# Patient Record
Sex: FEMALE | Race: WHITE | NOT HISPANIC OR LATINO | Employment: FULL TIME | URBAN - METROPOLITAN AREA
[De-identification: names, ages, dates, MRNs, and addresses within clinical notes are randomized per-mention and may not be internally consistent; named-entity substitution may affect disease eponyms.]

---

## 2017-03-07 ENCOUNTER — TRANSFERRED RECORDS (OUTPATIENT)
Dept: HEALTH INFORMATION MANAGEMENT | Facility: CLINIC | Age: 32
End: 2017-03-07

## 2017-03-07 LAB
HPV ABSTRACT: NORMAL
PAP SMEAR - HIM PATIENT REPORTED: NEGATIVE

## 2018-02-23 ENCOUNTER — TRANSFERRED RECORDS (OUTPATIENT)
Dept: HEALTH INFORMATION MANAGEMENT | Facility: CLINIC | Age: 33
End: 2018-02-23

## 2018-03-29 ENCOUNTER — TRANSFERRED RECORDS (OUTPATIENT)
Dept: HEALTH INFORMATION MANAGEMENT | Facility: CLINIC | Age: 33
End: 2018-03-29

## 2018-08-07 ENCOUNTER — MEDICAL CORRESPONDENCE (OUTPATIENT)
Dept: HEALTH INFORMATION MANAGEMENT | Facility: CLINIC | Age: 33
End: 2018-08-07

## 2018-08-07 ENCOUNTER — TRANSFERRED RECORDS (OUTPATIENT)
Dept: HEALTH INFORMATION MANAGEMENT | Facility: CLINIC | Age: 33
End: 2018-08-07

## 2018-08-17 ENCOUNTER — OFFICE VISIT (OUTPATIENT)
Dept: PHARMACY | Facility: CLINIC | Age: 33
End: 2018-08-17
Payer: COMMERCIAL

## 2018-08-17 DIAGNOSIS — J30.2 CHRONIC SEASONAL ALLERGIC RHINITIS DUE TO OTHER ALLERGEN: ICD-10-CM

## 2018-08-17 DIAGNOSIS — J45.30 MILD PERSISTENT ASTHMA WITHOUT COMPLICATION: Primary | ICD-10-CM

## 2018-08-17 DIAGNOSIS — F43.21 ADJUSTMENT DISORDER WITH DEPRESSED MOOD: ICD-10-CM

## 2018-08-17 DIAGNOSIS — G47.00 INSOMNIA, UNSPECIFIED TYPE: ICD-10-CM

## 2018-08-17 DIAGNOSIS — F43.22 ADJUSTMENT DISORDER WITH ANXIOUS MOOD: ICD-10-CM

## 2018-08-17 DIAGNOSIS — M79.7 FIBROMYALGIA: ICD-10-CM

## 2018-08-17 DIAGNOSIS — G43.719 INTRACTABLE CHRONIC MIGRAINE WITHOUT AURA AND WITHOUT STATUS MIGRAINOSUS: ICD-10-CM

## 2018-08-17 PROCEDURE — 99605 MTMS BY PHARM NP 15 MIN: CPT | Performed by: PHARMACIST

## 2018-08-17 PROCEDURE — 99607 MTMS BY PHARM ADDL 15 MIN: CPT | Performed by: PHARMACIST

## 2018-08-17 RX ORDER — TRAZODONE HYDROCHLORIDE 50 MG/1
50 TABLET, FILM COATED ORAL
COMMUNITY
Start: 2017-11-24 | End: 2018-09-20

## 2018-08-17 RX ORDER — HYDROXYZINE HYDROCHLORIDE 25 MG/1
25-50 TABLET, FILM COATED ORAL 2 TIMES DAILY PRN
COMMUNITY
Start: 2017-11-24 | End: 2018-11-27

## 2018-08-17 RX ORDER — MONTELUKAST SODIUM 10 MG/1
10 TABLET ORAL AT BEDTIME
COMMUNITY
Start: 2018-08-03

## 2018-08-17 RX ORDER — LEVALBUTEROL TARTRATE 45 UG/1
1-2 AEROSOL, METERED ORAL EVERY 4 HOURS PRN
COMMUNITY
Start: 2017-05-19 | End: 2022-01-07

## 2018-08-17 RX ORDER — LORAZEPAM 0.5 MG/1
0.5 TABLET ORAL DAILY PRN
COMMUNITY
End: 2019-10-16

## 2018-08-17 RX ORDER — ESCITALOPRAM OXALATE 20 MG/1
40 TABLET ORAL DAILY
COMMUNITY
Start: 2018-02-26 | End: 2018-09-20

## 2018-08-17 RX ORDER — BIOTIN 1 MG
5000 TABLET ORAL DAILY
COMMUNITY
End: 2019-02-19

## 2018-08-17 RX ORDER — BENZONATATE 100 MG/1
100-200 CAPSULE ORAL 3 TIMES DAILY PRN
COMMUNITY
Start: 2018-02-23 | End: 2018-10-02

## 2018-08-17 RX ORDER — FEXOFENADINE HCL 180 MG/1
180 TABLET ORAL DAILY
COMMUNITY
Start: 2018-01-19 | End: 2022-01-07

## 2018-08-17 RX ORDER — GABAPENTIN 300 MG/1
300 CAPSULE ORAL EVERY MORNING
COMMUNITY
Start: 2018-05-09 | End: 2018-11-27

## 2018-08-17 RX ORDER — NAPROXEN 500 MG/1
500 TABLET ORAL 2 TIMES DAILY PRN
COMMUNITY
End: 2018-10-25

## 2018-08-17 RX ORDER — ETONOGESTREL AND ETHINYL ESTRADIOL VAGINAL RING .015; .12 MG/D; MG/D
1 RING VAGINAL WEEKLY
COMMUNITY
Start: 2017-11-24 | End: 2019-06-05

## 2018-08-17 RX ORDER — FLUTICASONE PROPIONATE 110 UG/1
2 AEROSOL, METERED RESPIRATORY (INHALATION) 2 TIMES DAILY
COMMUNITY
Start: 2017-09-06 | End: 2020-02-21

## 2018-08-17 RX ORDER — EPINEPHRINE 0.3 MG/.3ML
0.3 INJECTION SUBCUTANEOUS ONCE
COMMUNITY
Start: 2018-06-12 | End: 2023-03-23

## 2018-08-17 NOTE — MR AVS SNAPSHOT
After Visit Summary   8/17/2018    Paris Nguyen    MRN: [id number]           Patient Information     Date Of Birth          1985        Visit Information        Provider Department      8/17/2018 11:00 AM Areli Schuler [facility] Medication Therapy Management        Today's Diagnoses     Mild persistent asthma without complication    -  1    Fibromyalgia        Chronic seasonal allergic rhinitis due to other allergen        Insomnia, unspecified type        Adjustment disorder with depressed mood        Adjustment disorder with anxious mood        Intractable chronic migraine without aura and without status migrainosus          Care Instructions    Recommendations from today's MTM visit:                                                    Today we reviewed what your medicines are for, how to know if they are working, that your medicines are safe and how to make your medicine regimen as easy as possible.     1. I have placed referrals for psychiatry, acupuncture and pain medicine with health psychology. Those places should be calling you to schedule appointments.  When those phone calls come, discuss coverage and payment with the person on the phone.  I was told this is the best person to help with questions about billing.     2.  You will need to find a new primary care provider in the [facility].  Here are a few names:    Floresita Pacheco      They will all take great care of you.  You can find more information on our website about these providers or just Google them.    Next MTM visit: I'll check in next month and see where you are!    To schedule another MTM appointment, please call the clinic directly or you may call the MTM scheduling line at [phone] or toll-free at [phone].     My Clinical Pharmacist's contact information:                                                      It was a pleasure seeing you today!   Please feel free to contact me with any questions or concerns you have.      Areli Schuler, Pharm.D., [facility]  Medication Therapy Management Pharmacist  Page/VM:  [phone]    You may receive a survey about the MTM services you received.  I would appreciate your feedback to help me serve you better in the future. Please fill it out and return it when you can. Your comments will be anonymous.                  Follow-ups after your visit        Additional Services     ACUPUNCTURE REFERRAL       [facility] provider [facility] [phone]    Please be aware that coverage of these services is subject to the terms and limitations of your health insurance plan.  Call member services at your health plan with any benefit or coverage questions.      Please bring the following with you to your appointment:    (1) Any X-Rays, CTs or MRIs which have been performed.  Contact the facility where they were done to arrange for  prior to your scheduled appointment.  (2) List of current medications   (3) This referral request   (4) Any documents/labs given to you for this referral  Services Requested: INTERVENTIONAL: Evaluations for Blocks/Injections    Please answer the following questions:    1. How long have you been treating this patient for this pain issue?      0 month(s)-  Transferring care from an outside health system    2. Have there been any of the following problematic behaviors?      Medication Management Issues: NO      Chemical Dependency: NO      Psychiatric History or Current Psych/Social Issues: YES depression, anxiety    3. Has the patient been to any other pain clinics and/or programs?      NO - she has seen acupuncture in the past prior to fibromyalgia ddx            BEHAVIORAL / SPIRITUAL HEALTH ([facility] ONLY)       The [facility] Behavioral / Spiritual Health Team is available to support any patient who receives care at the [facility].  This  team provides and/or connects patients and families to appropriate psychological, spiritual, and social work services.      The Behavioral / Spiritual Health Team  also provides consultation to medical providers and other care team members regarding patient behavioral/mental health issues, psychosocial concerns, or spiritual needs.  Services are provided by behavioral health clinicians, licensed psychologists, licensed social workers, and a .     The Behavioral / Spiritual Health Team will coordinate with the patient's medical care team to determine the appropriate response(s) to the current need.        Please provide the following information to assist us in addressing the current concern:    1. Reason for Referral? Depression, anxiety, fibromyalgia - she is specifically requesting psychiatry     2. Has clinical staff discussed this referral to the Behavioral / Spiritual Health Team with the patient and/or caregiver? Yes    3. Who should we contact on the patient's care team to discuss this issue further or to coordinate care? Patient is just establishing care at the [facility] but future contact is recommended            PAIN MANAGEMENT REFERRAL       Your provider has referred you to: [facility]: [facility] [place] [phone] [email or web address]      Please call [phone] to make an appointment. Clinic is located: [facility] [address] [place] [address] [id number] [place] [address]      Please complete the following questions:    Procedure/Referral: Referral Only -  Comprehensive Evaluation and Management  Refer to Dr. Ceci Mcgee for Pain Psychology, Needs approval to be seen, contact [phone] - patient is specifically requesting pain management physician and health psychology.     What is your diagnosis for the patient's pain?  Patient reports fibromyalgia    What are your specific  questions for the pain specialist? Patient requested this referral    Are there any red flags that may impact the assessment or management of the patient? None    Please note the Pre-Op Pain Consult must be scheduled 2-3 weeks prior to the patient's surgery.  Patient's trying to schedule within 2 weeks of surgery may not be accommodated.     Pre-Op Pain Consults are only good for 30 days.    **ANY DIAGNOSTIC TESTS THAT ARE NOT IN EPIC SHOULD BE SENT TO THE PAIN CENTER**    REGARDING OPIOID MEDICATIONS:  The discussion of opioids management, appropriateness of therapy, and dosing will be discussed in patients being seen for evaluation.  The pain management clinics are not long-term prescribing clinics, with transition of prescribing of medications ultimately going back to the referring provider/PCP.  If prescribing is taken over at the pain clinic, it is in actively involved patients whom are appropriate for opioids, urine drug screening is completed, and long-term prescribing plan has been determined.  Therefore, we will not be automatically taking over prescribing at the patient's first visit.  Is this agreeable to you? Agrees - patient will establish care with PCP at the [facility] soon.     Please be aware that coverage of these services is subject to the terms and limitations of your health insurance plan.  Call member services at your health plan with any benefit or coverage questions.      Please bring the following with you to your appointment:    (1) Any X-Rays, CTs or MRIs which have been performed.  Contact the facility where they were done to arrange for  prior to your scheduled appointment.    (2) List of current medications   (3) This referral request   (4) Any documents/labs given to you for this referral                  Who to contact     If you have questions or need follow up information about today's clinic visit or your schedule please contact [facility] MEDICATION THERAPY MANAGEMENT directly at  [phone].  Normal or non-critical lab and imaging results will be communicated to you by MyChart, letter or phone within 4 business days after the clinic has received the results. If you do not hear from us within 7 days, please contact the clinic through [employer] or phone. If you have a critical or abnormal lab result, we will notify you by phone as soon as possible.  Submit refill requests through [employer] or call your pharmacy and they will forward the refill request to us. Please allow 3 business days for your refill to be completed.          Additional Information About Your Visit        [employer] Information     [employer] gives you secure access to your electronic health record. If you see a primary care provider, you can also send messages to your care team and make appointments. If you have questions, please call your primary care clinic.  If you do not have a primary care provider, please call [phone] and they will assist you.        Care EveryWhere ID     This is your Care EveryWhere ID. This could be used by other organizations to access your [place] medical records  [id number]         Blood Pressure from Last 3 Encounters:   10/13/16 115/65    Weight from Last 3 Encounters:   10/12/16 219 lb 1.9 oz (99.4 kg)              We Performed the Following     ACUPUNCTURE REFERRAL     BEHAVIORAL / SPIRITUAL HEALTH ([facility] ONLY)     PAIN MANAGEMENT REFERRAL        Primary Care Provider    None Specified       No primary provider on file.        Equal Access to Services     FERMIN DELEON : Hadii julio Prasad, wanellieda kishore, qaybta kaalana laura barajas, chanel akhtar . So [facility] [place] [phone].    ATENCIÓN: Si habla español, tiene a simons disposición servicios gratuitos de asistencia lingüística. Llame al [phone].    We comply with applicable federal civil rights laws and [place] laws. We do not discriminate on the basis of race, color, national origin, age, disability, sex,  sexual orientation, or gender identity.            Thank you!     Thank you for choosing [facility] MEDICATION THERAPY MANAGEMENT  for your care. Our goal is always to provide you with excellent care. Hearing back from our patients is one way we can continue to improve our services. Please take a few minutes to complete the written survey that you may receive in the mail after your visit with us. Thank you!             Your Updated Medication List - Protect others around you: Learn how to safely use, store and throw away your medicines at www.disposemymeds.org.          This list is accurate as of 8/17/18 11:59 PM.  Always use your most recent med list.                   Brand Name Dispense Instructions for use Diagnosis    benzonatate 100 MG capsule    TESSALON     Take 100-200 mg by mouth 3 times daily as needed Use during asthma exacerbation        biotin 1000 MCG Tabs tablet      Take 5,000 mcg by mouth daily        EPINEPHrine 0.3 MG/0.3ML injection 2-pack    EPIPEN/ADRENACLICK/or ANY BX GENERIC EQUIV     Inject 0.3 mg into the muscle once        escitalopram 20 MG tablet    LEXAPRO     Take 40 mg by mouth daily        etonogestrel-ethinyl estradiol 0.12-0.015 MG/24HR vaginal ring    NUVARING     Place 1 each vaginally once a week        fexofenadine 180 MG tablet    ALLEGRA     Take 180 mg by mouth daily        fluticasone 110 MCG/ACT Inhaler    FLOVENT HFA     Inhale 2 puffs into the lungs 2 times daily        gabapentin 300 MG capsule    NEURONTIN     Take 300 mg by mouth 3 times daily as needed        hydrOXYzine 25 MG tablet    ATARAX     Take 25-50 mg by mouth nightly as needed        levalbuterol 45 MCG/ACT Inhaler    XOPENEX HFA     Inhale 1-2 puffs into the lungs every 4 hours as needed        LORazepam 0.5 MG tablet    ATIVAN     Take 0.5 mg by mouth daily as needed        montelukast 10 MG tablet    SINGULAIR     Take 10 mg by mouth At Bedtime        naproxen 500 MG tablet    NAPROSYN     Take 500 mg  by mouth 2 times daily as needed        traZODone 50 MG tablet    DESYREL     Take 50 mg by mouth nightly as needed

## 2018-08-17 NOTE — PROGRESS NOTES
SUBJECTIVE/OBJECTIVE:                           Paris Nguyen is a 33 year old female coming in for an initial visit for Medication Therapy Management.  She was referred to me from self. She is transferring her care to the [facility] as much as she is able.     Chief Complaint: Initial CMR    Allergies/ADRs: Reviewed in Epic  Tobacco: No tobacco use  Alcohol: 1-3 beverages / week  Caffeine: 2-3 cups/day of coffee  Activity: Very active with gym, pool therapy, PT, Betzy Barnes, gardening  PMH: Reviewed in Epic    Medication Adherence/Access:  no issues reported    Asthma:  Current asthma medications: Xoponex 45 mcg/act 1-2 puffs every 6 hours as needed and prior to exercise and Fluticasone (Flovent) 110 mcg 2 puffs twice daily. Pt rinses their mouth after using steroid inhaler. Asthma triggers include: dust mites, pollens and exercise or sports. She does not have concerns about her inhalers today and feels her asthma is well controlled.   Pt reports the following symptoms: none.  AAP on file: NO  ACT Total Scores 8/17/2018   ACT TOTAL SCORE (Goal Greater than or Equal to 20) 21   In the past 12 months, how many times did you visit the emergency room for your asthma without being admitted to the hospital? 0   In the past 12 months, how many times were you hospitalized overnight because of your asthma? 0     Fibromyalgia: Current medications include gabapentin 300 mg three times daily.  Sedentary activities make her fibro pain worse; light exercise makes it much better. She does pool therapy at [place] twice monthly.  She also has an occupational therapist she really likes and plans to continue to see. She is very active but notes significant soreness after exercise.  She is interested in trying acupuncture again. She also requests a pain management referral and for access to health psychology. Has been on amitriptyline in the past but experienced side effects including deteriorating nail health.  She takes biotin 1000 mcg  "daily for this and reports that it is better now.     Allergies: Current medications include montelukast 10 mg daily, fexofenadine 180 mg daily, hydroxyzine as needed, benzonatate 100 mg 1-2 capsules as needed. She reports allergies to trees, cats, grass, brewers yeast, dust mites, and a variety of foods: melons, bananas, pea protein (oral allergy syndrome).  She is undergoing allergy shots and desensitization with a provider at an outside health system currently. She hopes this will reduce her medication needs.      Insomnia: Current medications include: trazodone 50 daily mg and hydroxyzine 25-50 mg at bedtime as needed (she likes hydroxyzine better because it helps with muscle spasms and anxiety as well as sleep). Pt reports trouble staying asleep and trouble falling asleep. She hopes to get a DreamPad to help with sleep. She hopes this will eliminate her need for medication altogether.     Depression/Anxiety:  Current medications include: Escitalopram 40 mg once daily and trazodone 50 mg at bedtime. Pt reports that depression symptoms are better but were recently worse with some tearful episodes. She finds that her anxiety has been really poor - \"fight or flight state\". She loses her appetite when she is really anxious.  She feels a little \"frayed\".  She only takes her lorazepam when she thinks it will help I.e. when she is preparing for social conflict (difficult work situations, challenging public transportation rides).  She has tried paroxetine and citalopram in the past.  She is concerned that her escitalopram is not working. She does not have confidence in her current provider for psychiatry.  He doesn't spend much time with her during office visits. She would like to establish care at the [facility].      No flowsheet data found.    Migraines: She uses naproxen 500 mg twice daily as needed.  She finds that this is very helpful for her. She has tried Maxalt which made her feel dissociated from her body and Imitrex " wasn't effective. She uses naproxen 2-3 times a week. She denies side effects at this time and is satisfied with her treatment. She is not interested in prophylactic therapy.     Last Comprehensive Metabolic Panel:  No results found for: NA, POTASSIUM, CHLORIDE, CO2, ANIONGAP, GLC, BUN, CR, GFRESTIMATED, MASSIEL    Today's Vitals: There were no vitals taken for this visit. - patient declined      ASSESSMENT:                             Current medications were reviewed today.     Medication Adherence: excellent, no issues identified    Asthma: Stable. Up to date and at goal of ACT > or equal to 20.    Fibromyalgia: Stable. Patient would benefit from an acupuncture referral to help manage muscle pain with fibromyalgia. Will also refer to pain management.     Allergies: Stable. Plan in place with allergist.     Insomnia: Stable.     Depression/Anxiety: Needs Improvement. Based on worsening anxiety symptoms, patient may benefit from additional therapy or a change to an SNRI for anxiety and depression. Defer PHQ9 and GAD7 today. Patient is referred to psychiatry, per her request.     Migraines: Stable.     PLAN:                            1) Referrals for acupuncture, pain medicine, psychiatry and health psychology are all made today.   2) List of primary care providers will be given as well. Patient knows she must establish care with a PCP here to take advantage of pharmacy services.     I spent 45 minutes with this patient today.      Will follow up via [employer] when I have more information about billing info.    The patient was sent via [employer] a summary of these recommendations as an after visit summary.     Areli Schuler, Pharm.D., [facility]  Medication Therapy Management Pharmacist  Page/VM:  [phone]

## 2018-08-18 ASSESSMENT — ASTHMA QUESTIONNAIRES: ACT_TOTALSCORE: 21

## 2018-08-20 NOTE — PATIENT INSTRUCTIONS
Recommendations from today's MTM visit:                                                    Today we reviewed what your medicines are for, how to know if they are working, that your medicines are safe and how to make your medicine regimen as easy as possible.     1. I have placed referrals for psychiatry, acupuncture and pain medicine with health psychology. Those places should be calling you to schedule appointments.  When those phone calls come, discuss coverage and payment with the person on the phone.  I was told this is the best person to help with questions about billing.     2.  You will need to find a new primary care provider in the [facility].  Here are a few names:    Floresita Pacheco      They will all take great care of you.  You can find more information on our website about these providers or just Google them.    Next MTM visit: I'll check in next month and see where you are!    To schedule another MTM appointment, please call the clinic directly or you may call the MTM scheduling line at [phone] or toll-free at [phone].     My Clinical Pharmacist's contact information:                                                      It was a pleasure seeing you today!  Please feel free to contact me with any questions or concerns you have.      Areli Schuler, Pharm.D., [facility]  Medication Therapy Management Pharmacist  Page/VM:  [phone]    You may receive a survey about the MTM services you received.  I would appreciate your feedback to help me serve you better in the future. Please fill it out and return it when you can. Your comments will be anonymous.

## 2018-08-21 ENCOUNTER — HEALTH MAINTENANCE LETTER (OUTPATIENT)
Age: 33
End: 2018-08-21

## 2018-08-24 ENCOUNTER — TRANSFERRED RECORDS (OUTPATIENT)
Dept: HEALTH INFORMATION MANAGEMENT | Facility: CLINIC | Age: 33
End: 2018-08-24

## 2018-09-18 NOTE — TELEPHONE ENCOUNTER
FUTURE VISIT INFORMATION      FUTURE VISIT INFORMATION:    Date: 9/24/18    Time:     Location[place] [facility]  REFERRAL INFORMATION:    Referring provider:      Referring providers clinic:  Primary Care at     Reason for visit/diagnosis  Fibromyalgia    RECORDS REQUESTED FROM:       Clinic name Comments Records Status Imaging Status   [facility]  Care Everywhere n/a                                   RECORDS STATUS

## 2018-09-20 ENCOUNTER — OFFICE VISIT (OUTPATIENT)
Dept: BEHAVIORAL HEALTH | Facility: CLINIC | Age: 33
End: 2018-09-20
Payer: COMMERCIAL

## 2018-09-20 VITALS — SYSTOLIC BLOOD PRESSURE: 126 MMHG | HEART RATE: 72 BPM | DIASTOLIC BLOOD PRESSURE: 82 MMHG

## 2018-09-20 DIAGNOSIS — F41.1 GENERALIZED ANXIETY DISORDER: ICD-10-CM

## 2018-09-20 DIAGNOSIS — F90.9 ATTENTION DEFICIT HYPERACTIVITY DISORDER (ADHD), UNSPECIFIED ADHD TYPE: ICD-10-CM

## 2018-09-20 DIAGNOSIS — F33.1 MODERATE EPISODE OF RECURRENT MAJOR DEPRESSIVE DISORDER (H): Primary | ICD-10-CM

## 2018-09-20 DIAGNOSIS — G47.00 INSOMNIA, UNSPECIFIED TYPE: ICD-10-CM

## 2018-09-20 PROBLEM — J45.40 MODERATE PERSISTENT ASTHMA WITHOUT COMPLICATION: Status: ACTIVE | Noted: 2017-09-20

## 2018-09-20 PROBLEM — M79.7 FIBROMYALGIA: Status: ACTIVE | Noted: 2018-02-08

## 2018-09-20 PROBLEM — F84.0 AUTISM SPECTRUM DISORDER: Status: ACTIVE | Noted: 2018-08-31

## 2018-09-20 PROBLEM — F41.9 ANXIETY: Status: ACTIVE | Noted: 2018-08-31

## 2018-09-20 PROBLEM — F42.9 OCD (OBSESSIVE COMPULSIVE DISORDER): Status: ACTIVE | Noted: 2018-08-31

## 2018-09-20 RX ORDER — GABAPENTIN 300 MG/1
600 CAPSULE ORAL AT BEDTIME
Qty: 60 CAPSULE | Refills: 1 | Status: SHIPPED | OUTPATIENT
Start: 2018-09-20 | End: 2018-11-27

## 2018-09-20 RX ORDER — GUANFACINE 1 MG/1
1 TABLET, EXTENDED RELEASE ORAL AT BEDTIME
Qty: 30 TABLET | Refills: 1 | Status: SHIPPED | OUTPATIENT
Start: 2018-09-20 | End: 2018-10-16 | Stop reason: SINTOL

## 2018-09-20 RX ORDER — TRAZODONE HYDROCHLORIDE 50 MG/1
25-50 TABLET, FILM COATED ORAL
Qty: 30 TABLET | Refills: 1 | Status: SHIPPED | OUTPATIENT
Start: 2018-09-20 | End: 2019-04-16

## 2018-09-20 RX ORDER — ESCITALOPRAM OXALATE 20 MG/1
20 TABLET ORAL DAILY
Qty: 30 TABLET | Refills: 1 | Status: SHIPPED | OUTPATIENT
Start: 2018-09-20 | End: 2018-10-16

## 2018-09-20 ASSESSMENT — ANXIETY QUESTIONNAIRES
1. FEELING NERVOUS, ANXIOUS, OR ON EDGE: MORE THAN HALF THE DAYS
GAD7 TOTAL SCORE: 11
6. BECOMING EASILY ANNOYED OR IRRITABLE: MORE THAN HALF THE DAYS
5. BEING SO RESTLESS THAT IT IS HARD TO SIT STILL: SEVERAL DAYS
GAD7 TOTAL SCORE: 11
7. FEELING AFRAID AS IF SOMETHING AWFUL MIGHT HAPPEN: SEVERAL DAYS
2. NOT BEING ABLE TO STOP OR CONTROL WORRYING: SEVERAL DAYS
GAD7 TOTAL SCORE: 11
4. TROUBLE RELAXING: NEARLY EVERY DAY
7. FEELING AFRAID AS IF SOMETHING AWFUL MIGHT HAPPEN: SEVERAL DAYS
3. WORRYING TOO MUCH ABOUT DIFFERENT THINGS: SEVERAL DAYS

## 2018-09-20 ASSESSMENT — PATIENT HEALTH QUESTIONNAIRE - PHQ9
SUM OF ALL RESPONSES TO PHQ QUESTIONS 1-9: 21
SUM OF ALL RESPONSES TO PHQ QUESTIONS 1-9: 21
10. IF YOU CHECKED OFF ANY PROBLEMS, HOW DIFFICULT HAVE THESE PROBLEMS MADE IT FOR YOU TO DO YOUR WORK, TAKE CARE OF THINGS AT HOME, OR GET ALONG WITH OTHER PEOPLE: EXTREMELY DIFFICULT
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
## 2018-09-20 NOTE — PATIENT INSTRUCTIONS
Decrease Lexapro to 20mg daily.   Add a bedtime dose of 600mg of gabapentin to address sleep, and possibly RLS. Ask your new pain and primary doctors about RLS workup, and potentially Complete Blood Count and maybe iron studies. Iron supplementation may help if RLS is an issue as well.   Decrease trazodone as you are able. Start with 1/2 tablet, and see how this goes, gauge for improvement in morning sedation. If this goes okay, may discontinue.   Start guanfacine ER (Intuniv) 1mg at bedtime.       Scheduling:  If you have any concerns about today's visit or wish to schedule another appointment please call our office during normal business hours [phone] (8-5:00 M-F)    Contact Us:  Please call [phone] during business hours (8-5:00 M-F).  If after clinic hours, or on the weekend, please call  [phone].    Financial Assistance [phone]  [facility] Billing [phone]  [facility] Billing [phone]  Medical Records [phone]      MENTAL HEALTH CRISIS NUMBERS:  [facility]:   [facility] - [phone]   Crisis Residence [facility]idence - [phone]   Walk-In Counseling [facility] - [phone]   COPE 24/7 [facility] Mobile Team for Adults - [[phone]]; Child - [[phone]]     Crisis Connection - [phone]     [facility] - [phone]   Walk-in counseling [facility] - [phone]   Walk-in counseling [facility] - [phone]   Crisis Residence [facility] - [phone]   Urgent Care Adult Mental Health:   --Drop-in, 24/7 crisis line, and [facility] Mobile Team [[phone]]    CRISIS TEXT LINE: Text [phone] from anywhere, anytime, any crisis 24/7;    OR SEE www.crisistextline.org     Poison Control Center - [phone]    CHILD: [facility] needs assessment team - [phone]     [facility] - [phone]; or [facility] - [phone]    If  you have a medical emergency please call 911 or go to the nearest ER.

## 2018-09-20 NOTE — NURSING NOTE
Chief Complaint   Patient presents with     Eval/Assessment     depression and anxiety     Would like out of today's visit:  Treatment for increased in depression does not think antidepressant is working. Interested in [facility]. Had some recent testing done and wants to know how it relates to her medication management.

## 2018-09-20 NOTE — MR AVS SNAPSHOT
After Visit Summary   9/20/2018    Paris Nguyen    MRN: [id number]           Patient Information     Date Of Birth          1985        Visit Information        Provider Department      9/20/2018 3:00 PM Chin Cerda MD [facility]        Today's Diagnoses     Moderate episode of recurrent major depressive disorder (H)    -  1    Generalized anxiety disorder        Attention deficit hyperactivity disorder (ADHD), unspecified ADHD type        Insomnia, unspecified type          Care Instructions    Decrease Lexapro to 20mg daily.   Add a bedtime dose of 600mg of gabapentin to address sleep, and possibly RLS. Ask your new pain and primary doctors about RLS workup, and potentially Complete Blood Count and maybe iron studies. Iron supplementation may help if RLS is an issue as well.   Decrease trazodone as you are able. Start with 1/2 tablet, and see how this goes, gauge for improvement in morning sedation. If this goes okay, may discontinue.   Start guanfacine ER (Intuniv) 1mg at bedtime.       Scheduling:  If you have any concerns about today's visit or wish to schedule another appointment please call our office during normal business hours [phone] (8-5:00 M-F)    Contact Us:  Please call [phone] during business hours (8-5:00 M-F).  If after clinic hours, or on the weekend, please call  [phone].    Financial Assistance [phone]  [employer] Billing [phone]  [place] Billing [phone]  Medical Records [phone]      MENTAL HEALTH CRISIS NUMBERS:  [facility]:   [facility] - [phone]   Crisis Residence [facility] Residence - [phone]   Walk-In Counseling [facility] - [phone]   COPE 24/7 [place] Mobile Team for Adults - [[phone]]; Child - [[phone]]     Crisis Connection - [phone]     [facility]:   [facility] - [phone]   Walk-in counseling [facility]  [phone]   Walk-in counseling [facility] - [phone]   Crisis Residence [facility] - [phone]   Urgent Care Adult Mental Health:   --Drop-in, 24/7 crisis line, [facility] Mobile Team [[phone]]    CRISIS TEXT LINE: Text [phone] from anywhere, anytime, any crisis 24/7;    OR SEE www.crisistextline.org     Poison Control Center - [phone]    CHILD: [facility] needs assessment team - [phone]     [facility] - 1-573.941.3386; or [employer] [facility] - 1-316.344.1933    If you have a medical emergency please call 911 or go to the nearest ER.             Follow-ups after your visit        Follow-up notes from your care team     Return in about 4 weeks (around 10/18/2018).      Your next 10 appointments already scheduled     Sep 24, 2018  8:20 AM CDT   (Arrive by 8:05 AM)   New Patient Visit with TORSTEN Salcedo [facility] ([facility] [place])    [address] [address] [phone]            Oct 16, 2018 11:00 AM CDT   (Arrive by 10:45 AM)   Return Visit with Chin Cerda MD   [facility] ([facility])    [address] [address] [phone]              Who to contact     Please call your clinic at [phone] to:    Ask questions about your health    Make or cancel appointments    Discuss your medicines    Learn about your test results    Speak to your doctor            Additional Information About Your Visit        MyChart Information     [employer] gives you secure access to your electronic health record. If you see a primary care provider, you can also send messages to your care team and make appointments. If you have questions, please call your primary care clinic.  If you do not have a primary care provider, please call [phone] and they will assist  you.      [employer] is an electronic gateway that provides easy, online access to your medical records. With [employer], you can request a clinic appointment, read your test results, renew a prescription or communicate with your care team.     To access your existing account, please contact your [facility] or call [phone] for assistance.        Care EveryWhere ID     This is your Care EveryWhere ID. This could be used by other organizations to access your [facility] medical records  [id number]        Your Vitals Were     Pulse                   72            Blood Pressure from Last 3 Encounters:   09/20/18 126/82   10/13/16 115/65    Weight from Last 3 Encounters:   10/12/16 99.4 kg (219 lb 1.9 oz)              Today, you had the following     No orders found for display         Today's Medication Changes          These changes are accurate as of 9/20/18  4:38 PM.  If you have any questions, ask your nurse or doctor.               Start taking these medicines.        Dose/Directions    guanFACINE 1 MG Tb24 24 hr tablet   Commonly known as:  INTUNIV   Used for:  Generalized anxiety disorder, Attention deficit hyperactivity disorder (ADHD), unspecified ADHD type   Started by:  Chin Cerda MD        Dose:  1 mg   Take 1 tablet (1 mg) by mouth At Bedtime   Quantity:  30 tablet   Refills:  1         These medicines have changed or have updated prescriptions.        Dose/Directions    escitalopram 20 MG tablet   Commonly known as:  LEXAPRO   This may have changed:  how much to take   Used for:  Moderate episode of recurrent major depressive disorder (H), Generalized anxiety disorder   Changed by:  Chin Cerda MD        Dose:  20 mg   Take 1 tablet (20 mg) by mouth daily   Quantity:  30 tablet   Refills:  1       * gabapentin 300 MG capsule   Commonly known as:  NEURONTIN   This may have changed:  Another medication with the same name was added. Make sure you understand how and  when to take each.   Changed by:  Chin Cerda MD        Dose:  300 mg   Take 300 mg by mouth 3 times daily as needed   Refills:  0       * gabapentin 300 MG capsule   Commonly known as:  NEURONTIN   This may have changed:  You were already taking a medication with the same name, and this prescription was added. Make sure you understand how and when to take each.   Used for:  Insomnia, unspecified type, Generalized anxiety disorder   Changed by:  Chin Cerda MD        Dose:  600 mg   Take 2 capsules (600 mg) by mouth At Bedtime   Quantity:  60 capsule   Refills:  1       traZODone 50 MG tablet   Commonly known as:  DESYREL   This may have changed:    - how much to take  - reasons to take this   Used for:  Insomnia, unspecified type   Changed by:  Chin Cerda MD        Dose:  25-50 mg   Take 0.5-1 tablets (25-50 mg) by mouth nightly as needed for sleep   Quantity:  30 tablet   Refills:  1       * Notice:  This list has 2 medication(s) that are the same as other medications prescribed for you. Read the directions carefully, and ask your doctor or other care provider to review them with you.         Where to get your medicines      These medications were sent to [facility] [address] [place], [place] - [address]  [address], [place] [address]     Phone:  [phone]     escitalopram 20 MG tablet    gabapentin 300 MG capsule    guanFACINE 1 MG Tb24 24 hr tablet    traZODone 50 MG tablet                Primary Care Provider    None Specified       No primary provider on file.        Equal Access to Services     [facility]: Hadii julio Prasad, wanellieda ludonnelladaha, qaybta kaalmada landryda, chanel lazo. So [facility] [place] [phone].    ATENCIÓN: Si habla español, tiene a simons disposición servicios gratuitos de asistencia lingüística. Llame al [phone].    We comply with applicable federal civil rights laws and [place] laws. We do not discriminate on  the basis of race, color, national origin, age, disability, sex, sexual orientation, or gender identity.            Thank you!     Thank you for choosing [facility]  for your care. Our goal is always to provide you with excellent care. Hearing back from our patients is one way we can continue to improve our services. Please take a few minutes to complete the written survey that you may receive in the mail after your visit with us. Thank you!             Your Updated Medication List - Protect others around you: Learn how to safely use, store and throw away your medicines at www.disposemymeds.org.          This list is accurate as of 9/20/18  4:38 PM.  Always use your most recent med list.                   Brand Name Dispense Instructions for use Diagnosis    benzonatate 100 MG capsule    TESSALON     Take 100-200 mg by mouth 3 times daily as needed Use during asthma exacerbation        biotin 1000 MCG Tabs tablet      Take 5,000 mcg by mouth daily        EPINEPHrine 0.3 MG/0.3ML injection 2-pack    EPIPEN/ADRENACLICK/or ANY BX GENERIC EQUIV     Inject 0.3 mg into the muscle once        escitalopram 20 MG tablet    LEXAPRO    30 tablet    Take 1 tablet (20 mg) by mouth daily    Moderate episode of recurrent major depressive disorder (H), Generalized anxiety disorder       etonogestrel-ethinyl estradiol 0.12-0.015 MG/24HR vaginal ring    NUVARING     Place 1 each vaginally once a week        fexofenadine 180 MG tablet    ALLEGRA     Take 180 mg by mouth daily        fluticasone 110 MCG/ACT Inhaler    FLOVENT HFA     Inhale 2 puffs into the lungs 2 times daily        * gabapentin 300 MG capsule    NEURONTIN     Take 300 mg by mouth 3 times daily as needed        * gabapentin 300 MG capsule    NEURONTIN    60 capsule    Take 2 capsules (600 mg) by mouth At Bedtime    Insomnia, unspecified type, Generalized anxiety disorder       guanFACINE 1 MG Tb24 24 hr tablet    INTUNIV    30 tablet    Take 1 tablet  (1 mg) by mouth At Bedtime    Generalized anxiety disorder, Attention deficit hyperactivity disorder (ADHD), unspecified ADHD type       hydrOXYzine 25 MG tablet    ATARAX     Take 25-50 mg by mouth 2 times daily as needed for itching or anxiety        levalbuterol 45 MCG/ACT Inhaler    XOPENEX HFA     Inhale 1-2 puffs into the lungs every 4 hours as needed        LORazepam 0.5 MG tablet    ATIVAN     Take 0.5 mg by mouth daily as needed        montelukast 10 MG tablet    SINGULAIR     Take 10 mg by mouth At Bedtime        naproxen 500 MG tablet    NAPROSYN     Take 500 mg by mouth 2 times daily as needed        traZODone 50 MG tablet    DESYREL    30 tablet    Take 0.5-1 tablets (25-50 mg) by mouth nightly as needed for sleep    Insomnia, unspecified type       * Notice:  This list has 2 medication(s) that are the same as other medications prescribed for you. Read the directions carefully, and ask your doctor or other care provider to review them with you.

## 2018-09-21 ASSESSMENT — PATIENT HEALTH QUESTIONNAIRE - PHQ9: SUM OF ALL RESPONSES TO PHQ QUESTIONS 1-9: 21

## 2018-09-21 ASSESSMENT — ANXIETY QUESTIONNAIRES: GAD7 TOTAL SCORE: 11

## 2018-09-24 ENCOUNTER — OFFICE VISIT (OUTPATIENT)
Dept: ANESTHESIOLOGY | Facility: CLINIC | Age: 33
End: 2018-09-24
Payer: COMMERCIAL

## 2018-09-24 ENCOUNTER — PRE VISIT (OUTPATIENT)
Dept: ANESTHESIOLOGY | Facility: CLINIC | Age: 33
End: 2018-09-24

## 2018-09-24 VITALS
DIASTOLIC BLOOD PRESSURE: 67 MMHG | WEIGHT: 226 LBS | HEART RATE: 77 BPM | BODY MASS INDEX: 37.65 KG/M2 | RESPIRATION RATE: 16 BRPM | HEIGHT: 65 IN | SYSTOLIC BLOOD PRESSURE: 103 MMHG

## 2018-09-24 DIAGNOSIS — M79.7 FIBROMYALGIA: Primary | ICD-10-CM

## 2018-09-24 DIAGNOSIS — M62.838 MUSCLE SPASM: ICD-10-CM

## 2018-09-24 DIAGNOSIS — M79.18 MYOFASCIAL PAIN: ICD-10-CM

## 2018-09-24 RX ORDER — TIZANIDINE 2 MG/1
2 TABLET ORAL 3 TIMES DAILY PRN
Qty: 90 TABLET | Refills: 0 | Status: SHIPPED | OUTPATIENT
Start: 2018-09-24 | End: 2019-06-05

## 2018-09-24 ASSESSMENT — PATIENT HEALTH QUESTIONNAIRE - PHQ9
SUM OF ALL RESPONSES TO PHQ QUESTIONS 1-9: 17
SUM OF ALL RESPONSES TO PHQ QUESTIONS 1-9: 17
10. IF YOU CHECKED OFF ANY PROBLEMS, HOW DIFFICULT HAVE THESE PROBLEMS MADE IT FOR YOU TO DO YOUR WORK, TAKE CARE OF THINGS AT HOME, OR GET ALONG WITH OTHER PEOPLE: VERY DIFFICULT

## 2018-09-24 ASSESSMENT — ENCOUNTER SYMPTOMS
CHILLS: 0
DIARRHEA: 0
NUMBNESS: 1
EYE REDNESS: 1
DOUBLE VISION: 1
WEIGHT GAIN: 0
COUGH DISTURBING SLEEP: 0
HALLUCINATIONS: 0
HEMOPTYSIS: 0
DIZZINESS: 1
BOWEL INCONTINENCE: 0
BLOOD IN STOOL: 0
DYSPNEA ON EXERTION: 1
SHORTNESS OF BREATH: 1
TINGLING: 1
ABDOMINAL PAIN: 0
VOMITING: 0
HEADACHES: 1
NIGHT SWEATS: 1
NAUSEA: 1
POOR WOUND HEALING: 0
FATIGUE: 1
DEPRESSION: 1
DECREASED APPETITE: 1
SEIZURES: 0
EYE WATERING: 1
PANIC: 0
WEAKNESS: 0
LOSS OF CONSCIOUSNESS: 0
FEVER: 0
INSOMNIA: 0
ARTHRALGIAS: 1
INCREASED ENERGY: 1
SPEECH CHANGE: 1
NERVOUS/ANXIOUS: 1
BLOATING: 1
WEIGHT LOSS: 0
SPUTUM PRODUCTION: 1
JAUNDICE: 0
RECTAL PAIN: 1
SNORES LOUDLY: 0
DISTURBANCES IN COORDINATION: 1
BACK PAIN: 1
POLYDIPSIA: 1
ALTERED TEMPERATURE REGULATION: 1
NECK PAIN: 1
EYE IRRITATION: 1
PARALYSIS: 0
DECREASED CONCENTRATION: 1
MEMORY LOSS: 0
JOINT SWELLING: 0
HEARTBURN: 1
NAIL CHANGES: 0
COUGH: 0
POLYPHAGIA: 0
MUSCLE WEAKNESS: 1
EYE PAIN: 1
CONSTIPATION: 1
STIFFNESS: 1
MYALGIAS: 1
POSTURAL DYSPNEA: 0
MUSCLE CRAMPS: 1
TREMORS: 0
SKIN CHANGES: 0

## 2018-09-24 ASSESSMENT — PAIN SCALES - GENERAL: PAINLEVEL: MILD PAIN (2)

## 2018-09-24 NOTE — LETTER
"9/24/2018       RE: Paris Nguyen  [address] [place] [address]     Dear Colleague,    Thank you for referring your patient, Paris Nguyen, to the [facility] at [facility]. Please see a copy of my visit note below.    I had the pleasure of meeting Ms. Paris Nguyen on 9/24/2018 in the outpatient pain clinic in consult for Dr. Alvarado with regards to her fibromyalgia.   HPI:  Patient presents with past medical history of OCD, ADHD, depression, asthma, possible autism spectrum disorder, endometriosis, IBS; she is here today for evaluation of fibromyalgia. Patient states she was diagnosed with fibromyalgia by \"process of elimination\" by her neurologist and primary care provider approximately two years ago. She reports chronic fatigue and widespread pain; symptoms are aggravated by stress, depression, sleeplessness. Patient closely follows with her psychiatrist, but admits she is \"in between therapists\" but is interested in finding someone who has an understanding of chronic pain.   Her medication regimen for fibromyalgia management currently consists of gabapentin 300 mg which she takes morning and afternoon with a recent increase to 600 mg at HS at bedtime. She is also taking Lexapro through psychiatry management, but mentions she is tapering down with plans to alter to Cymbalta. She takes naproxen as needed which is greatly efficacious.   Further attempted treatments include current once weekly aquatic physical therapy, occupation therapy for sensory integration, regular massage therapy. She has used a friend's TENS unit and oral muscle relaxants in the past with good benefit. Patient also states that she focuses heavily on sleep hygiene with blackout curtains, noise machine, and a weighted blanket.    ?  Past Medical History:   Diagnosis Date     Anxiety      Depression      GERD (gastroesophageal reflux disease)      IBS " (irritable bowel syndrome)      Migraines      Pelvic and perineal pain      Uncomplicated asthma     past medical history reviewed with patient.   Past Surgical History:   Procedure Laterality Date     CYSTOSCOPY N/A 10/12/2016    Procedure: CYSTOSCOPY;  Surgeon: Eliazar Patel MD;  Location:  OR     DAVINCI ASSISTED ABLATION / EXCISION OF ENDOMETRIOSIS N/A 10/12/2016    Procedure: DAVINCI ASSISTED ABLATION / EXCISION OF ENDOMETRIOSIS;  Surgeon: Eliazar Patel MD;  Location:  OR     DAVINCI LYSIS OF ADHESIONS N/A 10/12/2016    Procedure: DAVINCI LYSIS OF ADHESIONS;  Surgeon: Eliazar Patel MD;  Location:  OR     DAVINCI PELVIC PROCEDURE N/A 10/12/2016    Procedure: DAVINCI PELVIC PROCEDURE;  Surgeon: Eliazar Patel MD;  Location:  OR     DILATION AND CURETTAGE, HYSTEROSCOPY DIAGNOSTIC, COMBINED N/A 10/12/2016    Procedure: COMBINED DILATION AND CURETTAGE, HYSTEROSCOPY DIAGNOSTIC;  Surgeon: Eliazar Patel MD;  Location:  OR     EYE SURGERY      Eye Surgery x 2 as an infant     HC TOOTH EXTRACTION W/FORCEP      past surgical history reviewed with patient.   Medications:  Current Outpatient Prescriptions   Medication     benzonatate (TESSALON) 100 MG capsule     biotin 1000 MCG TABS tablet     EPINEPHrine (EPIPEN/ADRENACLICK/OR ANY BX GENERIC EQUIV) 0.3 MG/0.3ML injection 2-pack     escitalopram (LEXAPRO) 20 MG tablet     etonogestrel-ethinyl estradiol (NUVARING) 0.12-0.015 MG/24HR vaginal ring     fexofenadine (ALLEGRA) 180 MG tablet     gabapentin (NEURONTIN) 300 MG capsule     gabapentin (NEURONTIN) 300 MG capsule     guanFACINE (INTUNIV) 1 MG TB24 24 hr tablet     hydrOXYzine (ATARAX) 25 MG tablet     levalbuterol (XOPENEX HFA) 45 MCG/ACT Inhaler     LORazepam (ATIVAN) 0.5 MG tablet     montelukast (SINGULAIR) 10 MG tablet     naproxen (NAPROSYN) 500 MG tablet     traZODone (DESYREL) 50 MG tablet     fluticasone (FLOVENT HFA) 110 MCG/ACT Inhaler     No current  "facility-administered medications for this visit.      A multi-state Prescription Monitoring Program reviewed and no aberrancies noted.     Allergies:     Allergies   Allergen Reactions     Latex Rash     Rash and blisters     Liquid Adhesive Rash     Rash and blisters       Family History:  family history includes Alcoholism in her father; Attention Deficit Disorder in her sister; Depression in her father; Hypothyroidism in her mother; KIDNEY DISEASE in her mother; Myocardial Infarction (age of onset: 53) in her paternal grandfather; Pancreatitis in her father.  Social history: she lives in [place]. She is in the process of looking for a new job.   Smoking: never smoker. Alcohol: yes, 3x/month. Street drugs: denies.     ROS:  A 14 point review of systems was completed; results are listed at end of note.     Objective:   /67  Pulse 77  Resp 16  Ht 1.638 m (5' 4.5\")  Wt 102.5 kg (226 lb)  BMI 38.19 kg/m2  Body mass index is 38.19 kg/(m^2).  General: In no apparent distress  Mental status: Normal mood and affect, pleasant  Neuro: Alert, oriented. No sensory deficits.   Head: Atraumatic, normocephalic  Eyes: Extra-ocular movements grossly intact, no scleral icterus  Neck: Supple, Range of motion full  Respiratory: Non-labored breathing; No respiratory distress  Skin: No rashes or lesions noted on exposed areas of skin  Msk:  Full range of motion of all 4 extremities, no swelling or erythema of joints   TTP in 18/18 common points for fibromyalgia: occiput, trapezius, supraspinatus, lateral glutes, low cervical neck, 2nd rib, lateral epicondyles, greater trochanters, bilateral knees.     Assessment:  Ms. Paris Nguyen is a 34 yo female seen today for fibromyalgia management.     Plan:   1. Patient education: I went over the above diagnoses and treatment plan with her and answered all of her questions.  2. Interventions:  -We discussed integrative medicine modalities, such as acupuncture, myofascial release, " TENS, trigger point injections/dry needling. Patient interested in trying a TENS unit; DME provided. Acupuncture may be considered in the future as patient has had good benefit with TMJ in the past.   3. Medications  1. Trial of tizanidine 2 mg tid prn for muscle spasm; I did discuss the weak evidence for skeletal muscle relaxants in fibromyalgia, however, as she reports benefit in the past, I think it is a reasonable option. Discussed side effect profile with patient in depth.   -Patient is likely tapering from her Lexapro with alteration to duloxetine through her psychiatrist.   -May consider further titration of gabapentin in future.   -Continue with naproxen; consider alternating with acetaminophen.   4. Exercise program:   -Current in physical therapy, OT, and massage; continue with HEP.   5. Behavioral Psychology:   -Referral to Dr. Ceci Mcgee in pain psychology; consider CBT. Patient has had benefit with CBT in the past.   6. Follow up: Return in 8-12 weeks.     Total time spent was 30 minutes, and more than 50% of face to face time was spent in counseling and/or coordination of care regarding the above plan.    Thank you for the consult.   TORSTEN Salcedo, [facility]          Answers for HPI/ROS submitted by the patient on 9/24/2018   General Symptoms: Yes  Skin Symptoms: Yes  HENT Symptoms: No  EYE SYMPTOMS: Yes  HEART SYMPTOMS: No  LUNG SYMPTOMS: Yes  INTESTINAL SYMPTOMS: Yes  URINARY SYMPTOMS: No  GYNECOLOGIC SYMPTOMS: No  BREAST SYMPTOMS: No  SKELETAL SYMPTOMS: Yes  BLOOD SYMPTOMS: No  NERVOUS SYSTEM SYMPTOMS: Yes  MENTAL HEALTH SYMPTOMS: Yes  Fever: No  Loss of appetite: Yes  Weight loss: No  Weight gain: No  Fatigue: Yes  Night sweats: Yes  Chills: No  Increased stress: Yes  Excessive hunger: No  Excessive thirst: Yes  Feeling hot or cold when others believe the temperature is normal: Yes  Loss of height: No  Post-operative complications: No  Surgical  site pain: No  Hallucinations: No  Change in or Loss of Energy: Yes  Hyperactivity: No  Confusion: No  Changes in hair: No  Changes in moles/birth marks: No  Itching: Yes  Rashes: No  Changes in nails: No  Acne: Yes  Hair in places you don't want it: No  Change in facial hair: No  Warts: No  Non-healing sores: No  Scarring: No  Flaking of skin: No  Color changes of hands/feet in cold : No  Sun sensitivity: Yes  Skin thickening: No  Eye pain: Yes  Vision loss: No  Dry eyes: Yes  Watery eyes: Yes  Eye bulging: No  Double vision: Yes  Flashing of lights: No  Spots: No  Floaters: Yes  Redness: Yes  Crossed eyes: Yes  Tunnel Vision: No  Yellowing of eyes: No  Eye irritation: Yes  Cough: No  Sputum or phlegm: Yes  Coughing up blood: No  Difficulty breating or shortness of breath: Yes  Snoring: No  Difficulty breathing on exertion: Yes  Nighttime Cough: No  Difficulty breathing when lying flat: No  Heart burn or indigestion: Yes  Nausea: Yes  Vomiting: No  Abdominal pain: No  Bloating: Yes  Constipation: Yes  Diarrhea: No  Blood in stool: No  Black stools: No  Rectal or Anal pain: Yes  Fecal incontinence: No  Yellowing of skin or eyes: No  Vomit with blood: No  Change in stools: No  Back pain: Yes  Muscle aches: Yes  Neck pain: Yes  Swollen joints: No  Joint pain: Yes  Bone pain: No  Muscle cramps: Yes  Muscle weakness: Yes  Joint stiffness: Yes  Bone fracture: No  Trouble with coordination: Yes  Dizziness or trouble with balance: Yes  Fainting or black-out spells: No  Memory loss: No  Headache: Yes  Seizures: No  Speech problems: Yes  Tingling: Yes  Tremor: No  Weakness: No  Difficulty walking: No  Paralysis: No  Numbness: Yes  Nervous or Anxious: Yes  Depression: Yes  Trouble sleeping: No  Trouble thinking or concentrating: Yes  Mood changes: No  Panic attacks: No  PHQ-2 Score: 4  If you checked off any problems, how difficult have these problems made it for you to do your work, take care of things at home, or get along  with other people?: Very difficult  PHQ9 TOTAL SCORE: 17      Again, thank you for allowing me to participate in the care of your patient.      Sincerely,    TORSTEN Salcedo CNP

## 2018-09-24 NOTE — NURSING NOTE
LPN reviewed AVS with Pt.  Pt verbalized an understanding of information, and was asked to contact clinic with questions.    Annabel Lucia LPN

## 2018-09-24 NOTE — LETTER
Date:September 25, 2018      Patient was self referred, no letter generated. Do not send.        [facility] Information

## 2018-09-24 NOTE — MR AVS SNAPSHOT
After Visit Summary   9/24/2018    Paris Nguyen    MRN: [id number]           Patient Information     Date Of Birth          1985        Visit Information        Provider Department      9/24/2018 8:20 AM Terri Otero APRN [facility]        Today's Diagnoses     Fibromyalgia    -  1    Muscle spasm        Myofascial pain          Care Instructions    1. Trial Tizanidine 2 mg Three times a day, as needed.    2.  Referral to Dr. Mcgee in Pain/Health Psychology.   [phone]    3. TENS unit ordered- Contact your insurance regarding coverage.      Follow up: 8 weeks in clinic.       To speak with a nurse, schedule/reschedule/cancel a clinic appointment, or request a medication refill call: [phone]     You can also reach us by [employer]: [email or web address]    For refills, please call on Monday, 1 week before your medication runs out. The doctors are not always in clinic, so this gives us time to get your prescriptions ready.  Please let us know the name of the medication you are requesting a refill of.                                     Follow-ups after your visit        Additional Services     PSYCHOLOGY REFERRAL       To make an appointment for health psychology, call any of these providers to make an appointmen:    Dr. Ceci Mcgee [phone]                  Follow-up notes from your care team     Return in about 8 weeks (around 11/19/2018).      Your next 10 appointments already scheduled     Oct 02, 2018  7:05 AM CDT   (Arrive by 6:50 AM)   New Patient Visit with DO FABI Staton [facility] ([facility])    [address] [place] [id number] [phone]            Oct 16, 2018 11:00 AM CDT   (Arrive by 10:45 AM)   Return Visit with MD FABI Iqbal [facility] ([facility])    [address] [place] [address]  "Se  3rd [facility] [id number] [phone]              Who to contact     Please call your clinic at [phone] to:    Ask questions about your health    Make or cancel appointments    Discuss your medicines    Learn about your test results    Speak to your doctor            Additional Information About Your Visit        MyChart Information     [employer] gives you secure access to your electronic health record. If you see a primary care provider, you can also send messages to your care team and make appointments. If you have questions, please call your primary care clinic.  If you do not have a primary care provider, please call [phone] and they will assist you.      [employer] is an electronic gateway that provides easy, online access to your medical records. With [employer], you can request a clinic appointment, read your test results, renew a prescription or communicate with your care team.     To access your existing account, please contact your [facility] or call [phone] for assistance.        Care EveryWhere ID     This is your Care EveryWhere ID. This could be used by other organizations to access your [place] medical records  [id number]        Your Vitals Were     Pulse Respirations Height BMI (Body Mass Index)          77 16 1.638 m (5' 4.5\") 38.19 kg/m2         Blood Pressure from Last 3 Encounters:   09/24/18 103/67   09/20/18 126/82   10/13/16 115/65    Weight from Last 3 Encounters:   09/24/18 102.5 kg (226 lb)   10/12/16 99.4 kg (219 lb 1.9 oz)              We Performed the Following     PSYCHOLOGY REFERRAL          Today's Medication Changes          These changes are accurate as of 9/24/18  9:18 AM.  If you have any questions, ask your nurse or doctor.               Start taking these medicines.        Dose/Directions    order for DME   Used for:  Fibromyalgia, Muscle spasm, Myofascial pain   Started by:  Terri Otero APRN CNP        Equipment " being ordered: TENS All necessary equipment: leads/pads Duration of use: 36 months Apply to painful areas   Quantity:  1 Device   Refills:  0       tiZANidine 2 MG tablet   Commonly known as:  ZANAFLEX   Used for:  Fibromyalgia, Muscle spasm   Started by:  Terri Otero APRN CNP        Dose:  2 mg   Take 1 tablet (2 mg) by mouth 3 times daily as needed for muscle spasms   Quantity:  90 tablet   Refills:  0            Where to get your medicines      These medications were sent to [place] [address] [place] [address] [address] [facility]  [address], [place] [address]     Phone:  [phone]     tiZANidine 2 MG tablet         Some of these will need a paper prescription and others can be bought over the counter.  Ask your nurse if you have questions.     Bring a paper prescription for each of these medications     order for DME                Primary Care Provider    None Specified       No primary provider on file.        Equal Access to Services     [facility] : Keesha Prasad, rigo novak, dannie barajas, chanel akhtar . So [facility] [phone].    ATENCIÓN: Si habla español, tiene a simons disposición servicios gratuitos de asistencia lingüística. Silvia al [phone].    We comply with applicable federal civil rights laws and [place] laws. We do not discriminate on the basis of race, color, national origin, age, disability, sex, sexual orientation, or gender identity.            Thank you!     Thank you for choosing [facility]  for your care. Our goal is always to provide you with excellent care. Hearing back from our patients is one way we can continue to improve our services. Please take a few minutes to complete the written survey that you may receive in the mail after your visit with us. Thank you!             Your Updated Medication List - Protect others around you: Learn how to safely use, store  and throw away your medicines at www.disposemymeds.org.          This list is accurate as of 9/24/18  9:18 AM.  Always use your most recent med list.                   Brand Name Dispense Instructions for use Diagnosis    benzonatate 100 MG capsule    TESSALON     Take 100-200 mg by mouth 3 times daily as needed Use during asthma exacerbation        biotin 1000 MCG Tabs tablet      Take 5,000 mcg by mouth daily        EPINEPHrine 0.3 MG/0.3ML injection 2-pack    EPIPEN/ADRENACLICK/or ANY BX GENERIC EQUIV     Inject 0.3 mg into the muscle once        escitalopram 20 MG tablet    LEXAPRO    30 tablet    Take 1 tablet (20 mg) by mouth daily    Moderate episode of recurrent major depressive disorder (H), Generalized anxiety disorder       etonogestrel-ethinyl estradiol 0.12-0.015 MG/24HR vaginal ring    NUVARING     Place 1 each vaginally once a week        fexofenadine 180 MG tablet    ALLEGRA     Take 180 mg by mouth daily        fluticasone 110 MCG/ACT Inhaler    FLOVENT HFA     Inhale 2 puffs into the lungs 2 times daily        * gabapentin 300 MG capsule    NEURONTIN     Take 300 mg by mouth 3 times daily as needed        * gabapentin 300 MG capsule    NEURONTIN    60 capsule    Take 2 capsules (600 mg) by mouth At Bedtime    Insomnia, unspecified type, Generalized anxiety disorder       guanFACINE 1 MG Tb24 24 hr tablet    INTUNIV    30 tablet    Take 1 tablet (1 mg) by mouth At Bedtime    Generalized anxiety disorder, Attention deficit hyperactivity disorder (ADHD), unspecified ADHD type       hydrOXYzine 25 MG tablet    ATARAX     Take 25-50 mg by mouth 2 times daily as needed for itching or anxiety        levalbuterol 45 MCG/ACT Inhaler    XOPENEX HFA     Inhale 1-2 puffs into the lungs every 4 hours as needed        LORazepam 0.5 MG tablet    ATIVAN     Take 0.5 mg by mouth daily as needed        montelukast 10 MG tablet    SINGULAIR     Take 10 mg by mouth At Bedtime        naproxen 500 MG tablet    NAPROSYN      Take 500 mg by mouth 2 times daily as needed        order for DME     1 Device    Equipment being ordered: TENS All necessary equipment: leads/pads Duration of use: 36 months Apply to painful areas    Fibromyalgia, Muscle spasm, Myofascial pain       tiZANidine 2 MG tablet    ZANAFLEX    90 tablet    Take 1 tablet (2 mg) by mouth 3 times daily as needed for muscle spasms    Fibromyalgia, Muscle spasm       traZODone 50 MG tablet    DESYREL    30 tablet    Take 0.5-1 tablets (25-50 mg) by mouth nightly as needed for sleep    Insomnia, unspecified type       * Notice:  This list has 2 medication(s) that are the same as other medications prescribed for you. Read the directions carefully, and ask your doctor or other care provider to review them with you.

## 2018-09-24 NOTE — PROGRESS NOTES
"I had the pleasure of meeting Ms. Paris Nguyen on 9/24/2018 in the outpatient pain clinic in consult for Dr. Alvarado with regards to her fibromyalgia.   HPI:  Patient presents with past medical history of OCD, ADHD, depression, asthma, possible autism spectrum disorder, endometriosis, IBS; she is here today for evaluation of fibromyalgia. Patient states she was diagnosed with fibromyalgia by \"process of elimination\" by her neurologist and primary care provider approximately two years ago. She reports chronic fatigue and widespread pain; symptoms are aggravated by stress, depression, sleeplessness. Patient closely follows with her psychiatrist, but admits she is \"in between therapists\" but is interested in finding someone who has an understanding of chronic pain.   Her medication regimen for fibromyalgia management currently consists of gabapentin 300 mg which she takes morning and afternoon with a recent increase to 600 mg at HS at bedtime. She is also taking Lexapro through psychiatry management, but mentions she is tapering down with plans to alter to Cymbalta. She takes naproxen as needed which is greatly efficacious.   Further attempted treatments include current once weekly aquatic physical therapy, occupation therapy for sensory integration, regular massage therapy. She has used a friend's TENS unit and oral muscle relaxants in the past with good benefit. Patient also states that she focuses heavily on sleep hygiene with blackout curtains, noise machine, and a weighted blanket.    ?  Past Medical History:   Diagnosis Date     Anxiety      Depression      GERD (gastroesophageal reflux disease)      IBS (irritable bowel syndrome)      Migraines      Pelvic and perineal pain      Uncomplicated asthma     past medical history reviewed with patient.   Past Surgical History:   Procedure Laterality Date     CYSTOSCOPY N/A 10/12/2016    Procedure: CYSTOSCOPY;  Surgeon: Eliazar Patel MD;  Location:  OR     " DAVINCI ASSISTED ABLATION / EXCISION OF ENDOMETRIOSIS N/A 10/12/2016    Procedure: DAVINCI ASSISTED ABLATION / EXCISION OF ENDOMETRIOSIS;  Surgeon: Eliazar Patel MD;  Location:  OR     DAVINCI LYSIS OF ADHESIONS N/A 10/12/2016    Procedure: DAVINCI LYSIS OF ADHESIONS;  Surgeon: Eliazar Patel MD;  Location:  OR     DAVINCI PELVIC PROCEDURE N/A 10/12/2016    Procedure: DAVINCI PELVIC PROCEDURE;  Surgeon: Eliazar Patel MD;  Location:  OR     DILATION AND CURETTAGE, HYSTEROSCOPY DIAGNOSTIC, COMBINED N/A 10/12/2016    Procedure: COMBINED DILATION AND CURETTAGE, HYSTEROSCOPY DIAGNOSTIC;  Surgeon: Eliazar Patel MD;  Location:  OR     EYE SURGERY      Eye Surgery x 2 as an infant     HC TOOTH EXTRACTION W/FORCEP      past surgical history reviewed with patient.   Medications:  Current Outpatient Prescriptions   Medication     benzonatate (TESSALON) 100 MG capsule     biotin 1000 MCG TABS tablet     EPINEPHrine (EPIPEN/ADRENACLICK/OR ANY BX GENERIC EQUIV) 0.3 MG/0.3ML injection 2-pack     escitalopram (LEXAPRO) 20 MG tablet     etonogestrel-ethinyl estradiol (NUVARING) 0.12-0.015 MG/24HR vaginal ring     fexofenadine (ALLEGRA) 180 MG tablet     gabapentin (NEURONTIN) 300 MG capsule     gabapentin (NEURONTIN) 300 MG capsule     guanFACINE (INTUNIV) 1 MG TB24 24 hr tablet     hydrOXYzine (ATARAX) 25 MG tablet     levalbuterol (XOPENEX HFA) 45 MCG/ACT Inhaler     LORazepam (ATIVAN) 0.5 MG tablet     montelukast (SINGULAIR) 10 MG tablet     naproxen (NAPROSYN) 500 MG tablet     traZODone (DESYREL) 50 MG tablet     fluticasone (FLOVENT HFA) 110 MCG/ACT Inhaler     No current facility-administered medications for this visit.      A multi-state Prescription Monitoring Program reviewed and no aberrancies noted.     Allergies:     Allergies   Allergen Reactions     Latex Rash     Rash and blisters     Liquid Adhesive Rash     Rash and blisters       Family History:  family history  "includes Alcoholism in her father; Attention Deficit Disorder in her sister; Depression in her father; Hypothyroidism in her mother; KIDNEY DISEASE in her mother; Myocardial Infarction (age of onset: 53) in her paternal grandfather; Pancreatitis in her father.  Social history: she lives in [place]. She is in the process of looking for a new job.   Smoking: never smoker. Alcohol: yes, 3x/month. Street drugs: denies.     ROS:  A 14 point review of systems was completed; results are listed at end of note.     Objective:   /67  Pulse 77  Resp 16  Ht 1.638 m (5' 4.5\")  Wt 102.5 kg (226 lb)  BMI 38.19 kg/m2  Body mass index is 38.19 kg/(m^2).  General: In no apparent distress  Mental status: Normal mood and affect, pleasant  Neuro: Alert, oriented. No sensory deficits.   Head: Atraumatic, normocephalic  Eyes: Extra-ocular movements grossly intact, no scleral icterus  Neck: Supple, Range of motion full  Respiratory: Non-labored breathing; No respiratory distress  Skin: No rashes or lesions noted on exposed areas of skin  Msk:  Full range of motion of all 4 extremities, no swelling or erythema of joints   TTP in 18/18 common points for fibromyalgia: occiput, trapezius, supraspinatus, lateral glutes, low cervical neck, 2nd rib, lateral epicondyles, greater trochanters, bilateral knees.     Assessment:  Ms. Paris Nguyen is a 32 yo female seen today for fibromyalgia management.     Plan:   1. Patient education: I went over the above diagnoses and treatment plan with her and answered all of her questions.  2. Interventions:  -We discussed integrative medicine modalities, such as acupuncture, myofascial release, TENS, trigger point injections/dry needling. Patient interested in trying a TENS unit; DME provided. Acupuncture may be considered in the future as patient has had good benefit with TMJ in the past.   3. Medications  1. Trial of tizanidine 2 mg tid prn for muscle spasm; I did discuss the weak evidence for " skeletal muscle relaxants in fibromyalgia, however, as she reports benefit in the past, I think it is a reasonable option. Discussed side effect profile with patient in depth.   -Patient is likely tapering from her Lexapro with alteration to duloxetine through her psychiatrist.   -May consider further titration of gabapentin in future.   -Continue with naproxen; consider alternating with acetaminophen.   4. Exercise program:   -Current in physical therapy, OT, and massage; continue with HEP.   5. Behavioral Psychology:   -Referral to Dr. Ceci Mcgee in pain psychology; consider CBT. Patient has had benefit with CBT in the past.   6. Follow up: Return in 8-12 weeks.     Total time spent was 30 minutes, and more than 50% of face to face time was spent in counseling and/or coordination of care regarding the above plan.    Thank you for the consult.   TORSTNE Salcedo, [facility]          Answers for HPI/ROS submitted by the patient on 9/24/2018   General Symptoms: Yes  Skin Symptoms: Yes  HENT Symptoms: No  EYE SYMPTOMS: Yes  HEART SYMPTOMS: No  LUNG SYMPTOMS: Yes  INTESTINAL SYMPTOMS: Yes  URINARY SYMPTOMS: No  GYNECOLOGIC SYMPTOMS: No  BREAST SYMPTOMS: No  SKELETAL SYMPTOMS: Yes  BLOOD SYMPTOMS: No  NERVOUS SYSTEM SYMPTOMS: Yes  MENTAL HEALTH SYMPTOMS: Yes  Fever: No  Loss of appetite: Yes  Weight loss: No  Weight gain: No  Fatigue: Yes  Night sweats: Yes  Chills: No  Increased stress: Yes  Excessive hunger: No  Excessive thirst: Yes  Feeling hot or cold when others believe the temperature is normal: Yes  Loss of height: No  Post-operative complications: No  Surgical site pain: No  Hallucinations: No  Change in or Loss of Energy: Yes  Hyperactivity: No  Confusion: No  Changes in hair: No  Changes in moles/birth marks: No  Itching: Yes  Rashes: No  Changes in nails: No  Acne: Yes  Hair in places you don't want it: No  Change in facial hair: No  Warts: No  Non-healing  sores: No  Scarring: No  Flaking of skin: No  Color changes of hands/feet in cold : No  Sun sensitivity: Yes  Skin thickening: No  Eye pain: Yes  Vision loss: No  Dry eyes: Yes  Watery eyes: Yes  Eye bulging: No  Double vision: Yes  Flashing of lights: No  Spots: No  Floaters: Yes  Redness: Yes  Crossed eyes: Yes  Tunnel Vision: No  Yellowing of eyes: No  Eye irritation: Yes  Cough: No  Sputum or phlegm: Yes  Coughing up blood: No  Difficulty breating or shortness of breath: Yes  Snoring: No  Difficulty breathing on exertion: Yes  Nighttime Cough: No  Difficulty breathing when lying flat: No  Heart burn or indigestion: Yes  Nausea: Yes  Vomiting: No  Abdominal pain: No  Bloating: Yes  Constipation: Yes  Diarrhea: No  Blood in stool: No  Black stools: No  Rectal or Anal pain: Yes  Fecal incontinence: No  Yellowing of skin or eyes: No  Vomit with blood: No  Change in stools: No  Back pain: Yes  Muscle aches: Yes  Neck pain: Yes  Swollen joints: No  Joint pain: Yes  Bone pain: No  Muscle cramps: Yes  Muscle weakness: Yes  Joint stiffness: Yes  Bone fracture: No  Trouble with coordination: Yes  Dizziness or trouble with balance: Yes  Fainting or black-out spells: No  Memory loss: No  Headache: Yes  Seizures: No  Speech problems: Yes  Tingling: Yes  Tremor: No  Weakness: No  Difficulty walking: No  Paralysis: No  Numbness: Yes  Nervous or Anxious: Yes  Depression: Yes  Trouble sleeping: No  Trouble thinking or concentrating: Yes  Mood changes: No  Panic attacks: No  PHQ-2 Score: 4  If you checked off any problems, how difficult have these problems made it for you to do your work, take care of things at home, or get along with other people?: Very difficult  PHQ9 TOTAL SCORE: 17

## 2018-09-24 NOTE — PATIENT INSTRUCTIONS
1. Trial Tizanidine 2 mg Three times a day, as needed.    2.  Referral to Dr. Mcgee in Pain/Health Psychology.   [phone]    3. TENS unit ordered- Contact your insurance regarding coverage.      Follow up: 8 weeks in clinic.       To speak with a nurse, schedule/reschedule/cancel a clinic appointment, or request a medication refill call: [phone]     You can also reach us by [employer]: [email or web address]    For refills, please call on Monday, 1 week before your medication runs out. The doctors are not always in clinic, so this gives us time to get your prescriptions ready.  Please let us know the name of the medication you are requesting a refill of.

## 2018-09-25 ASSESSMENT — PATIENT HEALTH QUESTIONNAIRE - PHQ9: SUM OF ALL RESPONSES TO PHQ QUESTIONS 1-9: 17

## 2018-09-26 ASSESSMENT — ENCOUNTER SYMPTOMS
HEARTBURN: 1
EXERCISE INTOLERANCE: 1
NAUSEA: 0
DOUBLE VISION: 1
DECREASED APPETITE: 1
LIGHT-HEADEDNESS: 1
NECK PAIN: 1
EYE WATERING: 1
RECTAL PAIN: 0
WEIGHT LOSS: 0
SYNCOPE: 0
LOSS OF CONSCIOUSNESS: 0
WEAKNESS: 1
PANIC: 0
POLYPHAGIA: 0
EYE PAIN: 0
HALLUCINATIONS: 0
NERVOUS/ANXIOUS: 1
HYPOTENSION: 1
INCREASED ENERGY: 1
MUSCLE WEAKNESS: 1
HOT FLASHES: 0
CHILLS: 0
BACK PAIN: 1
POLYDIPSIA: 1
NUMBNESS: 0
MUSCLE CRAMPS: 1
HEMOPTYSIS: 0
SORE THROAT: 1
SINUS CONGESTION: 0
DECREASED CONCENTRATION: 1
WEIGHT GAIN: 0
SLEEP DISTURBANCES DUE TO BREATHING: 0
TROUBLE SWALLOWING: 0
WHEEZING: 0
SNORES LOUDLY: 0
ALTERED TEMPERATURE REGULATION: 1
DISTURBANCES IN COORDINATION: 1
BLOOD IN STOOL: 0
TASTE DISTURBANCE: 0
EYE IRRITATION: 1
POSTURAL DYSPNEA: 0
FATIGUE: 1
COUGH DISTURBING SLEEP: 0
SPUTUM PRODUCTION: 1
MEMORY LOSS: 1
PARALYSIS: 0
INSOMNIA: 0
EYE REDNESS: 0
DIARRHEA: 0
TREMORS: 1
JOINT SWELLING: 0
NECK MASS: 0
STIFFNESS: 1
SEIZURES: 0
SINUS PAIN: 0
DEPRESSION: 1
POOR WOUND HEALING: 0
DIFFICULTY URINATING: 0
LEG PAIN: 0
NAIL CHANGES: 0
DYSPNEA ON EXERTION: 1
COUGH: 0
ORTHOPNEA: 0
SHORTNESS OF BREATH: 1
HYPERTENSION: 0
SMELL DISTURBANCE: 0
TINGLING: 1
SKIN CHANGES: 0
ARTHRALGIAS: 1
DIZZINESS: 1
HEMATURIA: 0
CONSTIPATION: 1
BOWEL INCONTINENCE: 0
SPEECH CHANGE: 1
ABDOMINAL PAIN: 0
VOMITING: 0
BLOATING: 1
HOARSE VOICE: 1
FLANK PAIN: 0
HEADACHES: 1
FEVER: 0
PALPITATIONS: 0
NIGHT SWEATS: 1
DYSURIA: 0
JAUNDICE: 0
DECREASED LIBIDO: 1
MYALGIAS: 1

## 2018-10-02 ENCOUNTER — OFFICE VISIT (OUTPATIENT)
Dept: INTERNAL MEDICINE | Facility: CLINIC | Age: 33
End: 2018-10-02
Payer: COMMERCIAL

## 2018-10-02 VITALS
SYSTOLIC BLOOD PRESSURE: 122 MMHG | TEMPERATURE: 98.5 F | OXYGEN SATURATION: 97 % | DIASTOLIC BLOOD PRESSURE: 79 MMHG | HEART RATE: 84 BPM | BODY MASS INDEX: 38.4 KG/M2 | WEIGHT: 227.2 LBS | RESPIRATION RATE: 12 BRPM

## 2018-10-02 DIAGNOSIS — E55.9 VITAMIN D DEFICIENCY: ICD-10-CM

## 2018-10-02 DIAGNOSIS — D50.8 IRON DEFICIENCY ANEMIA SECONDARY TO INADEQUATE DIETARY IRON INTAKE: ICD-10-CM

## 2018-10-02 DIAGNOSIS — M79.7 FIBROMYALGIA: ICD-10-CM

## 2018-10-02 DIAGNOSIS — R53.83 OTHER FATIGUE: ICD-10-CM

## 2018-10-02 DIAGNOSIS — D50.8 OTHER IRON DEFICIENCY ANEMIA: Primary | ICD-10-CM

## 2018-10-02 LAB
FERRITIN SERPL-MCNC: 41 NG/ML (ref 12–150)
HETEROPH AB SER QL: NEGATIVE
IRON SATN MFR SERPL: 17 % (ref 15–46)
IRON SERPL-MCNC: 77 UG/DL (ref 35–180)
TIBC SERPL-MCNC: 447 UG/DL (ref 240–430)
TRANSFERRIN SERPL-MCNC: 343 MG/DL (ref 210–360)

## 2018-10-02 ASSESSMENT — PAIN SCALES - GENERAL: PAINLEVEL: NO PAIN (0)

## 2018-10-02 NOTE — NURSING NOTE
"Chief Complaint   Patient presents with     Establish Care     patient states she is here to establish care.  Patient complains of \"flu\" like symptoms     ADAM WHEELER at 6:56 AM on 10/2/2018.      "

## 2018-10-02 NOTE — PROGRESS NOTES
PRIMARY CARE CENTER         HPI:       Paris Nguyen is a 33 year old female with PMH notable for fibromyalgia, IBS, TMJ, moderate persistent asthma, endometriosis, INDRA, and depression who presents for flu-like symptoms and to establish care. [facility] at which time she had reported 2 week history of fatigue and malaise. Labs at that time were notable for low vitamin D, normal TSH, hgb 12.1 (unchanged since 2016), ferritin 39, MCV 84, and hgbA1C 5.2%.     Today, she reports a 5 day history of increased fatigue, diffuse weakness, poor appetite, sinus pressure, both thin and thick marte colored sputum, and difficulty adjusting eyes into focus. Denies any diplopia, weight changes, fevers, muscle aches, changes in bowel habits, or abdominal pain. She states she has been sleeping 14 hours per day. Her partner who is a  had similar symptoms 4 weeks ago. She also reports starting guanfacine in the past week, but states that this made her too fatigued and has discontinued this several days ago. No recent travel. She states that she may soon apply for FMLA as she has used most of her PTO days due to frequent appointments with PT, OT, psych and allergy.       Problem, Medication and Allergy Lists were   reviewed and are current.     Patient Active Problem List    Diagnosis Date Noted     ADHD 08/31/2018     Priority: Medium     Autism spectrum disorder 08/31/2018     Priority: Medium     Generalized anxiety disorder 08/31/2018     Priority: Medium     OCD (obsessive compulsive disorder) 08/31/2018     Priority: Medium     Fibromyalgia 02/08/2018     Priority: Medium     Moderate persistent asthma without complication 09/20/2017     Priority: Medium     TMJ arthralgia 04/06/2015     Priority: Medium     IBS (irritable bowel syndrome) 03/19/2015     Priority: Medium         Current Outpatient Prescriptions   Medication Sig Dispense Refill     biotin 1000 MCG TABS tablet  Take 5,000 mcg by mouth daily       EPINEPHrine (EPIPEN/ADRENACLICK/OR ANY BX GENERIC EQUIV) 0.3 MG/0.3ML injection 2-pack Inject 0.3 mg into the muscle once       escitalopram (LEXAPRO) 20 MG tablet Take 1 tablet (20 mg) by mouth daily 30 tablet 1     etonogestrel-ethinyl estradiol (NUVARING) 0.12-0.015 MG/24HR vaginal ring Place 1 each vaginally once a week       fexofenadine (ALLEGRA) 180 MG tablet Take 180 mg by mouth daily       gabapentin (NEURONTIN) 300 MG capsule Take 2 capsules (600 mg) by mouth At Bedtime 60 capsule 1     gabapentin (NEURONTIN) 300 MG capsule Take 300 mg by mouth every morning        guanFACINE (INTUNIV) 1 MG TB24 24 hr tablet Take 1 tablet (1 mg) by mouth At Bedtime 30 tablet 1     hydrOXYzine (ATARAX) 25 MG tablet Take 25-50 mg by mouth 2 times daily as needed for itching or anxiety        levalbuterol (XOPENEX HFA) 45 MCG/ACT Inhaler Inhale 1-2 puffs into the lungs every 4 hours as needed       LORazepam (ATIVAN) 0.5 MG tablet Take 0.5 mg by mouth daily as needed       montelukast (SINGULAIR) 10 MG tablet Take 10 mg by mouth At Bedtime       naproxen (NAPROSYN) 500 MG tablet Take 500 mg by mouth 2 times daily as needed       order for DME Equipment being ordered: TENS  All necessary equipment: leads/pads  Duration of use: 36 months  Apply to painful areas 1 Device 0     tiZANidine (ZANAFLEX) 2 MG tablet Take 1 tablet (2 mg) by mouth 3 times daily as needed for muscle spasms 90 tablet 0     traZODone (DESYREL) 50 MG tablet Take 0.5-1 tablets (25-50 mg) by mouth nightly as needed for sleep 30 tablet 1     fluticasone (FLOVENT HFA) 110 MCG/ACT Inhaler Inhale 2 puffs into the lungs 2 times daily           Allergies   Allergen Reactions     Latex Rash     Rash and blisters     Liquid Adhesive Rash     Rash and blisters       Patient is a new patient to this clinic and so  I reviewed/updated the Past Medical History, the Family History and the Social History.          Review of Systems:    LIAM  I have personally reviewed and updated the complete ROS on the day of the visit.           Physical Exam:   /79  Pulse 84  Temp 98.5  F (36.9  C)  Resp 12  Wt 103.1 kg (227 lb 3.2 oz)  SpO2 97%  BMI 38.4 kg/m2  Body mass index is 38.4 kg/(m^2).  Vitals were reviewed       GENERAL APPEARANCE: healthy, alert and no distress     EYES: EOMI, PERRL     HENT: ear canals normal and TM's scarred, and nose; oropharynx without erythema, exudates or sores     NECK: no adenopathy, no asymmetry, masses, or scars and thyroid normal to palpation     RESP: lungs clear to auscultation - no rales, rhonchi or wheezes     CV: regular rates and rhythm, normal S1 S2, no S3 or S4 and no murmur, click or rub     ABDOMEN:  soft, obese, nontender, no HSM or masses and bowel sounds normal     MS: extremities normal- no gross deformities noted, no evidence of inflammation in joints, FROM in all extremities.     SKIN: no suspicious lesions or rashes over exposed areas     NEURO: Normal strength and tone, sensory exam grossly normal, mentation intact and speech normal     PSYCH: mentation appears normal. and affect normal/bright     LYMPHATICS: No cervical or supraclavicular adenopathy        Results:      Results from the last 24 hoursNo results found for this or any previous visit (from the past 24 hour(s)).  Assessment and Plan     Paris was seen today for establish care.    Diagnoses and all orders for this visit:    #. Fibromyalgia  Pt already engaged in regular physical activity with PT and OT althoughshe reports struggling with this. She does follow up with psychiatry but would be interested in consolidating her care in the [facility] system.   -     [facility] (Dr. Casanova)  -     PSYCHIATRY REFERRAL [facility] for [facility]  - Cont gabapentin     #. Fatigue  Recently evaluated for similar symptoms on 9/25 with labs notable for normal TSH, hgb and HgbA1C. Vitamin D low at 25 and she has begun taking a multivitamin for  this. Differential includes viral illness, anxiety/depression, mononucleosis, vit D def, or fibromyalgia.   -     Mononucleosis screen; Future  -     Cont OTC multivitamin   -     Repeat iron studies    #. HCM  BP - normotensive  PAP - 11/24/17  TDAP - UTD  Deferred until next visit: influenza, HIV, fasting lipids    Options for treatment and follow-up care were reviewed with the patient. Paris Nguyen engaged in the decision making process and verbalized understanding of the options discussed and agreed with the final plan.    Jeromy Almanzar,   Oct 2, 2018    Pt was seen and plan of care discussed with Dr. Bower.   Ms Nguyen was  seen and examined with the resident Maikel, I have reviewed his note and plan and I agree. She would benefit from care in an integrative care setting  We are trying to obtain suggestions from dr Casanova as how to best arrange that.  David Bower MD

## 2018-10-02 NOTE — MR AVS SNAPSHOT
After Visit Summary   10/2/2018    Paris Nguyen    MRN: [id number]           Patient Information     Date Of Birth          1985        Visit Information        Provider Department      10/2/2018 7:05 AM Jeromy Almanzar,  [facility]        Today's Diagnoses     Other iron deficiency anemia    -  1    Vitamin D deficiency        Other fatigue        Iron deficiency anemia secondary to inadequate dietary iron intake        Fibromyalgia          Care Instructions    Primary Care Center Medication Refill Request Information:  * Please contact your pharmacy regarding ANY request for medication refills.  ** [facility] Prescription Fax = [phone]  * Please allow 3 business days for routine medication refills.  * Please allow 5 business days for controlled substance medication refills.     Primary Care Center Test Result notification information:  *You will be notified with in 7-10 days of your appointment day regarding the results of your test.  If you are on MyChart you will be notified as soon as the provider has reviewed the results and signed off on them.    Primary Care Center: [phone]              [facility]         Internal Medicine Resident                   Continuity Clinic    Who We Are    Resident Continuity Clinic is a part of the [facility] Primary Care Clinic.  Resident physicians see patients independently and establish a relationship with them over the course of their three-year residency program.  As with the Primary Care Clinic, our Resident Continuity Clinic models a group practice.  If your doctor is not available, you will be able to see another resident physician.  At the end of a resident s training, patients will be transitioned to a new resident physician for ongoing care.     We treat patients with a wide array of medical needs from routine physicals, to acute illnesses, to diabetes and blood pressure management, to complex medical  illness.  What is a Resident Physician?    Resident physicians hold medical degrees and are doctors. They are training to become specialists in Internal Medicine. They work under the supervision of board-certified faculty physicians.  Expectations for Your Care    We strive to provide accessible, quality care at all times.    In order to provide this care, it is best to see your primary care resident doctor consistently rather switching between providers.  In the event you do see another physician, you should schedule a follow-up visit with your usual primary care doctor.    If you are transitioning your care from another clinic, it is helpful to have your records available for your doctor to review.    We do not prescribe controlled substances, such as ADD medications or narcotic pain medications, on your first visit.  We will review your health records and concerns prior to devising a treatment plan with you in order to provide the best care.      Clinic Services     Extended clinic hours; patient  to help navigate your visit;  parking; laboratory and imaging services with evening and weekend hours    Multiple medical and surgical specialties in one building    Complementary services, including Nutrition, Integrative Medicine, Pharmacy consultations, Mental and Behavioral Health, Sports Medicine and Physical Therapy    Thank You    We would like to thank you for choosing the [facility] Resident Continuity Clinic for your primary care. You are making a priceless contribution to the training of the next generation of health care practitioners.     Contact us at [phone] for appointments or questions.    Resident Clinic Hours are Tuesdays and Thursdays, 7:30am-5:00pm    Residents  Josie Landon MD   (Female )   Hortencia Stevens MD   (Female)   Benson Lew MD  (Male)   Ismael Ybarra MD  (Male)   Lloyd Alonso MD   (Female)   Param Adorno MD  (Male)    Austen  MD Asher  (Male)   Jeromy Almanzar MD  (Male)   Ismael Epps MD (Male)   Yosi Lopez MD  (Male)   Amy Menchaca MD (Female)    Linda Isaac MD (Female)   Concepcion Vasquez MD  (Male)   Bette Jack MD(Female)   Nanci Mcallister MD  (Female)    Supervising Physicians   MD Tiarra Whaley, MD Ayaz Villar, MD Maikol Magallon, MD Ceci Hawkins, MD David Chaney MD Heather Thompson Buum, MD Merlyn Leahy MD          Please go to the lab on the first floor before you leave today.           Follow-ups after your visit        Additional Services     [facility]ative [facility] (Dr. Casanova)       Patient to be scheduled at the [facility]            PSYCHIATRY REFERRAL [facility]       Your provider has referred you to: [facility] for a Primary Care Consultation. Please call [phone] to make an appointment.    Clinic is located at:  [facility], 2nd Floor  [address] [place] [address], [address] [address]    Please complete the following questions:    Reason for Referral: CBT for fibromyalgia     What specific question is it most critical for you and the patient to have answered? Assistance with fibromyalgia management/CBT    You have requested a one-time CONSULTATION visit. This means that although we will make recommendations and provide a multi-step plan where appropriate, the Psychiatry Clinic will not provide ongoing care. We expect that as the referring provider, you will continue to see and manage the patient's care primarily, and we will remain available via Epic for any ongoing questions that arise after the initial visit. In rare and unusually complex cases, we may schedule a follow up visit to complete the assessment, but this should also not be seen as taking over the case.    Is this acceptable to you? Yes                  Follow-up notes from your care team     Return in about 2 months (around  12/2/2018).      Your next 10 appointments already scheduled     Oct 16, 2018 11:00 AM CDT   (Arrive by 10:45 AM)   Return Visit with Chin Cerda MD   [facility] ([facility])    [address]  3rd [facility] [id number] [phone]            Nov 20, 2018  9:00 AM CST   (Arrive by 8:45 AM)   Return Visit with TORSTEN Salcedo CNP   [facility] ([facility])    [address] [place] [address]  4th Floor  [facility] [id number] [phone]              Future tests that were ordered for you today     Open Future Orders        Priority Expected Expires Ordered    Transferrin Routine 10/2/2018 10/2/2019 10/2/2018    Mononucleosis screen Routine 10/2/2018 10/16/2018 10/2/2018    Iron and iron binding capacity Routine 10/2/2018 10/2/2019 10/2/2018    Ferritin Routine 10/2/2018 10/2/2019 10/2/2018            Who to contact     Please call your clinic at [phone] to:    Ask questions about your health    Make or cancel appointments    Discuss your medicines    Learn about your test results    Speak to your doctor            Additional Information About Your Visit        [employer] Information     [employer] gives you secure access to your electronic health record. If you see a primary care provider, you can also send messages to your care team and make appointments. If you have questions, please call your primary care clinic.  If you do not have a primary care provider, please call [phone] and they will assist you.      [employer] is an electronic gateway that provides easy, online access to your medical records. With [employer], you can request a clinic appointment, read your test results, renew a prescription or communicate with your care team.     To access your existing account, please contact your [facility] or call [phone] for assistance.        Care  EveryWhere ID     This is your Care EveryWhere ID. This could be used by other organizations to access your [place] medical records  [id number]        Your Vitals Were     Pulse Temperature Respirations Pulse Oximetry BMI (Body Mass Index)       84 98.5  F (36.9  C) 12 97% 38.4 kg/m2        Blood Pressure from Last 3 Encounters:   10/02/18 122/79   09/24/18 103/67   09/20/18 126/82    Weight from Last 3 Encounters:   10/02/18 103.1 kg (227 lb 3.2 oz)   09/24/18 102.5 kg (226 lb)   10/12/16 99.4 kg (219 lb 1.9 oz)              We Performed the Following     [facility] (Dr. Casanova)     PSYCHIATRY REFERRAL P          Today's Medication Changes          These changes are accurate as of 10/2/18  8:21 AM.  If you have any questions, ask your nurse or doctor.               Stop taking these medicines if you haven't already. Please contact your care team if you have questions.     benzonatate 100 MG capsule   Commonly known as:  TESSALON   Stopped by:  Jeromy Almanzar DO                    Primary Care Provider    None Specified       No primary provider on file.        Equal Access to Services     [facility]: Hadlorraine Prasad, rigo novak, dannie barajas, chanel akhtar . So [facility] [phone].    ATENCIÓN: Si habla español, tiene a simons disposición servicios gratuitos de asistencia lingüística. Llame al [phone].    We comply with applicable federal civil rights laws and [place] laws. We do not discriminate on the basis of race, color, national origin, age, disability, sex, sexual orientation, or gender identity.            Thank you!     Thank you for choosing [facility] PRIMARY CARE CLINIC  for your care. Our goal is always to provide you with excellent care. Hearing back from our patients is one way we can continue to improve our services. Please take a few minutes to complete the written survey that you may receive in the mail after your  visit with us. Thank you!             Your Updated Medication List - Protect others around you: Learn how to safely use, store and throw away your medicines at www.disposemymeds.org.          This list is accurate as of 10/2/18  8:21 AM.  Always use your most recent med list.                   Brand Name Dispense Instructions for use Diagnosis    biotin 1000 MCG Tabs tablet      Take 5,000 mcg by mouth daily        EPINEPHrine 0.3 MG/0.3ML injection 2-pack    EPIPEN/ADRENACLICK/or ANY BX GENERIC EQUIV     Inject 0.3 mg into the muscle once        escitalopram 20 MG tablet    LEXAPRO    30 tablet    Take 1 tablet (20 mg) by mouth daily    Moderate episode of recurrent major depressive disorder (H), Generalized anxiety disorder       etonogestrel-ethinyl estradiol 0.12-0.015 MG/24HR vaginal ring    NUVARING     Place 1 each vaginally once a week        fexofenadine 180 MG tablet    ALLEGRA     Take 180 mg by mouth daily        fluticasone 110 MCG/ACT Inhaler    FLOVENT HFA     Inhale 2 puffs into the lungs 2 times daily        * gabapentin 300 MG capsule    NEURONTIN     Take 300 mg by mouth every morning        * gabapentin 300 MG capsule    NEURONTIN    60 capsule    Take 2 capsules (600 mg) by mouth At Bedtime    Insomnia, unspecified type, Generalized anxiety disorder       guanFACINE 1 MG Tb24 24 hr tablet    INTUNIV    30 tablet    Take 1 tablet (1 mg) by mouth At Bedtime    Generalized anxiety disorder, Attention deficit hyperactivity disorder (ADHD), unspecified ADHD type       hydrOXYzine 25 MG tablet    ATARAX     Take 25-50 mg by mouth 2 times daily as needed for itching or anxiety        levalbuterol 45 MCG/ACT Inhaler    XOPENEX HFA     Inhale 1-2 puffs into the lungs every 4 hours as needed        LORazepam 0.5 MG tablet    ATIVAN     Take 0.5 mg by mouth daily as needed        montelukast 10 MG tablet    SINGULAIR     Take 10 mg by mouth At Bedtime        naproxen 500 MG tablet    NAPROSYN     Take 500  mg by mouth 2 times daily as needed        order for DME     1 Device    Equipment being ordered: TENS All necessary equipment: leads/pads Duration of use: 36 months Apply to painful areas    Fibromyalgia, Muscle spasm, Myofascial pain       tiZANidine 2 MG tablet    ZANAFLEX    90 tablet    Take 1 tablet (2 mg) by mouth 3 times daily as needed for muscle spasms    Fibromyalgia, Muscle spasm       traZODone 50 MG tablet    DESYREL    30 tablet    Take 0.5-1 tablets (25-50 mg) by mouth nightly as needed for sleep    Insomnia, unspecified type       * Notice:  This list has 2 medication(s) that are the same as other medications prescribed for you. Read the directions carefully, and ask your doctor or other care provider to review them with you.

## 2018-10-02 NOTE — PATIENT INSTRUCTIONS
[facility] Medication Refill Request Information:  * Please contact your pharmacy regarding ANY request for medication refills.  ** [facility] Prescription Fax = [phone]  * Please allow 3 business days for routine medication refills.  * Please allow 5 business days for controlled substance medication refills.     [facility] Test Result notification information:  *You will be notified with in 7-10 days of your appointment day regarding the results of your test.  If you are on MyChart you will be notified as soon as the provider has reviewed the results and signed off on them.    [facility]: [phone]              [facility]         Internal Medicine Resident                   Continuity Clinic    Who We Are    Resident Continuity Clinic is a part of the [facility] Primary Care Clinic.  Resident physicians see patients independently and establish a relationship with them over the course of their three-year residency program.  As with the Primary Care Clinic, our Resident Continuity Clinic models a group practice.  If your doctor is not available, you will be able to see another resident physician.  At the end of a resident s training, patients will be transitioned to a new resident physician for ongoing care.     We treat patients with a wide array of medical needs from routine physicals, to acute illnesses, to diabetes and blood pressure management, to complex medical illness.  What is a Resident Physician?    Resident physicians hold medical degrees and are doctors. They are training to become specialists in Internal Medicine. They work under the supervision of board-certified faculty physicians.  Expectations for Your Care    We strive to provide accessible, quality care at all times.    In order to provide this care, it is best to see your primary care resident doctor consistently rather switching between providers.  In the event you do see another physician, you should  schedule a follow-up visit with your usual primary care doctor.    If you are transitioning your care from another clinic, it is helpful to have your records available for your doctor to review.    We do not prescribe controlled substances, such as ADD medications or narcotic pain medications, on your first visit.  We will review your health records and concerns prior to devising a treatment plan with you in order to provide the best care.      Clinic Services     Extended clinic hours; patient  to help navigate your visit;  parking; laboratory and imaging services with evening and weekend hours    Multiple medical and surgical specialties in one building    Complementary services, including Nutrition, Integrative Medicine, Pharmacy consultations, Mental and Behavioral Health, Sports Medicine and Physical Therapy    Thank You    We would like to thank you for choosing the [facility] Internal Medicine Resident Continuity Clinic for your primary care. You are making a priceless contribution to the training of the next generation of health care practitioners.     Contact us at [phone] for appointments or questions.    Resident Clinic Hours are Tuesdays and Thursdays, 7:30am-5:00pm    Residents  Josie Landon MD   (Female )   Hortencia Stevens MD   (Female)   Benson Lew MD  (Male)   Ismael Ybarra MD  (Male)   Lloyd Alonso MD   (Female)   Param Adorno MD  (Male)    Austen Sterling MD  (Male)   Jeromy Almanzar MD  (Male)   Ismael Epps MD (Male)   Yosi Lopez MD  (Male)   Amy Menchaca MD (Female)    Linda Isaac MD (Female)   Concepcion Vasquez MD  (Male)   Bette Jack MD(Female)   Nanci Mcallister MD  (Female)    Supervising Physicians   MD Tiarra Whaley MD Briar Duffy, MD James Langland, MD Mary Logeais, MD Tanya Melnik, MD Charles Moldow, MD Heather Thompson Buum, MD Kathleen Watson, MD          Please go to the  lab on the first floor before you leave today.

## 2018-10-16 ENCOUNTER — OFFICE VISIT (OUTPATIENT)
Dept: BEHAVIORAL HEALTH | Facility: CLINIC | Age: 33
End: 2018-10-16
Payer: COMMERCIAL

## 2018-10-16 ENCOUNTER — TELEPHONE (OUTPATIENT)
Dept: FAMILY MEDICINE | Facility: CLINIC | Age: 33
End: 2018-10-16

## 2018-10-16 VITALS
SYSTOLIC BLOOD PRESSURE: 131 MMHG | HEART RATE: 92 BPM | DIASTOLIC BLOOD PRESSURE: 86 MMHG | WEIGHT: 232.2 LBS | BODY MASS INDEX: 39.24 KG/M2

## 2018-10-16 DIAGNOSIS — F41.1 GENERALIZED ANXIETY DISORDER: ICD-10-CM

## 2018-10-16 DIAGNOSIS — F33.1 MODERATE EPISODE OF RECURRENT MAJOR DEPRESSIVE DISORDER (H): Primary | ICD-10-CM

## 2018-10-16 RX ORDER — ESCITALOPRAM OXALATE 10 MG/1
10 TABLET ORAL DAILY
Qty: 30 TABLET | Refills: 0 | Status: SHIPPED | OUTPATIENT
Start: 2018-10-16 | End: 2018-11-20

## 2018-10-16 RX ORDER — MULTIPLE VITAMINS W/ MINERALS TAB 9MG-400MCG
1 TAB ORAL DAILY
COMMUNITY

## 2018-10-16 RX ORDER — VENLAFAXINE HYDROCHLORIDE 75 MG/1
75 CAPSULE, EXTENDED RELEASE ORAL DAILY
Qty: 30 CAPSULE | Refills: 0 | Status: SHIPPED | OUTPATIENT
Start: 2018-10-16 | End: 2018-11-19

## 2018-10-16 ASSESSMENT — ANXIETY QUESTIONNAIRES
4. TROUBLE RELAXING: MORE THAN HALF THE DAYS
GAD7 TOTAL SCORE: 11
GAD7 TOTAL SCORE: 11
7. FEELING AFRAID AS IF SOMETHING AWFUL MIGHT HAPPEN: SEVERAL DAYS
7. FEELING AFRAID AS IF SOMETHING AWFUL MIGHT HAPPEN: SEVERAL DAYS
6. BECOMING EASILY ANNOYED OR IRRITABLE: NEARLY EVERY DAY
5. BEING SO RESTLESS THAT IT IS HARD TO SIT STILL: SEVERAL DAYS
1. FEELING NERVOUS, ANXIOUS, OR ON EDGE: SEVERAL DAYS
GAD7 TOTAL SCORE: 11
3. WORRYING TOO MUCH ABOUT DIFFERENT THINGS: MORE THAN HALF THE DAYS
2. NOT BEING ABLE TO STOP OR CONTROL WORRYING: SEVERAL DAYS

## 2018-10-16 ASSESSMENT — PATIENT HEALTH QUESTIONNAIRE - PHQ9
SUM OF ALL RESPONSES TO PHQ QUESTIONS 1-9: 14
SUM OF ALL RESPONSES TO PHQ QUESTIONS 1-9: 14
10. IF YOU CHECKED OFF ANY PROBLEMS, HOW DIFFICULT HAVE THESE PROBLEMS MADE IT FOR YOU TO DO YOUR WORK, TAKE CARE OF THINGS AT HOME, OR GET ALONG WITH OTHER PEOPLE: SOMEWHAT DIFFICULT

## 2018-10-16 NOTE — NURSING NOTE
Chief Complaint   Patient presents with     Recheck Medication     Would like out of today's visit:  Med Check  Bree Lopez LPN

## 2018-10-16 NOTE — PATIENT INSTRUCTIONS
Right now: Decrease Lexapro to 10mg. Start Effexor XR 75mg daily.   In 2 weeks, Bree will call you to check in.   If mood has improved, will hold dose steady.   If no change, likely hold dose steady.   If side effects or worsening of mood, please call, or if minor problems, we can consider at check in.       INSTRUCTIONS FOR USE OF EFFEXOR  [venlafaxine]      DO:    - call clinic if you, or another provider, makes any medication changes  - call clinic if your use of Ibuprofen (or similar) increases significantly  - call clinic if you experience any symptom listed below    - CHECK BLOOD PRESSURE 1-2 times a week for 3 weeks after each dose increase    call clinic if blood pressure is over 160/100      DO NOT:  stop this med abruptly         TRY TO AVOID:  excessive use of ibuprofen or similar pain medication      FOOD:  take with or without food       COMMON ADVERSE EFFECTS:  headache, dizziness, nausea, diarrhea, fatigue, sleepiness, sexual problems, weight gain, anxiety, a need to pace or restlessness       CALL CLINIC URGENTLY or EMERGENCY ROOM:  if you have any concerns, especially the following...    - if blood pressure is over 160/100 and/or heart rate is over 110  - abnormal bleeding  or  easy bruising (eagle if increase use of ibuprofen etc)  - significant pacing, restlessness or muscle spasm  - thoughts of death or hurting yourself    - suspect Serotonin Syndrome:   confusion   tremor  severe headache  sweats  fever   funny feeling in muscles  need to pace / restlessness   severe nausea, diarrhea        Scheduling:  If you have any concerns about today's visit or wish to schedule another appointment please call our office during normal business hours [phone] (8-5:00 M-F)    Contact Us:  Please call [phone] during business hours (8-5:00 M-F).  If after clinic hours, or on the weekend, please call  [phone].    Financial Assistance [phone]  [employer] Website Billing [phone]  [facility]  [phone]  Medical Records [phone]      MENTAL HEALTH CRISIS NUMBERS:  [facility]:   [facility] - [phone]   Crisis Residence [facility] - [phone]   Walk-In Counseling Center \[facility] [place] [facility]\"" - [phone]   COPE 24/7 [place] Mobile Team for Adults - [[phone]]; Child - [[phone]]     Crisis Connection - [phone]     [facility] - [phone]   Walk-in counseling [facility] - [phone]   Walk-in counseling [facility] - [phone]   Crisis Residence [facility] - [phone]   Urgent Care Adult Mental Health:   --Drop-in, 24/7 crisis line, and [place] Mobile Team [[phone]]    CRISIS TEXT LINE: Text [phone] from anywhere, anytime, any crisis 24/7;    OR SEE [email or web address]     Poison Control Center - [phone]    CHILD: [facility] needs assessment team - [phone]     [facility] Lifeline - [phone]; or [facility] Lifeline - [phone]    If you have a medical emergency please call 911 or go to the nearest ER.

## 2018-10-16 NOTE — PROGRESS NOTES
Psychiatric Outpatient Consultation Follow Up   Paris Nguyen is a 33 year old female who was referred for consultation by Manny Schuler for evaluation of depression and anxiety on 09/20/18.   Will plan to engage in brief care of up to 3 months, with plan to transition back to the referring provider or a designated prescriber on completion of brief care.   Psych critical item history includes mutiple psychotropic trials.    Interim History   History was provided by patient who was a good historian.   Has migraines that seem to be worsened by fluorescent lights at work, it looking for doctors note to have the lights dimmed. I referred her to primary care.   Went down on trazodone and felt that it worked fine at first, but then has been harder again recently. She was recently off of work for two weeks due to illness and asthma exacerbation. Sleep issues started after she went back to work on 10/8.   Notes no difference being on the reduced dose of escitalopram.   Overall things have improved a little, but with the dizziness still being persistent it is still hard and feels like this is the new normal.   Took a few doses of guanfacine and it tanked her BP and made her incredibly dizzy, so stopped it after 4 doses.   Does feel like gabapentin bedtime increase was helpful. Also started taking a multivitamin with vitamin D.     Discussion points from previous appointments:   Has been feeling very low energy and low motivation in the last motivation for the last month. Can't focus on anything, and nothing brings her amy. Gets irritable about not being able to get anything done. She notes that she hates her job, especially since it was restructured, feels like she has to spend all of the time looking busy.   She notes that primary goal is to address the tiredness and lack of motivation.   Sometimes appetite is very poor when mood symptoms are worse as well. IBS plays a role in this as well.    Recent Substance  Use  Alcohol- Maybe 2 drinks per week.   Tobacco- None  Caffeine- 24 oz/day of coffee  Opioids- None        Narcan Kit- N/A  Cannabis- None, although is interested in CBD/hemp products.   Other Illicit Drugs- none    Current Social Hx:  FINANCIAL SUPPORT- Working in [employer]. She notes that she loves this work, but the current place is a very bad environment.   LIVING SITUATION / RELATIONSHIPS- Lives with partner in a house that they rent, planning to buy a house in 3-5 years with other partner as well. 2 friends live in the other half of the duplex.    SOCIAL/ SPIRITUAL SUPPORT- Friends are supportive, family generally isn't.   FEELS SAFE AT HOME- Yes     Medical Review of Systems   Dizziness is a regular issue, has episodes maybe twice per day. Constipation is frequently an issue. Denies N/V, headaches. Had migraines in the past, but hasn't had one in a while. Thinks diet helped with this.   A comprehensive review of systems was performed and is negative other than noted in the HPI.       Psych Summary Points   09/2018 - Decreased Lexapro to 20mg. Decreased trazodone to 25mg. Added QHS dose of gabapentin. Started guanfacine, but discontinued after a few days due to hypotension.   10/2018 - Started cross taper from Lexapro to Effexor XR.      Psychiatric Medication Trials       Drug /  Start Date Dose (mg) Helpful Adverse Effects DC Reason / Date   Zoloft (at age 17)       Paxil       Celexa 40 Nor sure  Diminishing effect?   Lexapro 40 Not sure  Diminishing effect?   Effexor XR 10/2018 75      amitriptyline   Fingernails peeled off    Guanfacine 9/2018 1  Hypotension / dizziness    gabapentin 300 TID  fogginess    Ativan 1 daily PRN yes     Benadryl  no     hydroxyzine 25-50 BID PRN yes  Helpful for itching, allergies, fibro, anx   trazodone 50 yes     melatonin  maybe        Medical / Surgical History   CARE TEAM:   PCP- Previously at [facility], looking to transition care to this institution.    Therapist- Currently looking for a new therapist. [facility], but would like everyone under one umbrella.   Allergy- Maria Eugenia Leo    Pregnant or breastfeeding:  No   Contraception- Nuvaring    Neurologic Hx [head injury, seizures, etc.]: Has migraines previously diagnosed at [facility]  Patient Active Problem List   Diagnosis     Fibromyalgia     TMJ arthralgia     Moderate persistent asthma without complication     ADHD     Autism spectrum disorder     Generalized anxiety disorder     IBS (irritable bowel syndrome)     OCD (obsessive compulsive disorder)     Past Medical History:   Diagnosis Date     Anxiety      Depression      Endometriosis      GERD (gastroesophageal reflux disease)      IBS (irritable bowel syndrome)      Migraines      Pelvic and perineal pain      Uncomplicated asthma       Allergy   Latex and Liquid adhesive     Current Medications     Current Outpatient Prescriptions   Medication Sig Dispense Refill     biotin 1000 MCG TABS tablet Take 5,000 mcg by mouth daily       EPINEPHrine (EPIPEN/ADRENACLICK/OR ANY BX GENERIC EQUIV) 0.3 MG/0.3ML injection 2-pack Inject 0.3 mg into the muscle once       escitalopram (LEXAPRO) 20 MG tablet Take 1 tablet (20 mg) by mouth daily 30 tablet 1     etonogestrel-ethinyl estradiol (NUVARING) 0.12-0.015 MG/24HR vaginal ring Place 1 each vaginally once a week       fexofenadine (ALLEGRA) 180 MG tablet Take 180 mg by mouth daily       gabapentin (NEURONTIN) 300 MG capsule Take 2 capsules (600 mg) by mouth At Bedtime 60 capsule 1     gabapentin (NEURONTIN) 300 MG capsule Take 300 mg by mouth every morning        hydrOXYzine (ATARAX) 25 MG tablet Take 25-50 mg by mouth 2 times daily as needed for itching or anxiety        levalbuterol (XOPENEX HFA) 45 MCG/ACT Inhaler Inhale 1-2 puffs into the lungs every 4 hours as needed       LORazepam (ATIVAN) 0.5 MG tablet Take 0.5 mg by mouth daily as needed       montelukast (SINGULAIR) 10  "MG tablet Take 10 mg by mouth At Bedtime       multivitamin, therapeutic with minerals (MULTI-VITAMIN) TABS tablet Take 1 tablet by mouth daily       naproxen (NAPROSYN) 500 MG tablet Take 500 mg by mouth 2 times daily as needed       order for DME Equipment being ordered: TENS  All necessary equipment: leads/pads  Duration of use: 36 months  Apply to painful areas 1 Device 0     tiZANidine (ZANAFLEX) 2 MG tablet Take 1 tablet (2 mg) by mouth 3 times daily as needed for muscle spasms 90 tablet 0     traZODone (DESYREL) 50 MG tablet Take 0.5-1 tablets (25-50 mg) by mouth nightly as needed for sleep 30 tablet 1     fluticasone (FLOVENT HFA) 110 MCG/ACT Inhaler Inhale 2 puffs into the lungs 2 times daily       guanFACINE (INTUNIV) 1 MG TB24 24 hr tablet Take 1 tablet (1 mg) by mouth At Bedtime (Patient not taking: Reported on 10/16/2018) 30 tablet 1      Vitals   /86 (BP Location: Right arm, Patient Position: Sitting, Cuff Size: Adult Large)  Pulse 92  Wt 105.3 kg (232 lb 3.2 oz)  BMI 39.24 kg/m2    Pulse Readings from Last 5 Encounters:   10/16/18 92   10/02/18 84   09/24/18 77   09/20/18 72     Wt Readings from Last 5 Encounters:   10/16/18 105.3 kg (232 lb 3.2 oz)   10/02/18 103.1 kg (227 lb 3.2 oz)   09/24/18 102.5 kg (226 lb)   10/12/16 99.4 kg (219 lb 1.9 oz)     BP Readings from Last 5 Encounters:   10/16/18 131/86   10/02/18 122/79   09/24/18 103/67   09/20/18 126/82   10/13/16 115/65      Mental Status Exam   Alertness: alert  and oriented  Appearance: adequately groomed  Behavior/Demeanor: cooperative, pleasant and calm, with good eye contact   Speech: normal and regular rate and rhythm  Language: intact and no problems  Psychomotor: normal or unremarkable  Mood: \"About the same\"  Affect: full range and appropriate; was congruent to mood; was congruent to content  Thought Process/Associations: unremarkable  Thought Content:  Reports none;  Denies suicidal ideation, violent ideation and " delusions  Perception:  Reports none;  Denies auditory hallucinations and visual hallucinations  Insight: intact  Judgment: intact  Cognition: does appear grossly intact; formal cognitive testing was not done  Gait and Station: unremarkable     Labs and Data     PHQ-9 SCORE 9/20/2018 9/24/2018 10/16/2018   Total Score MyChart 21 (Severe depression) 17 (Moderately severe depression) 14 (Moderate depression)   Total Score 21 17 14     INDRA-7 SCORE 9/20/2018 10/16/2018   Total Score 11 (moderate anxiety) 11 (moderate anxiety)   Total Score 11 11      Psychiatric Diagnoses   Major depressive disorder, recurrent, moderate  Generalized anxiety disorder  ADHD by hx, records pending     Assessment   Paris Nguyen is a 33 year old female who provides a history supporting the diagnoses listed directly above.   Pertinent Background: First experienced mood symptoms in preteen years, was first treated by a therapist in high school. Was also on Zoloft around that time. She believes that she is somewhere on the autism spectrum as well. She sees an OT and goes to an autism support group. Notes that she has auditory processing delay diagnosed years ago. She was borderline diagnosed with OCD, ADHD, and autism [facility] ago [facility] [place] with Dr. Mansfield via psychological testing. Previously was being seen at [facility] and [facility]. Has also been diagnosed with fibromyalgia 2.5 years ago, as well as endometriosis for which she had surgery in 10/2016. Asthma is also an issue.   Psychotherapy: Paris would likely benefit from psychotherapy. I am pleased that she sought out a therapist on her own and is scheduled to start within the next few weeks.   Pharmacotherapy:   Antidepressant: She noted no change in mood with decrease in Lexapro from 40mg to 20mg, so she is confident at this time in decreasing further to 10mg while starting Effexor 75mg daily. We will plan on 2 week check in with our team LPN to determine if  further adjustments are needed.   Antiadrenergic: She had a significant worsening of dizziness issues and observed hypotension on the minimum dose of guanfacine, so I will not recommend further medications in this class at this time.   Sleep: Gabapentin has been somewhat helpful for sleep. Will continue with the current dose. She will continue to minimize trazodone as she is able.   Paris did inquire about [employer] testing today. We discussed, and she feels that it would likely be cost prohibitive. I told her that while it may be interesting, I wouldn't consider it essential to her treatment planning at this time.     [place] PRESCRIPTION MONITORING PROGRAM [] was checked today: indicates taking controlled substances as prescribed    PSYCHOTROPIC DRUG INTERACTIONS:   NAPROXEN -- ESCITALOPRAM -- VENLAFAXINE may result in an increased risk of bleeding.     ESCITALOPRAM -- VENLAFAXINE -- HYDROXYZINE -- TRAZODONE -- TIZANIDINE may result in increased risk of QT interval prolongation.     ESCITALOPRAM -- VENLAFAXINE -- TRAZODONE may result in an increased risk of serotonin syndrome (hypertension, hyperthermia, myoclonus, mental status changes).     LORAZEPAM -- ESTRADIOL  may result in decreased lorazepam effectiveness.      MANAGEMENT:  Monitoring for adverse effects and routine vitals     Plan     1) MEDICATIONS:  - Start Effexor XR 75mg daily  - Decrease to Lexapro 10mg daily  - Self discontinued guanfacine due to dizziness and hypotention  - Continue gabapentin 300mg QAM, 600mg QHS   - Continue trazodone 25-50mg QHS PRN for sleep (Decrease as able)  - Continue hydroxyzine 25-50mg for anxiety  - Continue lorazepam 0.5mg daily PRN for anxiety (takes rarely)    [see the following SmartPhrase(s) for more information: PSYMEDINFOESCITALOPRAM - PSYMEDINFOGABAPENTIN]    2) THERAPY: Psychotherapy is a primary recommendation. Scheduled with [facility] to start with a CBT provider, Marisol Mitchell, in a few weeks.     3) NEXT DUE:    Labs- Routine lab monitoring is not indicated for current psychotropic medication regimen  ECG- N/A   Rating Scales- N/A    4) REFERRALS: Will message PCP about referral to [facility] for medication management.     5) : None    6) RTC: 4 weeks with Dr. Cerda for follow up, with plan for transition back to referring provider or designated prescriber within 3 months    Treatment Risk Statement:  The patient understands the risks, benefits, adverse effects and alternatives. Agrees to treatment with the capacity to do so. No medical contraindications to treatment. Agrees to call clinic for any problems. The patient understands to call 911 or go to the nearest ED if life threatening or urgent symptoms occur. Crisis numbers are provided routinely in the After Visit Summary.       PROVIDER:  Chin Cerda MD

## 2018-10-16 NOTE — TELEPHONE ENCOUNTER
PATIENT DROPPED OFF FORM TO BE COMPLETED BY DR COHEN AND TO HAVE IT READY FOR HER ON HER APPT IN DECEMBER

## 2018-10-17 ASSESSMENT — ANXIETY QUESTIONNAIRES: GAD7 TOTAL SCORE: 11

## 2018-10-17 ASSESSMENT — PATIENT HEALTH QUESTIONNAIRE - PHQ9: SUM OF ALL RESPONSES TO PHQ QUESTIONS 1-9: 14

## 2018-10-25 ENCOUNTER — OFFICE VISIT (OUTPATIENT)
Dept: INTERNAL MEDICINE | Facility: CLINIC | Age: 33
End: 2018-10-25
Payer: COMMERCIAL

## 2018-10-25 VITALS
SYSTOLIC BLOOD PRESSURE: 112 MMHG | BODY MASS INDEX: 38.19 KG/M2 | HEART RATE: 87 BPM | WEIGHT: 229.2 LBS | HEIGHT: 65 IN | DIASTOLIC BLOOD PRESSURE: 76 MMHG

## 2018-10-25 DIAGNOSIS — M79.7 FIBROMYALGIA: Primary | ICD-10-CM

## 2018-10-25 DIAGNOSIS — M26.629 ARTHRALGIA OF TEMPOROMANDIBULAR JOINT, UNSPECIFIED LATERALITY: ICD-10-CM

## 2018-10-25 DIAGNOSIS — G43.809 OTHER MIGRAINE WITHOUT STATUS MIGRAINOSUS, NOT INTRACTABLE: ICD-10-CM

## 2018-10-25 PROBLEM — Z91.048 ALLERGY TO ADHESIVE TAPE: Status: ACTIVE | Noted: 2018-08-31

## 2018-10-25 RX ORDER — NAPROXEN 500 MG/1
500 TABLET ORAL 2 TIMES DAILY PRN
Qty: 60 TABLET | Refills: 1 | Status: SHIPPED | OUTPATIENT
Start: 2018-10-25

## 2018-10-25 ASSESSMENT — PAIN SCALES - GENERAL: PAINLEVEL: NO PAIN (0)

## 2018-10-25 NOTE — PATIENT INSTRUCTIONS
[facility] [phone] ([facility]th [facility])     [phone] ([facility]th [facility])        [facility] [phone]

## 2018-10-25 NOTE — PROGRESS NOTES
"[facility]   Radha Hinklebindukyra, APRN CNP  10/25/2018      Chief Complaint:   Establish Care and Referral     History of Present Illness:   Paris Nguyen is a 33 year old female with a history of  fibromyalgia, IBS, TMJ, moderate persistent asthma, endometriosis, INDRA, and depression  who presents to establish care and for a referral. She is new to the [facility] clinic, however she sees Dr. Cerda and Dr. Almanzar here at the clinic. She is trying to consolidate her care.    She has a form to be completed for work at [employer]. She works as a user experience researcher, which includes communicating with many teams and setting up their research to ensure users are having the experience they need. She has been working there for about 3-4 years. She states that she loves her field, but finance can work very slowly. She states that she finally saw a break through in one of her projects which has made her excited about her work recently. She works Monday to Friday 9-5. She is trying to work from home occasionally, which help avoid the commute.     She believes her migraines are tension headaches from environmental related stimulus like teeth clenching, bright fluorescent lights, and stress. She does not believe they are caffeine or dehydration related. Further, that she has trouble with depth perception that makes her need to focus more, and she believes this also contributes to her headache. The lighting in her office is also fluorescent, which only makes her eye strain worse. She wears a hat and has a cover over her desk to help, but still has some trouble. She states that she has mrigraines weekly now, and they last for about a day. When she has them, she has to go home and go straight to bed. She denies having auras with the Migraines, but she does get \"floaties\" or black spots intermittently, separate from the migraines. She takes 500 mg of Nexproxen, for migraines and fibromyalgia..    She " states that she thinks her TMJ might be acting up as well as she has been experiencing dizziness recently. She has never worked with a TMJ specialist before. She states that she would like to get a night mouth guard, but her insurance would not cover this in the past. She states this was diagnosed with TMJ about 10 years ago, and is caused by clenching her jaw when she is stressed. She denies grinding her teeth at night.    Other concerns discussed:  - She is currently transitioning from Lexapro to Effexor for her depression, working with Dr. Cerda   - She states that her IBS is msotly constipation and early gastric dumping.   - She states that she is due for a pap smear in January.   - She is taking a Multivitamin with Vitamin D and B12      Review of Systems:   Pertinent items are noted in HPI, remainder of complete ROS is negative.      Active Medications:      biotin 1000 MCG TABS tablet, Take 5,000 mcg by mouth daily, Disp: , Rfl:      EPINEPHrine (EPIPEN/ADRENACLICK/OR ANY BX GENERIC EQUIV) 0.3 MG/0.3ML injection 2-pack, Inject 0.3 mg into the muscle once, Disp: , Rfl:      escitalopram (LEXAPRO) 10 MG tablet, Take 1 tablet (10 mg) by mouth daily, Disp: 30 tablet, Rfl: 0     etonogestrel-ethinyl estradiol (NUVARING) 0.12-0.015 MG/24HR vaginal ring, Place 1 each vaginally once a week, Disp: , Rfl:      fexofenadine (ALLEGRA) 180 MG tablet, Take 180 mg by mouth daily, Disp: , Rfl:      fluticasone (FLOVENT HFA) 110 MCG/ACT Inhaler, Inhale 2 puffs into the lungs 2 times daily, Disp: , Rfl:      gabapentin (NEURONTIN) 300 MG capsule, Take 2 capsules (600 mg) by mouth At Bedtime, Disp: 60 capsule, Rfl: 1     gabapentin (NEURONTIN) 300 MG capsule, Take 300 mg by mouth every morning , Disp: , Rfl:      hydrOXYzine (ATARAX) 25 MG tablet, Take 25-50 mg by mouth 2 times daily as needed for itching or anxiety , Disp: , Rfl:      levalbuterol (XOPENEX HFA) 45 MCG/ACT Inhaler, Inhale 1-2 puffs into the lungs every 4  hours as needed, Disp: , Rfl:      LORazepam (ATIVAN) 0.5 MG tablet, Take 0.5 mg by mouth daily as needed, Disp: , Rfl:      Magnesium Glycinate POWD, 400 mg At Bedtime, Disp: 100 g, Rfl: 3     montelukast (SINGULAIR) 10 MG tablet, Take 10 mg by mouth At Bedtime, Disp: , Rfl:      multivitamin, therapeutic with minerals (MULTI-VITAMIN) TABS tablet, Take 1 tablet by mouth daily, Disp: , Rfl:      naproxen (NAPROSYN) 500 MG tablet, Take 500 mg by mouth 2 times daily as needed, Disp: , Rfl:      tiZANidine (ZANAFLEX) 2 MG tablet, Take 1 tablet (2 mg) by mouth 3 times daily as needed for muscle spasms, Disp: 90 tablet, Rfl: 0     traZODone (DESYREL) 50 MG tablet, Take 0.5-1 tablets (25-50 mg) by mouth nightly as needed for sleep, Disp: 30 tablet, Rfl: 1     venlafaxine (EFFEXOR XR) 75 MG 24 hr capsule, Take 1 capsule (75 mg) by mouth daily, Disp: 30 capsule, Rfl: 0      Allergies:   Latex and Liquid adhesive, all trees (except Maple) and grass, dust mites, cats, bananas, watermelon.     Past Medical History:  Anxiety   Depression  Endometriosis  Gastroesophageal reflux disease  Irritable bowel syndrome  Migraines  Pelvic and perineal pain  Uncomplicated asthma  Fibromyalgia   TMJ arthralgia  Moderate persistent asthma with allergies  ADHD  Autism spectrum disorder  Obsessive compulsive disorder     Past Surgical History:  Cystoscopy  DaVinci assisted ablation//excision of endometriosis  DaVinci lysis of adhesions  DaVinci pelvic procedure  Dilation and curettage, hysteroscopy diagnostic, combined  Eye surgery  Tooth extraction with forcep    Family History:   hypothyroidism - mother  Kidney disease - mother  Alcoholism - father  Depression - father  Pancreatitis - father (alcohol related)  Attention deficit disorder - sister  Myocardial infarction - paternal grandfather      Social History:   This patient presented alone.  Smoking status: never  Smokeless tobacco: never  Alcohol use: yes, 3x per month     Physical Exam:  "  /76  Pulse 87  Ht 1.638 m (5' 4.5\")  Wt 104 kg (229 lb 3.2 oz)  BMI 38.73 kg/m2   Constitutional: Alert, oriented, pleasant, no acute distress  Head: Normocephalic, atraumatic  Eyes: Extra-ocular movements intact, strabismus, pupils equally round and reactive bilaterally, no scleral icterus   Ears: tympanic membranes pearly gray with positive light reflex, small amount of cerumen in EACs bilaterally  ENT: Oropharynx clear, moist mucus membranes, good dentition  Neck: Supple, no lymphadenopathy, no thyromegaly.  Cardiovascular: Regular rate and rhythm, no murmurs, rubs or gallops  Respiratory: Good air movement bilaterally, lungs clear, no wheezes/rales/rhonchi  GI: Abdomen soft, bowel sounds present, nondistended, nontender, no organomegaly or masses, no rebound/guarding  Neurologic: Alert and oriented, cranial nerves 2-12 intact, strength 5/5 throughout  Psychiatric: normal mentation, affect and mood      Assessment and Plan:  Fibromyalgia  Other migraine without status migrainosus, not intractable  Per her request, I will refill her naproxen as this works effectively to help with her Fibromyalgia and migraines. Avoid daily use, take with food.  - naproxen (NAPROSYN) 500 MG tablet  Dispense: 60 tablet; Refill: 1    Arthralgia of temporomandibular joint, unspecified laterality  She would like to see a specialist for her TMJ, as she has had worsening symptoms recently. I put in a referral to ENT.   - OTOLARYNGOLOGY REFERRAL     Follow-up: 4 months--due for pap at that time, or sooner as needed for any changes or concerns        Scribe Disclosure:   I, Anita Georges, am serving as a scribe to document services personally performed by TORSTEN Chao CNP at this visit, based upon the provider's statements to me. All documentation has been reviewed by the aforementioned provider prior to being entered into the official medical record.     Portions of this medical record were completed by a scribe. " UPON MY REVIEW AND AUTHENTICATION BY ELECTRONIC SIGNATURE, this confirms (a) I performed the applicable clinical services, and (b) the record is accurate.     TORSTEN Chao CNP

## 2018-10-29 ENCOUNTER — TELEPHONE (OUTPATIENT)
Dept: BEHAVIORAL HEALTH | Facility: CLINIC | Age: 33
End: 2018-10-29

## 2018-10-29 NOTE — TELEPHONE ENCOUNTER
Per Dr. Cerda writer checked in with client to see how she was doing. Client states everything is going fine and she is not experiencing any side effects. Per Dr. Cerda writer instructed client to discontinue the Lexapro and keep effexor at 75 mg. Client will follow up with Rogelio Martinez on November 27th.     Plan for check in with Paris in 2 weeks:     If things are improved or the same:   Discontinue Lexapro and keep Effexor at 75mg.     If mood symptoms have worsened, or concern for side effects:   Check out specifics of mood symptoms and side effects, and let me know, I will contact Paris for further plan.

## 2018-11-12 ENCOUNTER — TRANSFERRED RECORDS (OUTPATIENT)
Dept: HEALTH INFORMATION MANAGEMENT | Facility: CLINIC | Age: 33
End: 2018-11-12

## 2018-11-12 ENCOUNTER — TELEPHONE (OUTPATIENT)
Dept: BEHAVIORAL HEALTH | Facility: CLINIC | Age: 33
End: 2018-11-12

## 2018-11-12 ASSESSMENT — ANXIETY QUESTIONNAIRES
GAD7 TOTAL SCORE: 11
GAD7 TOTAL SCORE: 11
2. NOT BEING ABLE TO STOP OR CONTROL WORRYING: SEVERAL DAYS
5. BEING SO RESTLESS THAT IT IS HARD TO SIT STILL: MORE THAN HALF THE DAYS
1. FEELING NERVOUS, ANXIOUS, OR ON EDGE: SEVERAL DAYS
4. TROUBLE RELAXING: NEARLY EVERY DAY
7. FEELING AFRAID AS IF SOMETHING AWFUL MIGHT HAPPEN: NOT AT ALL
7. FEELING AFRAID AS IF SOMETHING AWFUL MIGHT HAPPEN: NOT AT ALL
6. BECOMING EASILY ANNOYED OR IRRITABLE: MORE THAN HALF THE DAYS
3. WORRYING TOO MUCH ABOUT DIFFERENT THINGS: MORE THAN HALF THE DAYS

## 2018-11-13 ASSESSMENT — ANXIETY QUESTIONNAIRES: GAD7 TOTAL SCORE: 11

## 2018-11-19 DIAGNOSIS — F33.1 MODERATE EPISODE OF RECURRENT MAJOR DEPRESSIVE DISORDER (H): ICD-10-CM

## 2018-11-19 DIAGNOSIS — F41.1 GENERALIZED ANXIETY DISORDER: ICD-10-CM

## 2018-11-19 RX ORDER — VENLAFAXINE HYDROCHLORIDE 75 MG/1
75 CAPSULE, EXTENDED RELEASE ORAL DAILY
Qty: 30 CAPSULE | Refills: 0 | Status: SHIPPED | OUTPATIENT
Start: 2018-11-19 | End: 2018-11-27

## 2018-11-20 ENCOUNTER — OFFICE VISIT (OUTPATIENT)
Dept: ANESTHESIOLOGY | Facility: CLINIC | Age: 33
End: 2018-11-20
Payer: COMMERCIAL

## 2018-11-20 ENCOUNTER — TELEPHONE (OUTPATIENT)
Dept: PSYCHIATRY | Facility: CLINIC | Age: 33
End: 2018-11-20

## 2018-11-20 VITALS
BODY MASS INDEX: 38.15 KG/M2 | WEIGHT: 229 LBS | HEART RATE: 93 BPM | RESPIRATION RATE: 16 BRPM | HEIGHT: 65 IN | DIASTOLIC BLOOD PRESSURE: 77 MMHG | SYSTOLIC BLOOD PRESSURE: 123 MMHG

## 2018-11-20 DIAGNOSIS — M79.7 FIBROMYALGIA: Primary | ICD-10-CM

## 2018-11-20 ASSESSMENT — PAIN SCALES - GENERAL: PAINLEVEL: MODERATE PAIN (4)

## 2018-11-20 NOTE — MR AVS SNAPSHOT
After Visit Summary   11/20/2018    Paris Nguyen    MRN: [id number]           Patient Information     Date Of Birth          1985        Visit Information        Provider Department      11/20/2018 9:00 AM Terri Otero APRN CNP [facility]        Today's Diagnoses     Fibromyalgia    -  1      Care Instructions    1. Continue All current Medications and therapies.    2. We recommend you add the prescribed TENS unit to your regimen.    Follow up: 6 months in clinic with the Nurse Practitioner.       To speak with a nurse, schedule/reschedule/cancel a clinic appointment, or request a medication refill call: [phone]     You can also reach us by Employee Benefit Plans: [email or web address]    For refills, please call on Monday, 1 week before your medication runs out. The doctors are not always in clinic, so this gives us time to get your prescriptions ready.  Please let us know the name of the medication you are requesting a refill of.                                     Follow-ups after your visit        Your next 10 appointments already scheduled     Nov 27, 2018  8:00 AM CST   Adult General Eval with TORSTEN Jhaveri CNP   Psychiatry Clinic ([facility])    [place] [id number] [address] [phone]              Who to contact     Please call your clinic at [phone] to:    Ask questions about your health    Make or cancel appointments    Discuss your medicines    Learn about your test results    Speak to your doctor            Additional Information About Your Visit        MyChart Information     Employee Benefit Plans gives you secure access to your electronic health record. If you see a primary care provider, you can also send messages to your care team and make appointments. If you have questions, please call your primary care clinic.  If you do not have a primary care provider,  "please call [phone] and they will assist you.      [employer] is an electronic gateway that provides easy, online access to your medical records. With [employer], you can request a clinic appointment, read your test results, renew a prescription or communicate with your care team.     To access your existing account, please contact your [facility] or call [phone] for assistance.        Care EveryWhere ID     This is your Care EveryWhere ID. This could be used by other organizations to access your [place] medical records  [id number]        Your Vitals Were     Pulse Respirations Height BMI (Body Mass Index)          93 16 1.638 m (5' 4.5\") 38.7 kg/m2         Blood Pressure from Last 3 Encounters:   11/20/18 123/77   10/25/18 112/76   10/16/18 131/86    Weight from Last 3 Encounters:   11/20/18 103.9 kg (229 lb)   10/25/18 104 kg (229 lb 3.2 oz)   10/16/18 105.3 kg (232 lb 3.2 oz)              Today, you had the following     No orders found for display       Primary Care Provider Office Phone # Fax #    Radha Santosleonard Collins, APRN [facility] [phone]       [address] [facility] [address]        Equal Access to Services     FERMIN DELEON : Hadii aad ku hadasho Soomaali, waaxda luqadaha, qaybta kaalmada adeegyada, waxay idiin hayaan maryana kharajuany akhtar . So [facility] [phone].    ATENCIÓN: Si habla español, tiene a simons disposición servicios gratuitos de asistencia lingüística. Llame al [phone].    We comply with applicable federal civil rights laws and [place] laws. We do not discriminate on the basis of race, color, national origin, age, disability, sex, sexual orientation, or gender identity.            Thank you!     Thank you for choosing [facility]  for your care. Our goal is always to provide you with excellent care. Hearing back from our patients is one way we can continue to improve our services. Please take a few " minutes to complete the written survey that you may receive in the mail after your visit with us. Thank you!             Your Updated Medication List - Protect others around you: Learn how to safely use, store and throw away your medicines at www.disposemymeds.org.          This list is accurate as of 11/20/18 10:21 AM.  Always use your most recent med list.                   Brand Name Dispense Instructions for use Diagnosis    biotin 1000 MCG Tabs tablet      Take 5,000 mcg by mouth daily        EPINEPHrine 0.3 MG/0.3ML injection 2-pack    EPIPEN/ADRENACLICK/or ANY BX GENERIC EQUIV     Inject 0.3 mg into the muscle once        etonogestrel-ethinyl estradiol 0.12-0.015 MG/24HR vaginal ring    NUVARING     Place 1 each vaginally once a week        fexofenadine 180 MG tablet    ALLEGRA     Take 180 mg by mouth daily        fluticasone 110 MCG/ACT Inhaler    FLOVENT HFA     Inhale 2 puffs into the lungs 2 times daily        * gabapentin 300 MG capsule    NEURONTIN     Take 300 mg by mouth every morning        * gabapentin 300 MG capsule    NEURONTIN    60 capsule    Take 2 capsules (600 mg) by mouth At Bedtime    Insomnia, unspecified type, Generalized anxiety disorder       hydrOXYzine 25 MG tablet    ATARAX     Take 25-50 mg by mouth 2 times daily as needed for itching or anxiety        levalbuterol 45 MCG/ACT Inhaler    XOPENEX HFA     Inhale 1-2 puffs into the lungs every 4 hours as needed        LORazepam 0.5 MG tablet    ATIVAN     Take 0.5 mg by mouth daily as needed        Magnesium Glycinate Powd     100 g    400 mg At Bedtime    Primary insomnia       montelukast 10 MG tablet    SINGULAIR     Take 10 mg by mouth At Bedtime        Multi-vitamin Tabs tablet      Take 1 tablet by mouth daily        naproxen 500 MG tablet    NAPROSYN    60 tablet    Take 1 tablet (500 mg) by mouth 2 times daily as needed for moderate pain or headaches (migraines and fibromyalgia)    Fibromyalgia, Other migraine without status  migrainosus, not intractable       order for DME     1 Device    Equipment being ordered: TENS All necessary equipment: leads/pads Duration of use: 36 months Apply to painful areas    Fibromyalgia, Muscle spasm, Myofascial pain       tiZANidine 2 MG tablet    ZANAFLEX    90 tablet    Take 1 tablet (2 mg) by mouth 3 times daily as needed for muscle spasms    Fibromyalgia, Muscle spasm       traZODone 50 MG tablet    DESYREL    30 tablet    Take 0.5-1 tablets (25-50 mg) by mouth nightly as needed for sleep    Insomnia, unspecified type       venlafaxine 75 MG 24 hr capsule    EFFEXOR XR    30 capsule    Take 1 capsule (75 mg) by mouth daily    Moderate episode of recurrent major depressive disorder (H), Generalized anxiety disorder       * Notice:  This list has 2 medication(s) that are the same as other medications prescribed for you. Read the directions carefully, and ask your doctor or other care provider to review them with you.

## 2018-11-20 NOTE — LETTER
"11/20/2018       RE: Paris Nguyen  [address] [place] [address]     Dear Colleague,    Thank you for referring your patient, Paris Nguyen, to the [facility] at [facility]. Please see a copy of my visit note below.    Date of visit: 11/20/2018    Chief complaint:   Chief Complaint   Patient presents with     Pain Management     Follow up        Interval history:  Paris Nguyen is a 33 year old female last seen by me on 9/24/18 for fibromyalgia.   -Reports she has had an IBS flare since last seen; managing with Miralax and Senna d/t persistent constipation.   -Has not yet obtained TENS unit, but plans to within the next several months.   -Continues with  for weight training and cardiology. Practices self-directed aquatic therapy and hot tub at her gym. Uses heating pads at work.   -Uses tizanidine several times per week; taking only at HS. Cannot tolerate during the daytime due to drowsiness. Finds it to be effective.   -Continues with gabapentin 300/300/600 mg; afternoon dose is \"optional\". If she is able to go for a walk, she will not have necessity to take afternoon gabapentin.   -Has altered from Lexapro to Effexor with her psychiatrist; finding increased benefit with depression but no impact on pain.     Original pain history:   Patient presents with past medical history of OCD, ADHD, depression, asthma, possible autism spectrum disorder, endometriosis, IBS; she is here today for evaluation of fibromyalgia. Patient states she was diagnosed with fibromyalgia by \"process of elimination\" by her neurologist and primary care provider approximately two years ago. She reports chronic fatigue and widespread pain; symptoms are aggravated by stress, depression, sleeplessness. Patient closely follows with her psychiatrist, but admits she is \"in between therapists\" but is interested in finding someone who has an " understanding of chronic pain.   Her medication regimen for fibromyalgia management currently consists of gabapentin 300 mg which she takes morning and afternoon with a recent increase to 600 mg at HS at bedtime. She is also taking Lexapro through psychiatry management, but mentions she is tapering down with plans to alter to Cymbalta. She takes naproxen as needed which is greatly efficacious.   Further attempted treatments include current once weekly aquatic physical therapy, occupation therapy for sensory integration, regular massage therapy. She has used a friend's TENS unit and oral muscle relaxants in the past with good benefit. Patient also states that she focuses heavily on sleep hygiene with blackout curtains, noise machine, and a weighted blanket.    ?      Recommendations/plan at the last visit included:  1. Patient education: I went over the above diagnoses and treatment plan with her and answered all of her questions.  2. Interventions:  -We discussed integrative medicine modalities, such as acupuncture, myofascial release, TENS, trigger point injections/dry needling. Patient interested in trying a TENS unit; DME provided. Acupuncture may be considered in the future as patient has had good benefit with TMJ in the past.   3. Medications  1. Trial of tizanidine 2 mg tid prn for muscle spasm; I did discuss the weak evidence for skeletal muscle relaxants in fibromyalgia, however, as she reports benefit in the past, I think it is a reasonable option. Discussed side effect profile with patient in depth.   -Patient is likely tapering from her Lexapro with alteration to duloxetine through her psychiatrist.   -May consider further titration of gabapentin in future.   -Continue with naproxen; consider alternating with acetaminophen.   4. Exercise program:   -Current in physical therapy, OT, and massage; continue with HEP.   5. Behavioral Psychology:   -Referral to Dr. Ceci Mcgee in pain psychology; consider CBT.  Patient has had benefit with CBT in the past.   6. Follow up: Return in 8-12 weeks.     Medications:  Current Outpatient Prescriptions   Medication Sig Dispense Refill     biotin 1000 MCG TABS tablet Take 5,000 mcg by mouth daily       EPINEPHrine (EPIPEN/ADRENACLICK/OR ANY BX GENERIC EQUIV) 0.3 MG/0.3ML injection 2-pack Inject 0.3 mg into the muscle once       etonogestrel-ethinyl estradiol (NUVARING) 0.12-0.015 MG/24HR vaginal ring Place 1 each vaginally once a week       fexofenadine (ALLEGRA) 180 MG tablet Take 180 mg by mouth daily       gabapentin (NEURONTIN) 300 MG capsule Take 2 capsules (600 mg) by mouth At Bedtime 60 capsule 1     gabapentin (NEURONTIN) 300 MG capsule Take 300 mg by mouth every morning        hydrOXYzine (ATARAX) 25 MG tablet Take 25-50 mg by mouth 2 times daily as needed for itching or anxiety        levalbuterol (XOPENEX HFA) 45 MCG/ACT Inhaler Inhale 1-2 puffs into the lungs every 4 hours as needed       LORazepam (ATIVAN) 0.5 MG tablet Take 0.5 mg by mouth daily as needed       Magnesium Glycinate POWD 400 mg At Bedtime 100 g 3     montelukast (SINGULAIR) 10 MG tablet Take 10 mg by mouth At Bedtime       multivitamin, therapeutic with minerals (MULTI-VITAMIN) TABS tablet Take 1 tablet by mouth daily       naproxen (NAPROSYN) 500 MG tablet Take 1 tablet (500 mg) by mouth 2 times daily as needed for moderate pain or headaches (migraines and fibromyalgia) 60 tablet 1     order for DME Equipment being ordered: TENS  All necessary equipment: leads/pads  Duration of use: 36 months  Apply to painful areas 1 Device 0     tiZANidine (ZANAFLEX) 2 MG tablet Take 1 tablet (2 mg) by mouth 3 times daily as needed for muscle spasms 90 tablet 0     traZODone (DESYREL) 50 MG tablet Take 0.5-1 tablets (25-50 mg) by mouth nightly as needed for sleep 30 tablet 1     venlafaxine (EFFEXOR XR) 75 MG 24 hr capsule Take 1 capsule (75 mg) by mouth daily 30 capsule 0     fluticasone (FLOVENT HFA) 110 MCG/ACT  "Inhaler Inhale 2 puffs into the lungs 2 times daily         Medical History: any changes in medical history since they were last seen? No    Review of Systems:  The 14 system ROS was reviewed from the intake questionnaire; results   Physical Exam:  Blood pressure 123/77, pulse 93, resp. rate 16, height 1.638 m (5' 4.5\"), weight 103.9 kg (229 lb), not currently breastfeeding.  General: In no apparent distress  Mental status: Normal mood and affect, pleasant  Neuro: Alert, oriented. No sensory deficits.   Head: Atraumatic, normocephalic  Eyes: Extra-ocular movements grossly intact, no scleral icterus  Neck: Supple, Range of motion full  Respiratory: Non-labored breathing; No respiratory distress  Skin: No rashes or lesions noted on exposed areas of skin  Gait: Non-antalgic    Assessment:   Paris Nguyen is a 33 year old female who is seen at the pain clinic for fibromyalgia.    Plan:  1. Interventions:    -Recommend patient obtain TENS unit.   2. Medication Management:   -May consider further titration of gabapentin in future.   -Continue with naproxen; consider alternating with acetaminophen.   -Continue with tizanidine 2 mg; consider breaking tablet in half for less sedating side effects.   3. Physical Therapy:     -Continue with HEP and work with .   4. Need for Clinical Health Psychologist.   -Continue with CBT   5. Follow up: RTC in 6 months or as needed.     Total time spent was 20 minutes, and more than 50% of face to face time was spent in counseling and/or coordination of care regarding plan of care.    TORSTEN Salcedo, SHIRA-BC  Pain Management  Department of Interventional Pain Management and Anesthesiology   [facility]        Answers for HPI/ROS submitted by the patient on 11/12/2018   INDRA 7 TOTAL SCORE: 11  PHQ-2 Score: 1            "

## 2018-11-20 NOTE — PROGRESS NOTES
"Date of visit: 11/20/2018    Chief complaint:   Chief Complaint   Patient presents with     Pain Management     Follow up        Interval history:  Paris Nguyen is a 33 year old female last seen by me on 9/24/18 for fibromyalgia.   -Reports she has had an IBS flare since last seen; managing with Miralax and Senna d/t persistent constipation.   -Has not yet obtained TENS unit, but plans to within the next several months.   -Continues with  for weight training and cardiology. Practices self-directed aquatic therapy and hot tub at her gym. Uses heating pads at work.   -Uses tizanidine several times per week; taking only at HS. Cannot tolerate during the daytime due to drowsiness. Finds it to be effective.   -Continues with gabapentin 300/300/600 mg; afternoon dose is \"optional\". If she is able to go for a walk, she will not have necessity to take afternoon gabapentin.   -Has altered from Lexapro to Effexor with her psychiatrist; finding increased benefit with depression but no impact on pain.     Original pain history:   Patient presents with past medical history of OCD, ADHD, depression, asthma, possible autism spectrum disorder, endometriosis, IBS; she is here today for evaluation of fibromyalgia. Patient states she was diagnosed with fibromyalgia by \"process of elimination\" by her neurologist and primary care provider approximately two years ago. She reports chronic fatigue and widespread pain; symptoms are aggravated by stress, depression, sleeplessness. Patient closely follows with her psychiatrist, but admits she is \"in between therapists\" but is interested in finding someone who has an understanding of chronic pain.   Her medication regimen for fibromyalgia management currently consists of gabapentin 300 mg which she takes morning and afternoon with a recent increase to 600 mg at HS at bedtime. She is also taking Lexapro through psychiatry management, but mentions she is tapering down with " plans to alter to Cymbalta. She takes naproxen as needed which is greatly efficacious.   Further attempted treatments include current once weekly aquatic physical therapy, occupation therapy for sensory integration, regular massage therapy. She has used a friend's TENS unit and oral muscle relaxants in the past with good benefit. Patient also states that she focuses heavily on sleep hygiene with blackout curtains, noise machine, and a weighted blanket.    ?      Recommendations/plan at the last visit included:  1. Patient education: I went over the above diagnoses and treatment plan with her and answered all of her questions.  2. Interventions:  -We discussed integrative medicine modalities, such as acupuncture, myofascial release, TENS, trigger point injections/dry needling. Patient interested in trying a TENS unit; DME provided. Acupuncture may be considered in the future as patient has had good benefit with TMJ in the past.   3. Medications  1. Trial of tizanidine 2 mg tid prn for muscle spasm; I did discuss the weak evidence for skeletal muscle relaxants in fibromyalgia, however, as she reports benefit in the past, I think it is a reasonable option. Discussed side effect profile with patient in depth.   -Patient is likely tapering from her Lexapro with alteration to duloxetine through her psychiatrist.   -May consider further titration of gabapentin in future.   -Continue with naproxen; consider alternating with acetaminophen.   4. Exercise program:   -Current in physical therapy, OT, and massage; continue with HEP.   5. Behavioral Psychology:   -Referral to Dr. Ceci Mcgee in pain psychology; consider CBT. Patient has had benefit with CBT in the past.   6. Follow up: Return in 8-12 weeks.     Medications:  Current Outpatient Prescriptions   Medication Sig Dispense Refill     biotin 1000 MCG TABS tablet Take 5,000 mcg by mouth daily       EPINEPHrine (EPIPEN/ADRENACLICK/OR ANY BX GENERIC EQUIV) 0.3 MG/0.3ML  injection 2-pack Inject 0.3 mg into the muscle once       etonogestrel-ethinyl estradiol (NUVARING) 0.12-0.015 MG/24HR vaginal ring Place 1 each vaginally once a week       fexofenadine (ALLEGRA) 180 MG tablet Take 180 mg by mouth daily       gabapentin (NEURONTIN) 300 MG capsule Take 2 capsules (600 mg) by mouth At Bedtime 60 capsule 1     gabapentin (NEURONTIN) 300 MG capsule Take 300 mg by mouth every morning        hydrOXYzine (ATARAX) 25 MG tablet Take 25-50 mg by mouth 2 times daily as needed for itching or anxiety        levalbuterol (XOPENEX HFA) 45 MCG/ACT Inhaler Inhale 1-2 puffs into the lungs every 4 hours as needed       LORazepam (ATIVAN) 0.5 MG tablet Take 0.5 mg by mouth daily as needed       Magnesium Glycinate POWD 400 mg At Bedtime 100 g 3     montelukast (SINGULAIR) 10 MG tablet Take 10 mg by mouth At Bedtime       multivitamin, therapeutic with minerals (MULTI-VITAMIN) TABS tablet Take 1 tablet by mouth daily       naproxen (NAPROSYN) 500 MG tablet Take 1 tablet (500 mg) by mouth 2 times daily as needed for moderate pain or headaches (migraines and fibromyalgia) 60 tablet 1     order for DME Equipment being ordered: TENS  All necessary equipment: leads/pads  Duration of use: 36 months  Apply to painful areas 1 Device 0     tiZANidine (ZANAFLEX) 2 MG tablet Take 1 tablet (2 mg) by mouth 3 times daily as needed for muscle spasms 90 tablet 0     traZODone (DESYREL) 50 MG tablet Take 0.5-1 tablets (25-50 mg) by mouth nightly as needed for sleep 30 tablet 1     venlafaxine (EFFEXOR XR) 75 MG 24 hr capsule Take 1 capsule (75 mg) by mouth daily 30 capsule 0     fluticasone (FLOVENT HFA) 110 MCG/ACT Inhaler Inhale 2 puffs into the lungs 2 times daily         Medical History: any changes in medical history since they were last seen? No    Review of Systems:  The 14 system ROS was reviewed from the intake questionnaire; results   Physical Exam:  Blood pressure 123/77, pulse 93, resp. rate 16, height 1.638  "m (5' 4.5\"), weight 103.9 kg (229 lb), not currently breastfeeding.  General: In no apparent distress  Mental status: Normal mood and affect, pleasant  Neuro: Alert, oriented. No sensory deficits.   Head: Atraumatic, normocephalic  Eyes: Extra-ocular movements grossly intact, no scleral icterus  Neck: Supple, Range of motion full  Respiratory: Non-labored breathing; No respiratory distress  Skin: No rashes or lesions noted on exposed areas of skin  Gait: Non-antalgic    Assessment:   Paris Nguyen is a 33 year old female who is seen at the pain clinic for fibromyalgia.    Plan:  1. Interventions:    -Recommend patient obtain TENS unit.   2. Medication Management:   -May consider further titration of gabapentin in future.   -Continue with naproxen; consider alternating with acetaminophen.   -Continue with tizanidine 2 mg; consider breaking tablet in half for less sedating side effects.   3. Physical Therapy:     -Continue with HEP and work with .   4. Need for Clinical Health Psychologist.   -Continue with CBT   5. Follow up: RTC in 6 months or as needed.     Total time spent was 20 minutes, and more than 50% of face to face time was spent in counseling and/or coordination of care regarding plan of care.    TORSTEN Salcedo, SHIRA-BC  Pain Management  Department of Interventional Pain Management and Anesthesiology   [facility]        Answers for HPI/ROS submitted by the patient on 11/12/2018   INDRA 7 TOTAL SCORE: 11  PHQ-2 Score: 1    "

## 2018-11-20 NOTE — PATIENT INSTRUCTIONS
1. Continue All current Medications and therapies.    2. We recommend you add the prescribed TENS unit to your regimen.    Follow up: 6 months in clinic with the Nurse Practitioner.       To speak with a nurse, schedule/reschedule/cancel a clinic appointment, or request a medication refill call: [phone]     You can also reach us by [employer]: [email or web address]    For refills, please call on Monday, 1 week before your medication runs out. The doctors are not always in clinic, so this gives us time to get your prescriptions ready.  Please let us know the name of the medication you are requesting a refill of.

## 2018-11-27 ENCOUNTER — OFFICE VISIT (OUTPATIENT)
Dept: PSYCHIATRY | Facility: CLINIC | Age: 33
End: 2018-11-27
Attending: NURSE PRACTITIONER
Payer: COMMERCIAL

## 2018-11-27 VITALS
WEIGHT: 227.4 LBS | SYSTOLIC BLOOD PRESSURE: 114 MMHG | BODY MASS INDEX: 38.43 KG/M2 | DIASTOLIC BLOOD PRESSURE: 81 MMHG | HEART RATE: 92 BPM

## 2018-11-27 DIAGNOSIS — F41.1 GENERALIZED ANXIETY DISORDER: ICD-10-CM

## 2018-11-27 DIAGNOSIS — G47.00 INSOMNIA, UNSPECIFIED TYPE: ICD-10-CM

## 2018-11-27 DIAGNOSIS — F41.9 ANXIETY: ICD-10-CM

## 2018-11-27 DIAGNOSIS — F33.1 MODERATE EPISODE OF RECURRENT MAJOR DEPRESSIVE DISORDER (H): ICD-10-CM

## 2018-11-27 PROCEDURE — G0463 HOSPITAL OUTPT CLINIC VISIT: HCPCS | Mod: ZF

## 2018-11-27 RX ORDER — VENLAFAXINE HYDROCHLORIDE 75 MG/1
75 CAPSULE, EXTENDED RELEASE ORAL DAILY
Qty: 30 CAPSULE | Refills: 0 | Status: SHIPPED | OUTPATIENT
Start: 2018-11-27 | End: 2019-01-09

## 2018-11-27 RX ORDER — GABAPENTIN 300 MG/1
600 CAPSULE ORAL AT BEDTIME
Qty: 60 CAPSULE | Refills: 1 | Status: SHIPPED | OUTPATIENT
Start: 2018-11-27 | End: 2019-02-17

## 2018-11-27 RX ORDER — GABAPENTIN 300 MG/1
300 CAPSULE ORAL
Qty: 60 CAPSULE | Refills: 1 | Status: SHIPPED | OUTPATIENT
Start: 2018-11-27 | End: 2019-02-17

## 2018-11-27 RX ORDER — HYDROXYZINE HYDROCHLORIDE 25 MG/1
25-50 TABLET, FILM COATED ORAL 2 TIMES DAILY PRN
Qty: 120 TABLET | Refills: 0 | Status: SHIPPED | OUTPATIENT
Start: 2018-11-27 | End: 2019-06-07

## 2018-11-27 ASSESSMENT — PAIN SCALES - GENERAL: PAINLEVEL: MILD PAIN (3)

## 2018-11-27 ASSESSMENT — PATIENT HEALTH QUESTIONNAIRE - PHQ9: SUM OF ALL RESPONSES TO PHQ QUESTIONS 1-9: 13

## 2018-11-27 NOTE — PROGRESS NOTES
"  Psychiatry Clinic Progress Note                                                                   Paris Nguyen is a 33 year old female who prefers the name Ri (\"Ree\") and pronoun they.  Last seen by Dr. Cerda on 10/16/18.    Therapist: Ilana Ham @ [facility]  PCP: Radha Collins  Other Providers: ALEXANDRA Otero @ [facility]    Pertinent Background:  See previous notes.  Psych critical item history includes mutiple psychotropic trials.     Interim History                                                                                                        4, 4     The patient is a good historian, reports good treatment adherence and was last seen 10/16/18.  Since the last visit, Ri was able to discontinue Lexapro successfully and started Effexor XR 75mg.  Since starting Effexor, Ri reports depressive symptoms continues but less intense and less frequent.  Effexor XR started approximately 2 weeks ago.  Since increasing nighttime dose of Gabapentin, Ri reports sleep has improved.  Also Trazodone decreased to 25mg due to morning sedation.  Continues to experience some sedation in morning but positives outweigh negative.  Guanfacine tried recently to manage anxiety, panic, and irritability; but stopped due to lowered blood pressure.       Recent Symptoms:   Depression:  depressed mood, anhedonia, low energy, insomnia, appetite changes and poor concentration /memory  Elevated:  none  Psychosis:  none  Anxiety:  nervous/overwhelmed  Panic Attack:  none  Trauma Related:  none     Recent Substance Use:  No changes reported        Social/ Family History                                  [per patient report]                                 1ea,1ea   FINANCIAL SUPPORT- working       FEELS SAFE AT HOME- Yes    Medical / Surgical History                                                                                                                  Patient Active Problem List   Diagnosis     " Fibromyalgia     TMJ arthralgia     Moderate persistent asthma without complication     ADHD     Autism spectrum disorder     Generalized anxiety disorder     IBS (irritable bowel syndrome)     OCD (obsessive compulsive disorder)     Seasonal allergic rhinitis due to pollen     Allergic rhinitis due to dust     Allergy to adhesive tape     Need for desensitization to allergens     Oral allergy syndrome     Plantar fasciitis       Past Surgical History:   Procedure Laterality Date     CYSTOSCOPY N/A 10/12/2016    Procedure: CYSTOSCOPY;  Surgeon: Eliazar Patel MD;  Location:  OR     DAVINCI ASSISTED ABLATION / EXCISION OF ENDOMETRIOSIS N/A 10/12/2016    Procedure: DAVINCI ASSISTED ABLATION / EXCISION OF ENDOMETRIOSIS;  Surgeon: Eliazar Patel MD;  Location:  OR     DAVINCI LYSIS OF ADHESIONS N/A 10/12/2016    Procedure: DAVINCI LYSIS OF ADHESIONS;  Surgeon: Eliazar Patel MD;  Location:  OR     DAVINCI PELVIC PROCEDURE N/A 10/12/2016    Procedure: DAVINCI PELVIC PROCEDURE;  Surgeon: Eliazar Patel MD;  Location:  OR     DILATION AND CURETTAGE, HYSTEROSCOPY DIAGNOSTIC, COMBINED N/A 10/12/2016    Procedure: COMBINED DILATION AND CURETTAGE, HYSTEROSCOPY DIAGNOSTIC;  Surgeon: Eliazar Patel MD;  Location:  OR     EYE SURGERY      Eye Surgery x 2 as an infant     HC TOOTH EXTRACTION W/FORCEP          Medical Review of Systems                                                                                                    2,10   The remainder of the review of systems is noncontributory  Dizziness is a regular issue, has episodes maybe twice per day. Constipation is frequently an issue. Denies N/V, headaches. Had migraines in the past, but hasn't had one in a while. Thinks diet helped with this.   Allergy                                Latex; Liquid adhesive; and Seasonal allergies  Current Medications                                                                                                        Current Outpatient Prescriptions   Medication Sig Dispense Refill     biotin 1000 MCG TABS tablet Take 5,000 mcg by mouth daily       EPINEPHrine (EPIPEN/ADRENACLICK/OR ANY BX GENERIC EQUIV) 0.3 MG/0.3ML injection 2-pack Inject 0.3 mg into the muscle once       fexofenadine (ALLEGRA) 180 MG tablet Take 180 mg by mouth daily       gabapentin (NEURONTIN) 300 MG capsule Take 2 capsules (600 mg) by mouth At Bedtime 60 capsule 1     gabapentin (NEURONTIN) 300 MG capsule Take 300 mg by mouth every morning        hydrOXYzine (ATARAX) 25 MG tablet Take 25-50 mg by mouth 2 times daily as needed for itching or anxiety        levalbuterol (XOPENEX HFA) 45 MCG/ACT Inhaler Inhale 1-2 puffs into the lungs every 4 hours as needed       LORazepam (ATIVAN) 0.5 MG tablet Take 0.5 mg by mouth daily as needed       Magnesium Glycinate POWD 400 mg At Bedtime 100 g 3     montelukast (SINGULAIR) 10 MG tablet Take 10 mg by mouth At Bedtime       multivitamin, therapeutic with minerals (MULTI-VITAMIN) TABS tablet Take 1 tablet by mouth daily       naproxen (NAPROSYN) 500 MG tablet Take 1 tablet (500 mg) by mouth 2 times daily as needed for moderate pain or headaches (migraines and fibromyalgia) 60 tablet 1     order for DME Equipment being ordered: TENS  All necessary equipment: leads/pads  Duration of use: 36 months  Apply to painful areas 1 Device 0     tiZANidine (ZANAFLEX) 2 MG tablet Take 1 tablet (2 mg) by mouth 3 times daily as needed for muscle spasms 90 tablet 0     traZODone (DESYREL) 50 MG tablet Take 0.5-1 tablets (25-50 mg) by mouth nightly as needed for sleep 30 tablet 1     venlafaxine (EFFEXOR XR) 75 MG 24 hr capsule Take 1 capsule (75 mg) by mouth daily 30 capsule 0     etonogestrel-ethinyl estradiol (NUVARING) 0.12-0.015 MG/24HR vaginal ring Place 1 each vaginally once a week       fluticasone (FLOVENT HFA) 110 MCG/ACT Inhaler Inhale 2 puffs into the lungs 2 times daily       Vitals        "                                                                                                                3, 3   /81  Pulse 92  Wt 103.1 kg (227 lb 6.4 oz)  BMI 38.43 kg/m2   Mental Status Exam                                                                                    9, 14 cog gs     Alertness: alert  and oriented  Appearance: casually groomed  Behavior/Demeanor: cooperative, pleasant and calm, with good  eye contact   Speech: regular rate and rhythm  Language: no obvious problem  Psychomotor: normal or unremarkable  Mood: \"ok, better\"  Affect: appropriate; was congruent to mood; was congruent to content  Thought Process/Associations: unremarkable  Thought Content:  Reports none;  Denies suicidal ideation and violent ideation  Perception:  Reports none;  Denies auditory hallucinations and visual hallucinations  Insight: good  Judgment: good  Cognition: (6) does  appear grossly intact; formal cognitive testing was not done  Gait/Station and/or Muscle Strength/Tone: unremarkable    Labs and Data                                                                                                                 Rating Scales:    PHQ9 and CAGE AIDE 1. Have you ever felt that you outght to cut down on your drinking or drug use?  no  2. Have people annoyed you by criticizing your drinking or drug use? no  3. Have you ever felt bad or guilty about your drinking or drug use?  no  4. Have you ever had a drink or used drugs first thing in the morning to steady your nerves or to get rid of a hangover?  no    PHQ9 Today:  13  PHQ-9 SCORE 9/20/2018 9/24/2018 10/16/2018   Total Score MyChart 21 (Severe depression) 17 (Moderately severe depression) 14 (Moderate depression)   Total Score 21 17 14         Diagnosis and Assessment                                                                             m2, h3     Today the following issues were addressed:    1) Major Depressive Disorder, recurrent, moderate  2) " Generalized Anxiety Disorder  3) ADHD (Hx)    [place] Prescription Monitoring Program [] review was not needed today.    PSYCHOTROPIC DRUG INTERACTIONS:   NAPROXEN -- ESCITALOPRAM -- VENLAFAXINE may result in an increased risk of bleeding.     ESCITALOPRAM -- VENLAFAXINE -- HYDROXYZINE -- TRAZODONE -- TIZANIDINE may result in increased risk of QT interval prolongation.     ESCITALOPRAM -- VENLAFAXINE -- TRAZODONE may result in an increased risk of serotonin syndrome (hypertension, hyperthermia, myoclonus, mental status changes).     LORAZEPAM -- ESTRADIOL  may result in decreased lorazepam effectiveness.      MANAGEMENT:  Monitoring for adverse effects and routine vitals    Plan                                                                                                                    m2, h3      1) Medication Management   Continue Effexor XR 75mg  Continue Trazodone 25-50mg at bedtime PRN  Continue Hyrdoxyzine 25-50mg for anxiety  Continue Gabapentin 300 BID and 600mg at bedtime   Continue Lorazepam 0.5mg daily PRN (takes rarely)    2) Therapy  Continue with current therapist    RTC: 1 month    CRISIS NUMBERS:   Provided routinely in AVS.    Treatment Risk Statement:  The patient understands the risks, benefits, adverse effects and alternatives. Agrees to treatment with the capacity to do so. No medical contraindications to treatment. Agrees to call clinic for any problems. The patient understands to call 911 or go to the nearest ED if life threatening or urgent symptoms occur.       PROVIDER:  TORSTEN Hogan CNP

## 2018-11-27 NOTE — MR AVS SNAPSHOT
After Visit Summary   11/27/2018    Paris Nguyen    MRN: [id number]           Patient Information     Date Of Birth          1985        Visit Information        Provider Department      11/27/2018 8:00 AM Rogelio Green APRN CNP [facility]        Today's Diagnoses     Anxiety        Insomnia, unspecified type        Generalized anxiety disorder        Moderate episode of recurrent major depressive disorder (H)           Follow-ups after your visit        Your next 10 appointments already scheduled     Dec 14, 2018  2:30 PM CST   New Patient Visit with Aletha Buchanan MD   [facility] ([facility])    [facility] [address]  [address]  [address] [facility] [id number] [phone]            Dec 27, 2018  9:00 AM CST   Adult Med Follow UP with TORSTEN Jhaveri CNP   Psychiatry Clinic ([facility])    [address]  [address] [address] [phone]              Who to contact     Please call your clinic at [phone] to:    Ask questions about your health    Make or cancel appointments    Discuss your medicines    Learn about your test results    Speak to your doctor            Additional Information About Your Visit        [employer] Information     [employer] gives you secure access to your electronic health record. If you see a primary care provider, you can also send messages to your care team and make appointments. If you have questions, please call your primary care clinic.  If you do not have a primary care provider, please call [phone] and they will assist you.      [employer] is an electronic gateway that provides easy, online access to your medical records. With [employer], you can request a clinic appointment, read your test results, renew a prescription or communicate with your care team.     To access your existing account, please contact your [facility]  [facility] [facility] or call [phone] for assistance.        Care EveryWhere ID     This is your Care EveryWhere ID. This could be used by other organizations to access your [place] medical records  [id number]        Your Vitals Were     Pulse BMI (Body Mass Index)                92 38.43 kg/m2           Blood Pressure from Last 3 Encounters:   11/27/18 114/81   11/20/18 123/77   10/25/18 112/76    Weight from Last 3 Encounters:   11/27/18 103.1 kg (227 lb 6.4 oz)   11/20/18 103.9 kg (229 lb)   10/25/18 104 kg (229 lb 3.2 oz)              Today, you had the following     No orders found for display         Today's Medication Changes          These changes are accurate as of 11/27/18  9:50 AM.  If you have any questions, ask your nurse or doctor.               These medicines have changed or have updated prescriptions.        Dose/Directions    * gabapentin 300 MG capsule   Commonly known as:  NEURONTIN   This may have changed:  Another medication with the same name was changed. Make sure you understand how and when to take each.   Used for:  Insomnia, unspecified type, Generalized anxiety disorder   Changed by:  Rogelio Green APRN CNP        Dose:  600 mg   Take 2 capsules (600 mg) by mouth At Bedtime   Quantity:  60 capsule   Refills:  1       * gabapentin 300 MG capsule   Commonly known as:  NEURONTIN   This may have changed:  when to take this   Used for:  Anxiety   Changed by:  Rogelio Green APRN CNP        Dose:  300 mg   Take 1 capsule (300 mg) by mouth 2 times daily   Quantity:  60 capsule   Refills:  1       * Notice:  This list has 2 medication(s) that are the same as other medications prescribed for you. Read the directions carefully, and ask your doctor or other care provider to review them with you.         Where to get your medicines      These medications were sent to [facility] [address] [place], [place] - [address] [facility]  [address], [place] [address]     Phone:   [phone]     gabapentin 300 MG capsule    gabapentin 300 MG capsule    hydrOXYzine 25 MG tablet    venlafaxine 75 MG 24 hr capsule                Primary Care Provider Office Phone # Fax #    TORSTEN Bhatt [facility] [phone]       [address] [facility] [address]        Equal Access to Services     [facility] : Hadii aad ku hadasho Soomaali, waaxda luqadaha, qaybta kaalmada adeegyada, waxay idiin hayaan adeeg taysh lajackn . So [facility] [phone].    ATENCIÓN: Si habla español, tiene a simons disposición servicios gratuitos de asistencia lingüística. Silvia al [phone].    We comply with applicable federal civil rights laws and [place] laws. We do not discriminate on the basis of race, color, national origin, age, disability, sex, sexual orientation, or gender identity.            Thank you!     Thank you for choosing PSY[facility]ATRY CLINIC  for your care. Our goal is always to provide you with excellent care. Hearing back from our patients is one way we can continue to improve our services. Please take a few minutes to complete the written survey that you may receive in the mail after your visit with us. Thank you!             Your Updated Medication List - Protect others around you: Learn how to safely use, store and throw away your medicines at www.disposemymeds.org.          This list is accurate as of 11/27/18  9:50 AM.  Always use your most recent med list.                   Brand Name Dispense Instructions for use Diagnosis    biotin 1000 MCG Tabs tablet      Take 5,000 mcg by mouth daily        EPINEPHrine 0.3 MG/0.3ML injection 2-pack    EPIPEN/ADRENACLICK/or ANY BX GENERIC EQUIV     Inject 0.3 mg into the muscle once        etonogestrel-ethinyl estradiol 0.12-0.015 MG/24HR vaginal ring    NUVARING     Place 1 each vaginally once a week        fexofenadine 180 MG tablet    ALLEGRA     Take 180 mg by mouth daily        fluticasone 110 MCG/ACT inhaler    FLOVENT HFA     Inhale  2 puffs into the lungs 2 times daily        * gabapentin 300 MG capsule    NEURONTIN    60 capsule    Take 2 capsules (600 mg) by mouth At Bedtime    Insomnia, unspecified type, Generalized anxiety disorder       * gabapentin 300 MG capsule    NEURONTIN    60 capsule    Take 1 capsule (300 mg) by mouth 2 times daily    Anxiety       hydrOXYzine 25 MG tablet    ATARAX    120 tablet    Take 1-2 tablets (25-50 mg) by mouth 2 times daily as needed for itching or anxiety    Anxiety       levalbuterol 45 MCG/ACT inhaler    XOPENEX HFA     Inhale 1-2 puffs into the lungs every 4 hours as needed        LORazepam 0.5 MG tablet    ATIVAN     Take 0.5 mg by mouth daily as needed        Magnesium Glycinate Powd     100 g    400 mg At Bedtime    Primary insomnia       montelukast 10 MG tablet    SINGULAIR     Take 10 mg by mouth At Bedtime        Multi-vitamin tablet      Take 1 tablet by mouth daily        naproxen 500 MG tablet    NAPROSYN    60 tablet    Take 1 tablet (500 mg) by mouth 2 times daily as needed for moderate pain or headaches (migraines and fibromyalgia)    Fibromyalgia, Other migraine without status migrainosus, not intractable       order for DME     1 Device    Equipment being ordered: TENS All necessary equipment: leads/pads Duration of use: 36 months Apply to painful areas    Fibromyalgia, Muscle spasm, Myofascial pain       tiZANidine 2 MG tablet    ZANAFLEX    90 tablet    Take 1 tablet (2 mg) by mouth 3 times daily as needed for muscle spasms    Fibromyalgia, Muscle spasm       traZODone 50 MG tablet    DESYREL    30 tablet    Take 0.5-1 tablets (25-50 mg) by mouth nightly as needed for sleep    Insomnia, unspecified type       venlafaxine 75 MG 24 hr capsule    EFFEXOR XR    30 capsule    Take 1 capsule (75 mg) by mouth daily    Moderate episode of recurrent major depressive disorder (H), Generalized anxiety disorder       * Notice:  This list has 2 medication(s) that are the same as other medications  prescribed for you. Read the directions carefully, and ask your doctor or other care provider to review them with you.

## 2018-12-11 ASSESSMENT — ENCOUNTER SYMPTOMS
FEVER: 0
PARALYSIS: 0
JAUNDICE: 0
POLYDIPSIA: 1
HOT FLASHES: 1
WEIGHT LOSS: 0
POSTURAL DYSPNEA: 0
TREMORS: 0
NIGHT SWEATS: 1
SMELL DISTURBANCE: 0
FATIGUE: 1
SINUS PAIN: 0
LOSS OF CONSCIOUSNESS: 0
MUSCLE CRAMPS: 1
EYE WATERING: 1
ALTERED TEMPERATURE REGULATION: 0
NUMBNESS: 1
SPUTUM PRODUCTION: 1
MYALGIAS: 1
POLYPHAGIA: 0
EYE REDNESS: 0
DECREASED APPETITE: 1
STIFFNESS: 1
DECREASED LIBIDO: 1
ARTHRALGIAS: 1
HOARSE VOICE: 1
NERVOUS/ANXIOUS: 1
BACK PAIN: 1
SPEECH CHANGE: 1
TINGLING: 1
NAUSEA: 1
DOUBLE VISION: 0
SHORTNESS OF BREATH: 0
HEADACHES: 1
VOMITING: 0
HEMOPTYSIS: 0
SINUS CONGESTION: 1
NAIL CHANGES: 0
TASTE DISTURBANCE: 0
BOWEL INCONTINENCE: 0
DECREASED CONCENTRATION: 1
COUGH DISTURBING SLEEP: 0
DIZZINESS: 1
HALLUCINATIONS: 0
DISTURBANCES IN COORDINATION: 1
PANIC: 0
COUGH: 0
EYE IRRITATION: 1
SEIZURES: 0
CHILLS: 0
RECTAL PAIN: 1
WEAKNESS: 1
TROUBLE SWALLOWING: 0
INSOMNIA: 1
MUSCLE WEAKNESS: 1
ABDOMINAL PAIN: 1
MEMORY LOSS: 0
SORE THROAT: 0
SKIN CHANGES: 0
INCREASED ENERGY: 0
DYSPNEA ON EXERTION: 1
DEPRESSION: 1
BLOOD IN STOOL: 0
WEIGHT GAIN: 0
POOR WOUND HEALING: 0
JOINT SWELLING: 0
CONSTIPATION: 1
NECK PAIN: 1
WHEEZING: 0
DIARRHEA: 1
BLOATING: 1
SNORES LOUDLY: 0
NECK MASS: 0
EYE PAIN: 0
HEARTBURN: 1

## 2018-12-11 ASSESSMENT — ANXIETY QUESTIONNAIRES
5. BEING SO RESTLESS THAT IT IS HARD TO SIT STILL: MORE THAN HALF THE DAYS
3. WORRYING TOO MUCH ABOUT DIFFERENT THINGS: NOT AT ALL
7. FEELING AFRAID AS IF SOMETHING AWFUL MIGHT HAPPEN: NOT AT ALL
1. FEELING NERVOUS, ANXIOUS, OR ON EDGE: SEVERAL DAYS
7. FEELING AFRAID AS IF SOMETHING AWFUL MIGHT HAPPEN: NOT AT ALL
4. TROUBLE RELAXING: MORE THAN HALF THE DAYS
6. BECOMING EASILY ANNOYED OR IRRITABLE: NEARLY EVERY DAY
GAD7 TOTAL SCORE: 9
GAD7 TOTAL SCORE: 9
2. NOT BEING ABLE TO STOP OR CONTROL WORRYING: SEVERAL DAYS

## 2018-12-12 ASSESSMENT — ANXIETY QUESTIONNAIRES: GAD7 TOTAL SCORE: 9

## 2018-12-14 ENCOUNTER — OFFICE VISIT (OUTPATIENT)
Dept: OBGYN | Facility: CLINIC | Age: 33
End: 2018-12-14
Attending: OBSTETRICS & GYNECOLOGY
Payer: COMMERCIAL

## 2018-12-14 VITALS
SYSTOLIC BLOOD PRESSURE: 132 MMHG | WEIGHT: 227.7 LBS | HEART RATE: 93 BPM | DIASTOLIC BLOOD PRESSURE: 84 MMHG | BODY MASS INDEX: 37.94 KG/M2 | HEIGHT: 65 IN

## 2018-12-14 DIAGNOSIS — Z01.419 ENCOUNTER FOR GYNECOLOGICAL EXAMINATION WITHOUT ABNORMAL FINDING: ICD-10-CM

## 2018-12-14 DIAGNOSIS — Z12.4 SCREENING FOR MALIGNANT NEOPLASM OF CERVIX: ICD-10-CM

## 2018-12-14 DIAGNOSIS — N80.9 ENDOMETRIOSIS: Primary | ICD-10-CM

## 2018-12-14 PROCEDURE — 87624 HPV HI-RISK TYP POOLED RSLT: CPT | Performed by: OBSTETRICS & GYNECOLOGY

## 2018-12-14 PROCEDURE — G0145 SCR C/V CYTO,THINLAYER,RESCR: HCPCS | Performed by: OBSTETRICS & GYNECOLOGY

## 2018-12-14 PROCEDURE — G0463 HOSPITAL OUTPT CLINIC VISIT: HCPCS | Mod: ZF

## 2018-12-14 ASSESSMENT — MIFFLIN-ST. JEOR: SCORE: 1725.78

## 2018-12-14 ASSESSMENT — PATIENT HEALTH QUESTIONNAIRE - PHQ9: SUM OF ALL RESPONSES TO PHQ QUESTIONS 1-9: 11

## 2018-12-14 NOTE — PATIENT INSTRUCTIONS
Alternative treatments for endometriosis  1. Danazol- suppress pituitary output of FSH/LH resulting in decreased menstrual cycles and symptoms of endometriosis; medication is taken daily, androgenic side effects    2. Depo-Lupron -  suppress pituitary output of FSH/LH resulting in decreased menstrual cycles and symptoms of endometriosis; injection every three months, menopause side effects

## 2018-12-14 NOTE — LETTER
2018       RE: Paris Nguyen  [address] [place] [address]     Dear Colleague,    Thank you for referring your patient, Paris Nguyen, to the [facility] at [facility]. Please see a copy of my visit note below.        [facility]  Gynecology Visit    Reason for Consult: Endometriosis  Consulting Provider: None    HPI:    Paris Nguyen is a 33 year old  female here for consultation regarding endometriosis. Her past medical history is complicated by: fibromyalgia, IBS, TMJ, moderate persistent asthma, migraines, depression/anxiety, and obesity.    She feels as though her symptoms related to endometriosis all began at the age of 24.  She noted significant pre-menstrual symptoms, dysmenorrhea, and menorrhagia. She was therefore started on NuvaRing, which has helped to lighten her menses, but has not improved her dysmenorrhea. Therefore, in 10/2016, she underwent evaluation in the operating room via a Da Igor assisted pelviscopy, excision of endometriosis, bilateral ureterolysis, lysis of adhesions, hysteroscopy, D&C, and Cystoscopy.  Findings noted at that time, included:    1.  There was normal uterus, fallopian tubes and ovaries bilaterally.   2.  There was a normal anterior cul-de-sac.   3.  Left ovarian fossa had a 2 cm peritoneal window extending to the area over the left ureter.   4.  The right ovarian fossa had red clear type peritoneal changes in the distal area near the insertion of the uterosacral ligament.   5.  There was a normal appendix.   6.  There was a 2 cm patch of red clear type peritoneal changes in the posterior cul-de-sac over the right posterior cervix.   7.  There were adhesions of the rectosigmoid to the left pelvic sidewall.   8.  There was normal hysteroscopy, no evidence of polyps or fibroids.     9.  There was normal cystoscopy, no evidence of interstitial cystitis or bladder injury.     Since that  "time, she has continued with NuvaRing for cycle control and takes Naproxen as needed during her menstrual cycle. Her pelvic pain has worsened and thus she wishes to discuss further management of symptoms. She reports having a menstrual cycle q2 months with current hormonal regimen. Her menses are accompanied by nausea, significant cramping/stabbing pain, bloating and explosive diarrhea.  Reports history if high androgen levels.    GYN History  - LMP: Thanksgiving. Patient is not currently having periods (Reason: Birth Control).  - Menses: Menarche: 12. Menstrual cycles q2 months with NuvaRing. Significant dysmenorrhea  - Pap Smears: 16 NILM, high risk HPV positive  - Contraception: NuvaRing  - Sexual Activity/Concerns: \"Asexual\" per her report with partner  - Hx STIs/UTIs: Denies    OBHx  Obstetric History       T0      L0     SAB0   TAB0   Ectopic0   Multiple0   Live Births0           PMHx:   Past Medical History:   Diagnosis Date     Abnormal Pap smear 2017    HPV     Anxiety      Depression      Endometriosis      GERD (gastroesophageal reflux disease)      IBS (irritable bowel syndrome)      Migraines      Pelvic and perineal pain      Uncomplicated asthma        PSHx:   Past Surgical History:   Procedure Laterality Date     ABDOMEN SURGERY  10/2016    Endometriosis excision     CYSTOSCOPY N/A 10/12/2016    Procedure: CYSTOSCOPY;  Surgeon: Eliazar Patel MD;  Location: [facility] OR     DAVINCI ASSISTED ABLATION / EXCISION OF ENDOMETRIOSIS N/A 10/12/2016    Procedure: DAVINCI ASSISTED ABLATION / EXCISION OF ENDOMETRIOSIS;  Surgeon: Eliazar Patel MD;  Location: [facility] OR     DAVINCI LYSIS OF ADHESIONS N/A 10/12/2016    Procedure: DAVINCI LYSIS OF ADHESIONS;  Surgeon: Eliazar Patel MD;  Location: [facility] OR     DAVINCI PELVIC PROCEDURE N/A 10/12/2016    Procedure: DAVINCI PELVIC PROCEDURE;  Surgeon: Eliazar Patel MD;  Location:  OR     DILATION AND CURETTAGE, " HYSTEROSCOPY DIAGNOSTIC, COMBINED N/A 10/12/2016    Procedure: COMBINED DILATION AND CURETTAGE, HYSTEROSCOPY DIAGNOSTIC;  Surgeon: Eliazar Patel MD;  Location:  OR     EYE SURGERY      Eye Surgery x 2 as an infant     HC TOOTH EXTRACTION W/FORCEP         Meds:   Current Outpatient Medications   Medication     biotin 1000 MCG TABS tablet     fexofenadine (ALLEGRA) 180 MG tablet     gabapentin (NEURONTIN) 300 MG capsule     hydrOXYzine (ATARAX) 25 MG tablet     montelukast (SINGULAIR) 10 MG tablet     multivitamin, therapeutic with minerals (MULTI-VITAMIN) TABS tablet     naproxen (NAPROSYN) 500 MG tablet     tiZANidine (ZANAFLEX) 2 MG tablet     traZODone (DESYREL) 50 MG tablet     venlafaxine (EFFEXOR XR) 75 MG 24 hr capsule     EPINEPHrine (EPIPEN/ADRENACLICK/OR ANY BX GENERIC EQUIV) 0.3 MG/0.3ML injection 2-pack     etonogestrel-ethinyl estradiol (NUVARING) 0.12-0.015 MG/24HR vaginal ring     fluticasone (FLOVENT HFA) 110 MCG/ACT Inhaler     gabapentin (NEURONTIN) 300 MG capsule     levalbuterol (XOPENEX HFA) 45 MCG/ACT Inhaler     LORazepam (ATIVAN) 0.5 MG tablet     Magnesium Glycinate POWD     order for DME     No current facility-administered medications for this visit.        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  Social History     Socioeconomic History     Marital status: Single     Spouse name: Not on file     Number of children: Not on file     Years of education: Not on file     Highest education level: Not on file   Social Needs     Financial resource strain: Not on file     Food insecurity - worry: Not on file     Food insecurity - inability: Not on file     Transportation needs - medical: Not on file     Transportation needs - non-medical: Not on file   Occupational  "History     Not on file   Tobacco Use     Smoking status: Never Smoker     Smokeless tobacco: Never Used   Substance and Sexual Activity     Alcohol use: Yes     Comment: less than 1 drink a week     Drug use: No     Sexual activity: No   Other Topics Concern     Parent/sibling w/ CABG, MI or angioplasty before 65F 55M? 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      FamHx:    Family History   Problem Relation Age of Onset     Hypothyroidism Mother      Kidney Disease Mother         IgA Nephropathy     Diabetes Mother      Thyroid Disease Mother      Obesity Mother      Alcoholism Father      Pancreatitis Father         Alcohol related     Depression Father      Anxiety Disorder Father      Substance Abuse Father         alcholic     Diabetes Father      Attention Deficit Disorder Sister      Anxiety Disorder Sister      Myocardial Infarction Paternal Grandfather 53     Breast Cancer Paternal Grandmother      Other Cancer Paternal Grandmother         Skin     Uterine Cancer Maternal Grandmother      Physical Exam  /84 (BP Location: Left arm, Patient Position: Chair)   Pulse 93   Ht 1.638 m (5' 4.5\")   Wt 103.3 kg (227 lb 11.2 oz)   BMI 38.48 kg/m     Gen: Well-appearing, NAD  HEENT: Normocephalic, atraumatic  CV:  Regular rate and rhythm  Pulm: Normal respiratory effort  Abd: Soft, non-tender, non-distended  Ext: No LE edema, extremities warm and well perfused    Pelvic:  Normal appearing external female genitalia. Normal hair distribution. Vagina is without lesions. Physiologic discharge. Cervix nuliparous, no lesions, no cervical motion tenderness. Uterus is small, mobile, non-tender. No adnexal tenderness or masses      Assessment/Plan:  Paris Nguyen is a 33 year old  female here for consultation regarding " "endometriosis. Her past medical history is complicated by: fibromyalgia, IBS, TMJ, moderate persistent asthma, migraines, depression/anxiety, and obesity.    - Endometriosis   - Documented surgical diagnosis of disease in 2016   - Current management - NuvaRing/Naproxen   - Reported symptoms - chronic pelvic pain, dysmenorrhea   - Discussed options for continued management, including:    - Continued use of hormonal contraceptives + NSAIDs    - GnRH agonists (Lupron) with add back hormonal therapy    - Danazol. Reviewed that this may not be ideal with her history of reported elevated \"androgen levels\" and that her symptoms of abnormal hair growth, diaphoresis, etc. May worsen.     - Discussed it will be important to minimize repeat procedures for management of endometriosis as repeat procedures can become dramatically difficult due to scar tissue.   - She will discuss Lupron and Danazol with her psychiatrist and let the clinic know how she would like to proceed with management of her symptoms as she is very concerned on the impact of any medication on her psychiatric regimen as she has just become stabilized.    - Cervical cancer screening   - NILM/HPV other positive screening in 2017 without follow-up   - Repeat pap smear today. Please inform of results via [employer].  Follow-up pending results.    Staffed with Dr. Shabbir Buchanan MD  Ob/Gyn, PGY-4  12/14/2018, 2:22 PM    Patient was seen by the resident in [facility].  I reviewed the history & exam.  The patient's assessment and plan were made jointly.    Mary Jo Shea MD MPH  "

## 2018-12-14 NOTE — NURSING NOTE
Chief Complaint   Patient presents with     Consult     pt has endometriosis, would likr to know her b/c options she currently has nuvaring

## 2018-12-14 NOTE — PROGRESS NOTES
[facility]  Gynecology Visit    Reason for Consult: Endometriosis  Consulting Provider: None    HPI:    Paris Nguyen is a 33 year old  female here for consultation regarding endometriosis. Her past medical history is complicated by: fibromyalgia, IBS, TMJ, moderate persistent asthma, migraines, depression/anxiety, and obesity.    She feels as though her symptoms related to endometriosis all began at the age of 24.  She noted significant pre-menstrual symptoms, dysmenorrhea, and menorrhagia. She was therefore started on NuvaRing, which has helped to lighten her menses, but has not improved her dysmenorrhea. Therefore, in 10/2016, she underwent evaluation in the operating room via a Da Iogr assisted pelviscopy, excision of endometriosis, bilateral ureterolysis, lysis of adhesions, hysteroscopy, D&C, and Cystoscopy.  Findings noted at that time, included:    1.  There was normal uterus, fallopian tubes and ovaries bilaterally.   2.  There was a normal anterior cul-de-sac.   3.  Left ovarian fossa had a 2 cm peritoneal window extending to the area over the left ureter.   4.  The right ovarian fossa had red clear type peritoneal changes in the distal area near the insertion of the uterosacral ligament.   5.  There was a normal appendix.   6.  There was a 2 cm patch of red clear type peritoneal changes in the posterior cul-de-sac over the right posterior cervix.   7.  There were adhesions of the rectosigmoid to the left pelvic sidewall.   8.  There was normal hysteroscopy, no evidence of polyps or fibroids.     9.  There was normal cystoscopy, no evidence of interstitial cystitis or bladder injury.     Since that time, she has continued with NuvaRing for cycle control and takes Naproxen as needed during her menstrual cycle. Her pelvic pain has worsened and thus she wishes to discuss further management of symptoms. She reports having a menstrual cycle q2 months with current hormonal regimen. Her menses are  "accompanied by nausea, significant cramping/stabbing pain, bloating and explosive diarrhea.  Reports history if high androgen levels.    GYN History  - LMP: Thanksgiving. Patient is not currently having periods (Reason: Birth Control).  - Menses: Menarche: 12. Menstrual cycles q2 months with NuvaRing. Significant dysmenorrhea  - Pap Smears: 17 NILM, high risk HPV positive  - Contraception: NuvaRing  - Sexual Activity/Concerns: \"Asexual\" per her report with partner  - Hx STIs/UTIs: Denies    OBHx  Obstetric History       T0      L0     SAB0   TAB0   Ectopic0   Multiple0   Live Births0           PMHx:   Past Medical History:   Diagnosis Date     Abnormal Pap smear 2017    HPV     Anxiety      Depression      Endometriosis      GERD (gastroesophageal reflux disease)      IBS (irritable bowel syndrome)      Migraines      Pelvic and perineal pain      Uncomplicated asthma        PSHx:   Past Surgical History:   Procedure Laterality Date     ABDOMEN SURGERY  10/2016    Endometriosis excision     CYSTOSCOPY N/A 10/12/2016    Procedure: CYSTOSCOPY;  Surgeon: Eliazar Patel MD;  Location:  OR     DAVINCI ASSISTED ABLATION / EXCISION OF ENDOMETRIOSIS N/A 10/12/2016    Procedure: DAVINCI ASSISTED ABLATION / EXCISION OF ENDOMETRIOSIS;  Surgeon: Eliazar Patel MD;  Location:  OR     DAVINCI LYSIS OF ADHESIONS N/A 10/12/2016    Procedure: DAVINCI LYSIS OF ADHESIONS;  Surgeon: Eliazar Patel MD;  Location:  OR     DAVINCI PELVIC PROCEDURE N/A 10/12/2016    Procedure: DAVINCI PELVIC PROCEDURE;  Surgeon: Eilazar Patel MD;  Location:  OR     DILATION AND CURETTAGE, HYSTEROSCOPY DIAGNOSTIC, COMBINED N/A 10/12/2016    Procedure: COMBINED DILATION AND CURETTAGE, HYSTEROSCOPY DIAGNOSTIC;  Surgeon: Eliazar Patel MD;  Location:  OR     EYE SURGERY      Eye Surgery x 2 as an infant     HC TOOTH EXTRACTION W/FORCEP         Meds:   Current Outpatient " Medications   Medication     biotin 1000 MCG TABS tablet     fexofenadine (ALLEGRA) 180 MG tablet     gabapentin (NEURONTIN) 300 MG capsule     hydrOXYzine (ATARAX) 25 MG tablet     montelukast (SINGULAIR) 10 MG tablet     multivitamin, therapeutic with minerals (MULTI-VITAMIN) TABS tablet     naproxen (NAPROSYN) 500 MG tablet     tiZANidine (ZANAFLEX) 2 MG tablet     traZODone (DESYREL) 50 MG tablet     venlafaxine (EFFEXOR XR) 75 MG 24 hr capsule     EPINEPHrine (EPIPEN/ADRENACLICK/OR ANY BX GENERIC EQUIV) 0.3 MG/0.3ML injection 2-pack     etonogestrel-ethinyl estradiol (NUVARING) 0.12-0.015 MG/24HR vaginal ring     fluticasone (FLOVENT HFA) 110 MCG/ACT Inhaler     gabapentin (NEURONTIN) 300 MG capsule     levalbuterol (XOPENEX HFA) 45 MCG/ACT Inhaler     LORazepam (ATIVAN) 0.5 MG tablet     Magnesium Glycinate POWD     order for DME     No current facility-administered medications for this visit.        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  Social History     Socioeconomic History     Marital status: Single     Spouse name: Not on file     Number of children: Not on file     Years of education: Not on file     Highest education level: Not on file   Social Needs     Financial resource strain: Not on file     Food insecurity - worry: Not on file     Food insecurity - inability: Not on file     Transportation needs - medical: Not on file     Transportation needs - non-medical: Not on file   Occupational History     Not on file   Tobacco Use     Smoking status: Never Smoker     Smokeless tobacco: Never Used   Substance and Sexual Activity     Alcohol use: Yes     Comment: less than 1 drink a week     Drug use: No     Sexual activity: No   Other Topics Concern     Parent/sibling w/ CABG, MI or  "angioplasty before 65F 55M? 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      FamHx:    Family History   Problem Relation Age of Onset     Hypothyroidism Mother      Kidney Disease Mother         IgA Nephropathy     Diabetes Mother      Thyroid Disease Mother      Obesity Mother      Alcoholism Father      Pancreatitis Father         Alcohol related     Depression Father      Anxiety Disorder Father      Substance Abuse Father         alcholic     Diabetes Father      Attention Deficit Disorder Sister      Anxiety Disorder Sister      Myocardial Infarction Paternal Grandfather 53     Breast Cancer Paternal Grandmother      Other Cancer Paternal Grandmother         Skin     Uterine Cancer Maternal Grandmother        ROS: 10-Point ROS negative except as noted in HPI    Physical Exam  /84 (BP Location: Left arm, Patient Position: Chair)   Pulse 93   Ht 1.638 m (5' 4.5\")   Wt 103.3 kg (227 lb 11.2 oz)   BMI 38.48 kg/m    Gen: Well-appearing, NAD  HEENT: Normocephalic, atraumatic  CV:  Regular rate and rhythm  Pulm: Normal respiratory effort  Abd: Soft, non-tender, non-distended  Ext: No LE edema, extremities warm and well perfused    Pelvic:  Normal appearing external female genitalia. Normal hair distribution. Vagina is without lesions. Physiologic discharge. Cervix nuliparous, no lesions, no cervical motion tenderness. Uterus is small, mobile, non-tender. No adnexal tenderness or masses      Assessment/Plan:  Paris Nguyen is a 33 year old  female here for consultation regarding endometriosis. Her past medical history is complicated by: fibromyalgia, IBS, TMJ, moderate persistent asthma, migraines, depression/anxiety, and obesity.    - Endometriosis   - Documented surgical diagnosis of disease in 2016   - Current " "management - NuvaRing/Naproxen   - Reported symptoms - chronic pelvic pain, dysmenorrhea   - Discussed options for continued management, including:    - Continued use of hormonal contraceptives + NSAIDs    - GnRH agonists (Lupron) with add back hormonal therapy    - Danazol. Reviewed that this may not be ideal with her history of reported elevated \"androgen levels\" and that her symptoms of abnormal hair growth, diaphoresis, etc. May worsen.     - Discussed it will be important to minimize repeat procedures for management of endometriosis as repeat procedures can become dramatically difficult due to scar tissue.   - She will discuss Lupron and Danazol with her psychiatrist and let the clinic know how she would like to proceed with management of her symptoms as she is very concerned on the impact of any medication on her psychiatric regimen as she has just become stabilized.    - Cervical cancer screening   - NILM/HPV other positive screening in 2017 without follow-up   - Repeat pap smear today. Please inform of results via [employer].  Follow-up pending results.    Staffed with Dr. Shabbir Buchanan MD  Ob/Gyn, PGY-4  12/14/2018, 2:22 PM    Patient was seen by the resident in [facility].  I reviewed the history & exam.  The patient's assessment and plan were made jointly.    Mary Jo Shea MD MPH    "

## 2018-12-19 LAB
COPATH REPORT: NORMAL
PAP: NORMAL

## 2018-12-20 LAB
FINAL DIAGNOSIS: NORMAL
HPV HR 12 DNA CVX QL NAA+PROBE: NEGATIVE
HPV16 DNA SPEC QL NAA+PROBE: NEGATIVE
HPV18 DNA SPEC QL NAA+PROBE: NEGATIVE
SPECIMEN DESCRIPTION: NORMAL
SPECIMEN SOURCE CVX/VAG CYTO: NORMAL

## 2018-12-27 ENCOUNTER — PRE VISIT (OUTPATIENT)
Dept: OTOLARYNGOLOGY | Facility: CLINIC | Age: 33
End: 2018-12-27

## 2018-12-27 NOTE — TELEPHONE ENCOUNTER
FUTURE VISIT INFORMATION      FUTURE VISIT INFORMATION:    Date: TBD    Time:     Location:   REFERRAL INFORMATION:    Referring provider:  Hussein Rojo    Referring providers clinic:      Reason for visit/diagnosis  dizziness     RECORDS REQUESTED FROM:       Clinic name Comments Records Status Imaging Status   [facility]-1 year ago Requested     [facility] Office notes Received      No imaging done

## 2019-01-09 ENCOUNTER — OFFICE VISIT (OUTPATIENT)
Dept: PSYCHIATRY | Facility: CLINIC | Age: 34
End: 2019-01-09
Attending: NURSE PRACTITIONER
Payer: COMMERCIAL

## 2019-01-09 VITALS
DIASTOLIC BLOOD PRESSURE: 85 MMHG | SYSTOLIC BLOOD PRESSURE: 126 MMHG | WEIGHT: 220 LBS | HEART RATE: 77 BPM | BODY MASS INDEX: 37.18 KG/M2

## 2019-01-09 DIAGNOSIS — F33.1 MODERATE EPISODE OF RECURRENT MAJOR DEPRESSIVE DISORDER (H): ICD-10-CM

## 2019-01-09 DIAGNOSIS — G47.00 INSOMNIA, UNSPECIFIED TYPE: ICD-10-CM

## 2019-01-09 DIAGNOSIS — F41.1 GENERALIZED ANXIETY DISORDER: ICD-10-CM

## 2019-01-09 PROCEDURE — G0463 HOSPITAL OUTPT CLINIC VISIT: HCPCS | Mod: ZF

## 2019-01-09 RX ORDER — VENLAFAXINE HYDROCHLORIDE 150 MG/1
150 CAPSULE, EXTENDED RELEASE ORAL DAILY
Qty: 30 CAPSULE | Refills: 0 | Status: SHIPPED | OUTPATIENT
Start: 2019-01-09 | End: 2019-02-06

## 2019-01-09 ASSESSMENT — PAIN SCALES - GENERAL: PAINLEVEL: NO PAIN (0)

## 2019-01-09 NOTE — PROGRESS NOTES
"  Psychiatry Clinic Progress Note                                                                   Paris Nguyen is a 33 year old female who prefers the name Ri (\"Ree\") and pronoun they.  Last seen by Dr. Cerda on 10/16/18.    Therapist: Ilana Ham @ [facility]  PCP: Radha Collins  Other Providers: ALEXANDRA Otero @ [facility]    Pertinent Background:  See previous notes.  Psych critical item history includes mutiple psychotropic trials.     Interim History                                                                                                        4, 4     The patient is a good historian, reports good treatment adherence and was last seen 11/27/18.  Since the last visit, Ri reports increased anxiety and frustration.  She attributes to receiving a substantial medical bill recently.  Financial stress is main concern overall.  Mood reported as ok.  Lorazepam has been used more frequently (up to 3 times per week).  Requests increase Effexor XR 150mg.  Gabapentin helpful with body pain does not associate with anxiety relief.      11/27/18: Ri was able to discontinue Lexapro successfully and started Effexor XR 75mg.  Since starting Effexor, Ri reports depressive symptoms continues but less intense and less frequent.  Effexor XR started approximately 2 weeks ago.  Since increasing nighttime dose of Gabapentin, Ri reports sleep has improved.  Also Trazodone decreased to 25mg due to morning sedation.  Continues to experience some sedation in morning but positives outweigh negative.  Guanfacine tried recently to manage anxiety, panic, and irritability; but stopped due to lowered blood pressure.       Recent Symptoms:   Depression:  depressed mood, low energy, insomnia, appetite changes and poor concentration /memory  Elevated:  none  Psychosis:  none  Anxiety:  nervous/overwhelmed  Panic Attack:  none  Trauma Related:  none     Recent Substance Use:  No changes reported      "   Social/ Family History                                  [per patient report]                                 1ea,1ea   FINANCIAL SUPPORT- working       FEELS SAFE AT HOME- Yes    Medical / Surgical History                                                                                                                  Patient Active Problem List   Diagnosis     Fibromyalgia     TMJ arthralgia     Moderate persistent asthma without complication     ADHD     Autism spectrum disorder     Generalized anxiety disorder     IBS (irritable bowel syndrome)     OCD (obsessive compulsive disorder)     Seasonal allergic rhinitis due to pollen     Allergic rhinitis due to dust     Allergy to adhesive tape     Need for desensitization to allergens     Oral allergy syndrome     Plantar fasciitis       Past Surgical History:   Procedure Laterality Date     ABDOMEN SURGERY  10/2016    Endometriosis excision     CYSTOSCOPY N/A 10/12/2016    Procedure: CYSTOSCOPY;  Surgeon: Eliazar Patel MD;  Location:  OR     DAVINCI ASSISTED ABLATION / EXCISION OF ENDOMETRIOSIS N/A 10/12/2016    Procedure: DAVINCI ASSISTED ABLATION / EXCISION OF ENDOMETRIOSIS;  Surgeon: Eliazar Patel MD;  Location:  OR     DAVINCI LYSIS OF ADHESIONS N/A 10/12/2016    Procedure: DAVINCI LYSIS OF ADHESIONS;  Surgeon: Eliazar Patel MD;  Location:  OR     DAVINCI PELVIC PROCEDURE N/A 10/12/2016    Procedure: DAVINCI PELVIC PROCEDURE;  Surgeon: Eliazar Patel MD;  Location:  OR     DILATION AND CURETTAGE, HYSTEROSCOPY DIAGNOSTIC, COMBINED N/A 10/12/2016    Procedure: COMBINED DILATION AND CURETTAGE, HYSTEROSCOPY DIAGNOSTIC;  Surgeon: Eliazar Patel MD;  Location:  OR     EYE SURGERY      Eye Surgery x 2 as an infant     HC TOOTH EXTRACTION W/FORCEP          Medical Review of Systems                                                                                                    2,10   The remainder of the  review of systems is noncontributory  Dizziness is a regular issue, has episodes maybe twice per day. Constipation is frequently an issue. Denies N/V, headaches. Had migraines in the past, but hasn't had one in a while. Thinks diet helped with this.   Allergy                                Latex; Liquid adhesive; and Seasonal allergies  Current Medications                                                                                                       Current Outpatient Medications   Medication Sig Dispense Refill     biotin 1000 MCG TABS tablet Take 5,000 mcg by mouth daily       EPINEPHrine (EPIPEN/ADRENACLICK/OR ANY BX GENERIC EQUIV) 0.3 MG/0.3ML injection 2-pack Inject 0.3 mg into the muscle once       fexofenadine (ALLEGRA) 180 MG tablet Take 180 mg by mouth daily       gabapentin (NEURONTIN) 300 MG capsule Take 2 capsules (600 mg) by mouth At Bedtime 60 capsule 1     gabapentin (NEURONTIN) 300 MG capsule Take 1 capsule (300 mg) by mouth 2 times daily 60 capsule 1     hydrOXYzine (ATARAX) 25 MG tablet Take 1-2 tablets (25-50 mg) by mouth 2 times daily as needed for itching or anxiety 120 tablet 0     levalbuterol (XOPENEX HFA) 45 MCG/ACT Inhaler Inhale 1-2 puffs into the lungs every 4 hours as needed       LORazepam (ATIVAN) 0.5 MG tablet Take 0.5 mg by mouth daily as needed       montelukast (SINGULAIR) 10 MG tablet Take 10 mg by mouth At Bedtime       multivitamin, therapeutic with minerals (MULTI-VITAMIN) TABS tablet Take 1 tablet by mouth daily       naproxen (NAPROSYN) 500 MG tablet Take 1 tablet (500 mg) by mouth 2 times daily as needed for moderate pain or headaches (migraines and fibromyalgia) 60 tablet 1     order for DME Equipment being ordered: TENS  All necessary equipment: leads/pads  Duration of use: 36 months  Apply to painful areas 1 Device 0     tiZANidine (ZANAFLEX) 2 MG tablet Take 1 tablet (2 mg) by mouth 3 times daily as needed for muscle spasms 90 tablet 0     traZODone (DESYREL) 50  "MG tablet Take 0.5-1 tablets (25-50 mg) by mouth nightly as needed for sleep 30 tablet 1     venlafaxine (EFFEXOR XR) 75 MG 24 hr capsule Take 1 capsule (75 mg) by mouth daily 30 capsule 0     etonogestrel-ethinyl estradiol (NUVARING) 0.12-0.015 MG/24HR vaginal ring Place 1 each vaginally once a week       fluticasone (FLOVENT HFA) 110 MCG/ACT Inhaler Inhale 2 puffs into the lungs 2 times daily       Magnesium Glycinate POWD 400 mg At Bedtime (Patient not taking: Reported on 12/14/2018) 100 g 3     Vitals                                                                                                                       3, 3   /85   Pulse 77   Wt 99.8 kg (220 lb)   BMI 37.18 kg/m     Mental Status Exam                                                                                    9, 14 cog gs     Alertness: alert  and oriented  Appearance: casually groomed  Behavior/Demeanor: cooperative and pleasant, with good  eye contact   Speech: normal  Language: no obvious problem  Psychomotor: normal or unremarkable  Mood: \"ok, better\"  Affect: appropriate; was congruent to mood; was congruent to content  Thought Process/Associations: unremarkable  Thought Content:  Reports none;  Denies suicidal ideation and violent ideation  Perception:  Reports none;  Denies auditory hallucinations and visual hallucinations  Insight: good  Judgment: good  Cognition: (6) does  appear grossly intact; formal cognitive testing was not done  Gait/Station and/or Muscle Strength/Tone: unremarkable    Labs and Data                                                                                                                 Rating Scales:    PHQ9 and CAGE AIDE 1. Have you ever felt that you outght to cut down on your drinking or drug use?  no  2. Have people annoyed you by criticizing your drinking or drug use? no  3. Have you ever felt bad or guilty about your drinking or drug use?  no  4. Have you ever had a drink or used drugs " first thing in the morning to steady your nerves or to get rid of a hangover?  no    PHQ9 Today:  6  PHQ-9 SCORE 10/16/2018 11/27/2018 12/14/2018   PHQ-9 Total Score MyChart 14 (Moderate depression) - -   PHQ-9 Total Score 14 13 11         Diagnosis and Assessment                                                                             m2, h3     Today the following issues were addressed:    1) Major Depressive Disorder, recurrent, moderate  2) Generalized Anxiety Disorder  3) ADHD (Hx)    [place] Prescription Monitoring Program [] review was not needed today.    PSYCHOTROPIC DRUG INTERACTIONS:   NAPROXEN -- ESCITALOPRAM -- VENLAFAXINE may result in an increased risk of bleeding.     ESCITALOPRAM -- VENLAFAXINE -- HYDROXYZINE -- TRAZODONE -- TIZANIDINE may result in increased risk of QT interval prolongation.     ESCITALOPRAM -- VENLAFAXINE -- TRAZODONE may result in an increased risk of serotonin syndrome (hypertension, hyperthermia, myoclonus, mental status changes).     LORAZEPAM -- ESTRADIOL  may result in decreased lorazepam effectiveness.      MANAGEMENT:  Monitoring for adverse effects and routine vitals    Plan                                                                                                                    m2, h3      1) Medication Management   Increase Effexor XR to 150mg daily  Continue Trazodone 25mg at bedtime PRN  Continue Hyrdoxyzine 25-50mg for anxiety  Continue Gabapentin 300 BID and 600mg at bedtime   Continue Lorazepam 0.5mg daily PRN (takes rarely)    2) Therapy  Continue with current therapist    RTC: 1 month    CRISIS NUMBERS:   Provided routinely in AVS.    Treatment Risk Statement:  The patient understands the risks, benefits, adverse effects and alternatives. Agrees to treatment with the capacity to do so. No medical contraindications to treatment. Agrees to call clinic for any problems. The patient understands to call 911 or go to the nearest ED if life threatening or  urgent symptoms occur.       PROVIDER:  TORSTEN Hogan CNP

## 2019-01-09 NOTE — PATIENT INSTRUCTIONS
Thank you for coming to the PSYCHIATRY CLINIC.    Lab Testing:  If you had lab testing today and your results are reassuring or normal they will be mailed to you or sent through [employer] within 7 days.   If the lab tests need quick action we will call you with the results.  The phone number we will call with results is # [phone] (home) . If this is not the best number please call our clinic and change the number.    Medication Refills:  If you need any refills please call your pharmacy and they will contact us. Our fax number for refills is [phone]. Please allow three business for refill processing.   If you need to  your refill at a new pharmacy, please contact the new pharmacy directly. The new pharmacy will help you get your medications transferred.     Scheduling:  If you have any concerns about today's visit or wish to schedule another appointment please call our office during normal business hours [phone] (8-5:00 M-F)    Contact Us:  Please call [phone] during business hours (8-5:00 M-F).  If after clinic hours, or on the weekend, please call  [phone].    Financial Assistance [phone]  [facility] Billing [phone]  [facility] Billing [phone]  Medical Records [phone]      MENTAL HEALTH CRISIS NUMBERS:  [facility]:   [facility] - [phone]   Crisis Residence [facility] - [phone]   Walk-In Counseling [facility] [phone]   COPE 24/7 [facility] Mobile Team for Adults - [[phone]]; Child - [[phone]]     Crisis Connection - [phone]     [facility]:   [facility] - [phone]   Walk-in counseling [facility] - [phone]   Walk-in counseling [facility] - [phone]   Crisis Residence [facility] - [phone]   Urgent Care Adult Mental Health:   --Drop-in, 24/7 crisis line, and [place] Mobile Team [[phone]]    CRISIS TEXT  LINE: Text [phone] from anywhere, anytime, any crisis 24/7;    OR SEE www.crisistextline.org     Poison Control Center - [phone]    CHILD: [facility] needs assessment team - [phone]     [facility] [place] - [phone]; or [employer] [facility] - [phone]    If you have a medical emergency please call 911or go to the nearest ER.                    _____________________________________________    Again thank you for choosing PSYCHIATRY CLINIC and please let us know how we can best partner with you to improve you and your family's health.  You may be receiving a survey in the mail regarding this appointment. We would love to have your feedback, both positive and negative, so please fill out the survey and return it using the provided envelope. The survey is done by an external company, so your answers are anonymous.

## 2019-01-21 ENCOUNTER — PATIENT OUTREACH (OUTPATIENT)
Dept: OTOLARYNGOLOGY | Facility: CLINIC | Age: 34
End: 2019-01-21

## 2019-01-21 NOTE — PATIENT INSTRUCTIONS
Left message for pt regarding referral to ENT. Explained that we would like to discuss which provider she would be best suited seeing. Provided contact information and requested call back to discuss. Ceci DELCID RNCC  d

## 2019-01-25 ASSESSMENT — ANXIETY QUESTIONNAIRES
7. FEELING AFRAID AS IF SOMETHING AWFUL MIGHT HAPPEN: MORE THAN HALF THE DAYS
GAD7 TOTAL SCORE: 11
7. FEELING AFRAID AS IF SOMETHING AWFUL MIGHT HAPPEN: MORE THAN HALF THE DAYS
6. BECOMING EASILY ANNOYED OR IRRITABLE: NEARLY EVERY DAY
3. WORRYING TOO MUCH ABOUT DIFFERENT THINGS: MORE THAN HALF THE DAYS
2. NOT BEING ABLE TO STOP OR CONTROL WORRYING: SEVERAL DAYS
1. FEELING NERVOUS, ANXIOUS, OR ON EDGE: SEVERAL DAYS
GAD7 TOTAL SCORE: 11
4. TROUBLE RELAXING: MORE THAN HALF THE DAYS
5. BEING SO RESTLESS THAT IT IS HARD TO SIT STILL: NOT AT ALL

## 2019-01-25 ASSESSMENT — PATIENT HEALTH QUESTIONNAIRE - PHQ9
SUM OF ALL RESPONSES TO PHQ QUESTIONS 1-9: 14
SUM OF ALL RESPONSES TO PHQ QUESTIONS 1-9: 14
10. IF YOU CHECKED OFF ANY PROBLEMS, HOW DIFFICULT HAVE THESE PROBLEMS MADE IT FOR YOU TO DO YOUR WORK, TAKE CARE OF THINGS AT HOME, OR GET ALONG WITH OTHER PEOPLE: VERY DIFFICULT

## 2019-01-26 ASSESSMENT — ANXIETY QUESTIONNAIRES: GAD7 TOTAL SCORE: 11

## 2019-01-26 ASSESSMENT — PATIENT HEALTH QUESTIONNAIRE - PHQ9: SUM OF ALL RESPONSES TO PHQ QUESTIONS 1-9: 14

## 2019-01-28 ENCOUNTER — OFFICE VISIT (OUTPATIENT)
Dept: ANESTHESIOLOGY | Facility: CLINIC | Age: 34
End: 2019-01-28
Payer: COMMERCIAL

## 2019-01-28 VITALS — HEART RATE: 93 BPM | DIASTOLIC BLOOD PRESSURE: 83 MMHG | SYSTOLIC BLOOD PRESSURE: 130 MMHG

## 2019-01-28 DIAGNOSIS — M79.7 FIBROMYALGIA: Primary | ICD-10-CM

## 2019-01-28 ASSESSMENT — PAIN SCALES - GENERAL: PAINLEVEL: MILD PAIN (3)

## 2019-01-28 NOTE — PROGRESS NOTES
"Date of visit: 1/28/19     Chief complaint:        Chief Complaint   Patient presents with     Pain Management       Follow up          Interval history:  Paris Nguyen is a 33 year old female last seen by me on 11/20/2018 for fibromyalgia.   -Reports she had a fibromyalgia and migraine pain flare in December that lasted approximately six weeks. Her pain has now \"settled\", reporting 3/10. She is nervous that her fibro- symptoms will worsen with the impending cold months ahead. She feels she has been having increased anxiety and \"stress dreams\". She also notes \"fleeting\" migraines that are relatively well managed with hydration and naproxen. (History of Maxalt and Imitrex with oversedation/somnelence). She attempted the Whole30 diet and felt that cashews aggravated her endometriosis pain and caused constipation. She plans to start an autoimmune diet next month. She is questioning if she needs to file for disability as she is finding work to be increasingly difficult.   -Has not yet obtained TENS unit, but plans to within the next several months.   -Continues with  for weight training and cardiology. Practices self-directed aquatic therapy and hot tub at her gym. Uses heating pads at work.   -Uses tizanidine several times per week; taking only at HS. Cannot tolerate during the daytime due to drowsiness. Finds it to be effective.   -Continues with gabapentin 300/300/600 mg; afternoon dose is \"optional\". If she is able to go for a walk, she will not have necessity to take afternoon gabapentin.   -Has altered from Lexapro to Effexor with her psychiatrist; finding increased benefit with depression but no impact on pain. Plans to discuss Cymbalta due to her increased pain. Follows with therapist for CBT once weekly; therapist is not trained in pain management.        Original pain history:   Patient presents with past medical history of OCD, ADHD, depression, asthma, possible autism spectrum disorder, " "endometriosis, IBS; she is here today for evaluation of fibromyalgia. Patient states she was diagnosed with fibromyalgia by \"process of elimination\" by her neurologist and primary care provider approximately two years ago. She reports chronic fatigue and widespread pain; symptoms are aggravated by stress, depression, sleeplessness. Patient closely follows with her psychiatrist, but admits she is \"in between therapists\" but is interested in finding someone who has an understanding of chronic pain.   Her medication regimen for fibromyalgia management currently consists of gabapentin 300 mg which she takes morning and afternoon with a recent increase to 600 mg at HS at bedtime. She is also taking Lexapro through psychiatry management, but mentions she is tapering down with plans to alter to Cymbalta. She takes naproxen as needed which is greatly efficacious.   Further attempted treatments include current once weekly aquatic physical therapy, occupation therapy for sensory integration, regular massage therapy. She has used a friend's TENS unit and oral muscle relaxants in the past with good benefit. Patient also states that she focuses heavily on sleep hygiene with blackout curtains, noise machine, and a weighted blanket.    ?       Recommendations/plan at the last visit included:  1. Interventions:                 -Recommend patient obtain TENS unit.   2. Medication Management:   -May consider further titration of gabapentin in future.   -Continue with naproxen; consider alternating with acetaminophen.   -Continue with tizanidine 2 mg; consider breaking tablet in half for less sedating side effects.   3. Physical Therapy:                  -Continue with HEP and work with .   4. Need for Clinical Health Psychologist.                -Continue with CBT   5. Follow up: RTC in 6 months or as needed.      Medications:  Current Outpatient Prescriptions          Current Outpatient Prescriptions   Medication Sig " Dispense Refill     biotin 1000 MCG TABS tablet Take 5,000 mcg by mouth daily         EPINEPHrine (EPIPEN/ADRENACLICK/OR ANY BX GENERIC EQUIV) 0.3 MG/0.3ML injection 2-pack Inject 0.3 mg into the muscle once         etonogestrel-ethinyl estradiol (NUVARING) 0.12-0.015 MG/24HR vaginal ring Place 1 each vaginally once a week         fexofenadine (ALLEGRA) 180 MG tablet Take 180 mg by mouth daily         gabapentin (NEURONTIN) 300 MG capsule Take 2 capsules (600 mg) by mouth At Bedtime 60 capsule 1     gabapentin (NEURONTIN) 300 MG capsule Take 300 mg by mouth every morning          hydrOXYzine (ATARAX) 25 MG tablet Take 25-50 mg by mouth 2 times daily as needed for itching or anxiety          levalbuterol (XOPENEX HFA) 45 MCG/ACT Inhaler Inhale 1-2 puffs into the lungs every 4 hours as needed         LORazepam (ATIVAN) 0.5 MG tablet Take 0.5 mg by mouth daily as needed         Magnesium Glycinate POWD 400 mg At Bedtime 100 g 3     montelukast (SINGULAIR) 10 MG tablet Take 10 mg by mouth At Bedtime         multivitamin, therapeutic with minerals (MULTI-VITAMIN) TABS tablet Take 1 tablet by mouth daily         naproxen (NAPROSYN) 500 MG tablet Take 1 tablet (500 mg) by mouth 2 times daily as needed for moderate pain or headaches (migraines and fibromyalgia) 60 tablet 1     order for DME Equipment being ordered: TENS  All necessary equipment: leads/pads  Duration of use: 36 months  Apply to painful areas 1 Device 0     tiZANidine (ZANAFLEX) 2 MG tablet Take 1 tablet (2 mg) by mouth 3 times daily as needed for muscle spasms 90 tablet 0     traZODone (DESYREL) 50 MG tablet Take 0.5-1 tablets (25-50 mg) by mouth nightly as needed for sleep 30 tablet 1     venlafaxine (EFFEXOR XR) 75 MG 24 hr capsule Take 1 capsule (75 mg) by mouth daily 30 capsule 0     fluticasone (FLOVENT HFA) 110 MCG/ACT Inhaler Inhale 2 puffs into the lungs 2 times daily                Medical History: any changes in medical history since they were last  "seen? No     Review of Systems:  The 14 system ROS was reviewed from the intake questionnaire; results   Physical Exam:  Blood pressure 123/77, pulse 93, resp. rate 16, height 1.638 m (5' 4.5\"), weight 103.9 kg (229 lb), not currently breastfeeding.  General: In no apparent distress  Mental status: Normal mood and affect, pleasant  Neuro: Alert, oriented. No sensory deficits.   Head: Atraumatic, normocephalic  Eyes: Extra-ocular movements grossly intact, no scleral icterus  Neck: Supple, Range of motion full  Respiratory: Non-labored breathing; No respiratory distress  Skin: No rashes or lesions noted on exposed areas of skin  Gait: Non-antalgic     Assessment:   Paris Nguyen is a 33 year old female who is seen at the pain clinic for fibromyalgia.     Plan:  2. Interventions:      -Acupuncture referral placed.   -Recommend patient obtain TENS unit.   3. Medication Management:   -Recommend patient take afternoon dose of gabapentin (300 mg) consistently.   -Continue with naproxen; consider alternating with acetaminophen.   -Continue with tizanidine 2 mg; consider breaking tablet in half for less sedating side effects.   -Consider discussing Cymbalta versus Effexor with psychiatry.   4. Physical Therapy:                  -Continue with HEP and work with . Aquatic physical therapy order offered today; patient declined.   5. Need for Clinical Health Psychologist.                -Continue with CBT once weekly; discussed that it may be reasonable to consider a pain specialized therapist in the future. We would likely need to refer to external resource for this.   6. Follow up: RTC in 6 months or as needed.      Total time spent was 30 minutes, and more than 50% of face to face time was spent in counseling and/or coordination of care regarding plan of care.     TORSTEN Salcedo, SHIRA-BC  Pain Management  Department of Interventional Pain Management and Anesthesiology   [facility]          Answers " for HPI/ROS submitted by the patient on 1/25/2019   INDRA 7 TOTAL SCORE: 11  If you checked off any problems, how difficult have these problems made it for you to do your work, take care of things at home, or get along with other people?: Very difficult  PHQ9 TOTAL SCORE: 14

## 2019-01-28 NOTE — PATIENT INSTRUCTIONS
1. We recommend that you continuously take your Afternoon dose of Gabapentin, as prescribed.     2. Acupuncture referral-:    Your provider has referred you to: [facility]: [facility] [phone]    Please be aware that coverage of these services is subject to the terms and limitations of your health insurance plan.  Call member services at your health plan with any benefit or coverage questions.        Follow up: 6 months in clinic or earlier if indicated.       To speak with a nurse, schedule/reschedule/cancel a clinic appointment, or request a medication refill call: [phone]     You can also reach us by [facility]: [email or web address]    For refills, please call on Monday, 1 week before your medication runs out. The doctors are not always in clinic, so this gives us time to get your prescriptions ready.  Please let us know the name of the medication you are requesting a refill of.

## 2019-01-28 NOTE — LETTER
"1/28/2019       RE: Paris Nguyen  [address] [place] [address]     Dear Colleague,    Thank you for referring your patient, Paris Nguyen, to the [facility] at [facility]. Please see a copy of my visit note below.    Date of visit: 1/28/19     Chief complaint:        Chief Complaint   Patient presents with     Pain Management       Follow up          Interval history:  Paris Nguyen is a 33 year old female last seen by me on 11/20/2018 for fibromyalgia.   -Reports she had a fibromyalgia and migraine pain flare in December that lasted approximately six weeks. Her pain has now \"settled\", reporting 3/10. She is nervous that her fibro- symptoms will worsen with the impending cold months ahead. She feels she has been having increased anxiety and \"stress dreams\". She also notes \"fleeting\" migraines that are relatively well managed with hydration and naproxen. (History of Maxalt and Imitrex with oversedation/somnelence). She attempted the Whole30 diet and felt that cashews aggravated her endometriosis pain and caused constipation. She plans to start an autoimmune diet next month. She is questioning if she needs to file for disability as she is finding work to be increasingly difficult.   -Has not yet obtained TENS unit, but plans to within the next several months.   -Continues with  for weight training and cardiology. Practices self-directed aquatic therapy and hot tub at her gym. Uses heating pads at work.   -Uses tizanidine several times per week; taking only at HS. Cannot tolerate during the daytime due to drowsiness. Finds it to be effective.   -Continues with gabapentin 300/300/600 mg; afternoon dose is \"optional\". If she is able to go for a walk, she will not have necessity to take afternoon gabapentin.   -Has altered from Lexapro to Effexor with her psychiatrist; finding increased benefit with depression " "but no impact on pain. Plans to discuss Cymbalta due to her increased pain. Follows with therapist for CBT once weekly; therapist is not trained in pain management.        Original pain history:   Patient presents with past medical history of OCD, ADHD, depression, asthma, possible autism spectrum disorder, endometriosis, IBS; she is here today for evaluation of fibromyalgia. Patient states she was diagnosed with fibromyalgia by \"process of elimination\" by her neurologist and primary care provider approximately two years ago. She reports chronic fatigue and widespread pain; symptoms are aggravated by stress, depression, sleeplessness. Patient closely follows with her psychiatrist, but admits she is \"in between therapists\" but is interested in finding someone who has an understanding of chronic pain.   Her medication regimen for fibromyalgia management currently consists of gabapentin 300 mg which she takes morning and afternoon with a recent increase to 600 mg at HS at bedtime. She is also taking Lexapro through psychiatry management, but mentions she is tapering down with plans to alter to Cymbalta. She takes naproxen as needed which is greatly efficacious.   Further attempted treatments include current once weekly aquatic physical therapy, occupation therapy for sensory integration, regular massage therapy. She has used a friend's TENS unit and oral muscle relaxants in the past with good benefit. Patient also states that she focuses heavily on sleep hygiene with blackout curtains, noise machine, and a weighted blanket.    ?       Recommendations/plan at the last visit included:  1. Interventions:                 -Recommend patient obtain TENS unit.   2. Medication Management:   -May consider further titration of gabapentin in future.   -Continue with naproxen; consider alternating with acetaminophen.   -Continue with tizanidine 2 mg; consider breaking tablet in half for less sedating side effects.   3. Physical " Therapy:                  -Continue with HEP and work with .   4. Need for Clinical Health Psychologist.                -Continue with CBT   5. Follow up: RTC in 6 months or as needed.      Medications:  Current Outpatient Prescriptions          Current Outpatient Prescriptions   Medication Sig Dispense Refill     biotin 1000 MCG TABS tablet Take 5,000 mcg by mouth daily         EPINEPHrine (EPIPEN/ADRENACLICK/OR ANY BX GENERIC EQUIV) 0.3 MG/0.3ML injection 2-pack Inject 0.3 mg into the muscle once         etonogestrel-ethinyl estradiol (NUVARING) 0.12-0.015 MG/24HR vaginal ring Place 1 each vaginally once a week         fexofenadine (ALLEGRA) 180 MG tablet Take 180 mg by mouth daily         gabapentin (NEURONTIN) 300 MG capsule Take 2 capsules (600 mg) by mouth At Bedtime 60 capsule 1     gabapentin (NEURONTIN) 300 MG capsule Take 300 mg by mouth every morning          hydrOXYzine (ATARAX) 25 MG tablet Take 25-50 mg by mouth 2 times daily as needed for itching or anxiety          levalbuterol (XOPENEX HFA) 45 MCG/ACT Inhaler Inhale 1-2 puffs into the lungs every 4 hours as needed         LORazepam (ATIVAN) 0.5 MG tablet Take 0.5 mg by mouth daily as needed         Magnesium Glycinate POWD 400 mg At Bedtime 100 g 3     montelukast (SINGULAIR) 10 MG tablet Take 10 mg by mouth At Bedtime         multivitamin, therapeutic with minerals (MULTI-VITAMIN) TABS tablet Take 1 tablet by mouth daily         naproxen (NAPROSYN) 500 MG tablet Take 1 tablet (500 mg) by mouth 2 times daily as needed for moderate pain or headaches (migraines and fibromyalgia) 60 tablet 1     order for DME Equipment being ordered: TENS  All necessary equipment: leads/pads  Duration of use: 36 months  Apply to painful areas 1 Device 0     tiZANidine (ZANAFLEX) 2 MG tablet Take 1 tablet (2 mg) by mouth 3 times daily as needed for muscle spasms 90 tablet 0     traZODone (DESYREL) 50 MG tablet Take 0.5-1 tablets (25-50 mg) by mouth  "nightly as needed for sleep 30 tablet 1     venlafaxine (EFFEXOR XR) 75 MG 24 hr capsule Take 1 capsule (75 mg) by mouth daily 30 capsule 0     fluticasone (FLOVENT HFA) 110 MCG/ACT Inhaler Inhale 2 puffs into the lungs 2 times daily                Medical History: any changes in medical history since they were last seen? No     Review of Systems:  The 14 system ROS was reviewed from the intake questionnaire; results   Physical Exam:  Blood pressure 123/77, pulse 93, resp. rate 16, height 1.638 m (5' 4.5\"), weight 103.9 kg (229 lb), not currently breastfeeding.  General: In no apparent distress  Mental status: Normal mood and affect, pleasant  Neuro: Alert, oriented. No sensory deficits.   Head: Atraumatic, normocephalic  Eyes: Extra-ocular movements grossly intact, no scleral icterus  Neck: Supple, Range of motion full  Respiratory: Non-labored breathing; No respiratory distress  Skin: No rashes or lesions noted on exposed areas of skin  Gait: Non-antalgic     Assessment:   Paris Nguyen is a 33 year old female who is seen at the pain clinic for fibromyalgia.     Plan:  2. Interventions:      -Acupuncture referral placed.   -Recommend patient obtain TENS unit.   3. Medication Management:   -Recommend patient take afternoon dose of gabapentin (300 mg) consistently.   -Continue with naproxen; consider alternating with acetaminophen.   -Continue with tizanidine 2 mg; consider breaking tablet in half for less sedating side effects.   -Consider discussing Cymbalta versus Effexor with psychiatry.   4. Physical Therapy:                  -Continue with HEP and work with . Aquatic physical therapy order offered today; patient declined.   5. Need for Clinical Health Psychologist.                -Continue with CBT once weekly; discussed that it may be reasonable to consider a pain specialized therapist in the future. We would likely need to refer to external resource for this.   6. Follow up: RTC in 6 " months or as needed.      Total time spent was 30 minutes, and more than 50% of face to face time was spent in counseling and/or coordination of care regarding plan of care.       Again, thank you for allowing me to participate in the care of your patient.      Sincerely,    TORSTEN Salcedo CNP

## 2019-01-29 ASSESSMENT — PATIENT HEALTH QUESTIONNAIRE - PHQ9: SUM OF ALL RESPONSES TO PHQ QUESTIONS 1-9: 6

## 2019-01-31 ENCOUNTER — PATIENT OUTREACH (OUTPATIENT)
Dept: OTOLARYNGOLOGY | Facility: CLINIC | Age: 34
End: 2019-01-31

## 2019-01-31 NOTE — PATIENT INSTRUCTIONS
Left message for pt regarding referral to ENT. Explained that we would like to discuss which provider she would be best suited seeing. Provided contact information and requested call back to discuss. Ceci DELCID RNCC

## 2019-02-06 ENCOUNTER — OFFICE VISIT (OUTPATIENT)
Dept: PSYCHIATRY | Facility: CLINIC | Age: 34
End: 2019-02-06
Attending: NURSE PRACTITIONER
Payer: COMMERCIAL

## 2019-02-06 VITALS
WEIGHT: 216.6 LBS | SYSTOLIC BLOOD PRESSURE: 118 MMHG | BODY MASS INDEX: 36.61 KG/M2 | DIASTOLIC BLOOD PRESSURE: 81 MMHG | HEART RATE: 93 BPM

## 2019-02-06 DIAGNOSIS — F33.1 MODERATE EPISODE OF RECURRENT MAJOR DEPRESSIVE DISORDER (H): ICD-10-CM

## 2019-02-06 DIAGNOSIS — F41.1 GENERALIZED ANXIETY DISORDER: ICD-10-CM

## 2019-02-06 PROCEDURE — G0463 HOSPITAL OUTPT CLINIC VISIT: HCPCS | Mod: ZF

## 2019-02-06 RX ORDER — VENLAFAXINE HYDROCHLORIDE 150 MG/1
150 CAPSULE, EXTENDED RELEASE ORAL DAILY
Qty: 90 CAPSULE | Refills: 1 | Status: SHIPPED | OUTPATIENT
Start: 2019-02-06 | End: 2019-02-25

## 2019-02-06 ASSESSMENT — PAIN SCALES - GENERAL: PAINLEVEL: NO PAIN (0)

## 2019-02-06 NOTE — NURSING NOTE
Chief Complaint   Patient presents with     RECHECK     Moderate episode of recurrent major depressive disorder

## 2019-02-06 NOTE — PROGRESS NOTES
"  Psychiatry Clinic Progress Note                                                                   Paris Nguyen is a 33 year old female who prefers the name Ri (\"Ree\") and pronoun they.  Last seen by Dr. Cerda on 10/16/18.    Therapist: Ilana Ham @ [facility]  PCP: Radha Collins  Other Providers: ALEXANDRA Otero @ [facility]    Pertinent Background:  See previous notes.  Psych critical item history includes mutiple psychotropic trials.     Interim History                                                                                                        4, 4     The patient is a good historian, reports good treatment adherence and was last seen 1/9/19.  Since the last visit, Ri is currently experiencing a cold which has significant impacted her energy.  Has not been to work in 3 days.  She reports her mood and anxiety are currently well managed.  Increase in Effexor appears to be helpful as Ri describes herself as \"more chill.\"  Ri continues to work with therapist with distress tolerance and acceptance.  She reports most anxiety is work related.  Cymbalta for possible additional mood and pain management was discussed.  Explained interactions with Effexor and patient was agreeable to not start today.       1/9/19: Ri reports increased anxiety and frustration.  She attributes to receiving a substantial medical bill recently.  Financial stress is main concern overall.  Mood reported as ok.  Lorazepam has been used more frequently (up to 3 times per week).  Requests increase Effexor XR 150mg.  Gabapentin helpful with body pain does not associate with anxiety relief.      11/27/18: Ri was able to discontinue Lexapro successfully and started Effexor XR 75mg.  Since starting Effexor, Ri reports depressive symptoms continues but less intense and less frequent.  Effexor XR started approximately 2 weeks ago.  Since increasing nighttime dose of Gabapentin, Ri reports sleep has improved.  " Also Trazodone decreased to 25mg due to morning sedation.  Continues to experience some sedation in morning but positives outweigh negative.  Guanfacine tried recently to manage anxiety, panic, and irritability; but stopped due to lowered blood pressure.       Recent Symptoms:   Depression:  low energy, insomnia, appetite changes and poor concentration /memory  Elevated:  none  Psychosis:  none  Anxiety:  nervous/overwhelmed  Panic Attack:  none  Trauma Related:  none     Recent Substance Use:  No changes reported        Social/ Family History                                  [per patient report]                                 1ea,1ea   FINANCIAL SUPPORT- working       FEELS SAFE AT HOME- Yes    Medical / Surgical History                                                                                                                  Patient Active Problem List   Diagnosis     Fibromyalgia     TMJ arthralgia     Moderate persistent asthma without complication     ADHD     Autism spectrum disorder     Generalized anxiety disorder     IBS (irritable bowel syndrome)     OCD (obsessive compulsive disorder)     Seasonal allergic rhinitis due to pollen     Allergic rhinitis due to dust     Allergy to adhesive tape     Need for desensitization to allergens     Oral allergy syndrome     Plantar fasciitis       Past Surgical History:   Procedure Laterality Date     ABDOMEN SURGERY  10/2016    Endometriosis excision     CYSTOSCOPY N/A 10/12/2016    Procedure: CYSTOSCOPY;  Surgeon: Eliazar Patel MD;  Location: [facility] OR     DAVINCI ASSISTED ABLATION / EXCISION OF ENDOMETRIOSIS N/A 10/12/2016    Procedure: DAVINCI ASSISTED ABLATION / EXCISION OF ENDOMETRIOSIS;  Surgeon: Eliazar Patel MD;  Location: [facility] OR     DAVINCI LYSIS OF ADHESIONS N/A 10/12/2016    Procedure: DAVINCI LYSIS OF ADHESIONS;  Surgeon: Eliazar Patel MD;  Location: [facility] OR     DAVINCI PELVIC PROCEDURE N/A 10/12/2016    Procedure: DAVINCI  PELVIC PROCEDURE;  Surgeon: Eliazar Patel MD;  Location:  OR     DILATION AND CURETTAGE, HYSTEROSCOPY DIAGNOSTIC, COMBINED N/A 10/12/2016    Procedure: COMBINED DILATION AND CURETTAGE, HYSTEROSCOPY DIAGNOSTIC;  Surgeon: Eliazar Patel MD;  Location:  OR     EYE SURGERY      Eye Surgery x 2 as an infant     HC TOOTH EXTRACTION W/FORCEP          Medical Review of Systems                                                                                                    2,10   The remainder of the review of systems is noncontributory  Dizziness is a regular issue, has episodes maybe twice per day. Constipation is frequently an issue. Denies N/V, headaches. Had migraines in the past, but hasn't had one in a while. Thinks diet helped with this.   Allergy                                Latex; Liquid adhesive; and Seasonal allergies  Current Medications                                                                                                       Current Outpatient Medications   Medication Sig Dispense Refill     EPINEPHrine (EPIPEN/ADRENACLICK/OR ANY BX GENERIC EQUIV) 0.3 MG/0.3ML injection 2-pack Inject 0.3 mg into the muscle once       fexofenadine (ALLEGRA) 180 MG tablet Take 180 mg by mouth daily       gabapentin (NEURONTIN) 300 MG capsule Take 2 capsules (600 mg) by mouth At Bedtime 60 capsule 1     gabapentin (NEURONTIN) 300 MG capsule Take 1 capsule (300 mg) by mouth 2 times daily 60 capsule 1     hydrOXYzine (ATARAX) 25 MG tablet Take 1-2 tablets (25-50 mg) by mouth 2 times daily as needed for itching or anxiety 120 tablet 0     levalbuterol (XOPENEX HFA) 45 MCG/ACT Inhaler Inhale 1-2 puffs into the lungs every 4 hours as needed       LORazepam (ATIVAN) 0.5 MG tablet Take 0.5 mg by mouth daily as needed       Magnesium Glycinate POWD 400 mg At Bedtime 100 g 3     montelukast (SINGULAIR) 10 MG tablet Take 10 mg by mouth At Bedtime       multivitamin, therapeutic with minerals  (MULTI-VITAMIN) TABS tablet Take 1 tablet by mouth daily       naproxen (NAPROSYN) 500 MG tablet Take 1 tablet (500 mg) by mouth 2 times daily as needed for moderate pain or headaches (migraines and fibromyalgia) 60 tablet 1     order for DME Equipment being ordered: TENS  All necessary equipment: leads/pads  Duration of use: 36 months  Apply to painful areas 1 Device 0     tiZANidine (ZANAFLEX) 2 MG tablet Take 1 tablet (2 mg) by mouth 3 times daily as needed for muscle spasms 90 tablet 0     traZODone (DESYREL) 50 MG tablet Take 0.5-1 tablets (25-50 mg) by mouth nightly as needed for sleep 30 tablet 1     venlafaxine (EFFEXOR-XR) 150 MG 24 hr capsule Take 1 capsule (150 mg) by mouth daily 30 capsule 0     biotin 1000 MCG TABS tablet Take 5,000 mcg by mouth daily       etonogestrel-ethinyl estradiol (NUVARING) 0.12-0.015 MG/24HR vaginal ring Place 1 each vaginally once a week       fluticasone (FLOVENT HFA) 110 MCG/ACT Inhaler Inhale 2 puffs into the lungs 2 times daily       Vitals                                                                                                                       3, 3   /81   Pulse 93   Wt 98.2 kg (216 lb 9.6 oz)   BMI 36.61 kg/m     Mental Status Exam                                                                                    9, 14 cog gs     Alertness: alert  and oriented  Appearance: casually groomed  Behavior/Demeanor: cooperative and pleasant, with good  eye contact   Speech: regular rate and rhythm  Language: no obvious problem  Psychomotor: normal or unremarkable  Mood: description consistent with euthymia  Affect: appropriate; was congruent to mood; was congruent to content  Thought Process/Associations: unremarkable  Thought Content:  Reports none;  Denies suicidal ideation and violent ideation  Perception:  Reports none;  Denies auditory hallucinations and visual hallucinations  Insight: good  Judgment: good  Cognition: (6) does  appear grossly intact;  formal cognitive testing was not done  Gait/Station and/or Muscle Strength/Tone: unremarkable    Labs and Data                                                                                                                 Rating Scales:    PHQ9    PHQ9 Today:  Not completed  PHQ-9 SCORE 12/14/2018 1/9/2019 1/25/2019   PHQ-9 Total Score MyChart - - 14 (Moderate depression)   PHQ-9 Total Score 11 6 14         Diagnosis and Assessment                                                                             m2, h3     Today the following issues were addressed:    1) Major Depressive Disorder, recurrent, moderate  2) Generalized Anxiety Disorder  3) ADHD (Hx)    [place] Prescription Monitoring Program [] review was not needed today.    PSYCHOTROPIC DRUG INTERACTIONS:   NAPROXEN -- ESCITALOPRAM -- VENLAFAXINE may result in an increased risk of bleeding.     ESCITALOPRAM -- VENLAFAXINE -- HYDROXYZINE -- TRAZODONE -- TIZANIDINE may result in increased risk of QT interval prolongation.     ESCITALOPRAM -- VENLAFAXINE -- TRAZODONE may result in an increased risk of serotonin syndrome (hypertension, hyperthermia, myoclonus, mental status changes).     LORAZEPAM -- ESTRADIOL  may result in decreased lorazepam effectiveness.      MANAGEMENT:  Monitoring for adverse effects and routine vitals    Plan                                                                                                                    m2, h3      1) Medication Management   Continue Effexor XR 150mg daily  Continue Trazodone 25-50mg at bedtime PRN  Continue Hyrdoxyzine 25-50mg for anxiety  Continue Gabapentin 300 BID and 600mg at bedtime   Continue Lorazepam 0.5mg daily PRN (takes rarely)    2) Therapy  Continue with current therapist    RTC: 1 month    CRISIS NUMBERS:   Provided routinely in AVS.    Treatment Risk Statement:  The patient understands the risks, benefits, adverse effects and alternatives. Agrees to treatment with the capacity to do  so. No medical contraindications to treatment. Agrees to call clinic for any problems. The patient understands to call 911 or go to the nearest ED if life threatening or urgent symptoms occur.       PROVIDER:  TORSTEN Hogan CNP

## 2019-02-17 ENCOUNTER — MYC REFILL (OUTPATIENT)
Dept: PSYCHIATRY | Facility: CLINIC | Age: 34
End: 2019-02-17

## 2019-02-17 DIAGNOSIS — F41.9 ANXIETY: ICD-10-CM

## 2019-02-17 DIAGNOSIS — G47.00 INSOMNIA, UNSPECIFIED TYPE: ICD-10-CM

## 2019-02-17 DIAGNOSIS — F41.1 GENERALIZED ANXIETY DISORDER: ICD-10-CM

## 2019-02-18 NOTE — TELEPHONE ENCOUNTER
Last seen: 2/6/19  RTC: 1 month   Cancel: none  No-show: none  Next appt: 8/6/2019     Incoming refill from pt [employer] message.      Medication requested: gabapentin (NEURONTIN) 300 MG capsule  Directions: Take 2 capsules (600 mg) by mouth At Bedtime - Oral  Qty: 60 capsule, 1 refill   Last refilled: 11/27/2018     Medication requested: gabapentin (NEURONTIN) 300 MG capsule  Directions: Take 1 capsule (300 mg) by mouth 2 times daily - Oral  Qty: 60 capsule, 1 refill   Last refilled: 11/27/2018    Last visit note unsigned, routed to provider for approval and to confirm RTC.

## 2019-02-18 NOTE — PROGRESS NOTES
[facility]  Primary Care Center   Vonda Pascual, APRN CNP  2/18/2019     Chief Complaint:   Travel Clinic (pt is going to [place] on 03/12/19 to 03/23/19 for total of 11 days)    History of Present Illness:   Paris Nguyen is a 34 year old year old adult seen today alone in preparation for travel, leaving on March 12, 2019 and staying for 11 days.  Detailed itinerary: [place] and [place].  Patient will be staying in urban locations.  Reason for travel: Pleasure.  Country of birth: [place].  They will be traveling with: unaccompanied.  Planned activities during travel: Staying at a resort and going to [facility].    Immunizations:  Immunization History   Administered Date(s) Administered     Immunization History   Administered Date(s) Administered     DTaP, Unspecified 06/28/1996     HepA-Adult 06/09/2017     Historical DTP/aP 1984, 1984, 1984, 08/15/1985, 04/27/1998     Hpv, Unspecified  02/20/1986     Influenza Vaccine IM 3yrs+ 4 Valent IIV4 02/04/2015, 10/22/2015, 11/24/2017, 10/21/2018     Influenza Vaccine, 3 YRS +, IM (QUADRIVALENT W/PRESERVATIVES) 01/10/2013, 09/23/2015     MMR 05/16/1985, 04/16/1996     OPV, trivalent, live 1984, 1984, 08/15/1985, 04/27/1989     Pneumococcal 23 valent 11/24/2017     TDAP Vaccine (Adacel) 12/28/2006     Td (Adult), Adsorbed 06/18/1996     Tdap (Adult) Unspecified Formulation 11/24/2017     Typhoid IM 06/09/2017       Review of Systems:   Pertinent items are noted in HPI.  All other systems are negative.    LMP/GYN history: No LMP recorded. Patient is not currently having periods (Reason: Birth Control).      Active Medications:      benzonatate (TESSALON) 100 MG capsule, Take 100-200 mg by mouth, Disp: , Rfl:      Diclofenac Sodium 1.5 % SOLN, , Disp: , Rfl:      EPINEPHrine (EPIPEN/ADRENACLICK/OR ANY BX GENERIC EQUIV) 0.3 MG/0.3ML injection 2-pack, Inject 0.3 mg into the muscle once, Disp: , Rfl:      fexofenadine  (ALLEGRA) 180 MG tablet, Take 180 mg by mouth daily, Disp: , Rfl:      gabapentin (NEURONTIN) 300 MG capsule, Take 2 capsules (600 mg) by mouth At Bedtime, Disp: 60 capsule, Rfl: 3     gabapentin (NEURONTIN) 300 MG capsule, Take 1 capsule (300 mg) by mouth 2 times daily, Disp: 60 capsule, Rfl: 3     hydrOXYzine (ATARAX) 25 MG tablet, Take 1-2 tablets (25-50 mg) by mouth 2 times daily as needed for itching or anxiety, Disp: 120 tablet, Rfl: 0     levalbuterol (XOPENEX HFA) 45 MCG/ACT Inhaler, Inhale 1-2 puffs into the lungs every 4 hours as needed, Disp: , Rfl:      LORazepam (ATIVAN) 0.5 MG tablet, Take 0.5 mg by mouth daily as needed, Disp: , Rfl:      montelukast (SINGULAIR) 10 MG tablet, Take 10 mg by mouth At Bedtime, Disp: , Rfl:      multivitamin, therapeutic with minerals (MULTI-VITAMIN) TABS tablet, Take 1 tablet by mouth daily, Disp: , Rfl:      naproxen (NAPROSYN) 500 MG tablet, Take 1 tablet (500 mg) by mouth 2 times daily as needed for moderate pain or headaches (migraines and fibromyalgia), Disp: 60 tablet, Rfl: 1     tiZANidine (ZANAFLEX) 2 MG tablet, Take 1 tablet (2 mg) by mouth 3 times daily as needed for muscle spasms, Disp: 90 tablet, Rfl: 0     traZODone (DESYREL) 50 MG tablet, Take 0.5-1 tablets (25-50 mg) by mouth nightly as needed for sleep, Disp: 30 tablet, Rfl: 1     venlafaxine (EFFEXOR-XR) 150 MG 24 hr capsule, Take 1 capsule (150 mg) by mouth daily, Disp: 90 capsule, Rfl: 1     etonogestrel-ethinyl estradiol (NUVARING) 0.12-0.015 MG/24HR vaginal ring, Place 1 each vaginally once a week, Disp: , Rfl:      fluticasone (FLOVENT HFA) 110 MCG/ACT Inhaler, Inhale 2 puffs into the lungs 2 times daily, Disp: , Rfl:       Allergies:   Latex; Liquid adhesive; Seasonal allergies; and Wound dressing adhesive      Past Medical History:  Abnormal pap smear, HPV  Depression  Endometriosis  GERD  Irritable bowel syndrome  Migraines  Pelvic and perineal pain  Fibromyalgia  TMJ arthralgia  Moderate  persistent asthma without complication  ADHD  Autism spectrum disorder  Generalized anxiety disorder  Obsessive compulsive disorder  Seasonal allergic rhinitis due to pollen  Allergic rhinitis due to dust  Allergy to adhesive tape  Oral allergy syndrome  Plantar fascitis     Past Surgical History:  Endometriosis excision  Cytoscopy  Da Igor assisted ablation /excision of endometriosis  Da Igor lysis of adhesions  Da Igor pelvic procedure  Dilation and curettage, hysteroscopy diagnostic, combined  Eye surgery in infancy x2  Tooth extraction w/forcep    Family History:   Mother:hypothyroidism, IgA nephropathy, diabetes, thyroid disease, obesity  Father: alcoholism, alcohol-related pancreatitis, depression, anxiety disorder, diabetes  Sister: anxiety disorder, attention deficit disorder  Paternal grandfather: myocardial infarction (53)  Paternal grandmother: breast cancer, skin cancer  Maternal grandmother: uterine cancer     Social History:   The patient was alone.  Smoking Status: Never   Smokeless Tobacco: Never   Alcohol Use: Yes, less than one drink a week     Physical Exam:   /87   Pulse 88   Wt 99.3 kg (218 lb 14.4 oz)   BMI 36.99 kg/m    General: well-nourished, well-developed adult in no acute distress.    Assessment and Plan:   Paris Nguyen 34 year old old adult whom I had the pleasure of seeing today for counseling for international travel.    Counseling on vaccinations for travel:   Hepatitis A Vaccine - 2nd and final dose.    Counseling on malaria and other insect borne diseases    We also discussed general precautions for mosquito, other insect borne and ecto-parasite avoidance.    Counseling on traveler's diarrhea  We discussed precautions for clean water and food preparation as follows:  Recommended bottled or boiled water, including ice, and for tooth brushing.  Peel it, boil it, cook it, or forget it  Gave handout on traveler's diarrhea.  Handwashing or use of sanitizers several times  daily and prior to eating  We discussed medications for management of traveler's diarrhea, once symptomatic:  Gave handout to patient.  Recommended going to clinic, if dehydrated or if high fever and/or blood in stool.  Recommended loperamide (Imodium, an antimotility agent), for diarrhea without fever  Rx for azithromycin 500 mg 3 tabs given with instructions how to treat acute diarrhea.     Counseling on the epidemiology of travel related morbidity and mortality  Discussed precautions for accident avoidance.  Discussed flying: drinking plenty of fluids and exercising legs every 2 hours  Discussed health concerns overseas.  Gave copy of CDC recommendations.  Discussed safety and security:     Other counseling for travel  Recommended checking medicine cabinet - looking for any meds normally used at home for common illnesses.  Provided education regarding sun exposure and use of high SPF sunscreen.  Discussed avoidance of blood and body secretions.   High altitude sickness: not an issue.  Motion sickness.  Jet lag: Not an issue.  HIV Post Exposure : aware    Creating a Medical Travel Kit  Every busy traveler should have a kit containing personal care and medical items that can be easily tossed into a suitcase.  Quantities can be adjusted for the length of travel, for destinations, and availability of replacement at your destination.    Plan to see your Travel medicine Clinic 4-6 weeks prior to departure as some vaccines require time to become effective.     Items to Pack:  -Basic antibiotics  -Mild laxatives: Metamucil or Senokot, available without a prescription.  -Anti-diarrheal medication: Imodium AD or Pepto Bismol, available in tablet form without a prescription.  -Medical thermometer  -Antacid tablets  -Band aides, guaze bandages, ace wrap, adhesive tape, antibiotic ointment  -Topical ointment or creams for rash or bites (over the counter hydrocortisone cream).    -Motion sickness: Dramamine or Antivert  (Meclazine)tablets  -Aspirin, acetaminophen(tylenol) ibuprofen  -Cough medicine or drops  -Anti-fungal foot powder  -Nasal spray or decongestants  -Eyeglasses.  Take an extra pair or copy of your prescription.  -Insect repellent with 30% DEET time release, such as Ultrathon or Diaz products. Available at [employer]  -Permethrin clothing insecticide treatment.Available at [employer]  -Ear Plugs  -Sunscreen  -Sunglasses, Hat  -Safety pins, toilet paper, money belt, padlock, duct tape    Regular Medications should be left in original containers and carried in your carry on bag. Take enough with you for the entire trip. You might need a letter to give to your insurance to obtain a sufficient amount for your trip.    Carry with you the following info: Known allergies, medical conditions, medications, name of your insurance company and policy number, blood type if known, and phone numbers for emergency contact.    Medical resources  [facility]  [phone]  Provides Medical Alert Tags    [employer] [facility] [phone] (9 am TO 5 pm EST)  [phone] (after hours)  Deals with emergencies relating to passport problems.    [facility]  [phone]( Mon-Fri 8:30-10pm EST)  [phone] Emergencies only, 24 hour facilitator)  [phone] ( travel advisories recording) Provides information on current health conditions around the world.    Counseling for travel  For traveler's diarrhea as needed  -     azithromycin (ZITHROMAX) 500 MG tablet; 1 tab daily until diarrhea resolves.    Other orders  -     HEPA VACCINE ADULT IM    Please see after visit summary for details regarding the patient's planned vaccination schedule.  For any labs ordered, we will contact the patient with a plan for further care.  The patient was also encouraged to be seen for any post-travel illness, or with  future travel plans.    The total time spent during this visit for individual counseling was 30 minutes, all of which was spent in counseling time, including reviewing the past medical and vaccination history, the travel itinerary, the risks of travel and suggested precautions, and the recommended vaccines and medicines with their side effects -- all for upcoming travel.     Vonda SARMIENTO CNP       Scribe Disclosure:   I, Chirag Collins, am serving as a scribe to document services personally performed by TORSTEN Campbell CNP at this visit, based upon the provider's statements to me. All documentation has been reviewed by the aforementioned provider prior to being entered into the official medical record.     Portions of this medical record were completed by a scribe. UPON MY REVIEW AND AUTHENTICATION BY ELECTRONIC SIGNATURE, this confirms (a) I performed the applicable clinical services, and (b) the record is accurate.

## 2019-02-19 ENCOUNTER — OFFICE VISIT (OUTPATIENT)
Dept: INTERNAL MEDICINE | Facility: CLINIC | Age: 34
End: 2019-02-19
Payer: COMMERCIAL

## 2019-02-19 VITALS
BODY MASS INDEX: 36.99 KG/M2 | WEIGHT: 218.9 LBS | DIASTOLIC BLOOD PRESSURE: 87 MMHG | SYSTOLIC BLOOD PRESSURE: 125 MMHG | HEART RATE: 88 BPM

## 2019-02-19 DIAGNOSIS — Z71.84 COUNSELING FOR TRAVEL: Primary | ICD-10-CM

## 2019-02-19 RX ORDER — BENZONATATE 100 MG/1
100-200 CAPSULE ORAL PRN
COMMUNITY
Start: 2018-02-23

## 2019-02-19 RX ORDER — GABAPENTIN 300 MG/1
600 CAPSULE ORAL AT BEDTIME
Qty: 60 CAPSULE | Refills: 3 | Status: SHIPPED | OUTPATIENT
Start: 2019-02-19 | End: 2019-02-25

## 2019-02-19 RX ORDER — GABAPENTIN 300 MG/1
300 CAPSULE ORAL
Qty: 60 CAPSULE | Refills: 3 | Status: SHIPPED | OUTPATIENT
Start: 2019-02-19 | End: 2019-02-25

## 2019-02-19 RX ORDER — AZITHROMYCIN 500 MG/1
TABLET, FILM COATED ORAL
Qty: 3 TABLET | Refills: 0 | Status: SHIPPED | OUTPATIENT
Start: 2019-02-19 | End: 2019-06-05

## 2019-02-19 ASSESSMENT — PAIN SCALES - GENERAL: PAINLEVEL: MILD PAIN (2)

## 2019-02-19 NOTE — NURSING NOTE
Hepatitis A given to patient without problems, patient tolerated procedure well.Linda Cotter LPN 4:45 PM on 2/19/2019

## 2019-02-19 NOTE — NURSING NOTE
Chief Complaint   Patient presents with     Travel Clinic     pt is going to [place] on 03/12/19 to 03/23/19 for total of 11 days       Rupal Ta CMA at 3:53 PM on 2/19/2019.

## 2019-02-19 NOTE — PROGRESS NOTES
Pt traveling to [place] from 03/12/19 to 03/23/19 total of 11 days Rupal Ta CMA at 3:46 PM on 2/19/2019.

## 2019-02-19 NOTE — PATIENT INSTRUCTIONS
[facility] Medication Refill Request Information:  * Please contact your pharmacy regarding ANY request for medication refills.  ** [facility] Prescription Fax = [phone]  * Please allow 3 business days for routine medication refills.  * Please allow 5 business days for controlled substance medication refills.     [facility] Test Result notification information:  *You will be notified with in 7-10 days of your appointment day regarding the results of your test.  If you are on MyChart you will be notified as soon as the provider has reviewed the results and signed off on them.    [facility]: [phone]         Creating a Medical Travel Kit    Every busy traveler should have a kit containing personal care and medical items that can be easily tossed into a suitcase.  Quantities can be adjusted for the length of travel, for destinations, and availability of replacement at your destination.    Plan to see your Travel medicine Clinic 4-6 weeks prior to departure as some vaccines require time to become effective.     Items to Pack:    -Basic antibiotics  -Mild laxatives: Metamucil or Senokot, available without a prescription.  -Anti-diarrheal medication: Imodium AD or Pepto Bismol tablets  -Medical thermometer  -Antacid tablets  -Band aides, guaze bandages, ace wrap, adhesive tape, antibiotic ointment  -Topical ointment or creams for rash or bites (over the counter hydrocortisone cream).    -Motion sickness: Dramamine or Antivert (Meclazine)tablets  -Aspirin, acetaminophen(tylenol) ibuprofen  -Cough medicine or drops  -Anti-fungal foot powder  -Nasal spray or decongestants  -Eyeglasses.  Take an extra pair or copy of your prescription.  _Insect repellent with 30% DEET time release, such as Ultrathon or Diaz products. Available at [employer]  -Permethrin clothing insecticide treatment.Available at [employer] on line  -Ear Plugs  -Sunscreen  -Sunglasses,  Hat  -Safety pins, toilet paper, money belt, padlock, duct tape    Regular Medications should be left in original containers and carried in your carry on bag. Take enough with you for the entire trip. You might need a letter to give to your insurance to obtain a sufficient amount for your trip.      Carry with you the following info: Known allergies, medical conditions, medications, name of your insurance company and policy number, blood type if known, and phone numbers for emergency [email or web address].[email or web address]          Travel Websites:    [place]wwn[place].cdc/gov  [place].cdc.gov/travel  [place]wwn[place].cdc.gov/travelt    Medical resources  [employer]  [phone]  Provides Medical Alert Tags    Passport Emergency Services [phone] (9 am TO 5 pm EST)  [phone] (after hours)  Deals with emergencies relating to passport problems.    [facility] Department [facility]  [phone]( Mon-Fri 8:30-10pm EST)  [phone] Emergencies only, 24 hour facilitator)  [phone] ( travel advisories recording) Provides information on current health conditions around the world.

## 2019-02-22 ENCOUNTER — TELEPHONE (OUTPATIENT)
Dept: PSYCHIATRY | Facility: CLINIC | Age: 34
End: 2019-02-22

## 2019-02-22 DIAGNOSIS — F41.1 GENERALIZED ANXIETY DISORDER: ICD-10-CM

## 2019-02-22 DIAGNOSIS — F33.1 MODERATE EPISODE OF RECURRENT MAJOR DEPRESSIVE DISORDER (H): ICD-10-CM

## 2019-02-22 DIAGNOSIS — G47.00 INSOMNIA, UNSPECIFIED TYPE: ICD-10-CM

## 2019-02-22 NOTE — TELEPHONE ENCOUNTER
[facility] Call Center    Phone Message    May a detailed message be left on voicemail: yes    Reason for Call: Other: [facility] called to clarify whether a one prescription can be sent for the 300 mg gabapentin instead of 2 scripts, because the pt has to pay an extra co-pay.     Action Taken: Other: Skylar Hope

## 2019-02-25 ENCOUNTER — TELEPHONE (OUTPATIENT)
Dept: PSYCHIATRY | Facility: CLINIC | Age: 34
End: 2019-02-25

## 2019-02-25 RX ORDER — GABAPENTIN 300 MG/1
CAPSULE ORAL
Qty: 120 CAPSULE | Refills: 3
Start: 2019-02-25 | End: 2019-06-07

## 2019-02-25 RX ORDER — VENLAFAXINE HYDROCHLORIDE 150 MG/1
150 CAPSULE, EXTENDED RELEASE ORAL DAILY
Qty: 90 CAPSULE | Refills: 1 | Status: SHIPPED | OUTPATIENT
Start: 2019-02-25 | End: 2019-06-07

## 2019-02-25 NOTE — TELEPHONE ENCOUNTER
[facility] call back in regards to the potential of changing Gabapentin to singular prescription rather than two to reduce costs associated with multiple co-pays for patient.

## 2019-02-25 NOTE — TELEPHONE ENCOUNTER
[facility] to follow-up on gabapentin dosage, pharmacist asked to combine both gabapentin orders to eliminate additional co-pay for pt. Prescriptions combined to read gabapentin 300mg capsules, take 1 capsule (300mg) twice daily and 2 capsules (600mg) at bedtime, quantity of 120 with 3 additional refills as noted on original Rx sent on 2/19/19.    Writer called to update. Pt asked that effexor-XR ordered on 2/6/49 be resent to [facility] as well. Rx originally sent to [facility], writer [facility] to [facility].    Med tab updated to reflect changes.

## 2019-03-04 ENCOUNTER — OFFICE VISIT (OUTPATIENT)
Dept: INTERNAL MEDICINE | Facility: CLINIC | Age: 34
End: 2019-03-04
Payer: COMMERCIAL

## 2019-03-04 VITALS
WEIGHT: 215 LBS | HEART RATE: 95 BPM | SYSTOLIC BLOOD PRESSURE: 125 MMHG | BODY MASS INDEX: 36.7 KG/M2 | HEIGHT: 64 IN | OXYGEN SATURATION: 97 % | DIASTOLIC BLOOD PRESSURE: 86 MMHG

## 2019-03-04 DIAGNOSIS — Z00.00 ROUTINE HISTORY AND PHYSICAL EXAMINATION OF ADULT: Primary | ICD-10-CM

## 2019-03-04 DIAGNOSIS — N80.9 ENDOMETRIOSIS: ICD-10-CM

## 2019-03-04 DIAGNOSIS — Z13.220 SCREENING FOR HYPERLIPIDEMIA: ICD-10-CM

## 2019-03-04 DIAGNOSIS — L91.8 SKIN TAG: ICD-10-CM

## 2019-03-04 ASSESSMENT — ENCOUNTER SYMPTOMS
POLYPHAGIA: 0
HEARTBURN: 0
LOSS OF CONSCIOUSNESS: 0
TREMORS: 0
BOWEL INCONTINENCE: 0
FATIGUE: 1
HOARSE VOICE: 1
MUSCLE WEAKNESS: 1
TINGLING: 1
BLOATING: 1
WEIGHT LOSS: 0
TASTE DISTURBANCE: 0
HALLUCINATIONS: 0
NECK MASS: 0
DECREASED CONCENTRATION: 1
TROUBLE SWALLOWING: 0
MEMORY LOSS: 0
WEAKNESS: 1
RECTAL PAIN: 0
WEIGHT GAIN: 0
SMELL DISTURBANCE: 0
EYE PAIN: 1
NUMBNESS: 1
ARTHRALGIAS: 1
SORE THROAT: 0
STIFFNESS: 1
DIZZINESS: 1
ABDOMINAL PAIN: 1
HEADACHES: 1
JAUNDICE: 0
POLYDIPSIA: 1
DECREASED APPETITE: 1
CONSTIPATION: 1
DIARRHEA: 1
NIGHT SWEATS: 1
MUSCLE CRAMPS: 1
EYE IRRITATION: 1
PARALYSIS: 0
EYE REDNESS: 0
SINUS CONGESTION: 1
DISTURBANCES IN COORDINATION: 1
NERVOUS/ANXIOUS: 1
SEIZURES: 0
SPEECH CHANGE: 1
BLOOD IN STOOL: 0
INSOMNIA: 1
PANIC: 1
BACK PAIN: 1
CHILLS: 1
ALTERED TEMPERATURE REGULATION: 1
NECK PAIN: 1
SINUS PAIN: 0
VOMITING: 0
INCREASED ENERGY: 0
JOINT SWELLING: 0
EYE WATERING: 0
MYALGIAS: 1
DEPRESSION: 1
FEVER: 0
DOUBLE VISION: 0

## 2019-03-04 ASSESSMENT — PAIN SCALES - GENERAL: PAINLEVEL: MILD PAIN (3)

## 2019-03-04 ASSESSMENT — MIFFLIN-ST. JEOR: SCORE: 1661.1

## 2019-03-04 NOTE — PROGRESS NOTES
[facility]  Primary Care Center   TORSTEN Chao CNP  03/04/2019      Chief Complaint:   Physical       History of Present Illness:   Paris Nguyen is a 34 year old adult with a history of fibromyalgia, endometriosis, and anxiety who presents for an annual physical.      She is interested in switching birth control.  She has been using Nuvaring for more than 10 years but is now having symptoms on week 2 of Nuvaring.  She gets bloating, sudden stabbing pain in her abdomen, and fatigue beyond what she has with fibromyalgia.  Her periods usually last 3 - 5 days, filling a Diva cup 1 or 2 times per day.  Lately her flow has been unpredictable, sometimes heavier but other times she might just have spotting for 2 weeks.  She wants something that will keep her hormone levels steady so she does not have a flare of endometriosis.  She does not want an IUD.  She saw ob/gyn in December to discuss options for treatment of endometriosis.  They discussed Lupron or Danazol however she had concerns regarding potential mental health side effects or interaction with her medications.  Her psychiatrist did check and there are no interactions with those options.  However, she has not made a decision if she wants to pursue either of these options.  She had a pap smear about 3 months ago which was normal and negative for HPV.    She has a skin tag that keeps getting caught on her clothing right where her bra strap lays.  She tried removing this herself but it was too painful.    She has had more mucous in her nose/throat recently.  She is using her daily controller medications but has not needed her rescue inhaler.  She denies any fevers or chills.    Her mood has been more okay.  She has had more stress recently as her father has been in and out of the hospital in the past few months.  She is looking forward to a trip to [place] with her sister in the next couple of weeks.      Her cholesterol was last tested 11/2017 and was  slightly elevated.  She attributes this to moving in with a partner who does not eat a healthy diet.  She works out with a  regularly and also walks regularly.         Review of Systems:   Pertinent items are noted in HPI, remainder of complete ROS is negative.      Active Medications:      benzonatate (TESSALON) 100 MG capsule, Take 100-200 mg by mouth as needed , Disp: , Rfl:      EPINEPHrine (EPIPEN/ADRENACLICK/OR ANY BX GENERIC EQUIV) 0.3 MG/0.3ML injection 2-pack, Inject 0.3 mg into the muscle once, Disp: , Rfl:      fexofenadine (ALLEGRA) 180 MG tablet, Take 180 mg by mouth daily, Disp: , Rfl:      gabapentin (NEURONTIN) 300 MG capsule, Take 1 capsule (300mg) two times daily, take 2 capsules (600mg) at bedtime, Disp: 120 capsule, Rfl: 3     hydrOXYzine (ATARAX) 25 MG tablet, Take 1-2 tablets (25-50 mg) by mouth 2 times daily as needed for itching or anxiety, Disp: 120 tablet, Rfl: 0     levalbuterol (XOPENEX HFA) 45 MCG/ACT Inhaler, Inhale 1-2 puffs into the lungs every 4 hours as needed, Disp: , Rfl:      LORazepam (ATIVAN) 0.5 MG tablet, Take 0.5 mg by mouth daily as needed, Disp: , Rfl:      montelukast (SINGULAIR) 10 MG tablet, Take 10 mg by mouth At Bedtime, Disp: , Rfl:      multivitamin, therapeutic with minerals (MULTI-VITAMIN) TABS tablet, Take 1 tablet by mouth daily, Disp: , Rfl:      naproxen (NAPROSYN) 500 MG tablet, Take 1 tablet (500 mg) by mouth 2 times daily as needed for moderate pain or headaches (migraines and fibromyalgia), Disp: 60 tablet, Rfl: 1     tiZANidine (ZANAFLEX) 2 MG tablet, Take 1 tablet (2 mg) by mouth 3 times daily as needed for muscle spasms, Disp: 90 tablet, Rfl: 0     traZODone (DESYREL) 50 MG tablet, Take 0.5-1 tablets (25-50 mg) by mouth nightly as needed for sleep, Disp: 30 tablet, Rfl: 1     venlafaxine (EFFEXOR-XR) 150 MG 24 hr capsule, Take 1 capsule (150 mg) by mouth daily, Disp: 90 capsule, Rfl: 1     azithromycin (ZITHROMAX) 500 MG tablet, 1 tab  "daily until diarrhea resolves. (Patient not taking: Reported on 3/4/2019), Disp: 3 tablet, Rfl: 0     Diclofenac Sodium 1.5 % SOLN, , Disp: , Rfl:      etonogestrel-ethinyl estradiol (NUVARING) 0.12-0.015 MG/24HR vaginal ring, Place 1 each vaginally once a week, Disp: , Rfl:      fluticasone (FLOVENT HFA) 110 MCG/ACT Inhaler, Inhale 2 puffs into the lungs 2 times daily, Disp: , Rfl:       Allergies:   Latex      Past Medical History:  Anxiety  Depression  Attention deficit hyperactivity disorder   Obsessive-compulsive disorder   Autism spectrum disorder   Fibromyalgia   Endometriosis  Abnormal Pap smear (HPV positive)  Gastroesophageal reflux disease  Irritable bowel syndrome   Migraine headaches  Moderate persistent asthma   Seasonal allergic rhinitis   TMJ arthralgia      Past Surgical History:  Laparoscopy, endometriosis excision, hysteroscopy, lysis of adhesions, dilation and curettage 10/2016  Eye surgery x 2  Tooth extraction    Family History:   Hypothyroidism - mother  IgA nephropathy - mother  Diabetes - mother, father  Obesity - mother  Alcoholism - father  Depression - father  Anxiety - father, sister   ADD - sister  Pancreatitis - father   Myocardial infarction - paternal grandfather, father   Breast cancer - paternal grandmother  Skin cancer - paternal grandmother  Uterine cancer - maternal grandmother      Social History:   Presents to clinic alone.  Tobacco Use: No previous or current tobacco use.   Alcohol Use: 1 drink per week or less     Physical Exam:   /86   Pulse 95   Ht 1.635 m (5' 4.37\")   Wt 97.5 kg (215 lb)   SpO2 97%   BMI 36.48 kg/m       Constitutional: no distress, comfortable, pleasant, well-groomed  Eyes: anicteric, conjunctiva pink, normal extra-ocular movements   Ears, Nose and Throat: tympanic membranes pearly gray with positive light reflex, EACs clear bilaterally, nose clear and free of lesions, throat clear, mucosa pink and moist.   Neck: supple with full range of " motion, no thyromegaly.   Cardiovascular: regular rate and rhythm, normal S1 and S2, no murmurs, rubs or gallops  Respiratory: clear to auscultation with good air movement bilaterally, no wheezes or crackles, non-labored  Breast Exam: Without visible skin changes. No dimpling or lesions seen.   Breasts supple, non-tender with palpation, no dominant mass, nodularity, or nipple discharge noted bilaterally. Axillary nodes negative.    Gastrointestinal: positive bowel sounds, nontender, no hepatosplenomegaly, no masses   Musculoskeletal: full range of motion, strength 5/5, no edema   Skin: no concerning lesions or rash, no jaundice, temp normal, small skin tag under right axilla  Neurological: cranial nerves intact, normal strength and sensation, 2+ patellar reflexes, gait is steady with intact balance, speech is clear, no tremor   Psychological: appropriate mood, demonstrates intact judgment and logical thought process  LYMPH: no axillary, cervical,  supraclavicular, or infraclavicular nodes       Procedure note:  Verbal consent obtained  Procedure: Skin tag removal  Indication: irritated skin tag  Location: right axilla  Maximum diameter of the lesion: 2 mm  Cleansing agent: alcohol and betadine  Anesthetic agent injected SQ: none  Type of instrument: sterile scissors  Method of closing wound: none needed  Cautery applied: none needed  Type of dressing applied: none needed  Aftercare instructions (e.g. signs of infection, when to follow up, handout): reviewed  Patient tolerated procedure well without complications      Assessment and Plan:  Routine history and physical examination of adult  Pap smear up to date.  Regular self breast exams, healthy lifestyle choices encouraged.    - CBC with platelets  - Comprehensive metabolic panel    Screening for hyperlipidemia  She will return for fasting labs once she returns from vacation.   - Lipid panel reflex to direct LDL Fasting    Skin tag  Removed today without  difficulty.    Endometriosis  Recommend she follow up with ob/gyn to discuss the best options to manage her endometriosis symptoms as she is having symptoms in the middle of her cycle and irregular bleeding not controlled by her Nuvaring.          Scribe Disclosure:   I, Ceci Mckeon, am serving as a scribe to document services personally performed by TORSTEN Chao CNP at this visit, based upon the provider's statements to me. All documentation has been reviewed by the aforementioned provider prior to being entered into the official medical record.     Portions of this medical record were completed by a scribe. UPON MY REVIEW AND AUTHENTICATION BY ELECTRONIC SIGNATURE, this confirms (a) I performed the applicable clinical services, and (b) the record is accurate.     TORSTEN Chao CNP

## 2019-03-04 NOTE — NURSING NOTE
Chief Complaint   Patient presents with     Physical     Pt is here for annual physical.      Camila Mercado LPN at 4:00 PM on 3/4/2019.

## 2019-03-04 NOTE — PATIENT INSTRUCTIONS
[facility] Medication Refill Request Information:  * Please contact your pharmacy regarding ANY request for medication refills.  ** [facility] Prescription Fax = [phone]  * Please allow 3 business days for routine medication refills.  * Please allow 5 business days for controlled substance medication refills.     [facility] Test Result notification information:  *You will be notified with in 7-10 days of your appointment day regarding the results of your test.  If you are on MyChart you will be notified as soon as the provider has reviewed the results and signed off on them.    [facility]: [phone]

## 2019-04-16 DIAGNOSIS — G47.00 INSOMNIA, UNSPECIFIED TYPE: ICD-10-CM

## 2019-04-16 RX ORDER — TRAZODONE HYDROCHLORIDE 50 MG/1
25-50 TABLET, FILM COATED ORAL
Qty: 30 TABLET | Refills: 1 | Status: SHIPPED | OUTPATIENT
Start: 2019-04-16 | End: 2019-06-07

## 2019-05-08 DIAGNOSIS — Z13.220 SCREENING FOR HYPERLIPIDEMIA: ICD-10-CM

## 2019-05-08 DIAGNOSIS — Z00.00 ROUTINE HISTORY AND PHYSICAL EXAMINATION OF ADULT: ICD-10-CM

## 2019-05-08 LAB
ALBUMIN SERPL-MCNC: 4 G/DL (ref 3.4–5)
ALP SERPL-CCNC: 26 U/L (ref 40–150)
ALT SERPL W P-5'-P-CCNC: 43 U/L (ref 0–50)
ANION GAP SERPL CALCULATED.3IONS-SCNC: 6 MMOL/L (ref 3–14)
AST SERPL W P-5'-P-CCNC: 23 U/L (ref 0–45)
BILIRUB SERPL-MCNC: 0.4 MG/DL (ref 0.2–1.3)
BUN SERPL-MCNC: 7 MG/DL (ref 7–30)
CALCIUM SERPL-MCNC: 9.1 MG/DL (ref 8.5–10.1)
CHLORIDE SERPL-SCNC: 105 MMOL/L (ref 94–109)
CHOLEST SERPL-MCNC: 208 MG/DL
CO2 SERPL-SCNC: 26 MMOL/L (ref 20–32)
CREAT SERPL-MCNC: 0.64 MG/DL (ref 0.52–1.04)
ERYTHROCYTE [DISTWIDTH] IN BLOOD BY AUTOMATED COUNT: 12.9 % (ref 10–15)
GFR SERPL CREATININE-BSD FRML MDRD: >90 ML/MIN/{1.73_M2}
GLUCOSE SERPL-MCNC: 83 MG/DL (ref 70–99)
HCT VFR BLD AUTO: 39.4 % (ref 35–47)
HDLC SERPL-MCNC: 50 MG/DL
HGB BLD-MCNC: 13.1 G/DL (ref 11.7–15.7)
LDLC SERPL CALC-MCNC: 130 MG/DL
MCH RBC QN AUTO: 28.7 PG (ref 26.5–33)
MCHC RBC AUTO-ENTMCNC: 33.2 G/DL (ref 31.5–36.5)
MCV RBC AUTO: 86 FL (ref 78–100)
NONHDLC SERPL-MCNC: 158 MG/DL
PLATELET # BLD AUTO: 317 10E9/L (ref 150–450)
POTASSIUM SERPL-SCNC: 4 MMOL/L (ref 3.4–5.3)
PROT SERPL-MCNC: 7.2 G/DL (ref 6.8–8.8)
RBC # BLD AUTO: 4.56 10E12/L (ref 3.8–5.2)
SODIUM SERPL-SCNC: 137 MMOL/L (ref 133–144)
TRIGL SERPL-MCNC: 143 MG/DL
WBC # BLD AUTO: 7.9 10E9/L (ref 4–11)

## 2019-05-20 ENCOUNTER — MYC MEDICAL ADVICE (OUTPATIENT)
Dept: INTERNAL MEDICINE | Facility: CLINIC | Age: 34
End: 2019-05-20

## 2019-05-28 ENCOUNTER — TRANSFERRED RECORDS (OUTPATIENT)
Dept: HEALTH INFORMATION MANAGEMENT | Facility: CLINIC | Age: 34
End: 2019-05-28

## 2019-06-05 ENCOUNTER — OFFICE VISIT (OUTPATIENT)
Dept: INTERNAL MEDICINE | Facility: CLINIC | Age: 34
End: 2019-06-05
Payer: COMMERCIAL

## 2019-06-05 VITALS
OXYGEN SATURATION: 97 % | HEART RATE: 99 BPM | BODY MASS INDEX: 36.31 KG/M2 | DIASTOLIC BLOOD PRESSURE: 79 MMHG | WEIGHT: 214 LBS | SYSTOLIC BLOOD PRESSURE: 112 MMHG

## 2019-06-05 DIAGNOSIS — N92.1 MENORRHAGIA WITH IRREGULAR CYCLE: ICD-10-CM

## 2019-06-05 DIAGNOSIS — R11.0 NAUSEA: ICD-10-CM

## 2019-06-05 DIAGNOSIS — M79.7 FIBROMYALGIA: ICD-10-CM

## 2019-06-05 DIAGNOSIS — N92.1 MENORRHAGIA WITH IRREGULAR CYCLE: Primary | ICD-10-CM

## 2019-06-05 DIAGNOSIS — M62.838 MUSCLE SPASM: ICD-10-CM

## 2019-06-05 LAB
HGB BLD-MCNC: 13.5 G/DL (ref 11.7–15.7)
TSH SERPL DL<=0.005 MIU/L-ACNC: 3.13 MU/L (ref 0.4–4)

## 2019-06-05 RX ORDER — ACETAMINOPHEN AND CODEINE PHOSPHATE 120; 12 MG/5ML; MG/5ML
0.35 SOLUTION ORAL DAILY
Refills: 4 | COMMUNITY
Start: 2019-05-28 | End: 2020-09-11

## 2019-06-05 RX ORDER — ONDANSETRON 4 MG/1
4 TABLET, ORALLY DISINTEGRATING ORAL EVERY 6 HOURS PRN
Qty: 30 TABLET | Refills: 1 | Status: SHIPPED | OUTPATIENT
Start: 2019-06-05 | End: 2022-08-24

## 2019-06-05 RX ORDER — TIZANIDINE 2 MG/1
2 TABLET ORAL 3 TIMES DAILY PRN
Qty: 90 TABLET | Refills: 0 | Status: SHIPPED | OUTPATIENT
Start: 2019-06-05 | End: 2024-08-23

## 2019-06-05 ASSESSMENT — PAIN SCALES - GENERAL: PAINLEVEL: MILD PAIN (2)

## 2019-06-05 NOTE — NURSING NOTE
Chief Complaint   Patient presents with     Medication Follow-up     Pt is here to follow up on medications.      Camila Mercado LPN at 7:45 AM on 6/5/2019.

## 2019-06-05 NOTE — PATIENT INSTRUCTIONS
[facility] Medication Refill Request Information:  * Please contact your pharmacy regarding ANY request for medication refills.  ** [facility] Prescription Fax = [phone]  * Please allow 3 business days for routine medication refills.  * Please allow 5 business days for controlled substance medication refills.     [facility] Test Result notification information:  *You will be notified with in 7-10 days of your appointment day regarding the results of your test.  If you are on MyChart you will be notified as soon as the provider has reviewed the results and signed off on them.    [facility]: [phone]

## 2019-06-05 NOTE — PROGRESS NOTES
"[facility]   Radha HOLGUIN Michaelsy, TORSTEN CNP  2019      Chief Complaint: Medication Follow-up     History of Present Illness:   Paris Nguyen is a 34 year old adult with a history of fibromyalgia, ADD, and autism spectrum disorder who presents for medication refill. The patient reports intermittent nausea for the last couple weeks. She states it does not make her queasy, so she is able to eat, but she does not have the desire to. She states that Zofran helps, and requests refill. She states she is feeling better now that her period is over. Her period started 9 days ago and ended 4-5 days ago. She was bleeding 0.5-1 oz daily. She is curious if she is anemic, as she felt this was heavy flow. She has been having periods every 2-3 weeks. She had 2-3 days of cold sweats, minor headache, and shakiness while on the heaviest days of her period. She notes lower abdominal cramping that is \"all pelvic floor stuff.\" Of note, she has a pelvic US scheduled on 6/10. Her gynecologist is currently at [facility]. He plans to also check her TSH.     Ocular migraine:  She reports having a bad ocular migraine 10 days ago that \"would have sent me to the ER if I was not so stubborn.\" She attributes the migraine to not being on the Nuva Ring for the last 4 months. She started on Micronor 3 days ago. She reports that her dad recently .     Other concerns discussed:  She requests Tizanidine for intermittent muscle tightness. She reports taking it about once weekly, and this helps with muscular/fibromyalgia pain.     Review of Systems:   Pertinent items are noted in HPI, remainder of complete ROS is negative.      Active Medications:      benzonatate (TESSALON) 100 MG capsule, Take 100-200 mg by mouth as needed , Disp: , Rfl:      EPINEPHrine (EPIPEN/ADRENACLICK/OR ANY BX GENERIC EQUIV) 0.3 MG/0.3ML injection 2-pack, Inject 0.3 mg into the muscle once, Disp: , Rfl:      fexofenadine (ALLEGRA) 180 MG tablet, Take " 180 mg by mouth daily, Disp: , Rfl:      gabapentin (NEURONTIN) 300 MG capsule, Take 1 capsule (300mg) two times daily, take 2 capsules (600mg) at bedtime, Disp: 120 capsule, Rfl: 3     hydrOXYzine (ATARAX) 25 MG tablet, Take 1-2 tablets (25-50 mg) by mouth 2 times daily as needed for itching or anxiety, Disp: 120 tablet, Rfl: 0     levalbuterol (XOPENEX HFA) 45 MCG/ACT Inhaler, Inhale 1-2 puffs into the lungs every 4 hours as needed, Disp: , Rfl:      LORazepam (ATIVAN) 0.5 MG tablet, Take 0.5 mg by mouth daily as needed, Disp: , Rfl:      montelukast (SINGULAIR) 10 MG tablet, Take 10 mg by mouth At Bedtime, Disp: , Rfl:      multivitamin, therapeutic with minerals (MULTI-VITAMIN) TABS tablet, Take 1 tablet by mouth daily, Disp: , Rfl:      naproxen (NAPROSYN) 500 MG tablet, Take 1 tablet (500 mg) by mouth 2 times daily as needed for moderate pain or headaches (migraines and fibromyalgia), Disp: 60 tablet, Rfl: 1     norethindrone (MICRONOR) 0.35 MG tablet, Take 0.35 mg by mouth daily, Disp: , Rfl: 4     order for DME, Equipment being ordered: TENS All necessary equipment: leads/pads Duration of use: 36 months Apply to painful areas, Disp: 1 Device, Rfl: 0     tiZANidine (ZANAFLEX) 2 MG tablet, Take 1 tablet (2 mg) by mouth 3 times daily as needed for muscle spasms, Disp: 90 tablet, Rfl: 0     traZODone (DESYREL) 50 MG tablet, Take 0.5-1 tablets (25-50 mg) by mouth nightly as needed for sleep, Disp: 30 tablet, Rfl: 1     venlafaxine (EFFEXOR-XR) 150 MG 24 hr capsule, Take 1 capsule (150 mg) by mouth daily, Disp: 90 capsule, Rfl: 1     fluticasone (FLOVENT HFA) 110 MCG/ACT Inhaler, Inhale 2 puffs into the lungs 2 times daily, Disp: , Rfl:       Allergies:   Latex; Liquid adhesive; Seasonal allergies; Morphine; and Wound dressing adhesive      Past Medical History:  HPV positive pap smear  Depression   Endometriosis  Gastroesophageal reflux disease  Migraines  Fibromyalgia  TMJ arthralgia  Moderate persistent asthma  without complication  ADHD  Autism spectrum disorder  Generalized anxiety disorder  IBS (irritable bowel syndrome)  OCD (obsessive compulsive disorder)  Seasonal allergic rhinitis due to pollen  Allergic rhinitis due to dust  Allergy to adhesive tape  Oral allergy syndrome  Plantar fasciitis    Past Surgical History:  Cystoscopy  Davinci assisted ablation/excision of endometriosis  Davinci lysis of adhesions  Dilation and curettage, hysteroscopy diagnostic, combined  Eye surgery (x2)  Tooth extraction    Family History:   Mother - hypothyroidism, IgA nephropathy, diabetes, obesity  Father - alcoholism, pancreatitis, depression, anxiety, diabetic, myocardial infarction  Sister - attention deficit disorder, anxiety disorder  Paternal grandfather - myocardial infarction  Paternal grandmother - breast cancer, skin cancer  Maternal grandmother - uterine cancer     Social History:   Presents to clinic alone.   Tobacco Use: No previous or current tobacco use.   Alcohol Use: Yes, less than 1 drink weekly.     Physical Exam:   /79   Pulse 99   Wt 97.1 kg (214 lb)   SpO2 97%   BMI 36.31 kg/m     Constitutional: Alert, oriented, pleasant, no acute distress  Head: Normocephalic, atraumatic  Eyes: Extra-ocular movements intact, pupils equally round and reactive bilaterally, no scleral icterus  Ears: tympanic membranes pearly gray with positive light reflex  ENT: Oropharynx clear, moist mucus membranes, good dentition  Neck: Supple, no lymphadenopathy, no thyromegaly  Cardiovascular: Regular rate and rhythm, no murmurs, rubs or gallops  Respiratory: Good air movement bilaterally, lungs clear, no wheezes/rales/rhonchi  GI: Abdomen soft, bowel sounds present, nondistended, nontender, no organomegaly or masses, no rebound/guarding  Neurologic: Alert and oriented, cranial nerves grossly intact, clear speech, no tremor  Psychiatric: normal mentation, affect and mood      Assessment and Plan:  Fibromyalgia  Muscle spasm -  Refilled medication. Last fill was in September 2018. Warned that this can be habit forming, use sparingly.  - tiZANidine (ZANAFLEX) 2 MG tablet  Dispense: 90 tablet; Refill: 0    Menorrhagia with irregular cycle - She reports nausea associated with her menstrual cycle. Suspect recent ocular migraine is a separate event. She has recently transitioned from Nuva Ring to Micronor for contraception. Checking hemoglobin and TSH today. She has a pelvic US on 6/10.   - Hemoglobin  - TSH with free T4 reflex    Nausea  - ondansetron (ZOFRAN-ODT) 4 MG ODT tab  Dispense: 30 tablet; Refill: 1         Scribe Disclosure:  I, Kaylee Lazaro, am serving as a scribe to document services personally performed by TORSTNE Chao CNP at this visit, based upon the provider's statements to me. All documentation has been reviewed by the aforementioned provider prior to being entered into the official medical record.     Portions of this medical record were completed by a scribe. UPON MY REVIEW AND AUTHENTICATION BY ELECTRONIC SIGNATURE, this confirms (a) I performed the applicable clinical services, and (b) the record is accurate.       TORSTEN Chao CNP

## 2019-06-07 ENCOUNTER — OFFICE VISIT (OUTPATIENT)
Dept: PSYCHIATRY | Facility: CLINIC | Age: 34
End: 2019-06-07
Attending: NURSE PRACTITIONER
Payer: COMMERCIAL

## 2019-06-07 VITALS
BODY MASS INDEX: 36.52 KG/M2 | WEIGHT: 215.2 LBS | DIASTOLIC BLOOD PRESSURE: 79 MMHG | HEART RATE: 101 BPM | SYSTOLIC BLOOD PRESSURE: 116 MMHG

## 2019-06-07 DIAGNOSIS — F41.9 ANXIETY: ICD-10-CM

## 2019-06-07 DIAGNOSIS — G47.00 INSOMNIA, UNSPECIFIED TYPE: ICD-10-CM

## 2019-06-07 DIAGNOSIS — F33.1 MODERATE EPISODE OF RECURRENT MAJOR DEPRESSIVE DISORDER (H): ICD-10-CM

## 2019-06-07 DIAGNOSIS — F41.1 GENERALIZED ANXIETY DISORDER: ICD-10-CM

## 2019-06-07 PROCEDURE — G0463 HOSPITAL OUTPT CLINIC VISIT: HCPCS | Mod: ZF

## 2019-06-07 RX ORDER — LORAZEPAM 0.5 MG/1
0.5 TABLET ORAL DAILY PRN
Qty: 30 TABLET | Refills: 0 | Status: SHIPPED | OUTPATIENT
Start: 2019-06-07 | End: 2020-06-09

## 2019-06-07 RX ORDER — VENLAFAXINE HYDROCHLORIDE 150 MG/1
150 CAPSULE, EXTENDED RELEASE ORAL DAILY
Qty: 90 CAPSULE | Refills: 0 | Status: SHIPPED | OUTPATIENT
Start: 2019-06-07 | End: 2019-09-10

## 2019-06-07 RX ORDER — TRAZODONE HYDROCHLORIDE 50 MG/1
25-50 TABLET, FILM COATED ORAL
Qty: 45 TABLET | Refills: 0 | Status: SHIPPED | OUTPATIENT
Start: 2019-06-07 | End: 2019-09-10

## 2019-06-07 RX ORDER — HYDROXYZINE HYDROCHLORIDE 25 MG/1
25-50 TABLET, FILM COATED ORAL 2 TIMES DAILY PRN
Qty: 270 TABLET | Refills: 0 | Status: SHIPPED | OUTPATIENT
Start: 2019-06-07 | End: 2019-08-15

## 2019-06-07 RX ORDER — GABAPENTIN 300 MG/1
CAPSULE ORAL
Qty: 360 CAPSULE | Refills: 0 | Status: SHIPPED | OUTPATIENT
Start: 2019-06-07 | End: 2019-08-15

## 2019-06-07 ASSESSMENT — PATIENT HEALTH QUESTIONNAIRE - PHQ9: 5. POOR APPETITE OR OVEREATING: MORE THAN HALF THE DAYS

## 2019-06-07 ASSESSMENT — ANXIETY QUESTIONNAIRES
7. FEELING AFRAID AS IF SOMETHING AWFUL MIGHT HAPPEN: MORE THAN HALF THE DAYS
6. BECOMING EASILY ANNOYED OR IRRITABLE: NEARLY EVERY DAY
IF YOU CHECKED OFF ANY PROBLEMS ON THIS QUESTIONNAIRE, HOW DIFFICULT HAVE THESE PROBLEMS MADE IT FOR YOU TO DO YOUR WORK, TAKE CARE OF THINGS AT HOME, OR GET ALONG WITH OTHER PEOPLE: SOMEWHAT DIFFICULT
2. NOT BEING ABLE TO STOP OR CONTROL WORRYING: SEVERAL DAYS
1. FEELING NERVOUS, ANXIOUS, OR ON EDGE: NEARLY EVERY DAY
GAD7 TOTAL SCORE: 13
5. BEING SO RESTLESS THAT IT IS HARD TO SIT STILL: SEVERAL DAYS
3. WORRYING TOO MUCH ABOUT DIFFERENT THINGS: SEVERAL DAYS

## 2019-06-07 ASSESSMENT — PAIN SCALES - GENERAL: PAINLEVEL: MODERATE PAIN (4)

## 2019-06-07 NOTE — PROGRESS NOTES
"  Psychiatry Clinic Progress Note                                                                   Paris Nguyen is a 34 year old female who prefers the name Ri (\"Ree\") and pronoun they.   Therapist: Ilana Ham @ [facility]  PCP: Radha Collins  Other Providers: ALEXANDRA Otero @ [facility]    Pertinent Background:  See previous notes.  Psych critical item history includes mutiple psychotropic trials.     Interim History                                                                                                        4, 4     The patient is a good historian, reports good treatment adherence and was last seen 2/6/19.  Since the last visit, Ri reports their father passed away in April. They appear to be coping with loss appropriately.  They states their life after her father's passing will be \"simpler but not better.\"  They and their family are in process of redefining her familial role.  As expected, Ri reports a worsening of mood and anxiety.  They expect symptoms to resolves once they have properly grieved.  Ri does not want to adjust medications    2/6/19: Ri is currently experiencing a cold which has significant impacted her energy.  Has not been to work in 3 days.  They reports their mood and anxiety are currently well managed.  Increase in Effexor appears to be helpful as Ri describes herself as \"more chill.\"  Ri continues to work with therapist with distress tolerance and acceptance.  They reports most anxiety is work related.  Cymbalta for possible additional mood and pain management was discussed.  Explained interactions with Effexor and patient was agreeable to not start today.       1/9/19: Ri reports increased anxiety and frustration.  They attributes to receiving a substantial medical bill recently.  Financial stress is main concern overall.  Mood reported as ok.  Lorazepam has been used more frequently (up to 3 times per week).  Requests increase Effexor XR 150mg.  " Gabapentin helpful with body pain does not associate with anxiety relief.      11/27/18: Ri was able to discontinue Lexapro successfully and started Effexor XR 75mg.  Since starting Effexor, Ri reports depressive symptoms continues but less intense and less frequent.  Effexor XR started approximately 2 weeks ago.  Since increasing nighttime dose of Gabapentin, Ri reports sleep has improved.  Also Trazodone decreased to 25mg due to morning sedation.  Continues to experience some sedation in morning but positives outweigh negative.  Guanfacine tried recently to manage anxiety, panic, and irritability; but stopped due to lowered blood pressure.       Recent Symptoms:   Depression:  depressed mood, anhedonia, low energy, insomnia, appetite changes and poor concentration /memory  Elevated:  none  Psychosis:  none  Anxiety:  nervous/overwhelmed  Panic Attack:  none  Trauma Related:  none     Recent Substance Use:  No changes reported        Social/ Family History                                  [per patient report]                                 1ea,1ea   FINANCIAL SUPPORT- working       FEELS SAFE AT HOME- Yes    Medical / Surgical History                                                                                                                  Patient Active Problem List   Diagnosis     Fibromyalgia     TMJ arthralgia     Moderate persistent asthma without complication     ADHD     Autism spectrum disorder     Generalized anxiety disorder     IBS (irritable bowel syndrome)     OCD (obsessive compulsive disorder)     Seasonal allergic rhinitis due to pollen     Allergic rhinitis due to dust     Allergy to adhesive tape     Need for desensitization to allergens     Oral allergy syndrome     Plantar fasciitis       Past Surgical History:   Procedure Laterality Date     ABDOMEN SURGERY  10/2016    Endometriosis excision     CYSTOSCOPY N/A 10/12/2016    Procedure: CYSTOSCOPY;  Surgeon: Eliazar Patel MD;   Location:  OR     DAVINCI ASSISTED ABLATION / EXCISION OF ENDOMETRIOSIS N/A 10/12/2016    Procedure: DAVINCI ASSISTED ABLATION / EXCISION OF ENDOMETRIOSIS;  Surgeon: Eliazar Patel MD;  Location:  OR     DAVINCI LYSIS OF ADHESIONS N/A 10/12/2016    Procedure: DAVINCI LYSIS OF ADHESIONS;  Surgeon: Eliazar Patel MD;  Location:  OR     DAVINCI PELVIC PROCEDURE N/A 10/12/2016    Procedure: DAVINCI PELVIC PROCEDURE;  Surgeon: Eliazar Patel MD;  Location:  OR     DILATION AND CURETTAGE, HYSTEROSCOPY DIAGNOSTIC, COMBINED N/A 10/12/2016    Procedure: COMBINED DILATION AND CURETTAGE, HYSTEROSCOPY DIAGNOSTIC;  Surgeon: Eliazar Patel MD;  Location:  OR     EYE SURGERY      Eye Surgery x 2 as an infant     HC TOOTH EXTRACTION W/FORCEP          Medical Review of Systems                                                                                                    2,10   The remainder of the review of systems is noncontributory  Dizziness is a regular issue, has episodes maybe twice per day. Constipation is frequently an issue. Denies N/V, headaches. Had migraines in the past, but hasn't had one in a while. Thinks diet helped with this.   Allergy                                Latex; Liquid adhesive; Seasonal allergies; Morphine; and Wound dressing adhesive  Current Medications                                                                                                       Current Outpatient Medications   Medication Sig Dispense Refill     benzonatate (TESSALON) 100 MG capsule Take 100-200 mg by mouth as needed        EPINEPHrine (EPIPEN/ADRENACLICK/OR ANY BX GENERIC EQUIV) 0.3 MG/0.3ML injection 2-pack Inject 0.3 mg into the muscle once       fexofenadine (ALLEGRA) 180 MG tablet Take 180 mg by mouth daily       gabapentin (NEURONTIN) 300 MG capsule Take 1 capsule (300mg) two times daily, take 2 capsules (600mg) at bedtime 120 capsule 3     hydrOXYzine (ATARAX) 25 MG  tablet Take 1-2 tablets (25-50 mg) by mouth 2 times daily as needed for itching or anxiety 120 tablet 0     levalbuterol (XOPENEX HFA) 45 MCG/ACT Inhaler Inhale 1-2 puffs into the lungs every 4 hours as needed       LORazepam (ATIVAN) 0.5 MG tablet Take 0.5 mg by mouth daily as needed       montelukast (SINGULAIR) 10 MG tablet Take 10 mg by mouth At Bedtime       multivitamin, therapeutic with minerals (MULTI-VITAMIN) TABS tablet Take 1 tablet by mouth daily       naproxen (NAPROSYN) 500 MG tablet Take 1 tablet (500 mg) by mouth 2 times daily as needed for moderate pain or headaches (migraines and fibromyalgia) 60 tablet 1     norethindrone (MICRONOR) 0.35 MG tablet Take 0.35 mg by mouth daily  4     ondansetron (ZOFRAN-ODT) 4 MG ODT tab Take 1 tablet (4 mg) by mouth every 6 hours as needed for nausea 30 tablet 1     order for DME Equipment being ordered: TENS  All necessary equipment: leads/pads  Duration of use: 36 months  Apply to painful areas 1 Device 0     tiZANidine (ZANAFLEX) 2 MG tablet Take 1 tablet (2 mg) by mouth 3 times daily as needed for muscle spasms 90 tablet 0     traZODone (DESYREL) 50 MG tablet Take 0.5-1 tablets (25-50 mg) by mouth nightly as needed for sleep 30 tablet 1     venlafaxine (EFFEXOR-XR) 150 MG 24 hr capsule Take 1 capsule (150 mg) by mouth daily 90 capsule 1     fluticasone (FLOVENT HFA) 110 MCG/ACT Inhaler Inhale 2 puffs into the lungs 2 times daily       Vitals                                                                                                                       3, 3   /79   Pulse 101   Wt 97.6 kg (215 lb 3.2 oz)   BMI 36.52 kg/m     Mental Status Exam                                                                                    9, 14 cog gs     Alertness: alert  and oriented  Appearance: casually groomed  Behavior/Demeanor: cooperative and pleasant, with good  eye contact   Speech: regular rate and rhythm  Language: no obvious problem  Psychomotor:  "normal or unremarkable  Mood: \"ok\"  Affect: appropriate; was congruent to mood; was congruent to content  Thought Process/Associations: unremarkable  Thought Content:  Reports none;  Denies suicidal ideation and violent ideation  Perception:  Reports none;  Denies auditory hallucinations and visual hallucinations  Insight: good  Judgment: good  Cognition: (6) does  appear grossly intact; formal cognitive testing was not done  Gait/Station and/or Muscle Strength/Tone: unremarkable    Labs and Data                                                                                                                 Rating Scales:    PHQ9 and INDRA-7  INDRA-7: 13  PHQ9 Today: 13  PHQ-9 SCORE 12/14/2018 1/9/2019 1/25/2019   PHQ-9 Total Score MyChart - - 14 (Moderate depression)   PHQ-9 Total Score 11 6 14         Diagnosis and Assessment                                                                             m2, h3     Today the following issues were addressed:    1) Major Depressive Disorder, recurrent, moderate  2) Generalized Anxiety Disorder  3) ADHD (Hx)    [place] Prescription Monitoring Program [] review was not needed today.    PSYCHOTROPIC DRUG INTERACTIONS:   NAPROXEN -- ESCITALOPRAM -- VENLAFAXINE may result in an increased risk of bleeding.     ESCITALOPRAM -- VENLAFAXINE -- HYDROXYZINE -- TRAZODONE -- TIZANIDINE may result in increased risk of QT interval prolongation.     ESCITALOPRAM -- VENLAFAXINE -- TRAZODONE may result in an increased risk of serotonin syndrome (hypertension, hyperthermia, myoclonus, mental status changes).     LORAZEPAM -- ESTRADIOL  may result in decreased lorazepam effectiveness.      MANAGEMENT:  Monitoring for adverse effects and routine vitals    Plan                                                                                                                    m2, h3      1) Medication Management   Continue Effexor XR 150mg daily  Continue Trazodone 25-50mg at bedtime PRN  Continue " Hyrdoxyzine 25-50mg for anxiety  Continue Gabapentin 300 BID and 600mg at bedtime   Continue Lorazepam 0.5mg daily PRN (takes rarely) uses for headaches and anxiety    2) Therapy  Continue with current therapist    RTC: 2 months    CRISIS NUMBERS:   Provided routinely in AVS.    Treatment Risk Statement:  The patient understands the risks, benefits, adverse effects and alternatives. Agrees to treatment with the capacity to do so. No medical contraindications to treatment. Agrees to call clinic for any problems. The patient understands to call 911 or go to the nearest ED if life threatening or urgent symptoms occur.       PROVIDER:  TORSTEN Hogan CNP

## 2019-06-10 ASSESSMENT — PATIENT HEALTH QUESTIONNAIRE - PHQ9: SUM OF ALL RESPONSES TO PHQ QUESTIONS 1-9: 13

## 2019-06-11 ENCOUNTER — TRANSFERRED RECORDS (OUTPATIENT)
Dept: HEALTH INFORMATION MANAGEMENT | Facility: CLINIC | Age: 34
End: 2019-06-11

## 2019-06-11 LAB — TSH SERPL-ACNC: 2.5 UIU/ML (ref 0.36–3.74)

## 2019-06-11 ASSESSMENT — ANXIETY QUESTIONNAIRES: GAD7 TOTAL SCORE: 13

## 2019-07-12 ENCOUNTER — TELEPHONE (OUTPATIENT)
Dept: PSYCHIATRY | Facility: CLINIC | Age: 34
End: 2019-07-12

## 2019-07-12 DIAGNOSIS — M79.7 FIBROMYALGIA: ICD-10-CM

## 2019-07-12 DIAGNOSIS — M62.838 MUSCLE SPASM: ICD-10-CM

## 2019-07-12 NOTE — TELEPHONE ENCOUNTER
Writer received 4 pages from [employer] for Disability paperwork. Writer placed the originals in TORSTEN Wood CNP's folder and a copy was held at writers desk until completed forms returned.

## 2019-07-13 NOTE — TELEPHONE ENCOUNTER
tiZANidine (ZANAFLEX) 2 MG tablet      Last Written Prescription Date:  6/5/19  Last Fill Quantity: 90,   # refills: 1  Last Office Visit : 6/5/19  Future Office visit:  none    Routing refill request to provider for review/approval because:  Medication not on the [facility] refill protocol.

## 2019-07-15 RX ORDER — TIZANIDINE 2 MG/1
2 TABLET ORAL 3 TIMES DAILY PRN
Qty: 90 TABLET | Refills: 0 | Status: CANCELLED | OUTPATIENT
Start: 2019-07-15

## 2019-07-15 NOTE — TELEPHONE ENCOUNTER
Per note 6/05/19, patient takes tizanidine approximately once weekly. PCP advised patient to use sparingly. Too soon for refill at this time based on previous notes.    I spoke to Ri and she stated that she does not need a refill at this time. She reported she did not request a refill, stating she believes the pharmacy had the request on an auto-refill program.    Marisa Thompson RN

## 2019-07-22 ENCOUNTER — TELEPHONE (OUTPATIENT)
Dept: PSYCHIATRY | Facility: CLINIC | Age: 34
End: 2019-07-22

## 2019-07-22 NOTE — TELEPHONE ENCOUNTER
Writer is uncertain if only forms need to be completed or a letter as well. Called pt. No answer. LVM informing her that her message will be routed to the provider. Requested a c/b to confirm what exactly needs to be completed.

## 2019-07-22 NOTE — TELEPHONE ENCOUNTER
[facility] Call Center    Phone Message    May a detailed message be left on voicemail: yes    Reason for Call: Form or Letter   Type or form/letter needing completion: Pt was waiting for disability documents, and was hoping to get an update on it. In the meantime, the pt still has to work, but she needs a letter stating she can come back to work.   Provider: Rogelio Green  Date form needed: ASAP. Pt is at the office waiting on the letter.   Once completed: Fax form to: [phone]. Please use coverhead for discretion.       Action Taken: Other: [facility] Psychiatry Pool

## 2019-07-22 NOTE — LETTER
2019      RE: Paris Nguyen  [address] [place] [address]  : 1985         To Whom it May Concern,     Paris Nguyen has been under my care at the [facility]. She has been compliant with my treatment recommendations and is stable on her current medication regimen. As aPris's psychiatric provider, I believe she can return to work on 19.      If you have any additional questions regarding this letter, please call the clinic at the number listed above.        Sincerely,        TORSTEN Wood CNP

## 2019-07-23 NOTE — TELEPHONE ENCOUNTER
Received signed letter from the provider. This was faxed to the requested number of [phone]. Letter held at writer's desk.

## 2019-07-25 NOTE — TELEPHONE ENCOUNTER
Patient contacted this writer stating that the documentation from [employer] needs to actually be in at the end of this week. Patient also stated if the 4 pages of documentation were not filled out she would be missing three weeks of pay due to the circumstances.

## 2019-07-29 ENCOUNTER — TELEPHONE (OUTPATIENT)
Dept: PSYCHIATRY | Facility: CLINIC | Age: 34
End: 2019-07-29

## 2019-08-01 ENCOUNTER — OFFICE VISIT (OUTPATIENT)
Dept: PSYCHIATRY | Facility: CLINIC | Age: 34
End: 2019-08-01
Attending: NURSE PRACTITIONER
Payer: COMMERCIAL

## 2019-08-01 VITALS
DIASTOLIC BLOOD PRESSURE: 89 MMHG | WEIGHT: 215.2 LBS | BODY MASS INDEX: 36.52 KG/M2 | HEART RATE: 93 BPM | SYSTOLIC BLOOD PRESSURE: 144 MMHG

## 2019-08-01 DIAGNOSIS — F33.1 MODERATE EPISODE OF RECURRENT MAJOR DEPRESSIVE DISORDER (H): Primary | ICD-10-CM

## 2019-08-01 PROCEDURE — G0463 HOSPITAL OUTPT CLINIC VISIT: HCPCS | Mod: ZF

## 2019-08-01 ASSESSMENT — PAIN SCALES - GENERAL: PAINLEVEL: MODERATE PAIN (4)

## 2019-08-01 NOTE — PROGRESS NOTES
"  Psychiatry Clinic Progress Note                                                                   Paris Nguyen is a 34 year old female who prefers the name Ri (\"Ree\") and pronoun they.   Therapist: Marisol (replacement therapist) @ [facility].    PCP: Radha Collins  Other Providers: ALEXANDRA Otero @ [facility]    Pertinent Background:  See previous notes.  Psych critical item history includes mutiple psychotropic trials.     Interim History                                                                                                        4, 4     The patient is a good historian, reports good treatment adherence and was last seen 6/7/19.  Since the last visit, Ri reports she found her aunt had a heart attack and passed away yesterday.  Ri also lost her father and 2 friends since April.  Continues to see therapist regularly but previous therapist finished training. Ri is seeing interim therapist.  Plans to restart DBT in near future.  Ri reports that she is  More agitated and \"zoning out\" more often which she attributes to feeling overwhelmed with grief.  Reports that physical activity has been helpful in managing.  Reports that anxiety was improving prior to her aunt's death.  Since then, she reports using Lorazepam 3 times per week.  Have discussed alternative for benzo, such as propranolol, but due to circumstances and additional psychosocial stressors will continue Lorazepam.       Ri reports their father passed away in April. They appear to be coping with loss appropriately.  They states their life after her father's passing will be \"simpler but not better.\"  They and their family are in process of redefining her familial role.  As expected, Ri reports a worsening of mood and anxiety.  They expect symptoms to resolves once they have properly grieved.  Ri does not want to adjust medications    2/6/19: Ri is currently experiencing a cold which has significant impacted her energy.  Has " "not been to work in 3 days.  They reports their mood and anxiety are currently well managed.  Increase in Effexor appears to be helpful as Ri describes herself as \"more chill.\"  Ri continues to work with therapist with distress tolerance and acceptance.  They reports most anxiety is work related.  Cymbalta for possible additional mood and pain management was discussed.  Explained interactions with Effexor and patient was agreeable to not start today.       1/9/19: Ri reports increased anxiety and frustration.  They attributes to receiving a substantial medical bill recently.  Financial stress is main concern overall.  Mood reported as ok.  Lorazepam has been used more frequently (up to 3 times per week).  Requests increase Effexor XR 150mg.  Gabapentin helpful with body pain does not associate with anxiety relief.      11/27/18: Ri was able to discontinue Lexapro successfully and started Effexor XR 75mg.  Since starting Effexor, Ri reports depressive symptoms continues but less intense and less frequent.  Effexor XR started approximately 2 weeks ago.  Since increasing nighttime dose of Gabapentin, Ri reports sleep has improved.  Also Trazodone decreased to 25mg due to morning sedation.  Continues to experience some sedation in morning but positives outweigh negative.  Guanfacine tried recently to manage anxiety, panic, and irritability; but stopped due to lowered blood pressure.       Recent Symptoms:   Depression:  depressed mood, anhedonia, low energy, insomnia, appetite changes and poor concentration /memory  Elevated:  none  Psychosis:  none  Anxiety:  nervous/overwhelmed and periodic worry  Panic Attack:  none  Trauma Related:  none     Recent Substance Use:  No changes reported        Social/ Family History                                  [per patient report]                                 1ea,1ea   FINANCIAL SUPPORT- working       FEELS SAFE AT HOME- Yes    Medical / Surgical History                         "                                                                                          Patient Active Problem List   Diagnosis     Fibromyalgia     TMJ arthralgia     Moderate persistent asthma without complication     ADHD     Autism spectrum disorder     Generalized anxiety disorder     IBS (irritable bowel syndrome)     OCD (obsessive compulsive disorder)     Seasonal allergic rhinitis due to pollen     Allergic rhinitis due to dust     Allergy to adhesive tape     Need for desensitization to allergens     Oral allergy syndrome     Plantar fasciitis       Past Surgical History:   Procedure Laterality Date     ABDOMEN SURGERY  10/2016    Endometriosis excision     CYSTOSCOPY N/A 10/12/2016    Procedure: CYSTOSCOPY;  Surgeon: Eliazar Patel MD;  Location:  OR     DAVINCI ASSISTED ABLATION / EXCISION OF ENDOMETRIOSIS N/A 10/12/2016    Procedure: DAVINCI ASSISTED ABLATION / EXCISION OF ENDOMETRIOSIS;  Surgeon: Eliazar Patel MD;  Location:  OR     DAVINCI LYSIS OF ADHESIONS N/A 10/12/2016    Procedure: DAVINCI LYSIS OF ADHESIONS;  Surgeon: Eliazar Patel MD;  Location:  OR     DAVINCI PELVIC PROCEDURE N/A 10/12/2016    Procedure: DAVINCI PELVIC PROCEDURE;  Surgeon: Eliazar Patel MD;  Location:  OR     DILATION AND CURETTAGE, HYSTEROSCOPY DIAGNOSTIC, COMBINED N/A 10/12/2016    Procedure: COMBINED DILATION AND CURETTAGE, HYSTEROSCOPY DIAGNOSTIC;  Surgeon: Eliazar Patel MD;  Location:  OR     EYE SURGERY      Eye Surgery x 2 as an infant     HC TOOTH EXTRACTION W/FORCEP          Medical Review of Systems                                                                                                    2,10   The remainder of the review of systems is noncontributory  Dizziness is a regular issue, has episodes maybe twice per day. Constipation is frequently an issue. Denies N/V, headaches. Had migraines in the past, but hasn't had one in a while. Thinks diet  helped with this.   Allergy                                Latex; Liquid adhesive; Seasonal allergies; Morphine; and Wound dressing adhesive  Current Medications                                                                                                       Current Outpatient Medications   Medication Sig Dispense Refill     benzonatate (TESSALON) 100 MG capsule Take 100-200 mg by mouth as needed        EPINEPHrine (EPIPEN/ADRENACLICK/OR ANY BX GENERIC EQUIV) 0.3 MG/0.3ML injection 2-pack Inject 0.3 mg into the muscle once       fexofenadine (ALLEGRA) 180 MG tablet Take 180 mg by mouth daily       gabapentin (NEURONTIN) 300 MG capsule Take 1 capsule (300mg) two times daily, take 2 capsules (600mg) at bedtime 360 capsule 0     hydrOXYzine (ATARAX) 25 MG tablet Take 1-2 tablets (25-50 mg) by mouth 2 times daily as needed for itching or anxiety 270 tablet 0     levalbuterol (XOPENEX HFA) 45 MCG/ACT Inhaler Inhale 1-2 puffs into the lungs every 4 hours as needed       LORazepam (ATIVAN) 0.5 MG tablet Take 1 tablet (0.5 mg) by mouth daily as needed for anxiety 30 tablet 0     LORazepam (ATIVAN) 0.5 MG tablet Take 0.5 mg by mouth daily as needed       montelukast (SINGULAIR) 10 MG tablet Take 10 mg by mouth At Bedtime       multivitamin, therapeutic with minerals (MULTI-VITAMIN) TABS tablet Take 1 tablet by mouth daily       naproxen (NAPROSYN) 500 MG tablet Take 1 tablet (500 mg) by mouth 2 times daily as needed for moderate pain or headaches (migraines and fibromyalgia) 60 tablet 1     norethindrone (MICRONOR) 0.35 MG tablet Take 0.35 mg by mouth daily  4     ondansetron (ZOFRAN-ODT) 4 MG ODT tab Take 1 tablet (4 mg) by mouth every 6 hours as needed for nausea 30 tablet 1     tiZANidine (ZANAFLEX) 2 MG tablet Take 1 tablet (2 mg) by mouth 3 times daily as needed for muscle spasms 90 tablet 0     traZODone (DESYREL) 50 MG tablet Take 0.5-1 tablets (25-50 mg) by mouth nightly as needed for sleep 45 tablet 0      venlafaxine (EFFEXOR-XR) 150 MG 24 hr capsule Take 1 capsule (150 mg) by mouth daily 90 capsule 0     fluticasone (FLOVENT HFA) 110 MCG/ACT Inhaler Inhale 2 puffs into the lungs 2 times daily       order for DME Equipment being ordered: TENS  All necessary equipment: leads/pads  Duration of use: 36 months  Apply to painful areas (Patient not taking: Reported on 8/1/2019) 1 Device 0     Vitals                                                                                                                       3, 3   BP (!) 144/89   Pulse 93   Wt 97.6 kg (215 lb 3.2 oz)   BMI 36.52 kg/m     Mental Status Exam                                                                                    9, 14 cog gs     Alertness: alert  and oriented  Appearance: casually groomed  Behavior/Demeanor: cooperative and pleasant, with good  eye contact   Speech: regular rate and rhythm  Language: intact  Psychomotor: normal or unremarkable  Mood: depressed and anxious  Affect: appropriate; was congruent to mood; was congruent to content  Thought Process/Associations: unremarkable  Thought Content:  Reports none;  Denies suicidal ideation and violent ideation  Perception:  Reports none;  Denies auditory hallucinations and visual hallucinations  Insight: good  Judgment: good  Cognition: (6) does  appear grossly intact; formal cognitive testing was not done  Gait/Station and/or Muscle Strength/Tone: unremarkable    Labs and Data                                                                                                                 Rating Scales:    PHQ9 and INDRA-7  INDRA-7: 13  PHQ9 Today: 15  PHQ-9 SCORE 1/9/2019 1/25/2019 6/7/2019   PHQ-9 Total Score MyChart - 14 (Moderate depression) -   PHQ-9 Total Score 6 14 13         Diagnosis and Assessment                                                                             m2, h3     Today the following issues were addressed:    1) Major Depressive Disorder, recurrent,  moderate  2) Generalized Anxiety Disorder  3) ADHD (Hx)    [place] Prescription Monitoring Program [] review was not needed today.    PSYCHOTROPIC DRUG INTERACTIONS:   NAPROXEN -- ESCITALOPRAM -- VENLAFAXINE may result in an increased risk of bleeding.     ESCITALOPRAM -- VENLAFAXINE -- HYDROXYZINE -- TRAZODONE -- TIZANIDINE may result in increased risk of QT interval prolongation.     ESCITALOPRAM -- VENLAFAXINE -- TRAZODONE may result in an increased risk of serotonin syndrome (hypertension, hyperthermia, myoclonus, mental status changes).     LORAZEPAM -- ESTRADIOL  may result in decreased lorazepam effectiveness.      MANAGEMENT:  Monitoring for adverse effects and routine vitals    Plan                                                                                                                    m2, h3      1) Medication Management   Continue Effexor XR 150mg daily  Continue Trazodone 25-50mg at bedtime PRN  Continue Hyrdoxyzine 25-50mg for anxiety  Continue Gabapentin 300 BID and 600mg at bedtime   Continue Lorazepam 0.5mg daily PRN (takes rarely) uses for headaches and anxiety.  Plan to discontinue and use hydroxyzine as primary acute anxiety treatment    2) Therapy  Continue with current therapist    RTC: 2 months    CRISIS NUMBERS:   Provided routinely in AVS.    Treatment Risk Statement:  The patient understands the risks, benefits, adverse effects and alternatives. Agrees to treatment with the capacity to do so. No medical contraindications to treatment. Agrees to call clinic for any problems. The patient understands to call 911 or go to the nearest ED if life threatening or urgent symptoms occur.       PROVIDER:  TORSTEN Hogan CNP

## 2019-08-01 NOTE — TELEPHONE ENCOUNTER
8/1/2019, writer spoke to Paris regarding STD forms where pt was able to answer a few questions prior to her appointment with Rogelio Green at 1345 today. Additionally, writer had pt sign a JANIE to release records if needed and to fax forms to [place]. Writer received completed forms from Rogelio Green and faxed the forms, current medication list, pt supplied job description, and JANIE to [employer] at [phone] attn Norberto. Writer called Norberto at [phone] to notify him that 11 pages were faxed including cover sheet. If he needed more information, writer gave direct phone number. Writer called pt to notify her that forms were faxed and that writer spoke to Norberto. The original copies were sent to scanning and copies are held in Psych until scanning is complete.Chaya Lorenzana LPN

## 2019-08-15 DIAGNOSIS — G47.00 INSOMNIA, UNSPECIFIED TYPE: ICD-10-CM

## 2019-08-15 DIAGNOSIS — F41.9 ANXIETY: ICD-10-CM

## 2019-08-15 DIAGNOSIS — F41.1 GENERALIZED ANXIETY DISORDER: ICD-10-CM

## 2019-08-19 RX ORDER — GABAPENTIN 300 MG/1
CAPSULE ORAL
Qty: 120 CAPSULE | Refills: 0 | Status: SHIPPED | OUTPATIENT
Start: 2019-08-19 | End: 2019-09-10

## 2019-08-19 RX ORDER — HYDROXYZINE HYDROCHLORIDE 25 MG/1
TABLET, FILM COATED ORAL
Qty: 120 TABLET | Refills: 0 | Status: SHIPPED | OUTPATIENT
Start: 2019-08-19 | End: 2020-03-26

## 2019-08-19 NOTE — TELEPHONE ENCOUNTER
Medication requested:  hydrOXYzine (ATARAX) 25 MG   Last refilled: 6/7/19  Qty: 270  Continue Hyrdoxyzine 25-50mg for anxiety    Last seen: 6/7/19  RTC:  2 MOS  Cancel: 0  No-show: 0  Next appt: 9/10/19  Refill decision:  Refilled for 30 days per protocol   Will send FYI to provider as is outside RTC timeframe

## 2019-09-03 ASSESSMENT — PATIENT HEALTH QUESTIONNAIRE - PHQ9: SUM OF ALL RESPONSES TO PHQ QUESTIONS 1-9: 13

## 2019-09-10 ENCOUNTER — OFFICE VISIT (OUTPATIENT)
Dept: PSYCHIATRY | Facility: CLINIC | Age: 34
End: 2019-09-10
Attending: NURSE PRACTITIONER
Payer: COMMERCIAL

## 2019-09-10 VITALS
HEART RATE: 98 BPM | DIASTOLIC BLOOD PRESSURE: 83 MMHG | WEIGHT: 214 LBS | BODY MASS INDEX: 36.31 KG/M2 | SYSTOLIC BLOOD PRESSURE: 144 MMHG

## 2019-09-10 DIAGNOSIS — F33.1 MODERATE EPISODE OF RECURRENT MAJOR DEPRESSIVE DISORDER (H): ICD-10-CM

## 2019-09-10 DIAGNOSIS — F41.1 GENERALIZED ANXIETY DISORDER: ICD-10-CM

## 2019-09-10 DIAGNOSIS — F41.9 ANXIETY: ICD-10-CM

## 2019-09-10 DIAGNOSIS — G47.00 INSOMNIA, UNSPECIFIED TYPE: ICD-10-CM

## 2019-09-10 PROCEDURE — G0463 HOSPITAL OUTPT CLINIC VISIT: HCPCS | Mod: ZF

## 2019-09-10 RX ORDER — HYDROXYZINE HYDROCHLORIDE 25 MG/1
TABLET, FILM COATED ORAL
Qty: 120 TABLET | Refills: 0 | Status: CANCELLED | OUTPATIENT
Start: 2019-09-10

## 2019-09-10 RX ORDER — GABAPENTIN 300 MG/1
CAPSULE ORAL
Qty: 360 CAPSULE | Refills: 1 | Status: SHIPPED | OUTPATIENT
Start: 2019-09-10 | End: 2020-03-26

## 2019-09-10 RX ORDER — LORAZEPAM 0.5 MG/1
0.5 TABLET ORAL DAILY PRN
Qty: 30 TABLET | Refills: 0 | Status: CANCELLED | OUTPATIENT
Start: 2019-09-10

## 2019-09-10 RX ORDER — VENLAFAXINE HYDROCHLORIDE 150 MG/1
150 CAPSULE, EXTENDED RELEASE ORAL DAILY
Qty: 90 CAPSULE | Refills: 1 | Status: SHIPPED | OUTPATIENT
Start: 2019-09-10 | End: 2020-03-26

## 2019-09-10 RX ORDER — TRAZODONE HYDROCHLORIDE 50 MG/1
25-50 TABLET, FILM COATED ORAL
Qty: 45 TABLET | Refills: 1 | Status: SHIPPED | OUTPATIENT
Start: 2019-09-10 | End: 2020-02-18

## 2019-09-10 ASSESSMENT — PAIN SCALES - GENERAL: PAINLEVEL: SEVERE PAIN (7)

## 2019-09-10 ASSESSMENT — PATIENT HEALTH QUESTIONNAIRE - PHQ9
5. POOR APPETITE OR OVEREATING: NEARLY EVERY DAY
SUM OF ALL RESPONSES TO PHQ QUESTIONS 1-9: 13

## 2019-09-10 ASSESSMENT — ANXIETY QUESTIONNAIRES
3. WORRYING TOO MUCH ABOUT DIFFERENT THINGS: MORE THAN HALF THE DAYS
IF YOU CHECKED OFF ANY PROBLEMS ON THIS QUESTIONNAIRE, HOW DIFFICULT HAVE THESE PROBLEMS MADE IT FOR YOU TO DO YOUR WORK, TAKE CARE OF THINGS AT HOME, OR GET ALONG WITH OTHER PEOPLE: SOMEWHAT DIFFICULT
6. BECOMING EASILY ANNOYED OR IRRITABLE: MORE THAN HALF THE DAYS
7. FEELING AFRAID AS IF SOMETHING AWFUL MIGHT HAPPEN: MORE THAN HALF THE DAYS
5. BEING SO RESTLESS THAT IT IS HARD TO SIT STILL: SEVERAL DAYS
2. NOT BEING ABLE TO STOP OR CONTROL WORRYING: MORE THAN HALF THE DAYS
1. FEELING NERVOUS, ANXIOUS, OR ON EDGE: SEVERAL DAYS
GAD7 TOTAL SCORE: 13

## 2019-09-10 NOTE — PROGRESS NOTES
"  Psychiatry Clinic Progress Note                                                                   Paris Nguyen is a 34 year old female who prefers the name Ri (\"Ree\") and pronoun they.   Therapist: Marisol (replacement therapist) @ [facility].    PCP: Radha Collins  Other Providers: ALEXANDRA Otero @ [facility]    Pertinent Background:  See previous notes.  Psych critical item history includes mutiple psychotropic trials.     Interim History                                                                                                        4, 4     The patient is a good historian, reports good treatment adherence and was last seen 8/1/19.  Since the last visit, Ri reports feeling \"dysregulated right now.\"  Myalgia flare-up, and migraine today has worsened irritability.  They is going to gym weekly (pilates) which helps manage dysregulation.  Describes mood as \"angry.\"  Has returned to work which they describes as \"toxic.\" Sleep is \"ok.\"  They do not want to adjust medications today as believes symptoms are situational.  Lorazepam used extremely sparingly.  Using hydroxyzine and non-pharmacological interventions instead.      8/1/19: Ri reports she found her aunt had a heart attack and passed away yesterday.  Ri also lost her father and 2 friends since April.  Continues to see therapist regularly but previous therapist finished training. Ri is seeing interim therapist.  Plans to restart DBT in near future.  Ri reports that she is  More agitated and \"zoning out\" more often which she attributes to feeling overwhelmed with grief.  Reports that physical activity has been helpful in managing.  Reports that anxiety was improving prior to her aunt's death.  Since then, she reports using Lorazepam 3 times per week.  Have discussed alternative for benzo, such as propranolol, but due to circumstances and additional psychosocial stressors will continue Lorazepam.       6/7/19: Ri reports their " "father passed away in April. They appear to be coping with loss appropriately.  They states their life after her father's passing will be \"simpler but not better.\"  They and their family are in process of redefining her familial role.  As expected, Ri reports a worsening of mood and anxiety.  They expect symptoms to resolves once they have properly grieved.  Ri does not want to adjust medications    2/6/19: Ri is currently experiencing a cold which has significant impacted her energy.  Has not been to work in 3 days.  They reports their mood and anxiety are currently well managed.  Increase in Effexor appears to be helpful as Ri describes herself as \"more chill.\"  Ri continues to work with therapist with distress tolerance and acceptance.  They reports most anxiety is work related.  Cymbalta for possible additional mood and pain management was discussed.  Explained interactions with Effexor and patient was agreeable to not start today.       1/9/19: Ri reports increased anxiety and frustration.  They attributes to receiving a substantial medical bill recently.  Financial stress is main concern overall.  Mood reported as ok.  Lorazepam has been used more frequently (up to 3 times per week).  Requests increase Effexor XR 150mg.  Gabapentin helpful with body pain does not associate with anxiety relief.      11/27/18: Ri was able to discontinue Lexapro successfully and started Effexor XR 75mg.  Since starting Effexor, Ri reports depressive symptoms continues but less intense and less frequent.  Effexor XR started approximately 2 weeks ago.  Since increasing nighttime dose of Gabapentin, Ri reports sleep has improved.  Also Trazodone decreased to 25mg due to morning sedation.  Continues to experience some sedation in morning but positives outweigh negative.  Guanfacine tried recently to manage anxiety, panic, and irritability; but stopped due to lowered blood pressure.       Recent Symptoms:   Depression:  depressed " mood, anhedonia, low energy, appetite changes and poor concentration /memory  Elevated:  none  Psychosis:  none  Anxiety:  periodic worry  Panic Attack:  none  Trauma Related:  none     Recent Substance Use:  No changes reported        Social/ Family History                                  [per patient report]                                 1ea,1ea   FINANCIAL SUPPORT- working       FEELS SAFE AT HOME- Yes    Medical / Surgical History                                                                                                                  Patient Active Problem List   Diagnosis     Fibromyalgia     TMJ arthralgia     Moderate persistent asthma without complication     ADHD     Autism spectrum disorder     Generalized anxiety disorder     IBS (irritable bowel syndrome)     OCD (obsessive compulsive disorder)     Seasonal allergic rhinitis due to pollen     Allergic rhinitis due to dust     Allergy to adhesive tape     Need for desensitization to allergens     Oral allergy syndrome     Plantar fasciitis       Past Surgical History:   Procedure Laterality Date     ABDOMEN SURGERY  10/2016    Endometriosis excision     CYSTOSCOPY N/A 10/12/2016    Procedure: CYSTOSCOPY;  Surgeon: Eliazar Patel MD;  Location:  OR     DAVINCI ASSISTED ABLATION / EXCISION OF ENDOMETRIOSIS N/A 10/12/2016    Procedure: DAVINCI ASSISTED ABLATION / EXCISION OF ENDOMETRIOSIS;  Surgeon: Eliazar Patel MD;  Location:  OR     DAVINCI LYSIS OF ADHESIONS N/A 10/12/2016    Procedure: DAVINCI LYSIS OF ADHESIONS;  Surgeon: Eliazar Patel MD;  Location:  OR     DAVINCI PELVIC PROCEDURE N/A 10/12/2016    Procedure: DAVINCI PELVIC PROCEDURE;  Surgeon: Eliazar Patel MD;  Location:  OR     DILATION AND CURETTAGE, HYSTEROSCOPY DIAGNOSTIC, COMBINED N/A 10/12/2016    Procedure: COMBINED DILATION AND CURETTAGE, HYSTEROSCOPY DIAGNOSTIC;  Surgeon: Eliazar Patel MD;  Location:  OR     EYE SURGERY       Eye Surgery x 2 as an infant     HC TOOTH EXTRACTION W/FORCEP          Medical Review of Systems                                                                                                    2,10   The remainder of the review of systems is noncontributory  Dizziness is a regular issue, has episodes maybe twice per day. Constipation is frequently an issue. Denies N/V, headaches. Had migraines in the past, but hasn't had one in a while. Thinks diet helped with this.   Allergy                                Latex; Liquid adhesive; Seasonal allergies; Morphine; and Wound dressing adhesive  Current Medications                                                                                                       Current Outpatient Medications   Medication Sig Dispense Refill     benzonatate (TESSALON) 100 MG capsule Take 100-200 mg by mouth as needed        EPINEPHrine (EPIPEN/ADRENACLICK/OR ANY BX GENERIC EQUIV) 0.3 MG/0.3ML injection 2-pack Inject 0.3 mg into the muscle once       fexofenadine (ALLEGRA) 180 MG tablet Take 180 mg by mouth daily       gabapentin (NEURONTIN) 300 MG capsule TAKE 1 CAPSULE TWICE A DAY, AND 2 CAPSULES AT BEDTIME 120 capsule 0     hydrOXYzine (ATARAX) 25 MG tablet TAKE 1 TO 2 TABLETS TWICE A DAY AS NEEDED FOR ITCHING OR ANXIETY 120 tablet 0     levalbuterol (XOPENEX HFA) 45 MCG/ACT Inhaler Inhale 1-2 puffs into the lungs every 4 hours as needed       LORazepam (ATIVAN) 0.5 MG tablet Take 1 tablet (0.5 mg) by mouth daily as needed for anxiety 30 tablet 0     LORazepam (ATIVAN) 0.5 MG tablet Take 0.5 mg by mouth daily as needed       montelukast (SINGULAIR) 10 MG tablet Take 10 mg by mouth At Bedtime       multivitamin, therapeutic with minerals (MULTI-VITAMIN) TABS tablet Take 1 tablet by mouth daily       naproxen (NAPROSYN) 500 MG tablet Take 1 tablet (500 mg) by mouth 2 times daily as needed for moderate pain or headaches (migraines and fibromyalgia) 60 tablet 1     norethindrone  (MICRONOR) 0.35 MG tablet Take 0.35 mg by mouth daily  4     ondansetron (ZOFRAN-ODT) 4 MG ODT tab Take 1 tablet (4 mg) by mouth every 6 hours as needed for nausea 30 tablet 1     tiZANidine (ZANAFLEX) 2 MG tablet Take 1 tablet (2 mg) by mouth 3 times daily as needed for muscle spasms 90 tablet 0     traZODone (DESYREL) 50 MG tablet Take 0.5-1 tablets (25-50 mg) by mouth nightly as needed for sleep 45 tablet 0     venlafaxine (EFFEXOR-XR) 150 MG 24 hr capsule Take 1 capsule (150 mg) by mouth daily 90 capsule 0     fluticasone (FLOVENT HFA) 110 MCG/ACT Inhaler Inhale 2 puffs into the lungs 2 times daily       order for DME Equipment being ordered: TENS  All necessary equipment: leads/pads  Duration of use: 36 months  Apply to painful areas (Patient not taking: Reported on 8/1/2019) 1 Device 0     Vitals                                                                                                                       3, 3   BP (!) 144/83   Pulse 98   Wt 97.1 kg (214 lb)   BMI 36.31 kg/m     Mental Status Exam                                                                                    9, 14 cog gs     Alertness: alert  and oriented  Appearance: casually groomed  Behavior/Demeanor: cooperative and pleasant, with good  eye contact   Speech: normal  Language: no problems  Psychomotor: normal or unremarkable  Mood: angry  Affect: appropriate; was congruent to mood; was congruent to content  Thought Process/Associations: unremarkable  Thought Content:  Reports none;  Denies suicidal ideation and violent ideation  Perception:  Reports none;  Denies auditory hallucinations and visual hallucinations  Insight: good  Judgment: good  Cognition: (6) does  appear grossly intact; formal cognitive testing was not done  Gait/Station and/or Muscle Strength/Tone: unremarkable    Labs and Data                                                                                                                 Rating Scales:     PHQ9 and INDRA-7  INDRA-7: 13  PHQ9 Today: 13  PHQ-9 SCORE 1/25/2019 6/7/2019 8/1/2019   PHQ-9 Total Score MyChart 14 (Moderate depression) - -   PHQ-9 Total Score 14 13 13         Diagnosis and Assessment                                                                             m2, h3     Today the following issues were addressed:    1) Major Depressive Disorder, recurrent, moderate.    2) Generalized Anxiety Disorder  3) ADHD (Hx)    [place] Prescription Monitoring Program [] review was not needed today.    PSYCHOTROPIC DRUG INTERACTIONS:   NAPROXEN -- ESCITALOPRAM -- VENLAFAXINE may result in an increased risk of bleeding.     ESCITALOPRAM -- VENLAFAXINE -- HYDROXYZINE -- TRAZODONE -- TIZANIDINE may result in increased risk of QT interval prolongation.     ESCITALOPRAM -- VENLAFAXINE -- TRAZODONE may result in an increased risk of serotonin syndrome (hypertension, hyperthermia, myoclonus, mental status changes).     LORAZEPAM -- ESTRADIOL  may result in decreased lorazepam effectiveness.      MANAGEMENT:  Monitoring for adverse effects and routine vitals    Plan                                                                                                                    m2, h3      1) Medication Management   Continue Effexor XR 150mg daily  Continue Trazodone 25-50mg at bedtime PRN  Continue Hyrdoxyzine 25-50mg for anxiety  Continue Gabapentin 300 BID and 600mg at bedtime   Continue Lorazepam 0.5mg daily PRN (takes rarely) uses for headaches and anxiety.  Uses very infrequently  2) Therapy  Continue with current therapist    RTC: 6 months per Ri's request    CRISIS NUMBERS:   Provided routinely in AVS.    Treatment Risk Statement:  The patient understands the risks, benefits, adverse effects and alternatives. Agrees to treatment with the capacity to do so. No medical contraindications to treatment. Agrees to call clinic for any problems. The patient understands to call 911 or go to the nearest ED if life  threatening or urgent symptoms occur.       PROVIDER:  TORSTEN Hogan CNP

## 2019-09-11 ASSESSMENT — ANXIETY QUESTIONNAIRES: GAD7 TOTAL SCORE: 13

## 2019-10-16 ENCOUNTER — OFFICE VISIT (OUTPATIENT)
Dept: INTERNAL MEDICINE | Facility: CLINIC | Age: 34
End: 2019-10-16
Payer: COMMERCIAL

## 2019-10-16 ENCOUNTER — HOSPITAL ENCOUNTER (OUTPATIENT)
Facility: CLINIC | Age: 34
End: 2019-10-16
Attending: INTERNAL MEDICINE | Admitting: INTERNAL MEDICINE
Payer: COMMERCIAL

## 2019-10-16 VITALS
HEART RATE: 80 BPM | RESPIRATION RATE: 18 BRPM | BODY MASS INDEX: 37.23 KG/M2 | WEIGHT: 219.4 LBS | SYSTOLIC BLOOD PRESSURE: 104 MMHG | DIASTOLIC BLOOD PRESSURE: 73 MMHG

## 2019-10-16 DIAGNOSIS — K58.2 IRRITABLE BOWEL SYNDROME WITH BOTH CONSTIPATION AND DIARRHEA: ICD-10-CM

## 2019-10-16 DIAGNOSIS — Z82.49 FAMILY HISTORY OF MYOCARDIAL INFARCTION: ICD-10-CM

## 2019-10-16 DIAGNOSIS — K58.2 IRRITABLE BOWEL SYNDROME WITH BOTH CONSTIPATION AND DIARRHEA: Primary | ICD-10-CM

## 2019-10-16 DIAGNOSIS — R14.0 ABDOMINAL BLOATING: ICD-10-CM

## 2019-10-16 DIAGNOSIS — E55.9 VITAMIN D DEFICIENCY: ICD-10-CM

## 2019-10-16 LAB
ALBUMIN SERPL-MCNC: 3.9 G/DL (ref 3.4–5)
ALP SERPL-CCNC: 27 U/L (ref 40–150)
ALT SERPL W P-5'-P-CCNC: 31 U/L (ref 0–50)
ANION GAP SERPL CALCULATED.3IONS-SCNC: 9 MMOL/L (ref 3–14)
AST SERPL W P-5'-P-CCNC: 13 U/L (ref 0–45)
BASOPHILS # BLD AUTO: 0.1 10E9/L (ref 0–0.2)
BASOPHILS NFR BLD AUTO: 0.6 %
BILIRUB SERPL-MCNC: 0.3 MG/DL (ref 0.2–1.3)
BUN SERPL-MCNC: 12 MG/DL (ref 7–30)
CALCIUM SERPL-MCNC: 8.8 MG/DL (ref 8.5–10.1)
CHLORIDE SERPL-SCNC: 104 MMOL/L (ref 94–109)
CO2 SERPL-SCNC: 26 MMOL/L (ref 20–32)
CREAT SERPL-MCNC: 0.61 MG/DL (ref 0.52–1.04)
DEPRECATED CALCIDIOL+CALCIFEROL SERPL-MC: 18 UG/L (ref 20–75)
DIFFERENTIAL METHOD BLD: NORMAL
EOSINOPHIL # BLD AUTO: 0.2 10E9/L (ref 0–0.7)
EOSINOPHIL NFR BLD AUTO: 2.4 %
ERYTHROCYTE [DISTWIDTH] IN BLOOD BY AUTOMATED COUNT: 12.8 % (ref 10–15)
GFR SERPL CREATININE-BSD FRML MDRD: >90 ML/MIN/{1.73_M2}
GLUCOSE SERPL-MCNC: 100 MG/DL (ref 70–99)
HCT VFR BLD AUTO: 40.9 % (ref 35–47)
HGB BLD-MCNC: 13.2 G/DL (ref 11.7–15.7)
IMM GRANULOCYTES # BLD: 0.1 10E9/L (ref 0–0.4)
IMM GRANULOCYTES NFR BLD: 0.9 %
LYMPHOCYTES # BLD AUTO: 2.7 10E9/L (ref 0.8–5.3)
LYMPHOCYTES NFR BLD AUTO: 33.4 %
MCH RBC QN AUTO: 27.9 PG (ref 26.5–33)
MCHC RBC AUTO-ENTMCNC: 32.3 G/DL (ref 31.5–36.5)
MCV RBC AUTO: 87 FL (ref 78–100)
MONOCYTES # BLD AUTO: 0.6 10E9/L (ref 0–1.3)
MONOCYTES NFR BLD AUTO: 7.8 %
NEUTROPHILS # BLD AUTO: 4.5 10E9/L (ref 1.6–8.3)
NEUTROPHILS NFR BLD AUTO: 54.9 %
NRBC # BLD AUTO: 0 10*3/UL
NRBC BLD AUTO-RTO: 0 /100
PLATELET # BLD AUTO: 317 10E9/L (ref 150–450)
POTASSIUM SERPL-SCNC: 3.9 MMOL/L (ref 3.4–5.3)
PROT SERPL-MCNC: 7.2 G/DL (ref 6.8–8.8)
RBC # BLD AUTO: 4.73 10E12/L (ref 3.8–5.2)
SODIUM SERPL-SCNC: 139 MMOL/L (ref 133–144)
VIT B12 SERPL-MCNC: 357 PG/ML (ref 193–986)
WBC # BLD AUTO: 8.2 10E9/L (ref 4–11)

## 2019-10-16 ASSESSMENT — PAIN SCALES - GENERAL: PAINLEVEL: SEVERE PAIN (6)

## 2019-10-16 NOTE — TELEPHONE ENCOUNTER
RECORDS RECEIVED FROM: Internal    DATE RECEIVED: 10/29/19    NOTES STATUS DETAILS   OFFICE NOTE from referring provider Internal Ov note 10/16/19    OFFICE NOTE from other specialist N/A    DISCHARGE SUMMARY from hospital Internal ED note 2016    OPERATIVE REPORT N/A    MEDICATION LIST N/A         ENDOSCOPY  N/A    COLONOSCOPY N/A    ERCP N/A    EUS N/A    STOOL TESTING N/A    PERTINENT LABS Internal    PATHOLOGY REPORTS (RELATED) N/A    IMAGING (CT, MRI, EGD) N/A      REFERRAL INFORMATION    Date referral was placed: 10/29/19   Date all records received: N/A   Date records were scanned into Epic: N/A   Date records were sent to Provider to review: N/A   Date and recommendation received from provider:  LETTER SENT  SCHEDULE APPOINTMENT   Date patient was contacted to schedule: 10/16/19

## 2019-10-16 NOTE — NURSING NOTE
Chief Complaint   Patient presents with     Family History Of     Patient is here to discuss to get dianostic order regarding family heart history     Gastrointestinal Problem     Also digestive questions       Tylor Moreno CMA ([facility]) at 7:49 AM on 10/16/2019

## 2019-10-16 NOTE — PROGRESS NOTES
"[facility]   Radha FRAZIERGiancarlo Collins, APRN CNP  10/16/2019      Chief Complaint:   Family History Of and Gastrointestinal Problem       History of Present Illness:   Paris Nguyen is a 34 year old adult with a history of endometriosis, migraines, autism spectrum disorder, and irritable bowl syndrome who presents for evaluation of intermittent diarrhea and GI upset.    GI issues: She has been having digestive issues for about 10 years. For example, will eat the same food 10 times and 70% of the time will have significant gastrointestinal upset with diarrhea. She reports having tried [facility] food sensitivity testing without identifying an obvious cause. She has a hx IBS with constipation and diarrhea--can go for 3 days without stools and then \"looses everything\" with loose stool with unclear triggers. She has an upcoming allergy testing for foods in November. Multiple trials of restrictive diets, which did identify some triggers 5 years ago, mostly beans and peanuts. For some time she thought there was an issue with gluten, but during a trip to [place] she realized she was tolerating all the foods she ate. This made her think she was having an issue with preservatives. She can tolerate sour dough bread from whole foods. Generally, she has found it very difficult to identify food triggers because her sensitivities seem so random, leading her to believe there is an issue with her gastrointestinal doug. She discussed this with a nutritionist at her gym who recommended she seek SIBO testing.     Family history of heart disease: She has a family history of MI in her father, paternal grandfather and aunt. She didn't know her grandfather's lifestyle but does recall he needed a bypass that he did not get. Her father and aunt had myocardial infarctions, they both had alcoholism and overall were not healthy (poorly controlled diabetes, etc.). The father's autopsy report at [place] also tested for some genetic markers, " and is available for her to review, she has not done yet due to the length. Her paternal aunt had 3 myocardial infarctions over 24 hours leading to death in her 60s. Both her father and aunt's heart attacks didn't have significant preceding symptoms, her father had worsening blood glucose levels along with jaundice and her aunt didn't feel well.   In terms of lifestyle, Ri walks at least a mile to and from work, and walking while at work. She also tries to do pilates 2x per week.  She has not been going to the gym lately due to left sided pain which she attributes to a pinched nerve or fibromyalgia. She denies CP, SOB, palpitations with exercise. Patient's total cholesterol was 208 with  in May 2019.     Review of Systems:   Pertinent items are noted in HPI, remainder of complete ROS is negative.      Active Medications:     Current Outpatient Medications:      benzonatate (TESSALON) 100 MG capsule, Take 100-200 mg by mouth as needed , Disp: , Rfl:      EPINEPHrine (EPIPEN/ADRENACLICK/OR ANY BX GENERIC EQUIV) 0.3 MG/0.3ML injection 2-pack, Inject 0.3 mg into the muscle once, Disp: , Rfl:      fexofenadine (ALLEGRA) 180 MG tablet, Take 180 mg by mouth daily, Disp: , Rfl:      gabapentin (NEURONTIN) 300 MG capsule, TAKE 1 CAPSULE TWICE A DAY, AND 2 CAPSULES AT BEDTIME, Disp: 360 capsule, Rfl: 1     hydrOXYzine (ATARAX) 25 MG tablet, TAKE 1 TO 2 TABLETS TWICE A DAY AS NEEDED FOR ITCHING OR ANXIETY, Disp: 120 tablet, Rfl: 0     levalbuterol (XOPENEX HFA) 45 MCG/ACT Inhaler, Inhale 1-2 puffs into the lungs every 4 hours as needed, Disp: , Rfl:      LORazepam (ATIVAN) 0.5 MG tablet, Take 1 tablet (0.5 mg) by mouth daily as needed for anxiety, Disp: 30 tablet, Rfl: 0     montelukast (SINGULAIR) 10 MG tablet, Take 10 mg by mouth At Bedtime, Disp: , Rfl:      multivitamin, therapeutic with minerals (MULTI-VITAMIN) TABS tablet, Take 1 tablet by mouth daily, Disp: , Rfl:      naproxen (NAPROSYN) 500 MG tablet, Take 1  tablet (500 mg) by mouth 2 times daily as needed for moderate pain or headaches (migraines and fibromyalgia), Disp: 60 tablet, Rfl: 1     norethindrone (MICRONOR) 0.35 MG tablet, Take 0.35 mg by mouth daily, Disp: , Rfl: 4     ondansetron (ZOFRAN-ODT) 4 MG ODT tab, Take 1 tablet (4 mg) by mouth every 6 hours as needed for nausea, Disp: 30 tablet, Rfl: 1     order for DME, Equipment being ordered: TENS All necessary equipment: leads/pads Duration of use: 36 months Apply to painful areas, Disp: 1 Device, Rfl: 0     tiZANidine (ZANAFLEX) 2 MG tablet, Take 1 tablet (2 mg) by mouth 3 times daily as needed for muscle spasms, Disp: 90 tablet, Rfl: 0     traZODone (DESYREL) 50 MG tablet, Take 0.5-1 tablets (25-50 mg) by mouth nightly as needed for sleep, Disp: 45 tablet, Rfl: 1     venlafaxine (EFFEXOR-XR) 150 MG 24 hr capsule, Take 1 capsule (150 mg) by mouth daily, Disp: 90 capsule, Rfl: 1     fluticasone (FLOVENT HFA) 110 MCG/ACT Inhaler, Inhale 2 puffs into the lungs 2 times daily, Disp: , Rfl:       Allergies:   Latex; Liquid adhesive; Seasonal allergies; Morphine; and Wound dressing adhesive      Past Medical History:  Abnormal pap smear  Anxiety  Depression  Endometriosis  GERD (gastroesophageal reflux disease)  Irritable bowl syndrome  Migraines  Asthma  TMJ arthralgia  ADHD  Autism spectrum disorder  OCD  Anxiety     Past Surgical History:  Abdomen surgery for endometriosis  Cystoscopy  DaVinci ablation of endometrium x3  Dilatation and curettage  Eye surgery    Family History:   Mother: hypothyroidism, IgA nephropathy, diabetes mellitus, thyroid disease, obesity  Father: alcoholism, pancreatitis, depression, anxiety, substance abuse, diabetes mellitus, myocardial infarction   Paternal aunt: myocardial infarction x3 in 24 hours at age 61  Sister: ADHD, anxiety  Paternal grandfather: myocardial infarction age 62 or 64  Paternal grandmother: breast cancer, skin cancer  Maternal grandmother: uterine cancer     Social  History:   Unmarried  Non smoker    Physical Exam:   /73 (BP Location: Right arm, Patient Position: Sitting, Cuff Size: Adult Large)   Pulse 80   Resp 18   Wt 99.5 kg (219 lb 6.4 oz)   Breastfeeding? No   BMI 37.23 kg/m     Constitutional: Alert, oriented, pleasant, no acute distress  Head: Normocephalic, atraumatic  Eyes: Extra-ocular movements intact, pupils equally round and reactive bilaterally, no scleral icterus  ENT: Oropharynx clear, moist mucus membranes, good dentition  Neck: Supple, no lymphadenopathy, no thyromegaly  Cardiovascular: Regular rate and rhythm, no murmurs, rubs or gallops  Respiratory: Good air movement bilaterally, lungs clear, no wheezes/rales/rhonchi  GI: Abdomen soft, bowel sounds present, nondistended, mild tenderness in right lower quadrant, no organomegaly or masses, no rebound/guarding  Psychiatric: normal mentation, affect and mood      Assessment and Plan:  Irritable bowel syndrome with both constipation and diarrhea, Abdominal bloating  She is having diarrhea without clearly identifiable triggers. She is interested in further evaluation for bacterial overgrowth and a GI consult. Referral provided. Patient also has a reported history of vitamin D deficiency which she does not have recent labs for, she agreed to have these rechecked today.   - Vitamin D Deficiency  - Vitamin B12  - BREATH HYDROGEN TEST  - GASTROENTEROLOGY ADULT REFERRAL  - Comprehensive metabolic panel  - CBC with platelets differential    Family history of heart disease and MI  We reviewed her family's cardiac events and their health history prior to these events. It sounds like her family history is explained by their lifestyles which included alcoholism and poorly controlled diabetes; MIs all occurred after age 60. She is working on lifestyle to manage her cholesterol levels which she was encouraged to continue. We reviewed the typical symptoms of myocardial infarction she should monitor for while  exercising. We will recheck a lipid panel next year to see if lifestyle changes have been improving her cholesterol.      Follow-up: as needed    Scribe Disclosure:  I, Cecil Ramírez, am serving as a scribe to document services personally performed by TORSTEN Chao CNP at this visit, based upon the provider's statements to me. All documentation has been reviewed by the aforementioned provider prior to being entered into the official medical record.    Portions of this medical record were completed by a scribe. UPON MY REVIEW AND AUTHENTICATION BY ELECTRONIC SIGNATURE, this confirms (a) I performed the applicable clinical services, and (b) the record is accurate.     TORSTEN Chao CNP

## 2019-10-18 NOTE — TELEPHONE ENCOUNTER
Received return phone call from the pt. The pt confirmed that both forms and a letter need to be completed. The letter is the more urgent request, as this is holding her back from returning to work. They requested a letter stating they can return to work on 7/23/19. Writer drafted, printed, and placed this letter into the provider's folder for review and signature.    done

## 2019-10-26 ASSESSMENT — ENCOUNTER SYMPTOMS
BREAST MASS: 0
POLYDIPSIA: 1
TINGLING: 1
MUSCLE WEAKNESS: 1
INCREASED ENERGY: 1
HOARSE VOICE: 1
DECREASED APPETITE: 1
BOWEL INCONTINENCE: 0
RECTAL PAIN: 1
BLOOD IN STOOL: 0
EYE IRRITATION: 1
DIARRHEA: 0
WEIGHT LOSS: 0
NERVOUS/ANXIOUS: 1
SORE THROAT: 0
SHORTNESS OF BREATH: 1
SKIN CHANGES: 0
EYE WATERING: 0
POSTURAL DYSPNEA: 0
VOMITING: 0
SNORES LOUDLY: 0
DYSPNEA ON EXERTION: 0
JAUNDICE: 0
TROUBLE SWALLOWING: 0
CHILLS: 1
DEPRESSION: 0
SEIZURES: 0
COUGH DISTURBING SLEEP: 0
WEAKNESS: 0
MYALGIAS: 1
HEMOPTYSIS: 0
FEVER: 0
COUGH: 0
PANIC: 1
NECK PAIN: 1
TREMORS: 1
ARTHRALGIAS: 1
BACK PAIN: 1
HOT FLASHES: 1
DECREASED LIBIDO: 0
HEARTBURN: 1
SPEECH CHANGE: 0
PARALYSIS: 0
JOINT SWELLING: 0
MEMORY LOSS: 0
BREAST PAIN: 1
LOSS OF CONSCIOUSNESS: 0
ABDOMINAL PAIN: 1
INSOMNIA: 1
SPUTUM PRODUCTION: 1
FATIGUE: 1
HALLUCINATIONS: 0
ALTERED TEMPERATURE REGULATION: 0
MUSCLE CRAMPS: 1
HEADACHES: 1
SINUS CONGESTION: 1
NAUSEA: 1
WHEEZING: 1
NIGHT SWEATS: 1
BLOATING: 1
EYE REDNESS: 0
NAIL CHANGES: 0
DOUBLE VISION: 0
DISTURBANCES IN COORDINATION: 1
STIFFNESS: 1
SINUS PAIN: 1
EYE PAIN: 0
NECK MASS: 0
TASTE DISTURBANCE: 0
CONSTIPATION: 1
POOR WOUND HEALING: 0
POLYPHAGIA: 0
NUMBNESS: 1
WEIGHT GAIN: 1
DECREASED CONCENTRATION: 1
DIZZINESS: 1
SMELL DISTURBANCE: 0

## 2019-10-28 NOTE — PROGRESS NOTES
GI CLINIC VISIT - NEW PATIENT    CC/REFERRING PROVIDER: Radha Collins  REASON FOR CONSULTATION: Bloating    HPI: 34 year old adult with PMH of anxiety/depression, GERD, endometriosis, IBS, migraines, presenting to GI clinic for bloating and distention.    She reports she has been dealing with the bloating, abdominal distention, and associated abdominal discomfort for years.  She reports symptoms occur most days, worse around the time of her period, and will last for 1 day, may be worse after eating, and will improve with Gas-X and passing flatus.  She has associated decreased appetite and nausea during episodes. She has tried working out which foods trigger this, however reports her symptoms are unpredictable and she has not found which specific food or food group triggers her symptoms.  She has tried multiple diets including elimination no specific foods, however has not found a specific trigger.  No vomiting, dysphagia, odynophagia.  Weight has been stable.    She reports over the past 2 months she has been increasingly constipated, having a bowel movement once every 2 to 3 days.  She passes hard stool and has to strain to pass stool with occasional blood on toilet paper that she attributes to hemorrhoids.  Otherwise no black or bloody stool.  Has tried MiraLAX in the past reports did not have improvement.    Seen 10/16/19 in primary care, reported digestive issues x 10 years with post-prandial GI upset/diarrhea. Noted h/o IBS. Ordered hydrogen breath test as patient had heard about this to evaluate for SIBO as a possible cause of her symptoms.  She is also planning to have food allergy testing next month.    Prior workup:  Normal CMP, CBC 10/2019   Normal TSH 6/2019  TTG IgA negative 2015   HP IgG negative 2016  Colon 2015 normal, bx negative for MC  RUQ US normal 2015  HIDA normal 2/2016  CT abdm normal 3/2016    ROS: 10pt ROS performed and otherwise negative.    PAST MEDICAL HISTORY:  Past Medical  History:   Diagnosis Date     Abnormal Pap smear 11/24/2017    HPV     Anxiety      Chronic constipation      Depression      Depressive disorder 1/1/2001    in high school     Earache or other ear, nose, or throat complaint     Chronic sinus and allergy issues     Endometriosis      Esophageal reflux     Was on prilosec for years. Controlled now by diet     GERD (gastroesophageal reflux disease)      History of steroid therapy     predisone burst for asthma     IBS (irritable bowel syndrome)      Migraines      Pelvic and perineal pain      Stomach problems     Possible hemorrhoids?     Uncomplicated asthma      Vision disorder     Crossed-eye. Surgery at age 1 and 2       PREVIOUS ABDOMINAL/GYNECOLOGIC SURGERIES:  Past Surgical History:   Procedure Laterality Date     ABDOMEN SURGERY  10/2016    Endometriosis excision     CYSTOSCOPY N/A 10/12/2016    Procedure: CYSTOSCOPY;  Surgeon: Eliazar Patel MD;  Location:  OR     DAVINCI ASSISTED ABLATION / EXCISION OF ENDOMETRIOSIS N/A 10/12/2016    Procedure: DAVINCI ASSISTED ABLATION / EXCISION OF ENDOMETRIOSIS;  Surgeon: Eliazar Patel MD;  Location: [facility] OR     DAVINCI LYSIS OF ADHESIONS N/A 10/12/2016    Procedure: DAVINCI LYSIS OF ADHESIONS;  Surgeon: Eliazar Patel MD;  Location: [facility] OR     DAVINCI PELVIC PROCEDURE N/A 10/12/2016    Procedure: DAVINCI PELVIC PROCEDURE;  Surgeon: Eliazar Patel MD;  Location:  OR     DILATION AND CURETTAGE, HYSTEROSCOPY DIAGNOSTIC, COMBINED N/A 10/12/2016    Procedure: COMBINED DILATION AND CURETTAGE, HYSTEROSCOPY DIAGNOSTIC;  Surgeon: Eliazar Patel MD;  Location:  OR     EYE SURGERY      Eye Surgery x 2 as an infant     HC TOOTH EXTRACTION W/FORCEP         PERTINENT MEDICATIONS:  Current Outpatient Medications   Medication Sig Dispense Refill     benzonatate (TESSALON) 100 MG capsule Take 100-200 mg by mouth as needed        EPINEPHrine (EPIPEN/ADRENACLICK/OR ANY BX GENERIC EQUIV) 0.3  MG/0.3ML injection 2-pack Inject 0.3 mg into the muscle once       fexofenadine (ALLEGRA) 180 MG tablet Take 180 mg by mouth daily       gabapentin (NEURONTIN) 300 MG capsule TAKE 1 CAPSULE TWICE A DAY, AND 2 CAPSULES AT BEDTIME 360 capsule 1     hydrOXYzine (ATARAX) 25 MG tablet TAKE 1 TO 2 TABLETS TWICE A DAY AS NEEDED FOR ITCHING OR ANXIETY 120 tablet 0     levalbuterol (XOPENEX HFA) 45 MCG/ACT Inhaler Inhale 1-2 puffs into the lungs every 4 hours as needed       LORazepam (ATIVAN) 0.5 MG tablet Take 1 tablet (0.5 mg) by mouth daily as needed for anxiety 30 tablet 0     montelukast (SINGULAIR) 10 MG tablet Take 10 mg by mouth At Bedtime       multivitamin, therapeutic with minerals (MULTI-VITAMIN) TABS tablet Take 1 tablet by mouth daily       naproxen (NAPROSYN) 500 MG tablet Take 1 tablet (500 mg) by mouth 2 times daily as needed for moderate pain or headaches (migraines and fibromyalgia) 60 tablet 1     norethindrone (MICRONOR) 0.35 MG tablet Take 0.35 mg by mouth daily  4     ondansetron (ZOFRAN-ODT) 4 MG ODT tab Take 1 tablet (4 mg) by mouth every 6 hours as needed for nausea 30 tablet 1     order for DME Equipment being ordered: TENS  All necessary equipment: leads/pads  Duration of use: 36 months  Apply to painful areas 1 Device 0     tiZANidine (ZANAFLEX) 2 MG tablet Take 1 tablet (2 mg) by mouth 3 times daily as needed for muscle spasms 90 tablet 0     traZODone (DESYREL) 50 MG tablet Take 0.5-1 tablets (25-50 mg) by mouth nightly as needed for sleep 45 tablet 1     venlafaxine (EFFEXOR-XR) 150 MG 24 hr capsule Take 1 capsule (150 mg) by mouth daily 90 capsule 1     vitamin D3 (CHOLECALCIFEROL) 1000 units (25 mcg) tablet Take 1 tablet (1,000 Units) by mouth daily 90 tablet 3     fluticasone (FLOVENT HFA) 110 MCG/ACT Inhaler Inhale 2 puffs into the lungs 2 times daily         SOCIAL HISTORY:  Smoking: Denies  EtOH: Rare  No drugs  Social History     Socioeconomic History     Marital status: Single      Spouse name: Not on file     Number of children: Not on file     Years of education: Not on file     Highest education level: Not on file   Occupational History     Not on file   Social Needs     Financial resource strain: Not on file     Food insecurity:     Worry: Not on file     Inability: Not on file     Transportation needs:     Medical: Not on file     Non-medical: Not on file   Tobacco Use     Smoking status: Never Smoker     Smokeless tobacco: Never Used   Substance and Sexual Activity     Alcohol use: Yes     Comment: less than 1 drink a week     Drug use: No     Sexual activity: Yes     Partners: Male     Birth control/protection: Condom, Pill   Lifestyle     Physical activity:     Days per week: Not on file     Minutes per session: Not on file     Stress: Not on file   Relationships     Social connections:     Talks on phone: Not on file     Gets together: Not on file     Attends Muslim service: Not on file     Active member of club or organization: Not on file     Attends meetings of clubs or organizations: Not on file     Relationship status: Not on file     Intimate partner violence:     Fear of current or ex partner: Not on file     Emotionally abused: Not on file     Physically abused: Not on file     Forced sexual activity: Not on file   Other Topics Concern     Parent/sibling w/ CABG, MI or angioplasty before 65F 55M? 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        FAMILY HISTORY:  No family history of GI cancers or IBD.  Family History   Problem Relation Age of Onset     Hypothyroidism Mother      Kidney Disease Mother         IgA Nephropathy     Diabetes Mother      Thyroid Disease Mother      Obesity Mother      Alcoholism Father      Pancreatitis Father         Alcohol related     Depression Father       Anxiety Disorder Father      Substance Abuse Father         alcholic     Diabetes Father      Myocardial Infarction Father      Coronary Artery Disease Father      Hyperlipidemia Father      Attention Deficit Disorder Sister      Anxiety Disorder Sister      Myocardial Infarction Paternal Grandfather 62     Breast Cancer Paternal Grandmother      Other Cancer Paternal Grandmother         Skin     Uterine Cancer Maternal Grandmother      Myocardial Infarction Maternal Aunt      Alcoholism Maternal Aunt      Osteoporosis Maternal Aunt        PHYSICAL EXAMINATION:  Vitals reviewed  /79   Pulse 98   Temp 98.8  F (37.1  C) (Oral)   Wt 98.2 kg (216 lb 9.6 oz)   SpO2 96%   BMI 36.75 kg/m    Gen: aaox3, cooperative, pleasant, not diaphoretic, nad  HEENT: ncat, op w/o ulcer/exudate, anicteric, mmm  Neck: no lymphadenopathy  Resp: breathing comfortably on RA, CTAB  CV: RRR  Abd: soft, ND, minimally tender to palpation diffusely. BS+.  Ext: no edema  Skin: warm, perfused, no jaundice  Neuro: grossly intact    PERTINENT STUDIES Reviewed in EMR    ASSESSMENT/PLAN: 34 year old adult with PMH of anxiety/depression, GERD, endometriosis, IBS, migraines, presenting to GI clinic for chronic intermittent abdominal bloating, distention.  Suspect that her symptoms are secondary to functional dyspepsia, with potential contribution of IBS with constipation  given she reports hard bowel movements requiring straining every 2 to 3 days.  SIBO less likely given no significant risk factors.  Consider idiopathic gastroparesis as potential etiology as well.  Her lab work-up is unremarkable for celiac disease and H. pylori, and and prior imaging has been unremarkable.    Recommendations  - HBT as scheduled 11/9 to evaluate for SIBO  - Dietician to help evaluate for trigger foods and consider low FODMAP  - Start daily Miralax and fiber supplement (Citrucel, Metamucil, or Benefiber). Can increase Miralax as needed with goal 1 bowel  movement/day    RTC 4 months    Thank you for this consultation. It was a pleasure to participate in the care of this patient; please contact us with any further questions.    Discussed with Dr. Last Tomas MD  GI Fellow  p [phone]

## 2019-10-29 ENCOUNTER — OFFICE VISIT (OUTPATIENT)
Dept: GASTROENTEROLOGY | Facility: CLINIC | Age: 34
End: 2019-10-29
Payer: COMMERCIAL

## 2019-10-29 ENCOUNTER — TELEPHONE (OUTPATIENT)
Dept: INTERNAL MEDICINE | Facility: CLINIC | Age: 34
End: 2019-10-29

## 2019-10-29 ENCOUNTER — PRE VISIT (OUTPATIENT)
Dept: GASTROENTEROLOGY | Facility: CLINIC | Age: 34
End: 2019-10-29

## 2019-10-29 VITALS
OXYGEN SATURATION: 96 % | HEART RATE: 98 BPM | DIASTOLIC BLOOD PRESSURE: 79 MMHG | BODY MASS INDEX: 36.75 KG/M2 | SYSTOLIC BLOOD PRESSURE: 127 MMHG | WEIGHT: 216.6 LBS | TEMPERATURE: 98.8 F

## 2019-10-29 DIAGNOSIS — R14.0 BLOATING: Primary | ICD-10-CM

## 2019-10-29 RX ORDER — POLYETHYLENE GLYCOL 3350 17 G/17G
POWDER, FOR SOLUTION ORAL
Qty: 850 G | Refills: 1 | Status: SHIPPED | OUTPATIENT
Start: 2019-10-29 | End: 2022-01-07

## 2019-10-29 ASSESSMENT — PAIN SCALES - GENERAL: PAINLEVEL: NO PAIN (0)

## 2019-10-29 NOTE — LETTER
10/29/2019       RE: Paris Nguyen  [address] [place] [address]     Dear Colleague,    Thank you for referring your patient, Paris Nguyen, to the [facility] GASTROENTEROLOGY AND IBD CLINIC at [facility]. Please see a copy of my visit note below.    GI CLINIC VISIT - NEW PATIENT    CC/REFERRING PROVIDER: Radha Collins  REASON FOR CONSULTATION: Bloating    HPI: 34 year old adult with PMH of anxiety/depression, GERD, endometriosis, IBS, migraines, presenting to GI clinic for bloating and distention.    She reports she has been dealing with the bloating, abdominal distention, and associated abdominal discomfort for years.  She reports symptoms occur most days, worse around the time of her period, and will last for 1 day, may be worse after eating, and will improve with Gas-X and passing flatus.  She has associated decreased appetite and nausea during episodes. She has tried working out which foods trigger this, however reports her symptoms are unpredictable and she has not found which specific food or food group triggers her symptoms.  She has tried multiple diets including elimination no specific foods, however has not found a specific trigger.  No vomiting, dysphagia, odynophagia.  Weight has been stable.    She reports over the past 2 months she has been increasingly constipated, having a bowel movement once every 2 to 3 days.  She passes hard stool and has to strain to pass stool with occasional blood on toilet paper that she attributes to hemorrhoids.  Otherwise no black or bloody stool.  Has tried MiraLAX in the past reports did not have improvement.    Seen 10/16/19 in primary care, reported digestive issues x 10 years with post-prandial GI upset/diarrhea. Noted h/o IBS. Ordered hydrogen breath test as patient had heard about this to evaluate for SIBO as a possible cause of her symptoms.  She is also planning to have food allergy testing next  month.    Prior workup:  Normal CMP, CBC 10/2019   Normal TSH 6/2019  TTG IgA negative 2015   HP IgG negative 2016  Colon 2015 normal, bx negative for MC  RUQ US normal 2015  HIDA normal 2/2016  CT abdm normal 3/2016    ROS: 10pt ROS performed and otherwise negative.    PAST MEDICAL HISTORY:  Past Medical History:   Diagnosis Date     Abnormal Pap smear 11/24/2017    HPV     Anxiety      Chronic constipation      Depression      Depressive disorder 1/1/2001    in high school     Earache or other ear, nose, or throat complaint     Chronic sinus and allergy issues     Endometriosis      Esophageal reflux     Was on prilosec for years. Controlled now by diet     GERD (gastroesophageal reflux disease)      History of steroid therapy     predisone burst for asthma     IBS (irritable bowel syndrome)      Migraines      Pelvic and perineal pain      Stomach problems     Possible hemorrhoids?     Uncomplicated asthma      Vision disorder     Crossed-eye. Surgery at age 1 and 2       PREVIOUS ABDOMINAL/GYNECOLOGIC SURGERIES:  Past Surgical History:   Procedure Laterality Date     ABDOMEN SURGERY  10/2016    Endometriosis excision     CYSTOSCOPY N/A 10/12/2016    Procedure: CYSTOSCOPY;  Surgeon: Eliazar Patel MD;  Location:  OR     DAVINCI ASSISTED ABLATION / EXCISION OF ENDOMETRIOSIS N/A 10/12/2016    Procedure: DAVINCI ASSISTED ABLATION / EXCISION OF ENDOMETRIOSIS;  Surgeon: Eliazar Patel MD;  Location: [facility] OR     DAVINCI LYSIS OF ADHESIONS N/A 10/12/2016    Procedure: DAVINCI LYSIS OF ADHESIONS;  Surgeon: Eliazar Patel MD;  Location: [facility] OR     DAVINCI PELVIC PROCEDURE N/A 10/12/2016    Procedure: DAVINCI PELVIC PROCEDURE;  Surgeon: Eliazar Patel MD;  Location:  OR     DILATION AND CURETTAGE, HYSTEROSCOPY DIAGNOSTIC, COMBINED N/A 10/12/2016    Procedure: COMBINED DILATION AND CURETTAGE, HYSTEROSCOPY DIAGNOSTIC;  Surgeon: Eliazar Patel MD;  Location:  OR     EYE SURGERY       Eye Surgery x 2 as an infant     HC TOOTH EXTRACTION W/FORCEP         PERTINENT MEDICATIONS:  Current Outpatient Medications   Medication Sig Dispense Refill     benzonatate (TESSALON) 100 MG capsule Take 100-200 mg by mouth as needed        EPINEPHrine (EPIPEN/ADRENACLICK/OR ANY BX GENERIC EQUIV) 0.3 MG/0.3ML injection 2-pack Inject 0.3 mg into the muscle once       fexofenadine (ALLEGRA) 180 MG tablet Take 180 mg by mouth daily       gabapentin (NEURONTIN) 300 MG capsule TAKE 1 CAPSULE TWICE A DAY, AND 2 CAPSULES AT BEDTIME 360 capsule 1     hydrOXYzine (ATARAX) 25 MG tablet TAKE 1 TO 2 TABLETS TWICE A DAY AS NEEDED FOR ITCHING OR ANXIETY 120 tablet 0     levalbuterol (XOPENEX HFA) 45 MCG/ACT Inhaler Inhale 1-2 puffs into the lungs every 4 hours as needed       LORazepam (ATIVAN) 0.5 MG tablet Take 1 tablet (0.5 mg) by mouth daily as needed for anxiety 30 tablet 0     montelukast (SINGULAIR) 10 MG tablet Take 10 mg by mouth At Bedtime       multivitamin, therapeutic with minerals (MULTI-VITAMIN) TABS tablet Take 1 tablet by mouth daily       naproxen (NAPROSYN) 500 MG tablet Take 1 tablet (500 mg) by mouth 2 times daily as needed for moderate pain or headaches (migraines and fibromyalgia) 60 tablet 1     norethindrone (MICRONOR) 0.35 MG tablet Take 0.35 mg by mouth daily  4     ondansetron (ZOFRAN-ODT) 4 MG ODT tab Take 1 tablet (4 mg) by mouth every 6 hours as needed for nausea 30 tablet 1     order for DME Equipment being ordered: TENS  All necessary equipment: leads/pads  Duration of use: 36 months  Apply to painful areas 1 Device 0     tiZANidine (ZANAFLEX) 2 MG tablet Take 1 tablet (2 mg) by mouth 3 times daily as needed for muscle spasms 90 tablet 0     traZODone (DESYREL) 50 MG tablet Take 0.5-1 tablets (25-50 mg) by mouth nightly as needed for sleep 45 tablet 1     venlafaxine (EFFEXOR-XR) 150 MG 24 hr capsule Take 1 capsule (150 mg) by mouth daily 90 capsule 1     vitamin D3 (CHOLECALCIFEROL) 1000  units (25 mcg) tablet Take 1 tablet (1,000 Units) by mouth daily 90 tablet 3     fluticasone (FLOVENT HFA) 110 MCG/ACT Inhaler Inhale 2 puffs into the lungs 2 times daily         SOCIAL HISTORY:  Smoking: Denies  EtOH: Rare  No drugs  Social History     Socioeconomic History     Marital status: Single     Spouse name: Not on file     Number of children: Not on file     Years of education: Not on file     Highest education level: Not on file   Occupational History     Not on file   Social Needs     Financial resource strain: Not on file     Food insecurity:     Worry: Not on file     Inability: Not on file     Transportation needs:     Medical: Not on file     Non-medical: Not on file   Tobacco Use     Smoking status: Never Smoker     Smokeless tobacco: Never Used   Substance and Sexual Activity     Alcohol use: Yes     Comment: less than 1 drink a week     Drug use: No     Sexual activity: Yes     Partners: Male     Birth control/protection: Condom, Pill   Lifestyle     Physical activity:     Days per week: Not on file     Minutes per session: Not on file     Stress: Not on file   Relationships     Social connections:     Talks on phone: Not on file     Gets together: Not on file     Attends [employer] service: Not on file     Active member of club or organization: Not on file     Attends meetings of clubs or organizations: Not on file     Relationship status: Not on file     Intimate partner violence:     Fear of current or ex partner: Not on file     Emotionally abused: Not on file     Physically abused: Not on file     Forced sexual activity: Not on file   Other Topics Concern     Parent/sibling w/ CABG, MI or angioplasty before 65F 55M? 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        FAMILY HISTORY:  No family history of GI  cancers or IBD.  Family History   Problem Relation Age of Onset     Hypothyroidism Mother      Kidney Disease Mother         IgA Nephropathy     Diabetes Mother      Thyroid Disease Mother      Obesity Mother      Alcoholism Father      Pancreatitis Father         Alcohol related     Depression Father      Anxiety Disorder Father      Substance Abuse Father         alcholic     Diabetes Father      Myocardial Infarction Father      Coronary Artery Disease Father      Hyperlipidemia Father      Attention Deficit Disorder Sister      Anxiety Disorder Sister      Myocardial Infarction Paternal Grandfather 62     Breast Cancer Paternal Grandmother      Other Cancer Paternal Grandmother         Skin     Uterine Cancer Maternal Grandmother      Myocardial Infarction Maternal Aunt      Alcoholism Maternal Aunt      Osteoporosis Maternal Aunt        PHYSICAL EXAMINATION:  Vitals reviewed  /79   Pulse 98   Temp 98.8  F (37.1  C) (Oral)   Wt 98.2 kg (216 lb 9.6 oz)   SpO2 96%   BMI 36.75 kg/m     Gen: aaox3, cooperative, pleasant, not diaphoretic, nad  HEENT: ncat, op w/o ulcer/exudate, anicteric, mmm  Neck: no lymphadenopathy  Resp: breathing comfortably on RA, CTAB  CV: RRR  Abd: soft, ND, minimally tender to palpation diffusely. BS+.  Ext: no edema  Skin: warm, perfused, no jaundice  Neuro: grossly intact    PERTINENT STUDIES Reviewed in EMR    ASSESSMENT/PLAN: 34 year old adult with PMH of anxiety/depression, GERD, endometriosis, IBS, migraines, presenting to GI clinic for chronic intermittent abdominal bloating, distention.  Suspect that her symptoms are secondary to functional dyspepsia, with potential contribution of IBS with constipation  given she reports hard bowel movements requiring straining every 2 to 3 days.  SIBO less likely given no significant risk factors.  Consider idiopathic gastroparesis as potential etiology as well.  Her lab work-up is unremarkable for celiac disease and H. pylori, and and  prior imaging has been unremarkable.    Recommendations  - HBT as scheduled 11/9 to evaluate for SIBO  - Dietician to help evaluate for trigger foods and consider low FODMAP  - Start daily Miralax and fiber supplement (Citrucel, Metamucil, or Benefiber). Can increase Miralax as needed with goal 1 bowel movement/day    RTC 4 months    Thank you for this consultation. It was a pleasure to participate in the care of this patient; please contact us with any further questions.    Discussed with Dr. Last Tomas MD  GI Fellow  p [phone]    I performed a history and physical examination of the above patient and discussed the management with Dr. Tomas on 10/29/2019. I reviewed the note and there are no changes to the past medical, family or social history.  A complete 10 point review of systems was obtained. Please see the HPI for pertinent positives and negatives. All other systems were reviewed and were found to be negative. I agree with the documented findings and plan of care as outlined.    Onofre Ni MD  GI Attending  Pager: [phone]

## 2019-10-29 NOTE — PATIENT INSTRUCTIONS
It was a pleasure taking care of you today.  I've included a brief summary of our discussion and care plan from today's visit below.  Please review this information with your primary care provider.  _______________________________________________________________________    My recommendations are summarized as follows:    -- Complete hydrogen breath test as planned  -- Start daily Miralax and fiber supplement (Citrucel, Metamucil, or Benefiber). Can increase Miralax as needed with goal 1 bowel movement/day  -- Nutrition referral to assist with dietary triggers    Return to GI Clinic in 4 months with me or Eileen Cabral to review your progress.    _______________________________________________________________________    Who do I call with any questions after my visit?  Please be in touch if there are any further questions that arise following today's visit.  There are multiple ways to contact your gastroenterology care team.        During business hours, you may reach a Gastroenterology nurse at [phone] and choose option 3.         To schedule or reschedule an appointment, please call [phone].       You can always send a secure message through Trusight.  Trusight messages are answered by your nurse or doctor typically within 24 hours.  Please allow extra time on weekends and holidays.        For urgent/emergent questions after business hours, you may reach the on-call GI Fellow by contacting the [facility] at [phone].     How will I get the results of any tests ordered?    You will receive all of your results.  If you have signed up for Tr[employer]ight, any tests ordered at your visit will be available to you after your physician reviews them.  Typically this takes 1-2 weeks.  If there are urgent results that require a change in your care plan, your physician or nurse will call you to discuss the next steps.      What is Trusight?  Trusight is a secure way for you to access all of your  healthcare records from the [facility].  It is a web based computer program, so you can sign on to it from any location.  It also allows you to send secure messages to your care team.  I recommend signing up for [employer] access if you have not already done so and are comfortable with using a computer.      How to I schedule a follow-up visit?  If you did not schedule a follow-up visit today, please call [phone] to schedule a follow-up office visit.        Sincerely,    Maikol Tomas MD  Fellow  [facility]  Division of Gastroenterology

## 2019-10-29 NOTE — PROGRESS NOTES
I performed a history and physical examination of the above patient and discussed the management with Dr. Tomas on 10/29/2019. I reviewed the note and there are no changes to the past medical, family or social history.  A complete 10 point review of systems was obtained. Please see the HPI for pertinent positives and negatives. All other systems were reviewed and were found to be negative. I agree with the documented findings and plan of care as outlined.    Onofre Ni MD  GI Attending  Pager: [phone]

## 2019-10-29 NOTE — NURSING NOTE
Chief Complaint   Patient presents with     New Patient     new consult, abdominal, bloating       Vitals:    10/29/19 1452   BP: 127/79   Pulse: 98   Temp: 98.8  F (37.1  C)   TempSrc: Oral   SpO2: 96%   Weight: 98.2 kg (216 lb 9.6 oz)       Body mass index is 36.75 kg/m .    Saima Moreno CMA

## 2019-11-05 ENCOUNTER — OFFICE VISIT (OUTPATIENT)
Dept: GASTROENTEROLOGY | Facility: CLINIC | Age: 34
End: 2019-11-05
Payer: COMMERCIAL

## 2019-11-05 DIAGNOSIS — R14.0 ABDOMINAL BLOATING: ICD-10-CM

## 2019-11-05 DIAGNOSIS — K58.9 IRRITABLE BOWEL SYNDROME: ICD-10-CM

## 2019-11-05 DIAGNOSIS — K58.2 IRRITABLE BOWEL SYNDROME WITH BOTH CONSTIPATION AND DIARRHEA: Primary | ICD-10-CM

## 2019-11-05 DIAGNOSIS — Z71.3 NUTRITIONAL COUNSELING: ICD-10-CM

## 2019-11-05 NOTE — LETTER
"11/5/2019       RE: Paris Nguyen  [address] [place] [address]     Dear Colleague,    Thank you for referring your patient, Paris Nguyen, to the [facility] at [facility]. Please see a copy of my visit note below.    [facility] Outpatient Medical Nutrition Therapy      Time Spent:  90 minutes  Session Type:  Initial Individual Session  Referring Physician: Dr. Onofre Ni (and Dr. Maikol Tomas)  Reason for RD Visit:   Irritable bowel syndrome, GERD, nutritional counseling    Nutrition Assessment:  Patient is here for initial visit with Registered Dietitian (RD).  Patient is a 34 year old who identifies as other and prefers to be called \"Ri\". From discussion with pt today, she is okay with any pronouns and okay with having she, her, hers in note today.     Pt with history of irritable bowel syndrome, GERD, ADHD, OCD, migraines,fibromyalgia anxiety, depression, endometriosis and with c/o having a lot of bloating and flatulence, and currently with more constipation. Per review of Logan scale today, she reported a Logan score of 2 currently and previously was a 1 prior to starting miralax. She also started some psyllium husk baked into homemade cookies along with her miralax or sometimes she adds miralax into her coffee. She stated that she has a long history of GI issues with bloating and flatulence. In the past, she tried a low FODMAP diet but didn't notice any difference. In January, she tried a whole 30 diet and also included coconut sugar while on that diet, but she ate a lot of nuts and then got constipated on this diet. After that she tried the autoimmune protocol diet and did it for 3 days but got sick on this diet so stopped. She then ordered an online kit for food sensitivity testing (helix food sensitivity testing). Her results came back that she was low reactive/sensitive to all foods, so she does not trust the results " because she is aware of some foods that she reacts too as well as reported some food allergies (see below). She stated that she has oral allergy syndrome. Watermelon is the worst reaction and caused throat itching. She also reacts to cantaloupe and somewhat to honeydew melon and fresh bananas, but does tolerate a little bit of frozen banana and will add to homemade smoothies. Overall frozen fruit is better tolerated.  She has met with an allergist in past and will have follow up and further allergy testing scheduled in December 2019. She also met with GI MD last week and will have hydrogen breath test later this week. In the past, she also worked with a licensed Nutritionist at her gym who gave recommendations for IBS. This past March 2019, she went to [place] and she decided to eat whatever she wanted. She ate a lot of fried bread and mangos and shrimp and felt great/the best she has felt without c/o digestive issues except after she ate some mayonnaise that was from a  towards the end of her trip. When she came back from this trip, she was trying her best to eat similarly since this diet was tolerated the best. She was eating cactus petals, white rice, corn tortillas and making fresh foods/meals mostly but this was hard to maintain and difficult to consistently prepare everything homemade and find all of the food selections as well as high cost of some foods. She is interested in diet education for IBS, low FODMAP diet (with interest in retrying this diet) as well as general information/education on diet for SIBO, in case she is found to have this later this week.    Patient reported the following at visit today:  1. Nausea/vomiting? -nauseous a lot especially when bloated  2. Heartburn? -Denies for the most part due to avoids known triggers such as OJ and pineapple juice (whole citrus fruits such as oranges, pineapple, tangerines, sumo mandarins are tolerated without issues). She used to as a kid.  "OJ/pineapple juice brings on symptoms. (ok for fresh oranges/pineapples though)  3. Bloating? -Yes. A lot of flatulence that does not smell but loud  4. Belching? -A little bit but more flatulence  5. Feeling full quickly? -yes but uncertain if full feeling or aversion to food/eating. Eats very slowly. Therefore will graze more often  6. Decreased appetite? -when bloated decreased appetite.  7. Weight loss/gain? -stays within 5-6 lbs  8. Constipation/Diarrhea? -early dumping issues but less over the past couple of months and now more constipation.  9. Abdominal pain/pain with or without eating? -discomfort with bloating and gas.      Height:   Ht Readings from Last 1 Encounters:   03/04/19 1.635 m (5' 4.37\")     Weight:      Wt Readings from Last 10 Encounters:   10/29/19 98.2 kg (216 lb 9.6 oz)   10/16/19 99.5 kg (219 lb 6.4 oz)   06/05/19 97.1 kg (214 lb)   03/04/19 97.5 kg (215 lb)   02/19/19 99.3 kg (218 lb 14.4 oz)   12/14/18 103.3 kg (227 lb 11.2 oz)   11/20/18 103.9 kg (229 lb)   10/25/18 104 kg (229 lb 3.2 oz)   10/16/18 105.3 kg (232 lb 3.2 oz)   10/02/18 103.1 kg (227 lb 3.2 oz)     BMI: 36.56    Diet Recall:  (some usual recall below): She only eats cooked vegetables. Does not tolerate salads/raw veggies. Can eat fresh tomato and cucumbers with sesame seeds. Cooked spinach, spanokopita, pan spotatoes, white rice, pasta (has to limit the amount), pan sheared asparagus. For lunch or dinner tries to eat a protein, veg and starch. During lunch gets busy catching up on personal stuff. Potatoes with peels are tolerated, keeps peels on cucumber. She likes to eat a lot of fruit and veg fibers. Tolerates triscuits but is sensitive to other gluten.  Occasionally eats a pint of ice cream and then does not feel well. Gets up at 7am and then eats breakfast at 10am. Does not feel hungry in the morning and takes 1.5 hrs to feel ready to eat. Takes a long time to eat lunch over a couple of hours  Meal Food    Breakfast " "10am: 2 homemade fiber cookies OR Granola bar OR pastry OR cup of grapes OR pastry or cup of grapes OR occas biscuit /croissant breakfast sandwich   Lunch 1-2pm: [employer]: 1/2 burrito bowl with chicken or steak, corn salsa, guac, cheese, letter, white rice and chips and OR Renny sliced beef and shrimp rice noodles OR sushi (raw fish tolerates)   Dinner Frozen meal: Stacey[employer] meal (burrito) OR [employer] [address] corn (corn, cheese with added mushrooms sauteed) OR 1/2 jerk seasoned chicken breast, baked red potatoes and pan sheared asparagus   Snacks 2 per week of Raised donut, go go squeeze applesauce pouch, 2 c fruit salad   Beverages Coffee drink in morning (this am pumpkin americano, 66-99 oz water, 16 oz juice few times per week,1-2 cans 7 up/week, sparking water with some juice   Alcohol Intake 2-3 drinks per month (causes sour stomach) so may have cider, mead or baileys/liquor. Will make a \"mocktail instead lime juice blend     Frequency of eating/taking out meals: 5 times per week (from [employer] or at work cafe) (Lunch 3-5 x/week and dinner 1x/week).     Labs:  Reviewed  Pertinent Medications/vitamin and mineral supplements:    miralax, psyllum husk (making cookies with miralax, psyllium, protein powder) or into a fruit smoothie.  Food Allergies:  rogers's yeast, watermelon, melon, fresh bananas (frozen ok).  Food intolerances: peas, legumes, avoids soy d/t endometriosis, decreased amount of gluten due to believes sensitivity, bell peppers eagle red (diarrhea), alcohol (causes sour stomach), beer causes immense pain, nuts (likes them but does not tolerate)  Physical Activity:  [place] [employer] some [place] at work for 30 minutes  Estimated Nutrition Needs based on ideal body weight of 55k-1650 calories (25-30 kcals/kg), 55g protein (1g/kg), ~1 ml/kcal or total fluids per MD.     MALNUTRITION:  % Weight Loss:  Weight loss does not meet criteria for malnutrition   % Intake:  No decreased intake noted  Subcutaneous Fat " Loss:  None observed  Muscle Loss:  None observed  Fluid Retention:  None noted    Malnutrition Diagnosis: Patient does not meet two of the above criteria necessary for diagnosing malnutrition  In Context of:  Chronic illness or disease    Nutrition Diagnosis:    Food and nutrition related knowledge deficit related to lack of previous diet education/lack of complete recall of previous diet education for IBS, possible SIBO, bloating, constipation as evidenced by pt report and interest in diet education/review.      Nutrition Prescription: trial low fodmap elimination diet for 3 weeks.    Nutrition Intervention:    Nutrition Education/Counseling:  Provided diet education for IBS including some potential food and beverage triggers such as alcohol, caffeine, carbonation, high fat and high added sugars in diet. Also discussed if fiber containing foods not well tolerated discussed tips potentially better tolerance such as cooked not raw vegetables and softer/peeled/canned fruit. Also explained recommendation for gradually adding in more fiber into diet but not eating excessive amounts at a time and drastically increasing daily amount in a very short time frame. Briefly discussed some diet tips for SIBO per pt preference and to answer her questions if her hydrogen breath test should find SIBO dx.     Provided nutrition education on general digestion of carbohydrates, FODMAP (Fermentable Oligo-saccharides, Di-saccharides, Mono-saccharides And Polyols) foods, impact these short chain carbohydrates on GI tract, Gut microbiota and its effects on fiber. Discussed high and low FODMAP foods. Reviewed FODMAP diet guidelines, including length of the diet,  the different phases of the diet plan with elimination phase and discussed recommendation and schedule for adding foods back in. Explained that pt could follow a modified lower FODMAP diet if preferred. For Modified lower FODMAP diet, review the list of high FODMAP foods and then  eliminate 1-2 of those high FODMAP foods you eat daily/most days to see if any improvement in GI issues. (i.e. apples, flip).    Keep daily food and beverage journals and track all GI symptoms. Can use julita such as my symptoms julita, my fitness pal for example.     Discussed adequate hydration and recommended pt continue drinking at least 48 oz-64 oz fluids/water per day. Provided diet education handouts for Low FODMAP.     Patient expressed understanding of diet education and interested in focusing on goals below.    Educational Materials Provided:  NCM: low FODMAP diet handout, fodmap list, IBS nutrition therapy, SIBO handout.    Patient verbalized understanding of education provided. See Goals below.     Goals:  1. Follow a low FODMAP diet. Start with eliminating higher FODMAP foods for 3 weeks. Then after 3 weeks start adding those higher FODMAP foods back into diet. Start adding 1-2 of your favorite higher FODMAP foods for 3-4 days (eat a serving of that higher 1-2 FODMAP foods daily during those 3-4 days). Then add another 1-2 higher FODMAP foods and onward. (if you noticed any symptoms after adding back in a higher FODMAP food than wait a day before trying the next higher FODMAP food.     2. Keep daily food and beverage journals and track all GI symptoms.Can use an julita such as My Symptoms julita.    3. Plan menus and meals ahead of time to help you stick to the elimination diet.    4. Eat meals and snacks at consistent times of the day.   -don't skip breakfast. Aim for eating breakfast within 60-90 minutes of getting up and space meals out about 4-6 hours apart.    5. Discontinue coffee drinks but okay for coffee with almond milk/low FODMAP nut milk.    6. Lower in fat may be better tolerated as well as moderate fiber (~20g-25g/day).     Nutrition Monitoring and Evaluation: Will monitor adherence to nutrition recommendations at future RD visits.     Further Medical Nutrition Therapy:  Recommended f/u in ~4  weeks  Next Appointment (if applicable):  Gave pt scheduling info/number  Patient was encouraged to call/contact RD with any further questions.    Tiarra Gardner MS, RD, LD

## 2019-11-05 NOTE — PATIENT INSTRUCTIONS
It was nice meeting you today.    1. Follow a low FODMAP diet. Start with eliminating higher FODMAP foods for 3 weeks. Then after 3 weeks start adding those higher FODMAP foods back into diet. Start adding 1-2 of your favorite higher FODMAP foods for 3-4 days (eat a serving of that higher 1-2 FODMAP foods daily during those 3-4 days). Then add another 1-2 higher FODMAP foods and onward. (if you noticed any symptoms after adding back in a higher FODMAP food than wait a day before trying the next higher FODMAP food.     2. Keep daily food and beverage journals and track all GI symptoms.Can use an julita such as My Symptoms julita.    3. Plan menus and meals ahead of time to help you stick to the elimination diet.    4. Eat meals and snacks at consistent times of the day.   -don't skip breakfast. Aim for eating breakfast within 60-90 minutes of getting up and space meals out about 4-6 hours apart.    5. Discontinue coffee drinks but okay for coffee with almond milk/low FODMAP nut milk.    6. Lower in fat may be better tolerated as well as moderate fiber (~20g-25g/day).     Tiarra Gardner, MS, RD, LD    If you would like to schedule a follow up appointment with Tiarra Gardner, Registered Dietitian, please call [phone].

## 2019-11-05 NOTE — PROGRESS NOTES
"[facility] Outpatient Medical Nutrition Therapy      Time Spent:  90 minutes  Session Type:  Initial Individual Session  Referring Physician: Dr. Onofre Ni (and Dr. Maikol Tomas)  Reason for RD Visit:   Irritable bowel syndrome, GERD, nutritional counseling    Nutrition Assessment:  Patient is here for initial visit with Registered Dietitian (RD).  Patient is a 34 year old who identifies as other and prefers to be called \"Ri\". From discussion with pt today, she is okay with any pronouns and okay with having she, her, hers in note today.     Pt with history of irritable bowel syndrome, GERD, ADHD, OCD, migraines,fibromyalgia anxiety, depression, endometriosis and with c/o having a lot of bloating and flatulence, and currently with more constipation. Per review of [place] scale today, she reported a [place] score of 2 currently and previously was a 1 prior to starting miralax. She also started some psyllium husk baked into homemade cookies along with her miralax or sometimes she adds miralax into her coffee. She stated that she has a long history of GI issues with bloating and flatulence. In the past, she tried a low FODMAP diet but didn't notice any difference. In January, she tried a whole 30 diet and also included coconut sugar while on that diet, but she ate a lot of nuts and then got constipated on this diet. After that she tried the autoimmune protocol diet and did it for 3 days but got sick on this diet so stopped. She then ordered an online kit for food sensitivity testing (helix food sensitivity testing). Her results came back that she was low reactive/sensitive to all foods, so she does not trust the results because she is aware of some foods that she reacts too as well as reported some food allergies (see below). She stated that she has oral allergy syndrome. Watermelon is the worst reaction and caused throat itching. She also reacts to cantaloupe and somewhat to honeydew melon and fresh bananas, but " does tolerate a little bit of frozen banana and will add to homemade smoothies. Overall frozen fruit is better tolerated.  She has met with an allergist in past and will have follow up and further allergy testing scheduled in December 2019. She also met with GI MD last week and will have hydrogen breath test later this week. In the past, she also worked with a licensed Nutritionist at her gym who gave recommendations for IBS. This past March 2019, she went to [place] and she decided to eat whatever she wanted. She ate a lot of fried bread and mangos and shrimp and felt great/the best she has felt without c/o digestive issues except after she ate some mayonnaise that was from a  towards the end of her trip. When she came back from this trip, she was trying her best to eat similarly since this diet was tolerated the best. She was eating cactus petals, white rice, corn tortillas and making fresh foods/meals mostly but this was hard to maintain and difficult to consistently prepare everything homemade and find all of the food selections as well as high cost of some foods. She is interested in diet education for IBS, low FODMAP diet (with interest in retrying this diet) as well as general information/education on diet for SIBO, in case she is found to have this later this week.    Patient reported the following at visit today:  1. Nausea/vomiting? -nauseous a lot especially when bloated  2. Heartburn? -Denies for the most part due to avoids known triggers such as OJ and pineapple juice (whole citrus fruits such as oranges, pineapple, tangerines, sumo mandarins are tolerated without issues). She used to as a kid. OJ/pineapple juice brings on symptoms. (ok for fresh oranges/pineapples though)  3. Bloating? -Yes. A lot of flatulence that does not smell but loud  4. Belching? -A little bit but more flatulence  5. Feeling full quickly? -yes but uncertain if full feeling or aversion to food/eating. Eats very  "slowly. Therefore will graze more often  6. Decreased appetite? -when bloated decreased appetite.  7. Weight loss/gain? -stays within 5-6 lbs  8. Constipation/Diarrhea? -early dumping issues but less over the past couple of months and now more constipation.  9. Abdominal pain/pain with or without eating? -discomfort with bloating and gas.      Height:   Ht Readings from Last 1 Encounters:   03/04/19 1.635 m (5' 4.37\")     Weight:      Wt Readings from Last 10 Encounters:   10/29/19 98.2 kg (216 lb 9.6 oz)   10/16/19 99.5 kg (219 lb 6.4 oz)   06/05/19 97.1 kg (214 lb)   03/04/19 97.5 kg (215 lb)   02/19/19 99.3 kg (218 lb 14.4 oz)   12/14/18 103.3 kg (227 lb 11.2 oz)   11/20/18 103.9 kg (229 lb)   10/25/18 104 kg (229 lb 3.2 oz)   10/16/18 105.3 kg (232 lb 3.2 oz)   10/02/18 103.1 kg (227 lb 3.2 oz)     BMI: 36.56    Diet Recall:  (some usual recall below): She only eats cooked vegetables. Does not tolerate salads/raw veggies. Can eat fresh tomato and cucumbers with sesame seeds. Cooked spinach, spanokopita, pan spotatoes, white rice, pasta (has to limit the amount), pan sheared asparagus. For lunch or dinner tries to eat a protein, veg and starch. During lunch gets busy catching up on personal stuff. Potatoes with peels are tolerated, keeps peels on cucumber. She likes to eat a lot of fruit and veg fibers. Tolerates triscuits but is sensitive to other gluten.  Occasionally eats a pint of ice cream and then does not feel well. Gets up at 7am and then eats breakfast at 10am. Does not feel hungry in the morning and takes 1.5 hrs to feel ready to eat. Takes a long time to eat lunch over a couple of hours  Meal Food    Breakfast 10am: 2 homemade fiber cookies OR Granola bar OR pastry OR cup of grapes OR pastry or cup of grapes OR occas biscuit /croissant breakfast sandwich   Lunch 1-2pm: [employer]: 1/2 burrito bowl with chicken or steak, corn salsa, guac, cheese, letter, white rice and chips and OR Renny sliced beef and " "shrimp rice noodles OR sushi (raw fish tolerates)   Dinner Frozen meal: Stacey[employer] meal (burrito) OR [employer] [address] corn (corn, cheese with added mushrooms sauteed) OR 1/2 jerk seasoned chicken breast, baked red potatoes and pan sheared asparagus   Snacks 2 per week of Raised donut, go go squeeze applesauce pouch, 2 c fruit salad   Beverages Coffee drink in morning (this am pumpkin americano, 66-99 oz water, 16 oz juice few times per week,1-2 cans 7 up/week, sparking water with some juice   Alcohol Intake 2-3 drinks per month (causes sour stomach) so may have cider, mead or baileys/liquor. Will make a \"mocktail instead lime juice blend     Frequency of eating/taking out meals: 5 times per week (from [employer] or at work cafe) (Lunch 3-5 x/week and dinner 1x/week).     Labs:  Reviewed  Pertinent Medications/vitamin and mineral supplements:    miralax, psyllum husk (making cookies with miralax, psyllium, protein powder) or into a fruit smoothie.  Food Allergies:  rogers's yeast, watermelon, melon, fresh bananas (frozen ok).  Food intolerances: peas, legumes, avoids soy d/t endometriosis, decreased amount of gluten due to believes sensitivity, bell peppers eagle red (diarrhea), alcohol (causes sour stomach), beer causes immense pain, nuts (likes them but does not tolerate)  Physical Activity:  [place] some [place] work for 30 minutes  Estimated Nutrition Needs based on ideal body weight of 55k-1650 calories (25-30 kcals/kg), 55g protein (1g/kg), ~1 ml/kcal or total fluids per MD.     MALNUTRITION:  % Weight Loss:  Weight loss does not meet criteria for malnutrition   % Intake:  No decreased intake noted  Subcutaneous Fat Loss:  None observed  Muscle Loss:  None observed  Fluid Retention:  None noted    Malnutrition Diagnosis: Patient does not meet two of the above criteria necessary for diagnosing malnutrition  In Context of:  Chronic illness or disease    Nutrition Diagnosis:    Food and nutrition related " knowledge deficit related to lack of previous diet education/lack of complete recall of previous diet education for IBS, possible SIBO, bloating, constipation as evidenced by pt report and interest in diet education/review.      Nutrition Prescription: trial low fodmap elimination diet for 3 weeks.    Nutrition Intervention:    Nutrition Education/Counseling:  Provided diet education for IBS including some potential food and beverage triggers such as alcohol, caffeine, carbonation, high fat and high added sugars in diet. Also discussed if fiber containing foods not well tolerated discussed tips potentially better tolerance such as cooked not raw vegetables and softer/peeled/canned fruit. Also explained recommendation for gradually adding in more fiber into diet but not eating excessive amounts at a time and drastically increasing daily amount in a very short time frame. Briefly discussed some diet tips for SIBO per pt preference and to answer her questions if her hydrogen breath test should find SIBO dx.     Provided nutrition education on general digestion of carbohydrates, FODMAP (Fermentable Oligo-saccharides, Di-saccharides, Mono-saccharides And Polyols) foods, impact these short chain carbohydrates on GI tract, Gut microbiota and its effects on fiber. Discussed high and low FODMAP foods. Reviewed FODMAP diet guidelines, including length of the diet,  the different phases of the diet plan with elimination phase and discussed recommendation and schedule for adding foods back in. Explained that pt could follow a modified lower FODMAP diet if preferred. For Modified lower FODMAP diet, review the list of high FODMAP foods and then eliminate 1-2 of those high FODMAP foods you eat daily/most days to see if any improvement in GI issues. (i.e. apples, flip).    Keep daily food and beverage journals and track all GI symptoms. Can use julita such as my symptoms julita, my fitness pal for example.     Discussed adequate  hydration and recommended pt continue drinking at least 48 oz-64 oz fluids/water per day. Provided diet education handouts for Low FODMAP.     Patient expressed understanding of diet education and interested in focusing on goals below.    Educational Materials Provided:  NCM: low FODMAP diet handout, fodmap list, IBS nutrition therapy, SIBO handout.    Patient verbalized understanding of education provided. See Goals below.     Goals:  1. Follow a low FODMAP diet. Start with eliminating higher FODMAP foods for 3 weeks. Then after 3 weeks start adding those higher FODMAP foods back into diet. Start adding 1-2 of your favorite higher FODMAP foods for 3-4 days (eat a serving of that higher 1-2 FODMAP foods daily during those 3-4 days). Then add another 1-2 higher FODMAP foods and onward. (if you noticed any symptoms after adding back in a higher FODMAP food than wait a day before trying the next higher FODMAP food.     2. Keep daily food and beverage journals and track all GI symptoms.Can use an julita such as My Symptoms julita.    3. Plan menus and meals ahead of time to help you stick to the elimination diet.    4. Eat meals and snacks at consistent times of the day.   -don't skip breakfast. Aim for eating breakfast within 60-90 minutes of getting up and space meals out about 4-6 hours apart.    5. Discontinue coffee drinks but okay for coffee with almond milk/low FODMAP nut milk.    6. Lower in fat may be better tolerated as well as moderate fiber (~20g-25g/day).     Nutrition Monitoring and Evaluation: Will monitor adherence to nutrition recommendations at future RD visits.     Further Medical Nutrition Therapy:  Recommended f/u in ~4 weeks  Next Appointment (if applicable):  Gave pt scheduling info/number  Patient was encouraged to call/contact RD with any further questions.    Tiarra Gardner, MS, RD, LD

## 2019-11-06 ENCOUNTER — TELEPHONE (OUTPATIENT)
Dept: GASTROENTEROLOGY | Facility: CLINIC | Age: 34
End: 2019-11-06

## 2019-11-06 NOTE — TELEPHONE ENCOUNTER
Procedure scheduled: Hydrogen Breath test    Referring Provider. Radha Collins    Arrival time verified? Friday 11/08/19 @7:45 am    Facility location verified? [address] [facility] end[facility] clinic, [address]; [address] [facility]    Instructions given regarding prep and procedure    Prep Type day before restricted diet; 12 hours before exam NPO    Instructions given? Via [employer]/email    Patients first name  Johnny Will.

## 2019-11-08 ENCOUNTER — HOSPITAL ENCOUNTER (OUTPATIENT)
Facility: CLINIC | Age: 34
Discharge: HOME | End: 2019-11-08
Attending: INTERNAL MEDICINE | Admitting: INTERNAL MEDICINE
Payer: COMMERCIAL

## 2019-11-08 VITALS — WEIGHT: 216 LBS | BODY MASS INDEX: 35.99 KG/M2 | HEIGHT: 65 IN

## 2019-11-08 PROCEDURE — 91065 BREATH HYDROGEN/METHANE TEST: CPT | Performed by: INTERNAL MEDICINE

## 2019-11-08 ASSESSMENT — MIFFLIN-ST. JEOR: SCORE: 1667.71

## 2019-11-08 NOTE — DISCHARGE INSTRUCTIONS
Discharge Instructions after Hydrogen Breath Test:      You may notice diarrhea for the next 12 to 24 hours after the test.  You may resume regular diet and medications.  Follow up with referring MD in clinic.    If you have questions call  from 7am to 430 pm Mon through Friday.    Yayo DELCID RN

## 2019-11-08 NOTE — OR NURSING
"Pt here for Hydrogen Breath Test.  Substrate 25 gm D Xylose.    Procedure explained and consent obtaied.  Main complaints are of gas/bloating especially aftger eating legumes, whole grains.     Baseline breath obtained before pt drank D xylose 25gm at 0830.          Baseline:        H2   7      CH4 0    CO2  3.3  20 min            H2   9      CH4 0    CO2  3.5  40 min            H2   25    CH4 2    CO2  3.5         \" feeling farty/burpy\"  60 min            H2   41    CH4 1    CO2  4.0  80 min            H2   55    CH4 3    CO2  4.0       \"feeling farty/burpy\"  100 min          H2   67    CH4 2    CO2  3.2          \"heartburn\"  120 min          H@ 61     CH4 3   CO2  4.2  140 min          H2   62     CH4 3   CO2  4.3  160 min          H2   53     CH4 2   CO2  3.6  180 min          H2   56     CH4  3  CO2  4.2      Pt symptoms:  0914 and 0955  and 1008.    Reviewed discharge instructions.  Pt tolerated procedure.  Pt without questions or concerns.  Pt will follow up with referring provider for results.        "

## 2019-11-19 ENCOUNTER — TELEPHONE (OUTPATIENT)
Dept: INTERNAL MEDICINE | Facility: CLINIC | Age: 34
End: 2019-11-19

## 2019-11-19 DIAGNOSIS — K63.8219 SMALL INTESTINAL BACTERIAL OVERGROWTH: Primary | ICD-10-CM

## 2019-11-19 NOTE — TELEPHONE ENCOUNTER
[facility] Call Center    Phone Message    May a detailed message be left on voicemail: yes    Reason for Call: Other: .  pt is returning a missed call - unsure what its about - suspects its about hydrogen breath test    Action Taken: Message routed to:  [facility] ([facility]): maurice

## 2019-11-19 NOTE — TELEPHONE ENCOUNTER
Hydrogen breath testing shows positive H2 breath test, suggestive of SIBO, unable to leave detailed voicemail, sent results via [employer]. Will treat with 14-day course Rifaximin 550 mg TID. Keep appt with Dr. Tomas in GI as scheduled for follow-up.  TORSTEN Chao CNP

## 2019-11-21 NOTE — TELEPHONE ENCOUNTER
Left a message for pt that Dr. Tomas attempted to reach out to her and unable to reach. Hydrogen breath test was ordered by primary care provider and the provider snet a my chart message and prescription,. Left my name and number.         Maikol Tomas MD [place], Ann, RN   Caller: Unspecified (2 days ago, 12:14 PM)             Yes tried calling again but goes straight to voicemail.     It looks like PCP (who ordered HBT) sent her Mychart message and prescription for rifaximin.     Thanks,

## 2020-02-06 NOTE — PROGRESS NOTES
"[facility] Outpatient Medical Nutrition Therapy      Time Spent:  60 minutes  Session Type:  Follow up Individual Session  Referring Physician: Dr. Onofre Ni (and Dr. Maikol Tomas  Reason for RD Visit:   Irritable bowel syndrome, GERD, nutritional counseling    Nutrition Assessment:  Patient is here with her friend/partner, Leonidas for follow up visit with Registered Dietitian (RD).  Patient is a 34 year old who identifies as other and prefers to be called \"Ri\". From discussion with pt today, she is okay with any pronouns and okay with having she, her, hers in note.     Pt with history of irritable bowel syndrome, GERD, ADHD, OCD, migraines, fibromyalgia anxiety, depression, endometriosis and with c/o having a lot of bloating and flatulence, and at previous visit with more constipation.  Since last visit, she has her breath test and was positive for SIBO. She stated that she completed her antibiotic course now. Patient stated that she also completed her allergy testing and was found to be allergic to red kidney beans, peas, hazelnuts, banana, cantaloupe, honeydew, watermelon and questionable on chickpeas.      She stated that after last visit and treatment for SIBO, she has been doing a partial low FODMAP elimination diet and eliminated those higher FODMAP food that she was eating on a more regular basis which includes onions, garlic, apples, cherries, flip, mushrooms, high fructose corn syrup, asparagus and is now using lactose-free milk.  She believes that gluten is a trigger for her but she has not been completely avoiding it and has been eating more.name foods lately.  She has found that onions and garlic are definitely triggers for symptoms and in general the fruit tends tends to be more of an issue for her.  She downloaded the [facility] pMDsoft journal at and tracking her intake there and just noting her symptoms.  She also put together a spreadsheet with all the high FODMAP foods and other categories of " whether she tolerated did not tolerate with yes and no green and red respectively and then and Asacol which describes a possibility of tolerance depending on the day and how she is feeling.  She mostly put together this list to help organize and remind her of the foods that do work for her and also for Leonidas who cooks for her.      She stated that last week she had a very difficult week at work with a lot of stress, so her intake was not well controlled and she did not feel well and had an increase in symptoms.  Prior to that when following the partial low FODMAP elimination diet she did notice an improvement in symptoms. She continues to only be able to tolerate cooked vegetables and generally her favorite vegetables are cucumbers, spinach, asparagus and mushrooms. Due to mushrooms and asparagus being high FODMAP foods she has had limited vegetables she likes/tolerates during this elimination phase. She wasn't sure how long she should go through this process or if she should avoid forever, and/or since she did not avoid these foods last week and had symptoms, she wondered if she needs to restart the entire elimination period. She find that when she is reactive to a food/beverage, it happens pretty soon after ingestion. After that it takes about 3-4 days before she feels better.     Progress towards previous goals:  1. Follow a low FODMAP diet. Start with eliminating higher FODMAP foods for 3 weeks. Then after 3 weeks start adding those higher FODMAP foods back into diet. Start adding 1-2 of your favorite higher FODMAP foods for 3-4 days (eat a serving of that higher 1-2 FODMAP foods daily during those 3-4 days). Then add another 1-2 higher FODMAP foods and onward. (if you noticed any symptoms after adding back in a higher FODMAP food than wait a day before trying the next higher FODMAP food. -She did a partial elimination of common FODMAP foods she eats regularly.    2. Keep daily food and beverage journals and  "track all GI symptoms.Can use an julita such as My Symptoms julita.-Meeting using the "[employer] julita.    3. Plan menus and meals ahead of time to help you stick to the elimination diet.-Not meeting. She brought Leonidas to visit today to help figure out what to make for her.    4. Eat meals and snacks at consistent times of the day. -not necessarily doing this consistently  -don't skip breakfast. Aim for eating breakfast within 60-90 minutes of getting up and space meals out about 4-6 hours apart.-working on it.    5. Discontinue coffee drinks but okay for coffee with almond milk/low FODMAP nut milk.-meeting, now uses oatmilk or macademia milk or fairlife lactose free milk at home in coffee.    6. Lower in fat may be better tolerated as well as moderate fiber (~20g-25g/day).-didn't focus on this specifically, more on partial fodmap elimination.      Height:   Ht Readings from Last 1 Encounters:   11/08/19 1.638 m (5' 4.5\")     Weight:  Offered to obtain weight at visit, but pt declined and reported weighing at home and no weight changes/significant change.    Wt Readings from Last 10 Encounters:   11/08/19 98 kg (216 lb)   10/29/19 98.2 kg (216 lb 9.6 oz)   10/16/19 99.5 kg (219 lb 6.4 oz)   06/05/19 97.1 kg (214 lb)   03/04/19 97.5 kg (215 lb)   02/19/19 99.3 kg (218 lb 14.4 oz)   12/14/18 103.3 kg (227 lb 11.2 oz)   11/20/18 103.9 kg (229 lb)   10/25/18 104 kg (229 lb 3.2 oz)   10/16/18 105.3 kg (232 lb 3.2 oz)     BMI: 36.56     Diet Recall:  (some usual recall below):  Meal Food    Breakfast Hash browns shallot oil and B italian sausage  breakfast sandwich OR    Lunch : Spanish, Occitan/ minda (rice bowl) sumo mandarin OR deli roast beef or proscuito/cured meat and cheese and mandarin/rasp/strawberries, nuts (peanuts or almonds) OR Perfect snack bar: PB chocolate chips, coconut   Dinner pasta with butter and parmesan, spinach or cucumber tomato salad OR spinach pie with potato crust and feta OR lettuce wrap burger with " "mushrooms    Snacks triscuits with brie or deli ham on bun or    Beverages Coffee drink made with oat milk or macademia milk or simple syrup OR latte with sugar, 66-99 oz water,   2 cans cane sugar soda per week.     Alcohol Intake 2-3 drinks per month (causes sour stomach) so may have cider, mead or baileys/liquor. Will make a \"mocktail instead lime juice blend     Frequency of eating/taking out meals: 5 times per week (from [employer] or at work cafe).  (Lunch 3-5 x/week and dinner 1x/week).     Labs:  Reviewed  Pertinent Medications/vitamin and mineral supplements:    Obtained at last/initial visit, did not discuss at visit today. miralax, psyllum husk (making cookies with miralax, psyllium, protein powder) or into a fruit smoothie.  Food Allergies:  rogers's yeast, watermelon, honey dew melon, cantaloupe, banana, peas, red kidney beans, hazelnuts, chickpeas.  Food intolerances: peas, legumes, avoids soy d/t endometriosis, decreased amount of gluten due to believes sensitivity, bell peppers eagle red (diarrhea), alcohol (causes sour stomach), beer causes immense pain, nuts (likes them but does not tolerate)  Physical Activity:  [place] [employer] some days at work for 30 minutes   Estimated Nutrition Needs based on ideal body weight of 55k-1650 calories (25-30 kcals/kg), 55g protein (1g/kg), ~1 ml/kcal or total fluids per MD.     MALNUTRITION:  % Weight Loss:  Weight loss does not meet criteria for malnutrition   % Intake:  No decreased intake noted  Subcutaneous Fat Loss:  None observed  Muscle Loss:  None observed  Fluid Retention:  None noted    Malnutrition Diagnosis: Patient does not meet two of the above criteria necessary for diagnosing malnutrition  In Context of:  Chronic illness or disease    Nutrition Diagnosis:    Previous and current: Food and nutrition related knowledge deficit related to lack of previous diet education/lack of complete recall of previous diet education for IBS, possible SIBO, " bloating, constipation as evidenced by pt report and interest in diet education/review.  -Ongoing    Nutrition Prescription: Continue partial FODMAP elimination diet + start adding in 1 high FODMAP food every 3-4 days.    Nutrition Intervention:    Nutrition Education/Counseling:  Provided diet education review for IBS, answered her questions re: SIBO diet recommendations.  Encouraged patient to continue partial elimination diet as well as trialing wheat, barley, rye elimination consistent with lower FODMAP diet with restarting challenge phase. Discussed ways in which to add high FODMAP foods back into diet. Recommended doing so slowly, 1 food every 3-4 days as tolerated.  Continue patient interest and wanting to try asparagus, encouraged her to start with a small serving of asparagus for a 3-4 day period.  Encouraged pt to aim for eating meals consistently each day as well as generally at consistent times of the day.    Recommended pt continue to keep daily food and beverage journals and track all GI symptoms. Can continue [employer] julita. Recommended pt continue drinking at least 48 oz-64 oz fluids/water or more per day. Provided diet education handouts for Low FODMAP. Since pt has put together good lists with higher FODMAP foods and wrote how she feels with each, and per Leonidas's request, recommended she do a copy of her spreadsheet with just the tolerated foods to help her and Leonidas meal plan and grocery shop for foods. Discussed some websites with low FODMAP recipes as well. Patient expressed understanding of diet education and interested in focusing on goals below. See Goals below.     Goals:  1. Continue/restart elimination of onions, garlic, mushrooms, apples, cherries, flip, mushrooms, asparagus for at least week.  Then retry asparagus (have a small serving ~1/2 cup) for 3-4 days to see if tolerated. If causes a reaction on first day, then do not have to eat the next 2 days.    2. Discontinue higher FODMAP  grains (wheat, barley, rye) for at least 3-4 weeks to see if any relief in symptoms.    3. Limit/avoid added sugars in diet.    4. Continue fairlife milk or oat milk, macademia milk.    5. [email or web address] has some recipes and information. Christelle [employer] has a low FODMAP cookbook.    6. Keep daily food and beverage symptoms     Nutrition Monitoring and Evaluation: Will monitor adherence to nutrition recommendations at future RD visits.     Further Medical Nutrition Therapy:  Recommended f/u in ~1 month and/or on same day as next MD visit.  Next Appointment (if applicable):  Gave pt scheduling info/number  Patient was encouraged to call/contact RD with any further questions.    Tiarra Gardner, MS, RD, LD

## 2020-02-10 ENCOUNTER — OFFICE VISIT (OUTPATIENT)
Dept: GASTROENTEROLOGY | Facility: CLINIC | Age: 35
End: 2020-02-10
Payer: COMMERCIAL

## 2020-02-10 DIAGNOSIS — Z71.3 NUTRITIONAL COUNSELING: ICD-10-CM

## 2020-02-10 DIAGNOSIS — K58.2 IRRITABLE BOWEL SYNDROME WITH BOTH CONSTIPATION AND DIARRHEA: ICD-10-CM

## 2020-02-10 DIAGNOSIS — K58.9 IRRITABLE BOWEL SYNDROME: Primary | ICD-10-CM

## 2020-02-10 DIAGNOSIS — R14.0 ABDOMINAL BLOATING: ICD-10-CM

## 2020-02-10 DIAGNOSIS — K63.8219 SMALL INTESTINAL BACTERIAL OVERGROWTH: ICD-10-CM

## 2020-02-10 NOTE — PATIENT INSTRUCTIONS
It was nice seeing you again today:    1. Continue/restart elimination of onions, garlic, mushrooms, apples, cherries, flip, mushrooms, asparagus for at least week.  Then retry asparagus (have a small serving ~1/2 cup) for 3-4 days to see if tolerated. If causes a reaction on first day, then do not have to eat the next 2 days.    2. Discontinue higher FODMAP grains (wheat, barley, rye) for at least 3-4 weeks to see if any relief in symptoms.    3. Limit/avoid added sugars in diet.    4. Continue fairlife milk or oat milk, macademia milk.    5. [email or web address] has some recipes and information. Christelleandres [employer] has a low FODMAP cookbook.    6. Keep daily food and beverage symptoms     Tiarra Gardner, MS, RD, LD    If you would like to schedule a follow up appointment with Tiarra Gardner, Registered Dietitian, please call [phone].

## 2020-02-10 NOTE — LETTER
"2/10/2020       RE: Paris Nguyen  [address] [place] [address]     Dear Colleague,    Thank you for referring your patient, Paris Nguyen, to the [facility] GASTRO[facility]OLOG[facility] IBD [facility] at [facility]. Please see a copy of my visit note below.    [facility] Outpatient Medical Nutrition Therapy      Time Spent:  60 minutes  Session Type:  Follow up Individual Session  Referring Physician: Dr. Onofre Ni (and Dr. Maikol Tomas  Reason for RD Visit:   Irritable bowel syndrome, GERD, nutritional counseling    Nutrition Assessment:  Patient is here with her friend/partner, Leonidas for follow up visit with Registered Dietitian (RD).  Patient is a 34 year old who identifies as other and prefers to be called \"Ri\". From discussion with pt today, she is okay with any pronouns and okay with having she, her, hers in note.     Pt with history of irritable bowel syndrome, GERD, ADHD, OCD, migraines, fibromyalgia anxiety, depression, endometriosis and with c/o having a lot of bloating and flatulence, and at previous visit with more constipation.  Since last visit, she has her breath test and was positive for SIBO. She stated that she completed her antibiotic course now. Patient stated that she also completed her allergy testing and was found to be allergic to red kidney beans, peas, hazelnuts, banana, cantaloupe, honeydew, watermelon and questionable on chickpeas.      She stated that after last visit and treatment for SIBO, she has been doing a partial low FODMAP elimination diet and eliminated those higher FODMAP food that she was eating on a more regular basis which includes onions, garlic, apples, cherries, flip, mushrooms, high fructose corn syrup, asparagus and is now using lactose-free milk.  She believes that gluten is a trigger for her but she has not been completely avoiding it and has been eating more.name foods lately.  She has found that onions and garlic are " definitely triggers for symptoms and in general the fruit tends tends to be more of an issue for her.  She downloaded the [facility] journal at and tracking her intake there and just noting her symptoms.  She also put together a spreadsheet with all the high FODMAP foods and other categories of whether she tolerated did not tolerate with yes and no green and red respectively and then and Asacol which describes a possibility of tolerance depending on the day and how she is feeling.  She mostly put together this list to help organize and remind her of the foods that do work for her and also for Leonidas who cooks for her.      She stated that last week she had a very difficult week at work with a lot of stress, so her intake was not well controlled and she did not feel well and had an increase in symptoms.  Prior to that when following the partial low FODMAP elimination diet she did notice an improvement in symptoms. She continues to only be able to tolerate cooked vegetables and generally her favorite vegetables are cucumbers, spinach, asparagus and mushrooms. Due to mushrooms and asparagus being high FODMAP foods she has had limited vegetables she likes/tolerates during this elimination phase. She wasn't sure how long she should go through this process or if she should avoid forever, and/or since she did not avoid these foods last week and had symptoms, she wondered if she needs to restart the entire elimination period. She find that when she is reactive to a food/beverage, it happens pretty soon after ingestion. After that it takes about 3-4 days before she feels better.     Progress towards previous goals:  1. Follow a low FODMAP diet. Start with eliminating higher FODMAP foods for 3 weeks. Then after 3 weeks start adding those higher FODMAP foods back into diet. Start adding 1-2 of your favorite higher FODMAP foods for 3-4 days (eat a serving of that higher 1-2 FODMAP foods daily during those 3-4 days). Then  "add another 1-2 higher FODMAP foods and onward. (if you noticed any symptoms after adding back in a higher FODMAP food than wait a day before trying the next higher FODMAP food. -She did a partial elimination of common FODMAP foods she eats regularly.    2. Keep daily food and beverage journals and track all GI symptoms.Can use an julita such as My Symptoms julita.-Meeting using the [employer] julita.    3. Plan menus and meals ahead of time to help you stick to the elimination diet.-Not meeting. She brought Leonidas to visit today to help figure out what to make for her.    4. Eat meals and snacks at consistent times of the day. -not necessarily doing this consistently  -don't skip breakfast. Aim for eating breakfast within 60-90 minutes of getting up and space meals out about 4-6 hours apart.-working on it.    5. Discontinue coffee drinks but okay for coffee with almond milk/low FODMAP nut milk.-meeting, now uses oatmilk or macademia milk or fairlife lactose free milk at home in coffee.    6. Lower in fat may be better tolerated as well as moderate fiber (~20g-25g/day).-didn't focus on this specifically, more on partial fodmap elimination.      Height:   Ht Readings from Last 1 Encounters:   11/08/19 1.638 m (5' 4.5\")     Weight:  Offered to obtain weight at visit, but pt declined and reported weighing at home and no weight changes/significant change.    Wt Readings from Last 10 Encounters:   11/08/19 98 kg (216 lb)   10/29/19 98.2 kg (216 lb 9.6 oz)   10/16/19 99.5 kg (219 lb 6.4 oz)   06/05/19 97.1 kg (214 lb)   03/04/19 97.5 kg (215 lb)   02/19/19 99.3 kg (218 lb 14.4 oz)   12/14/18 103.3 kg (227 lb 11.2 oz)   11/20/18 103.9 kg (229 lb)   10/25/18 104 kg (229 lb 3.2 oz)   10/16/18 105.3 kg (232 lb 3.2 oz)     BMI: 36.56     Diet Recall:  (some usual recall below):  Meal Food    Breakfast Hash browns shallot oil and B italian sausage  breakfast sandwich OR    Lunch : Dutch, Thai/ Zimbabwean (rice bowl) sumo mandarin OR deli " "roast beef or proscuito/cured meat and cheese and mandarin/rasp/strawberries, nuts (peanuts or almonds) OR Perfect snack bar: PB chocolate chips, coconut   Dinner pasta with butter and parmesan, spinach or cucumber tomato salad OR spinach pie with potato crust and feta OR lettuce wrap burger with mushrooms    Snacks triscuits with brie or deli ham on bun or    Beverages Coffee drink made with oat milk or macademia milk or simple syrup OR latte with sugar, 66-99 oz water,   2 cans cane sugar soda per week.     Alcohol Intake 2-3 drinks per month (causes sour stomach) so may have cider, mead or baileys/liquor. Will make a \"mocktail instead lime juice blend     Frequency of eating/taking out meals: 5 times per week (from [employer] or at work cafe).  (Lunch 3-5 x/week and dinner 1x/week).     Labs:  Reviewed  Pertinent Medications/vitamin and mineral supplements:    Obtained at last/initial visit, did not discuss at visit today. miralax, psyllum husk (making cookies with miralax, psyllium, protein powder) or into a fruit smoothie.  Food Allergies:  rogers's yeast, watermelon, honey dew melon, cantaloupe, banana, peas, red kidney beans, hazelnuts, chickpeas.  Food intolerances: peas, legumes, avoids soy d/t endometriosis, decreased amount of gluten due to believes sensitivity, bell peppers eagle red (diarrhea), alcohol (causes sour stomach), beer causes immense pain, nuts (likes them but does not tolerate)  Physical Activity:  walks [place] [employer] some days at work for 30 minutes   Estimated Nutrition Needs based on ideal body weight of 55k-1650 calories (25-30 kcals/kg), 55g protein (1g/kg), ~1 ml/kcal or total fluids per MD.     MALNUTRITION:  % Weight Loss:  Weight loss does not meet criteria for malnutrition   % Intake:  No decreased intake noted  Subcutaneous Fat Loss:  None observed  Muscle Loss:  None observed  Fluid Retention:  None noted    Malnutrition Diagnosis: Patient does not meet two of the above criteria " necessary for diagnosing malnutrition  In Context of:  Chronic illness or disease    Nutrition Diagnosis:    Previous and current: Food and nutrition related knowledge deficit related to lack of previous diet education/lack of complete recall of previous diet education for IBS, possible SIBO, bloating, constipation as evidenced by pt report and interest in diet education/review.  -Ongoing    Nutrition Prescription: Continue partial FODMAP elimination diet + start adding in 1 high FODMAP food every 3-4 days.    Nutrition Intervention:    Nutrition Education/Counseling:  Provided diet education review for IBS, answered her questions re: SIBO diet recommendations.  Encouraged patient to continue partial elimination diet as well as trialing wheat, barley, rye elimination consistent with lower FODMAP diet with restarting challenge phase. Discussed ways in which to add high FODMAP foods back into diet. Recommended doing so slowly, 1 food every 3-4 days as tolerated.  Continue patient interest and wanting to try asparagus, encouraged her to start with a small serving of asparagus for a 3-4 day period.  Encouraged pt to aim for eating meals consistently each day as well as generally at consistent times of the day.    Recommended pt continue to keep daily food and beverage journals and track all GI symptoms. Can continue [employer] julita. Recommended pt continue drinking at least 48 oz-64 oz fluids/water or more per day. Provided diet education handouts for Low FODMAP. Since pt has put together good lists with higher FODMAP foods and wrote how she feels with each, and per Leonidas's request, recommended she do a copy of her spreadsheet with just the tolerated foods to help her and Leonidas meal plan and grocery shop for foods. Discussed some websites with low FODMAP recipes as well. Patient expressed understanding of diet education and interested in focusing on goals below. See Goals below.     Goals:  1. Continue/restart elimination of  onions, garlic, mushrooms, apples, cherries, flip, mushrooms, asparagus for at least week.  Then retry asparagus (have a small serving ~1/2 cup) for 3-4 days to see if tolerated. If causes a reaction on first day, then do not have to eat the next 2 days.    2. Discontinue higher FODMAP grains (wheat, barley, rye) for at least 3-4 weeks to see if any relief in symptoms.    3. Limit/avoid added sugars in diet.    4. Continue fairlife milk or oat milk, macademia milk.    5. [employer]free[employer] has some recipes and information. [employer]elle Branch has a low FODMAP cookbook.    6. Keep daily food and beverage symptoms     Nutrition Monitoring and Evaluation: Will monitor adherence to nutrition recommendations at future RD visits.     Further Medical Nutrition Therapy:  Recommended f/u in ~1 month and/or on same day as next MD visit.  Next Appointment (if applicable):  Gave pt scheduling info/number  Patient was encouraged to call/contact RD with any further questions.    Tiarra Gardner, MS, RD, LD

## 2020-02-15 DIAGNOSIS — G47.00 INSOMNIA, UNSPECIFIED TYPE: ICD-10-CM

## 2020-02-19 RX ORDER — TRAZODONE HYDROCHLORIDE 50 MG/1
TABLET, FILM COATED ORAL
Qty: 45 TABLET | Refills: 0 | Status: SHIPPED | OUTPATIENT
Start: 2020-02-19 | End: 2020-03-21

## 2020-02-21 ENCOUNTER — OFFICE VISIT (OUTPATIENT)
Dept: FAMILY MEDICINE | Facility: CLINIC | Age: 35
End: 2020-02-21
Payer: COMMERCIAL

## 2020-02-21 VITALS
WEIGHT: 213.8 LBS | OXYGEN SATURATION: 98 % | SYSTOLIC BLOOD PRESSURE: 128 MMHG | BODY MASS INDEX: 36.5 KG/M2 | DIASTOLIC BLOOD PRESSURE: 88 MMHG | TEMPERATURE: 99.2 F | HEART RATE: 79 BPM | HEIGHT: 64 IN

## 2020-02-21 DIAGNOSIS — J01.00 ACUTE NON-RECURRENT MAXILLARY SINUSITIS: Primary | ICD-10-CM

## 2020-02-21 RX ORDER — PREDNISONE 20 MG/1
40 TABLET ORAL
COMMUNITY
Start: 2020-02-19 | End: 2020-02-24

## 2020-02-21 ASSESSMENT — PATIENT HEALTH QUESTIONNAIRE - PHQ9
SUM OF ALL RESPONSES TO PHQ QUESTIONS 1-9: 16
5. POOR APPETITE OR OVEREATING: MORE THAN HALF THE DAYS

## 2020-02-21 ASSESSMENT — ANXIETY QUESTIONNAIRES
5. BEING SO RESTLESS THAT IT IS HARD TO SIT STILL: NOT AT ALL
1. FEELING NERVOUS, ANXIOUS, OR ON EDGE: SEVERAL DAYS
GAD7 TOTAL SCORE: 6
6. BECOMING EASILY ANNOYED OR IRRITABLE: SEVERAL DAYS
IF YOU CHECKED OFF ANY PROBLEMS ON THIS QUESTIONNAIRE, HOW DIFFICULT HAVE THESE PROBLEMS MADE IT FOR YOU TO DO YOUR WORK, TAKE CARE OF THINGS AT HOME, OR GET ALONG WITH OTHER PEOPLE: SOMEWHAT DIFFICULT
7. FEELING AFRAID AS IF SOMETHING AWFUL MIGHT HAPPEN: NOT AT ALL
2. NOT BEING ABLE TO STOP OR CONTROL WORRYING: SEVERAL DAYS
3. WORRYING TOO MUCH ABOUT DIFFERENT THINGS: SEVERAL DAYS

## 2020-02-21 ASSESSMENT — MIFFLIN-ST. JEOR: SCORE: 1646.38

## 2020-02-21 NOTE — PATIENT INSTRUCTIONS
Sinusitis - Augmentin twice daily per orders for 10 days. Increase fluids to 70-80 oz of water daily. Recommend OTC vitamin C 1000 mg per day. Get plenty of rest. May use OTC antihistamines or cold medications per label instructions for relief of symptoms. If your symptoms do not improve or become worse in the next week please schedule follow up visit with us.       Patient Education     Acute Sinusitis    Acute sinusitis is irritation and swelling of the sinuses. It is usually caused by a viral infection after a common cold. Your doctor can help you find relief.  What is acute sinusitis?  Sinuses are air-filled spaces in the skull behind the face. They are kept moist and clean by a lining of mucosa. Things such as pollen, smoke, and chemical fumes can irritate the mucosa. It can then swell up. As a response to irritation, the mucosa makes more mucus and other fluids. Tiny hairlike cilia cover the mucosa. Cilia help carry mucus toward the opening of the sinus. Too much mucus may cause the cilia to stop working. This blocks the sinus opening. A buildup of fluid in the sinuses then causes pain and pressure. It can also encourage bacteria to grow in the sinuses.  Common symptoms of acute sinusitis  You may have:    Facial soreness pain    Headache    Fever    Fluid draining in the back of the throat (postnasal drip)    Congestion    Drainage that is thick and colored, instead of clear    Cough  Diagnosing acute sinusitis  Your doctor will ask about your symptoms and health history. He or she will look at your ear, nose, and throat. You usually won't need to have X-rays taken.    The doctor may take a sample of mucus to check for bacteria. If you have sinusitis that keeps coming back, you may need imaging tests such as X-rays or CAT scans. This will help your doctor check for a structural problem that may be causing the infection.  Treating acute sinusitis  Treatment is aimed at unblocking the sinus opening and helping  the cilia work again. You may need to take antihistamine and decongestant medicine. These can reduce inflammation and decrease the amount of fluid your sinuses make. If you have a bacterial infection, you will need to take antibiotic medicine for 10 to 14 days. Take this medicine until it is gone, even if you feel better.  Date Last Reviewed: 10/1/2016    2325-3519 [email or web address] [address] [place] [address], [place] [address]. All rights reserved. This information is not intended as a substitute for professional medical care. Always follow your healthcare professional's instructions.

## 2020-02-21 NOTE — PROGRESS NOTES
SUBJECTIVE:  Paris Nguyen is a 35 year old adult presents for   Chief Complaint   Patient presents with     Sinus Problem     Prednisone not seeming to help, attached calendar of symptoms, dizziness, x1.5 weeks       HPI    Ri is here today with concern of sinus pressure and pain with significant congestion with purulent drainage the past 10 days. Symptoms came on and improved a bit, but then got worse this past Monday and are still bad. She has asthma that is under good control but she did take prednisone as she has this as part of her asthma plan if needed. This hasn't helped her sinus symptoms. Her asthma is mildly affected by these sinus symptoms, but she hasn't needed her rescue inhaler. She has been tired and had some body aches with this. Denies n/v/d or high fevers. No other concerns mentioned at today's visit.    Review Of Systems  Skin: negative  Eyes: negative  Ears/Nose/Throat: as above  Respiratory: negative  Cardiovascular: negative  Gastrointestinal: negative      Medications and allergies were reviewed by me today.   PMH/PSH and Social History Reviewed per EMR.    Current Medications  Current Outpatient Medications   Medication Sig Dispense Refill     benzonatate (TESSALON) 100 MG capsule Take 100-200 mg by mouth as needed        EPINEPHrine (EPIPEN/ADRENACLICK/OR ANY BX GENERIC EQUIV) 0.3 MG/0.3ML injection 2-pack Inject 0.3 mg into the muscle once       fexofenadine (ALLEGRA) 180 MG tablet Take 180 mg by mouth daily       fluticasone (FLOVENT HFA) 110 MCG/ACT Inhaler Inhale 2 puffs into the lungs 2 times daily       gabapentin (NEURONTIN) 300 MG capsule TAKE 1 CAPSULE TWICE A DAY, AND 2 CAPSULES AT BEDTIME 360 capsule 1     hydrOXYzine (ATARAX) 25 MG tablet TAKE 1 TO 2 TABLETS TWICE A DAY AS NEEDED FOR ITCHING OR ANXIETY 120 tablet 0     levalbuterol (XOPENEX HFA) 45 MCG/ACT Inhaler Inhale 1-2 puffs into the lungs every 4 hours as needed       LORazepam (ATIVAN) 0.5 MG tablet Take 1 tablet (0.5  "mg) by mouth daily as needed for anxiety 30 tablet 0     montelukast (SINGULAIR) 10 MG tablet Take 10 mg by mouth At Bedtime       multivitamin, therapeutic with minerals (MULTI-VITAMIN) TABS tablet Take 1 tablet by mouth daily       naproxen (NAPROSYN) 500 MG tablet Take 1 tablet (500 mg) by mouth 2 times daily as needed for moderate pain or headaches (migraines and fibromyalgia) 60 tablet 1     norethindrone (MICRONOR) 0.35 MG tablet Take 0.35 mg by mouth daily  4     ondansetron (ZOFRAN-ODT) 4 MG ODT tab Take 1 tablet (4 mg) by mouth every 6 hours as needed for nausea 30 tablet 1     order for DME Equipment being ordered: TENS  All necessary equipment: leads/pads  Duration of use: 36 months  Apply to painful areas 1 Device 0     predniSONE 20 MG PO tablet Take 40 mg by mouth       tiZANidine (ZANAFLEX) 2 MG tablet Take 1 tablet (2 mg) by mouth 3 times daily as needed for muscle spasms 90 tablet 0     traZODone 50 MG PO tablet Take 0.5-1 tablets (25-50 mg) by mouth nightly as needed for sleep 45 tablet 0     venlafaxine (EFFEXOR-XR) 150 MG 24 hr capsule Take 1 capsule (150 mg) by mouth daily 90 capsule 1     polyethylene glycol (MIRALAX) powder 1 capful (17 g) in 8 oz of liquid daily (Patient not taking: Reported on 2/21/2020) 850 g 1     rifaximin (XIFAXAN) 550 MG TABS tablet Take 1 tablet (550 mg) by mouth 3 times daily (Patient not taking: Reported on 2/21/2020) 42 tablet 0     vitamin D3 (CHOLECALCIFEROL) 1000 units (25 mcg) tablet Take 1 tablet (1,000 Units) by mouth daily (Patient not taking: Reported on 2/21/2020) 90 tablet 3       Allergies  Allergies   Allergen Reactions     Latex Rash     Rash and blisters     Liquid Adhesive Rash     Rash and blisters       Seasonal Allergies      Morphine Anxiety, GI Disturbance and Nausea     Wound Dressing Adhesive Rash     PN: \"blisters\"         OBJECTIVE:    Physical Exam  /88   Pulse 79   Temp 99.2  F (37.3  C) (Oral)   Ht 1.628 m (5' 4.1\")   Wt 97 kg " (213 lb 12.8 oz)   LMP 02/14/2020   SpO2 98%   BMI 36.58 kg/m      Exam:  Constitutional: healthy, alert and no distress  Head: Normocephalic. No masses, lesions, tenderness or abnormalities  Neck: Neck supple. No adenopathy. Thyroid symmetric, normal size,, Carotids without bruits.  ENT: Sinus tenderness acute  ENT exam normal, no neck nodes or sinus tenderness  Cardiovascular: PMI normal. No lifts, heaves, or thrills. Normal s1,s2, RRR. No murmurs, clicks gallops or rub  Respiratory: Good diaphragmatic excursion. Lungs clear throughout  Gastrointestinal: Abdomen soft, non-tender. BS normal. No masses, organomegaly  : Deferred  Musculoskeletal: extremities normal- no gross deformities noted, gait normal and normal muscle tone  Skin: no suspicious lesions or rashes  Neurologic: Gait normal. Reflexes normal and symmetric. Sensation grossly WNL.  Psychiatric: mentation appears normal and affect normal/bright  Hematologic/Lymphatic/Immunologic: Normal cervical lymph nodes      ASSESSMENT/PLAN:  Sinusitis - Augmentin twice daily per orders for 10 days. Increase fluids to 70-80 oz of water daily. Recommend OTC vitamin C 1000 mg per day. Get plenty of rest. May use OTC antihistamines or cold medications per label instructions for relief of symptoms. If your symptoms do not improve or become worse in the next week please schedule follow up visit with us.       Jennifer Rodriguez DNP, APRN, CNP

## 2020-02-21 NOTE — NURSING NOTE
"35 year old  Chief Complaint   Patient presents with     Asthma     Prednisone not seeming to help, attached calendar of symptoms, dizziness, x1.5 weeks       Blood pressure 128/88, pulse 79, temperature 99.2  F (37.3  C), temperature source Oral, height 1.628 m (5' 4.1\"), weight 97 kg (213 lb 12.8 oz), last menstrual period 02/14/2020, SpO2 98 %, not currently breastfeeding. Body mass index is 36.58 kg/m .  BP completed using cuff size:    Patient Active Problem List   Diagnosis     Fibromyalgia     TMJ arthralgia     Moderate persistent asthma without complication     ADHD     Autism spectrum disorder     Generalized anxiety disorder     IBS (irritable bowel syndrome)     OCD (obsessive compulsive disorder)     Seasonal allergic rhinitis due to pollen     Allergic rhinitis due to dust     Allergy to adhesive tape     Need for desensitization to allergens     Oral allergy syndrome     Plantar fasciitis       Wt Readings from Last 2 Encounters:   02/21/20 97 kg (213 lb 12.8 oz)   11/08/19 98 kg (216 lb)     BP Readings from Last 3 Encounters:   02/21/20 128/88   10/29/19 127/79   10/16/19 104/73       Allergies   Allergen Reactions     Latex Rash     Rash and blisters     Liquid Adhesive Rash     Rash and blisters       Seasonal Allergies      Morphine Anxiety, GI Disturbance and Nausea     Wound Dressing Adhesive Rash     PN: \"blisters\"       Current Outpatient Medications   Medication     benzonatate (TESSALON) 100 MG capsule     EPINEPHrine (EPIPEN/ADRENACLICK/OR ANY BX GENERIC EQUIV) 0.3 MG/0.3ML injection 2-pack     fexofenadine (ALLEGRA) 180 MG tablet     fluticasone (FLOVENT HFA) 110 MCG/ACT Inhaler     gabapentin (NEURONTIN) 300 MG capsule     hydrOXYzine (ATARAX) 25 MG tablet     levalbuterol (XOPENEX HFA) 45 MCG/ACT Inhaler     LORazepam (ATIVAN) 0.5 MG tablet     montelukast (SINGULAIR) 10 MG tablet     multivitamin, therapeutic with minerals (MULTI-VITAMIN) TABS tablet     naproxen (NAPROSYN) 500 MG " tablet     norethindrone (MICRONOR) 0.35 MG tablet     ondansetron (ZOFRAN-ODT) 4 MG ODT tab     order for DME     predniSONE 20 MG PO tablet     tiZANidine (ZANAFLEX) 2 MG tablet     traZODone 50 MG PO tablet     venlafaxine (EFFEXOR-XR) 150 MG 24 hr capsule     polyethylene glycol (MIRALAX) powder     rifaximin (XIFAXAN) 550 MG TABS tablet     vitamin D3 (CHOLECALCIFEROL) 1000 units (25 mcg) tablet     No current facility-administered medications for this visit.        Social History     Tobacco Use     Smoking status: Never Smoker     Smokeless tobacco: Never Used   Substance Use Topics     Alcohol use: Yes     Comment: less than 1 drink a week     Drug use: No       Honoring Choices - Health Care Directive Guide offered to patient at time of visit.    Health Maintenance Due   Topic Date Due     DEPRESSION ACTION PLAN  1984     HIV SCREENING  02/17/1999     ASTHMA CONTROL TEST  02/17/2019     ASTHMA ACTION PLAN  08/17/2019     PREVENTIVE CARE VISIT  03/04/2020     PHQ-9  03/10/2020       Immunization History   Administered Date(s) Administered     DTaP, Unspecified 06/28/1996     HepA-Adult 06/09/2017, 02/19/2019     Historical DTP/aP 1984, 1984, 1984, 08/15/1985, 04/27/1998     Hpv, Unspecified  02/20/1986     Influenza Vaccine IM > 6 months Valent IIV4 02/04/2015, 10/22/2015, 11/24/2017, 10/21/2018, 09/30/2019     Influenza Vaccine, 6+MO IM (QUADRIVALENT W/PRESERVATIVES) 01/10/2013, 09/23/2015     MMR 05/16/1985, 04/16/1996     OPV, trivalent, live 1984, 1984, 08/15/1985, 04/27/1989     Pneumococcal 23 valent 11/24/2017     TDAP Vaccine (Adacel) 12/28/2006     Td (Adult), Adsorbed 06/18/1996     Tdap (Adult) Unspecified Formulation 11/24/2017     Typhoid IM 06/09/2017       Lab Results   Component Value Date    PAP NIL 12/14/2018         Recent Labs   Lab Test 10/16/19  0857 06/11/19 06/05/19  0832 05/08/19  1044   LDL  --   --   --  130*   HDL  --   --   --  50   TRIG  --    --   --  143   ALT 31  --   --  43   CR 0.61  --   --  0.64   GFRESTIMATED >90  --   --  >90   GFRESTBLACK >90  --   --  >90   ALBUMIN 3.9  --   --  4.0   POTASSIUM 3.9  --   --  4.0   TSH  --  2.500 3.13  --        PHQ-2 ( 1999 Pfizer) 9/10/2019 8/1/2019   Q1: Little interest or pleasure in doing things 1 1   Q2: Feeling down, depressed or hopeless 1 1   PHQ-2 Score 2 2   Q1: Little interest or pleasure in doing things - -   Q2: Feeling down, depressed or hopeless - -   PHQ-2 Score - -       PHQ-9 SCORE 6/7/2019 8/1/2019 9/10/2019 2/21/2020   PHQ-9 Total Score Cheryl - - - -   PHQ-9 Total Score 13 13 13 16       INDRA-7 SCORE 6/7/2019 9/10/2019 2/21/2020   Total Score - - -   Total Score 13 13 6       ACT Total Scores 8/17/2018 2/21/2020   ACT TOTAL SCORE (Goal Greater than or Equal to 20) 21 14   In the past 12 months, how many times did you visit the emergency room for your asthma without being admitted to the hospital? 0 0   In the past 12 months, how many times were you hospitalized overnight because of your asthma? 0 0         Cheyanne Herrmann CMA  February 21, 2020 10:59 AM

## 2020-02-22 ASSESSMENT — ANXIETY QUESTIONNAIRES: GAD7 TOTAL SCORE: 6

## 2020-02-22 ASSESSMENT — ASTHMA QUESTIONNAIRES: ACT_TOTALSCORE: 14

## 2020-03-04 ENCOUNTER — TRANSFERRED RECORDS (OUTPATIENT)
Dept: HEALTH INFORMATION MANAGEMENT | Facility: CLINIC | Age: 35
End: 2020-03-04

## 2020-03-19 DIAGNOSIS — G47.00 INSOMNIA, UNSPECIFIED TYPE: ICD-10-CM

## 2020-03-19 NOTE — TELEPHONE ENCOUNTER
Last seen: 9/10/19  RTC: 6 months   Cancel: none   No-show: 3/10/20  Next appt: none     Incoming refill from pharmacy via Rx Auth     Medication requested: traZODone 50 MG PO tablet   Directions: : Take 0.5-1 tablets (25-50 mg) by mouth nightly as needed for sleep  Qty: 45  Last refilled: 2/19/20    Will route to provider for approval

## 2020-03-21 DIAGNOSIS — G47.00 INSOMNIA, UNSPECIFIED TYPE: ICD-10-CM

## 2020-03-21 RX ORDER — TRAZODONE HYDROCHLORIDE 50 MG/1
TABLET, FILM COATED ORAL
Qty: 45 TABLET | Refills: 2 | Status: SHIPPED | OUTPATIENT
Start: 2020-03-21 | End: 2020-03-26

## 2020-03-23 RX ORDER — TRAZODONE HYDROCHLORIDE 50 MG/1
TABLET, FILM COATED ORAL
Qty: 45 TABLET | Refills: 2 | OUTPATIENT
Start: 2020-03-23

## 2020-03-23 NOTE — TELEPHONE ENCOUNTER
Meds refilled by provider on 3/21 encounter   Med tab changed to reflect this     No further action needed by this writer

## 2020-03-26 ENCOUNTER — VIRTUAL VISIT (OUTPATIENT)
Dept: PSYCHIATRY | Facility: CLINIC | Age: 35
End: 2020-03-26
Attending: NURSE PRACTITIONER
Payer: COMMERCIAL

## 2020-03-26 DIAGNOSIS — F33.1 MODERATE EPISODE OF RECURRENT MAJOR DEPRESSIVE DISORDER (H): ICD-10-CM

## 2020-03-26 DIAGNOSIS — F41.9 ANXIETY: ICD-10-CM

## 2020-03-26 DIAGNOSIS — F41.1 GENERALIZED ANXIETY DISORDER: ICD-10-CM

## 2020-03-26 DIAGNOSIS — G47.00 INSOMNIA, UNSPECIFIED TYPE: ICD-10-CM

## 2020-03-26 RX ORDER — HYDROXYZINE HYDROCHLORIDE 25 MG/1
TABLET, FILM COATED ORAL
Qty: 270 TABLET | Refills: 1 | Status: SHIPPED | OUTPATIENT
Start: 2020-03-26 | End: 2020-06-09

## 2020-03-26 RX ORDER — GABAPENTIN 300 MG/1
CAPSULE ORAL
Qty: 360 CAPSULE | Refills: 1 | Status: SHIPPED | OUTPATIENT
Start: 2020-03-26 | End: 2020-08-11

## 2020-03-26 RX ORDER — TRAZODONE HYDROCHLORIDE 50 MG/1
TABLET, FILM COATED ORAL
Qty: 45 TABLET | Refills: 2 | Status: SHIPPED | OUTPATIENT
Start: 2020-03-26 | End: 2020-06-09

## 2020-03-26 RX ORDER — VENLAFAXINE HYDROCHLORIDE 150 MG/1
150 CAPSULE, EXTENDED RELEASE ORAL DAILY
Qty: 90 CAPSULE | Refills: 1 | Status: SHIPPED | OUTPATIENT
Start: 2020-03-26 | End: 2020-08-11

## 2020-03-26 NOTE — PROGRESS NOTES
"  Psychiatry Clinic Progress Note                                                                   Paris Nguyen is a 35 year old female who prefers the name Ri (\"Ree\") and pronoun they.   Therapist: Marisol (replacement therapist) @ [facility] [[email or web address]    PCP: Radha Collins  Other Providers: ALEXANDRA Otero @ [facility]    Pertinent Background:  See previous notes.  Psych critical item history includes mutiple psychotropic trials.     Interim History                                                                                                        4, 4     The patient is a good historian, reports good treatment adherence and was last seen 9/10/20.  Since the last visit, Ri reports they are doing well while working from home. Mood stable. Recently rejected from job but states they are coping effectively.  Sleep is \"garbage\" due to headache pain.  Does not want to adjust medications    9/10/20: Ri reports feeling \"dysregulated right now.\"  Myalgia flare-up, and migraine today has worsened irritability.  They are going to gym weekly (pilates) which helps manage dysregulation.  Describes mood as \"angry.\"  Has returned to work which they describes as \"toxic.\" Sleep is \"ok.\"  They do not want to adjust medications today as believes symptoms are situational.  Lorazepam used extremely sparingly.  Using hydroxyzine and non-pharmacological interventions instead.      8/1/19: Ri reports they found out their aunt had a heart attack and passed away yesterday.  Ri also lost their father and 2 friends since April.  Continues to see therapist regularly but previous therapist finished training. Ri is seeing interim therapist.  Plans to restart DBT in near future.  Ri reports that their are more agitated and \"zoning out\" more often which they attributes to feeling overwhelmed with grief.  Reports that physical activity has been helpful in managing.  Reports that anxiety was improving prior to her aunt's death.  " "Since then, she reports using Lorazepam 3 times per week.  Have discussed alternative for benzo, such as propranolol, but due to circumstances and additional psychosocial stressors will continue Lorazepam.       6/7/19: Ri reports their father passed away in April. They appear to be coping with loss appropriately.  They states their life after her father's passing will be \"simpler but not better.\"  They and their family are in process of redefining her familial role.  As expected, Ri reports a worsening of mood and anxiety.  They expect symptoms to resolves once they have properly grieved.  Ri does not want to adjust medications    2/6/19: Ri is currently experiencing a cold which has significant impacted her energy.  Has not been to work in 3 days.  They reports their mood and anxiety are currently well managed.  Increase in Effexor appears to be helpful as Ri describes herself as \"more chill.\"  Ri continues to work with therapist with distress tolerance and acceptance.  They reports most anxiety is work related.  Cymbalta for possible additional mood and pain management was discussed.  Explained interactions with Effexor and patient was agreeable to not start today.       1/9/19: Ri reports increased anxiety and frustration.  They attributes to receiving a substantial medical bill recently.  Financial stress is main concern overall.  Mood reported as ok.  Lorazepam has been used more frequently (up to 3 times per week).  Requests increase Effexor XR 150mg.  Gabapentin helpful with body pain does not associate with anxiety relief.      11/27/18: Ri was able to discontinue Lexapro successfully and started Effexor XR 75mg.  Since starting Effexor, Ri reports depressive symptoms continues but less intense and less frequent.  Effexor XR started approximately 2 weeks ago.  Since increasing nighttime dose of Gabapentin, Ri reports sleep has improved.  Also Trazodone decreased to 25mg due to morning sedation.  Continues " to experience some sedation in morning but positives outweigh negative.  Guanfacine tried recently to manage anxiety, panic, and irritability; but stopped due to lowered blood pressure.       Recent Symptoms:   Depression:  depressed mood, anhedonia, low energy, appetite changes and poor concentration /memory  Elevated:  none  Psychosis:  none  Anxiety:  periodic worry  Panic Attack:  none  Trauma Related:  none     Recent Substance Use:  No changes reported        Social/ Family History                                  [per patient report]                                 1ea,1ea   FINANCIAL SUPPORT- working       FEELS SAFE AT HOME- Yes    Medical / Surgical History                                                                                                                  Patient Active Problem List   Diagnosis     Fibromyalgia     TMJ arthralgia     Moderate persistent asthma without complication     ADHD     Autism spectrum disorder     Generalized anxiety disorder     IBS (irritable bowel syndrome)     OCD (obsessive compulsive disorder)     Seasonal allergic rhinitis due to pollen     Allergic rhinitis due to dust     Allergy to adhesive tape     Need for desensitization to allergens     Oral allergy syndrome     Plantar fasciitis       Past Surgical History:   Procedure Laterality Date     ABDOMEN SURGERY  10/2016    Endometriosis excision     CYSTOSCOPY N/A 10/12/2016    Procedure: CYSTOSCOPY;  Surgeon: Eliazar Patel MD;  Location: [facility] OR     DAVINCI ASSISTED ABLATION / EXCISION OF ENDOMETRIOSIS N/A 10/12/2016    Procedure: DAVINCI ASSISTED ABLATION / EXCISION OF ENDOMETRIOSIS;  Surgeon: Eliazar Patel MD;  Location: [facility] OR     DAVINCI LYSIS OF ADHESIONS N/A 10/12/2016    Procedure: DAVINCI LYSIS OF ADHESIONS;  Surgeon: Eliazar Patel MD;  Location: [facility] OR     DAVINCI PELVIC PROCEDURE N/A 10/12/2016    Procedure: DAVINCI PELVIC PROCEDURE;  Surgeon: Eliazar Patel MD;   Location:  OR     DILATION AND CURETTAGE, HYSTEROSCOPY DIAGNOSTIC, COMBINED N/A 10/12/2016    Procedure: COMBINED DILATION AND CURETTAGE, HYSTEROSCOPY DIAGNOSTIC;  Surgeon: Eliazar Patel MD;  Location:  OR     EYE SURGERY      Eye Surgery x 2 as an infant     HC TOOTH EXTRACTION W/FORCEP          Medical Review of Systems                                                                                                    2,10   The remainder of the review of systems is noncontributory  Dizziness is a regular issue, has episodes maybe twice per day. Constipation is frequently an issue. Denies N/V, headaches. Had migraines in the past, but hasn't had one in a while. Thinks diet helped with this.   Allergy                                Latex; Liquid adhesive; Seasonal allergies; Morphine; and Wound dressing adhesive  Current Medications                                                                                                       Current Outpatient Medications   Medication Sig Dispense Refill     benzonatate (TESSALON) 100 MG capsule Take 100-200 mg by mouth as needed        EPINEPHrine (EPIPEN/ADRENACLICK/OR ANY BX GENERIC EQUIV) 0.3 MG/0.3ML injection 2-pack Inject 0.3 mg into the muscle once       fexofenadine (ALLEGRA) 180 MG tablet Take 180 mg by mouth daily       fluticasone (FLOVENT HFA) 110 MCG/ACT Inhaler Inhale 2 puffs into the lungs 2 times daily       gabapentin (NEURONTIN) 300 MG capsule TAKE 1 CAPSULE TWICE A DAY, AND 2 CAPSULES AT BEDTIME 360 capsule 1     hydrOXYzine (ATARAX) 25 MG tablet TAKE 1 TO 2 TABLETS TWICE A DAY AS NEEDED FOR ITCHING OR ANXIETY 120 tablet 0     levalbuterol (XOPENEX HFA) 45 MCG/ACT Inhaler Inhale 1-2 puffs into the lungs every 4 hours as needed       LORazepam (ATIVAN) 0.5 MG tablet Take 1 tablet (0.5 mg) by mouth daily as needed for anxiety 30 tablet 0     montelukast (SINGULAIR) 10 MG tablet Take 10 mg by mouth At Bedtime       multivitamin, therapeutic  "with minerals (MULTI-VITAMIN) TABS tablet Take 1 tablet by mouth daily       naproxen (NAPROSYN) 500 MG tablet Take 1 tablet (500 mg) by mouth 2 times daily as needed for moderate pain or headaches (migraines and fibromyalgia) 60 tablet 1     norethindrone (MICRONOR) 0.35 MG tablet Take 0.35 mg by mouth daily  4     ondansetron (ZOFRAN-ODT) 4 MG ODT tab Take 1 tablet (4 mg) by mouth every 6 hours as needed for nausea 30 tablet 1     order for DME Equipment being ordered: TENS  All necessary equipment: leads/pads  Duration of use: 36 months  Apply to painful areas 1 Device 0     polyethylene glycol (MIRALAX) powder 1 capful (17 g) in 8 oz of liquid daily (Patient not taking: Reported on 2/21/2020) 850 g 1     rifaximin (XIFAXAN) 550 MG TABS tablet Take 1 tablet (550 mg) by mouth 3 times daily (Patient not taking: Reported on 2/21/2020) 42 tablet 0     tiZANidine (ZANAFLEX) 2 MG tablet Take 1 tablet (2 mg) by mouth 3 times daily as needed for muscle spasms 90 tablet 0     traZODone (DESYREL) 50 MG tablet Take 0.5-1 tablets (25-50 mg) by mouth nightly as needed for sleep 45 tablet 2     venlafaxine (EFFEXOR-XR) 150 MG 24 hr capsule Take 1 capsule (150 mg) by mouth daily 90 capsule 1     vitamin D3 (CHOLECALCIFEROL) 1000 units (25 mcg) tablet Take 1 tablet (1,000 Units) by mouth daily (Patient not taking: Reported on 2/21/2020) 90 tablet 3     Vitals                                                                                                                       3, 3   There were no vitals taken for this visit.   Mental Status Exam                                                                                    9, 14 cog gs     Alertness: alert  and oriented  Appearance: unable to assess  Behavior/Demeanor: cooperative and pleasant, with unable to assess eye contact   Speech: normal  Language: no problems  Psychomotor: unable to assess  Mood: \"good\"  Affect: unable to assess; was congruent to mood; was congruent " to content  Thought Process/Associations: unremarkable  Thought Content:  Reports none;  Denies suicidal ideation and violent ideation  Perception:  Reports none;  Denies auditory hallucinations and visual hallucinations  Insight: good  Judgment: good  Cognition: (6) does  appear grossly intact; formal cognitive testing was not done  Gait/Station and/or Muscle Strength/Tone: unable to assess    Labs and Data                                                                                                                 Rating Scales:    PHQ9 and INDRA-7  INDRA-7: not completed  PHQ9 Today: not completed  PHQ-9 SCORE 8/1/2019 9/10/2019 2/21/2020   PHQ-9 Total Score MyChart - - -   PHQ-9 Total Score 13 13 16         Diagnosis and Assessment                                                                             m2, h3     Today the following issues were addressed:    1) Major Depressive Disorder, recurrent, moderate.    2) Generalized Anxiety Disorder  3) ADHD (Hx)    [place] Prescription Monitoring Program [] review was not needed today.    PSYCHOTROPIC DRUG INTERACTIONS:   NAPROXEN -- ESCITALOPRAM -- VENLAFAXINE may result in an increased risk of bleeding.     ESCITALOPRAM -- VENLAFAXINE -- HYDROXYZINE -- TRAZODONE -- TIZANIDINE may result in increased risk of QT interval prolongation.     ESCITALOPRAM -- VENLAFAXINE -- TRAZODONE may result in an increased risk of serotonin syndrome (hypertension, hyperthermia, myoclonus, mental status changes).     LORAZEPAM -- ESTRADIOL  may result in decreased lorazepam effectiveness.      MANAGEMENT:  Monitoring for adverse effects and routine vitals    Plan                                                                                                                    m2, h3      1) Medication Management   Continue Effexor XR 150mg daily  Continue Trazodone 25-50mg at bedtime PRN  Continue Hyrdoxyzine 25mg for anxiety  Continue Gabapentin 300 BID and 600mg at bedtime   Continue  "Lorazepam 0.5mg daily PRN (takes rarely) uses for headaches and anxiety.  Uses very infrequently  2) Therapy  Continue with current therapist    RTC: 6 months per Ri's request    CRISIS NUMBERS:   Provided routinely in AVS.    Treatment Risk Statement:  The patient understands the risks, benefits, adverse effects and alternatives. Agrees to treatment with the capacity to do so. No medical contraindications to treatment. Agrees to call clinic for any problems. The patient understands to call 911 or go to the nearest ED if life threatening or urgent symptoms occur.     PROVIDER:  TORSTEN Hogan CNP    TELEPHONE VISIT  Paris Nguyen is a 35 year old pt. who is being evaluated via a billable telephone visit.      The patient has been notified of the following:    We have found that certain health care needs can be provided without the need for a physical exam. This service lets us provide the care you need with a short phone conversation. If a prescription is necessary we can send it directly to your pharmacy. If lab work is needed we can place an order for that and you can then stop by our lab to have the test done at a later time.     Telephone visits are billed at different rates depending on your insurance coverage. During this emergency period, for some insurers they may be billed the same as an in-person visit. Please reach out to your insurance provider with any questions.   If during the course of the call the it appears that a telephone visit is not appropriate, you will not be charged for this service.\"    Patient has given verbal consent for a telephone visit?:  Yes   How would the pt like to obtain the AVS?:  not requested  AVS SmartPhrase [PsychAVS] has been placed in 'Patient Instructions':  unknown     Start Time:  9:36am          End Time:  9:52am            "

## 2020-04-30 DIAGNOSIS — F41.1 GENERALIZED ANXIETY DISORDER: ICD-10-CM

## 2020-04-30 DIAGNOSIS — F33.1 MODERATE EPISODE OF RECURRENT MAJOR DEPRESSIVE DISORDER (H): ICD-10-CM

## 2020-04-30 RX ORDER — VENLAFAXINE HYDROCHLORIDE 150 MG/1
CAPSULE, EXTENDED RELEASE ORAL
Qty: 90 CAPSULE | Refills: 3 | OUTPATIENT
Start: 2020-04-30

## 2020-05-04 ENCOUNTER — MYC REFILL (OUTPATIENT)
Dept: PSYCHIATRY | Facility: CLINIC | Age: 35
End: 2020-05-04

## 2020-05-04 DIAGNOSIS — F33.1 MODERATE EPISODE OF RECURRENT MAJOR DEPRESSIVE DISORDER (H): ICD-10-CM

## 2020-05-04 DIAGNOSIS — F41.1 GENERALIZED ANXIETY DISORDER: ICD-10-CM

## 2020-05-04 RX ORDER — VENLAFAXINE HYDROCHLORIDE 150 MG/1
150 CAPSULE, EXTENDED RELEASE ORAL DAILY
Qty: 90 CAPSULE | Refills: 1 | Status: CANCELLED | OUTPATIENT
Start: 2020-05-04

## 2020-05-04 NOTE — TELEPHONE ENCOUNTER
Patient contacted [facility] and confirmed there's a fill on file. Responded to patient and refused request. No further action needed by this writer.

## 2020-05-21 ENCOUNTER — TRANSFERRED RECORDS (OUTPATIENT)
Dept: HEALTH INFORMATION MANAGEMENT | Facility: CLINIC | Age: 35
End: 2020-05-21

## 2020-06-02 ENCOUNTER — TRANSFERRED RECORDS (OUTPATIENT)
Dept: HEALTH INFORMATION MANAGEMENT | Facility: CLINIC | Age: 35
End: 2020-06-02

## 2020-06-09 ENCOUNTER — TELEPHONE (OUTPATIENT)
Dept: PSYCHIATRY | Facility: CLINIC | Age: 35
End: 2020-06-09

## 2020-06-09 ENCOUNTER — VIRTUAL VISIT (OUTPATIENT)
Dept: PSYCHIATRY | Facility: CLINIC | Age: 35
End: 2020-06-09
Attending: NURSE PRACTITIONER
Payer: COMMERCIAL

## 2020-06-09 DIAGNOSIS — F41.9 ANXIETY: ICD-10-CM

## 2020-06-09 DIAGNOSIS — G47.00 INSOMNIA, UNSPECIFIED TYPE: ICD-10-CM

## 2020-06-09 DIAGNOSIS — F33.1 MODERATE EPISODE OF RECURRENT MAJOR DEPRESSIVE DISORDER (H): ICD-10-CM

## 2020-06-09 DIAGNOSIS — F41.1 GENERALIZED ANXIETY DISORDER: ICD-10-CM

## 2020-06-09 RX ORDER — TRAZODONE HYDROCHLORIDE 50 MG/1
TABLET, FILM COATED ORAL
Qty: 45 TABLET | Refills: 2 | Status: SHIPPED | OUTPATIENT
Start: 2020-06-09 | End: 2020-08-11

## 2020-06-09 RX ORDER — LORAZEPAM 0.5 MG/1
0.5 TABLET ORAL DAILY PRN
Qty: 30 TABLET | Refills: 0 | Status: SHIPPED | OUTPATIENT
Start: 2020-06-09 | End: 2020-09-16

## 2020-06-09 RX ORDER — HYDROXYZINE HYDROCHLORIDE 25 MG/1
TABLET, FILM COATED ORAL
Qty: 270 TABLET | Refills: 1 | Status: SHIPPED | OUTPATIENT
Start: 2020-06-09 | End: 2022-01-07

## 2020-06-09 ASSESSMENT — PAIN SCALES - GENERAL: PAINLEVEL: MILD PAIN (2)
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
## 2020-06-09 NOTE — TELEPHONE ENCOUNTER
On 6/9/2020, at 1355, writer called patient at mobile to confirm Virtual Visit. Writer unable to make contact with patient. Writer left detailed voice message for callback. [phone], left as call back number. NATASHA Rodriguez, EMT

## 2020-06-09 NOTE — PATIENT INSTRUCTIONS
Thank you for coming to the PSYCHIATRY CLINIC.    Lab Testing:  If you had lab testing today and your results are reassuring or normal they will be mailed to you or sent through [employer] within 7 days. If the lab tests need quick action we will call you with the results. The phone number we will call with results is # [phone] (home) . If this is not the best number please call our clinic and change the number.    Medication Refills:  If you need any refills please call your pharmacy and they will contact us. Our fax number for refills is [phone]. Please allow three business for refill processing. If you need to  your refill at a new pharmacy, please contact the new pharmacy directly. The new pharmacy will help you get your medications transferred.     Scheduling:  If you have any concerns about today's visit or wish to schedule another appointment please call our office during normal business hours [phone] (8-5:00 M-F)    Contact Us:  Please call [phone] during business hours (8-5:00 M-F).  If after clinic hours, or on the weekend, please call  [phone].    Financial Assistance [phone]  [facility] Billing [phone]  Central Billing Office, [facility]: [phone]  [facility] Billing [phone]  Medical Records [phone]      MENTAL HEALTH CRISIS NUMBERS:  For a medical emergency please call  911 or go to the nearest ER.     [facility]:   [facility] -[phone]   Crisis Residence [facility]idence -[phone]   Walk-In Counseling [facility] -[phone]   COPE 24/7 [place] Mobile Team -[phone] (adults)/[phone] (child)  CHILD: [facility] needs assessment team - [phone]      [facility]:   [facility] - [phone]   Walk-in counseling [facility] - [phone]   Walk-in counseling [facility] - [phone]   Crisis Residence [facility] - [phone]  Urgent  [facility] Adult [facility]-[phone] mobile unit/ 24/7 crisis line    National Crisis Numbers:   National Suicide Prevention Lifeline: 4-986-078-TALK (959-377-0431)  Poison Control Center - 2-460-042-4273  [employer]/resources for a list of additional resources (SOS)  Trans Lifeline a hotline for transgender people 3-757-303-3351  The [employer] a hotline for LGBT youth 1-976.661.7663  Crisis Text Line: For any crisis 24/7   To: 150082  see www.crisistextline.org  - IF MAKING A CALL FEELS TOO HARD, send a text!         Again thank you for choosing PSYCHIATRY CLINIC and please let us know how we can best partner with you to improve you and your family's health.    You may be receiving a survey regarding this appointment. We would love to have your feedback, both positive and negative. The survey is done by an external company, so your answers are anonymous.

## 2020-06-11 ENCOUNTER — TRANSFERRED RECORDS (OUTPATIENT)
Dept: HEALTH INFORMATION MANAGEMENT | Facility: CLINIC | Age: 35
End: 2020-06-11

## 2020-08-11 ENCOUNTER — VIRTUAL VISIT (OUTPATIENT)
Dept: PSYCHIATRY | Facility: CLINIC | Age: 35
End: 2020-08-11
Attending: NURSE PRACTITIONER
Payer: COMMERCIAL

## 2020-08-11 DIAGNOSIS — F33.1 MODERATE EPISODE OF RECURRENT MAJOR DEPRESSIVE DISORDER (H): ICD-10-CM

## 2020-08-11 DIAGNOSIS — F41.1 GENERALIZED ANXIETY DISORDER: ICD-10-CM

## 2020-08-11 DIAGNOSIS — G47.00 INSOMNIA, UNSPECIFIED TYPE: ICD-10-CM

## 2020-08-11 RX ORDER — VENLAFAXINE HYDROCHLORIDE 150 MG/1
150 CAPSULE, EXTENDED RELEASE ORAL DAILY
Qty: 90 CAPSULE | Refills: 1 | Status: SHIPPED | OUTPATIENT
Start: 2020-08-11 | End: 2021-03-15

## 2020-08-11 RX ORDER — GABAPENTIN 300 MG/1
CAPSULE ORAL
Qty: 360 CAPSULE | Refills: 1 | Status: SHIPPED | OUTPATIENT
Start: 2020-08-11 | End: 2022-01-07

## 2020-08-11 RX ORDER — TRAZODONE HYDROCHLORIDE 50 MG/1
TABLET, FILM COATED ORAL
Qty: 45 TABLET | Refills: 2 | Status: SHIPPED | OUTPATIENT
Start: 2020-08-11 | End: 2020-09-30

## 2020-08-11 ASSESSMENT — PAIN SCALES - GENERAL: PAINLEVEL: MILD PAIN (3)

## 2020-08-11 NOTE — PROGRESS NOTES
"VIDEO VISIT  Paris Nguyen is a 35 year old patient who is being evaluated via a billable video visit.      The patient has been notified of following:   \"This video visit will be conducted via a call between you and your physician/provider. We have found that certain health care needs can be provided without the need for an in-person physical exam. This service lets us provide the care you need with a video conversation. If a prescription is necessary we can send it directly to your pharmacy. If lab work is needed we can place an order for that and you can then stop by our lab to have the test done at a later time. Insurers are generally covering virtual visits as they would in-office visits so billing should not be different than normal.  If for some reason you do get billed incorrectly, you should contact the billing office to correct it and that number is in the AVS .    Video Conference to be completed via:  Michael    Patient has given verbal consent for video visit?:  Yes    Patient would prefer that any video invitations be sent by: Send to e-mail at: [email or web address]      How would patient like to obtain AVS?:  Cheryl    AVS SmartPhrase [PsychAVS] has been placed in 'Patient Instructions':  Yes    "
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
## 2020-08-11 NOTE — PATIENT INSTRUCTIONS
Thank you for coming to the PSYCHIATRY CLINIC.    Lab Testing:  If you had lab testing today and your results are reassuring or normal they will be mailed to you or sent through [employer] within 7 days. If the lab tests need quick action we will call you with the results. The phone number we will call with results is # [phone] (home) . If this is not the best number please call our clinic and change the number.    Medication Refills:  If you need any refills please call your pharmacy and they will contact us. Our fax number for refills is [phone]. Please allow three business for refill processing. If you need to  your refill at a new pharmacy, please contact the new pharmacy directly. The new pharmacy will help you get your medications transferred.     Scheduling:  If you have any concerns about today's visit or wish to schedule another appointment please call our office during normal business hours [phone] (8-5:00 M-F)    Contact Us:  Please call [phone] during business hours (8-5:00 M-F).  If after clinic hours, or on the weekend, please call  [phone].    Financial Assistance [phone]  [facility] Billing [phone]  Central Billing Office, [facility]: [phone]  [place] Billing [phone]  Medical Records [phone]      MENTAL HEALTH CRISIS NUMBERS:  For a medical emergency please call  911 or go to the nearest ER.     [facility]:   [facility] -[phone]   Crisis Residence [facility] -[phone]   Walk-In Counseling [facility] -[phone]   COPE 24/7 [facility] Mobile Team -[phone] (adults)/[phone] (child)  CHILD: [facility] needs assessment team - [phone]      [facility]:   [facility] - [phone]   Walk-in counseling [facility] - [phone]   Walk-in counseling [facility] - [phone]   Crisis Residence [facility] - [phone]  Urgent  [facility] Adult [facility] [place]-[phone] mobile unit/ 24/7 crisis line    National Crisis Numbers:   National Suicide Prevention Lifeline: 7-714-876-TALK (028-035-4462)  Poison Control Center - 9-964-032-8933  [employer]ribe/resources for a list of additional resources (SOS)  Trans Lifeline a hotline for transgender people 3-389-908-8859  The [employer] Project a hotline for LGBT youth 1-666.194.9050  Crisis Text Line: For any crisis 24/7   To: 205176  see www.crisistextline.org  - IF MAKING A CALL FEELS TOO HARD, send a text!         Again thank you for choosing PSYCHIATRY CLINIC and please let us know how we can best partner with you to improve you and your family's health.    You may be receiving a survey regarding this appointment. We would love to have your feedback, both positive and negative. The survey is done by an external company, so your answers are anonymous.

## 2020-09-09 ENCOUNTER — TELEPHONE (OUTPATIENT)
Dept: PSYCHIATRY | Facility: CLINIC | Age: 35
End: 2020-09-09

## 2020-09-09 ASSESSMENT — ENCOUNTER SYMPTOMS
CHILLS: 0
EYE WATERING: 1
MYALGIAS: 1
MUSCLE CRAMPS: 1
DIZZINESS: 1
VOMITING: 0
HOARSE VOICE: 1
CONSTIPATION: 0
DYSPNEA ON EXERTION: 0
SINUS CONGESTION: 1
DECREASED CONCENTRATION: 1
PANIC: 1
BLOATING: 1
POLYDIPSIA: 1
INSOMNIA: 1
FATIGUE: 1
NECK PAIN: 1
DISTURBANCES IN COORDINATION: 1
RECTAL PAIN: 1
NAUSEA: 0
EYE PAIN: 0
PARALYSIS: 0
JOINT SWELLING: 0
JAUNDICE: 0
EYE IRRITATION: 1
NUMBNESS: 1
DECREASED APPETITE: 1
BOWEL INCONTINENCE: 0
TREMORS: 0
TROUBLE SWALLOWING: 0
HEADACHES: 1
NERVOUS/ANXIOUS: 1
SEIZURES: 0
DEPRESSION: 1
TASTE DISTURBANCE: 0
BLOOD IN STOOL: 0
WEAKNESS: 1
WEIGHT LOSS: 0
BACK PAIN: 1
INCREASED ENERGY: 1
DIARRHEA: 0
NIGHT SWEATS: 1
SORE THROAT: 0
SNORES LOUDLY: 1
DOUBLE VISION: 0
WHEEZING: 0
COUGH DISTURBING SLEEP: 0
SPUTUM PRODUCTION: 1
HEARTBURN: 0
WEIGHT GAIN: 1
SPEECH CHANGE: 0
SINUS PAIN: 1
STIFFNESS: 1
EYE REDNESS: 0
SHORTNESS OF BREATH: 0
FEVER: 0
MEMORY LOSS: 0
ABDOMINAL PAIN: 0
LOSS OF CONSCIOUSNESS: 0
POSTURAL DYSPNEA: 0
ARTHRALGIAS: 0
ALTERED TEMPERATURE REGULATION: 0
TINGLING: 1
SMELL DISTURBANCE: 0
NECK MASS: 0
HALLUCINATIONS: 0
MUSCLE WEAKNESS: 0
POLYPHAGIA: 0
HEMOPTYSIS: 0
COUGH: 0

## 2020-09-09 NOTE — TELEPHONE ENCOUNTER
Writer printed STD forms received from pt via [employer] for STD through [employer]. The forms are currently being worked on in Nurse Triage. A signed JANIE is needed.Chaya Lorenzana LPN

## 2020-09-10 ENCOUNTER — OFFICE VISIT (OUTPATIENT)
Dept: INTERNAL MEDICINE | Facility: CLINIC | Age: 35
End: 2020-09-10
Payer: COMMERCIAL

## 2020-09-10 VITALS
BODY MASS INDEX: 38.24 KG/M2 | OXYGEN SATURATION: 97 % | HEART RATE: 85 BPM | WEIGHT: 223.5 LBS | DIASTOLIC BLOOD PRESSURE: 84 MMHG | SYSTOLIC BLOOD PRESSURE: 120 MMHG

## 2020-09-10 DIAGNOSIS — Z23 NEED FOR INFLUENZA VACCINATION: Primary | ICD-10-CM

## 2020-09-10 DIAGNOSIS — K58.8 OTHER IRRITABLE BOWEL SYNDROME: ICD-10-CM

## 2020-09-10 DIAGNOSIS — Z83.49 FAMILY HISTORY OF THYROID DISEASE: ICD-10-CM

## 2020-09-10 DIAGNOSIS — E55.9 VITAMIN D DEFICIENCY: ICD-10-CM

## 2020-09-10 RX ORDER — NORETHINDRONE ACETATE 5 MG
TABLET ORAL
COMMUNITY
Start: 2020-08-21

## 2020-09-10 ASSESSMENT — PAIN SCALES - GENERAL: PAINLEVEL: NO PAIN (0)

## 2020-09-10 NOTE — PROGRESS NOTES
S: Paris is 35 yp with the following problem list.         Patient Active Problem List   Diagnosis     Fibromyalgia     TMJ arthralgia     Moderate persistent asthma without complication     ADHD     Autism spectrum disorder     Generalized anxiety disorder     IBS (irritable bowel syndrome)     OCD (obsessive compulsive disorder)     Seasonal allergic rhinitis due to pollen     Allergic rhinitis due to dust     Allergy to adhesive tape     Need for desensitization to allergens     Oral allergy syndrome     Plantar fasciitis     She lives with her boyfriend. No new symptoms.  She needs medication review and physical. No new complaints.          Past Medical History:   Diagnosis Date     Abnormal Pap smear 11/24/2017    HPV     Anxiety      Chronic constipation      Depression      Depressive disorder 1/1/2001    in high school     Earache or other ear, nose, or throat complaint     Chronic sinus and allergy issues     Endometriosis      Esophageal reflux     Was on prilosec for years. Controlled now by diet     GERD (gastroesophageal reflux disease)      History of steroid therapy     predisone burst for asthma     IBS (irritable bowel syndrome)      Migraines      Pelvic and perineal pain      Stomach problems     Possible hemorrhoids?     Uncomplicated asthma      Vision disorder     Crossed-eye. Surgery at age 1 and 2            Past Surgical History:   Procedure Laterality Date     ABDOMEN SURGERY  10/2016    Endometriosis excision     CYSTOSCOPY N/A 10/12/2016    Procedure: CYSTOSCOPY;  Surgeon: Eliazar Patel MD;  Location: [facility] OR     DAVINCI ASSISTED ABLATION / EXCISION OF ENDOMETRIOSIS N/A 10/12/2016    Procedure: DAVINCI ASSISTED ABLATION / EXCISION OF ENDOMETRIOSIS;  Surgeon: Eliazar Patel MD;  Location: [facility] OR     DAVINCI LYSIS OF ADHESIONS N/A 10/12/2016    Procedure: DAVINCI LYSIS OF ADHESIONS;  Surgeon: Eliazar Patel MD;  Location: [facility] OR     DAVINCI PELVIC PROCEDURE N/A  "10/12/2016    Procedure: DAVINCI PELVIC PROCEDURE;  Surgeon: Eliazar Patel MD;  Location:  OR     DILATION AND CURETTAGE, HYSTEROSCOPY DIAGNOSTIC, COMBINED N/A 10/12/2016    Procedure: COMBINED DILATION AND CURETTAGE, HYSTEROSCOPY DIAGNOSTIC;  Surgeon: Eliazar Patel MD;  Location:  OR     EYE SURGERY      Eye Surgery x 2 as an infant     HC TOOTH EXTRACTION W/FORCEP              Social History     Tobacco Use     Smoking status: Never Smoker     Smokeless tobacco: Never Used   Substance Use Topics     Alcohol use: Yes     Comment: less than 1 drink a week            Family History   Problem Relation Age of Onset     Hypothyroidism Mother      Kidney Disease Mother         IgA Nephropathy     Diabetes Mother      Thyroid Disease Mother      Obesity Mother      Alcoholism Father      Pancreatitis Father         Alcohol related     Depression Father      Anxiety Disorder Father      Substance Abuse Father         alcholic     Diabetes Father      Myocardial Infarction Father      Coronary Artery Disease Father      Hyperlipidemia Father      Attention Deficit Disorder Sister      Anxiety Disorder Sister      Myocardial Infarction Paternal Grandfather 62     Breast Cancer Paternal Grandmother      Other Cancer Paternal Grandmother         Skin     Uterine Cancer Maternal Grandmother      Myocardial Infarction Maternal Aunt      Alcoholism Maternal Aunt      Osteoporosis Maternal Aunt                Allergies   Allergen Reactions     Latex Rash     Rash and blisters     Liquid Adhesive Rash     Rash and blisters       Seasonal Allergies      Morphine Anxiety, GI Disturbance and Nausea     Wound Dressing Adhesive Rash     PN: \"blisters\"            Current Outpatient Medications   Medication Sig Dispense Refill     benzonatate (TESSALON) 100 MG capsule Take 100-200 mg by mouth as needed        EPINEPHrine (EPIPEN/ADRENACLICK/OR ANY BX GENERIC EQUIV) 0.3 MG/0.3ML injection 2-pack Inject 0.3 mg into " the muscle once       fexofenadine (ALLEGRA) 180 MG tablet Take 180 mg by mouth daily       gabapentin (NEURONTIN) 300 MG capsule TAKE 1 CAPSULE TWICE A DAY, AND 2 CAPSULES AT BEDTIME 360 capsule 1     hydrOXYzine (ATARAX) 25 MG tablet TAKE 1 TABLET  THREE TIMES A DAY AS NEEDED FOR ITCHING AND ANXIETY 270 tablet 1     levalbuterol (XOPENEX HFA) 45 MCG/ACT Inhaler Inhale 1-2 puffs into the lungs every 4 hours as needed       LORazepam (ATIVAN) 0.5 MG tablet Take 1 tablet (0.5 mg) by mouth daily as needed for anxiety 30 tablet 0     montelukast (SINGULAIR) 10 MG tablet Take 10 mg by mouth At Bedtime       multivitamin, therapeutic with minerals (MULTI-VITAMIN) TABS tablet Take 1 tablet by mouth daily       naproxen (NAPROSYN) 500 MG tablet Take 1 tablet (500 mg) by mouth 2 times daily as needed for moderate pain or headaches (migraines and fibromyalgia) 60 tablet 1     norethindrone (AYGESTIN) 5 MG tablet        ondansetron (ZOFRAN-ODT) 4 MG ODT tab Take 1 tablet (4 mg) by mouth every 6 hours as needed for nausea 30 tablet 1     tiZANidine (ZANAFLEX) 2 MG tablet Take 1 tablet (2 mg) by mouth 3 times daily as needed for muscle spasms 90 tablet 0     traZODone (DESYREL) 50 MG tablet Take 0.5-1 tablets (25-50 mg) by mouth nightly as needed for sleep 45 tablet 2     venlafaxine (EFFEXOR-XR) 150 MG 24 hr capsule Take 1 capsule (150 mg) by mouth daily 90 capsule 1     fluticasone (FLOVENT HFA) 110 MCG/ACT Inhaler Inhale 2 puffs into the lungs 2 times daily       polyethylene glycol (MIRALAX) powder 1 capful (17 g) in 8 oz of liquid daily (Patient not taking: Reported on 2/21/2020) 850 g 1     rifaximin (XIFAXAN) 550 MG TABS tablet Take 1 tablet (550 mg) by mouth 3 times daily 42 tablet 0     vitamin D3 (CHOLECALCIFEROL) 1000 units (25 mcg) tablet Take 1 tablet (1,000 Units) by mouth daily (Patient not taking: Reported on 2/21/2020) 90 tablet 3        REVIEW OF SYSTEMS:  Constitutional:  Loss of appetite.  Weight gin,  fatigue noc sweats and increased stress.     Ears/Nose/Throat: negative    Dental exam: UTD    Eyes: negative.     Respiratory: negative    Cardiovascular: negative    Gastrointestinal: negative    Genitourinary: negative.  Last [id number].    Musculoskeletal: negative      Neurologic: negative     Skin: negative     Psychiatric: negative     Hematologic/Lymphatic/Immunologic:  negative      Endocrine:  negative         Answers for HPI/ROS submitted by the patient on 9/9/2020   General Symptoms: Yes  Skin Symptoms: No  HENT Symptoms: Yes  EYE SYMPTOMS: Yes  HEART SYMPTOMS: No  LUNG SYMPTOMS: Yes  INTESTINAL SYMPTOMS: Yes  URINARY SYMPTOMS: No  GYNECOLOGIC SYMPTOMS: No  REPRODUCTIVE SYMPTOMS: No  BREAST SYMPTOMS: No  SKELETAL SYMPTOMS: Yes  BLOOD SYMPTOMS: No  NERVOUS SYSTEM SYMPTOMS: Yes  MENTAL HEALTH SYMPTOMS: Yes  Fever: No  Loss of appetite: Yes  Weight loss: No  Weight gain: Yes  Fatigue: Yes  Night sweats: Yes  Chills: No  Increased stress: Yes  Excessive hunger: No  Excessive thirst: Yes  Feeling hot or cold when others believe the temperature is normal: No  Loss of height: No  Post-operative complications: No  Surgical site pain: No  Hallucinations: No  Change in or Loss of Energy: Yes  Hyperactivity: No  Confusion: No  Ear pain: No  Ear discharge: No  Hearing loss: No  Tinnitus: Yes  Nosebleeds: No  Congestion: Yes  Sinus pain: Yes  Trouble swallowing: No   Voice hoarseness: Yes  Mouth sores: No  Sore throat: No  Tooth pain: No  Gum tenderness: No  Bleeding gums: Yes  Change in taste: No  Change in sense of smell: No  Dry mouth: Yes  Hearing aid used: No  Neck lump: No  Eye pain: No  Vision loss: No  Dry eyes: Yes  Watery eyes: Yes  Eye bulging: No  Double vision: No  Flashing of lights: No  Spots: Yes  Floaters: Yes  Redness: No  Crossed eyes: Yes  Tunnel Vision: No  Yellowing of eyes: No  Eye irritation: Yes  Cough: No  Sputum or phlegm: Yes  Coughing up blood: No  Difficulty breating or shortness of breath:  No  Snoring: Yes  Wheezing: No  Difficulty breathing on exertion: No  Nighttime Cough: No  Difficulty breathing when lying flat: No  Heart burn or indigestion: No  Nausea: No  Vomiting: No  Abdominal pain: No  Bloating: Yes  Constipation: No  Diarrhea: No  Blood in stool: No  Black stools: No  Rectal or Anal pain: Yes  Fecal incontinence: No  Yellowing of skin or eyes: No  Vomit with blood: No  Change in stools: No  Back pain: Yes  Muscle aches: Yes  Neck pain: Yes  Swollen joints: No  Joint pain: No  Bone pain: No  Muscle cramps: Yes  Muscle weakness: No  Joint stiffness: Yes  Bone fracture: No  Trouble with coordination: Yes  Dizziness or trouble with balance: Yes  Fainting or black-out spells: No  Memory loss: No  Headache: Yes  Seizures: No  Speech problems: No  Tingling: Yes  Tremor: No  Weakness: Yes  Difficulty walking: No  Paralysis: No  Numbness: Yes  Nervous or Anxious: Yes  Depression: Yes  Trouble sleeping: Yes  Trouble thinking or concentrating: Yes  Mood changes: Yes  Panic attacks: Yes    O:   /84 (BP Location: Right arm, Patient Position: Sitting, Cuff Size: Adult Large)   Pulse 85   Wt 101.4 kg (223 lb 8 oz)   SpO2 97%   Breastfeeding No   BMI 38.24 kg/m    GENERAL APPEARANCE: healthy, alert and no distress  EYES: EOMI,  PERRL.  HENT: ear canals and TM's normal and nose and mouth without ulcers or lesions  RESP: lungs clear to auscultation - no rales, rhonchi or wheezes  CV: regular rates and rhythm, normal S1 S2, no S3 or S4 and no murmur, click or rub   ABDOMEN:  soft, nontender, no HSM or masses and bowel sounds normal  NEURO: grossly normal  MUSK:grossly normal  SKIN:[place] o/w nl.  Breats exam: normal.  LYMPH: nl  EXT: warm.  Edema NO   PSYCHE: normal.    A/P:  Paris was seen today for physical.    Diagnoses and all orders for this visit:    Need for influenza vaccination  -     FLU VAC PRESRV FREE QUAD SPLIT VIR 3+YRS IM    Vitamin D deficiency  -     Vitamin D Deficiency;  Future    Family history of thyroid disease  -     TSH with free T4 reflex; Future    Other irritable bowel syndrome  -     CBC with platelets; Future      Total time spent 25 minutes.  More than 50% of the time spent with   Patrick on counseling / coordinating Ri A Tredak's care.    Vonda SARMIENTO, CNP

## 2020-09-10 NOTE — NURSING NOTE
Chief Complaint   Patient presents with     Physical     Pt comes in for physical exam; she would also like labs drawn today      YOU Ferguson at 10:09 AM sign on 9/10/2020

## 2020-09-11 ENCOUNTER — TRANSFERRED RECORDS (OUTPATIENT)
Dept: HEALTH INFORMATION MANAGEMENT | Facility: CLINIC | Age: 35
End: 2020-09-11

## 2020-09-15 NOTE — TELEPHONE ENCOUNTER
Writer confirmed signed JANIE via [employer], including information from patient that was needed on [employer] Forms from [employer]. 11 pages of completed forms was faxed to [employer] at [phone]. The original copies were sent to scanning and copies are held in Psych until scanning is complete.Chaya Lorenzana LPN

## 2020-09-16 DIAGNOSIS — F41.9 ANXIETY: ICD-10-CM

## 2020-09-16 NOTE — TELEPHONE ENCOUNTER
Last Seen 08/11/20    RTC Unknown    Cancel 0  No-Show 0    Next Appt Not Scheduled    Incoming Refill From [facility] via Fax    Medication Requested Lorazepam 0.5 mg Tablets     Directions Take 1 Tablet by mouth daily as needed for Anxiety     Qty 30    Last Refill 06/09/20 Qty 30 no refills       Medication Refill Pended Per Refill Protocol

## 2020-09-17 RX ORDER — LORAZEPAM 0.5 MG/1
0.5 TABLET ORAL DAILY PRN
Qty: 30 TABLET | Refills: 0 | Status: SHIPPED | OUTPATIENT
Start: 2020-09-17 | End: 2023-03-23

## 2020-09-25 ENCOUNTER — VIRTUAL VISIT (OUTPATIENT)
Dept: PSYCHIATRY | Facility: CLINIC | Age: 35
End: 2020-09-25
Attending: PSYCHOLOGIST
Payer: COMMERCIAL

## 2020-09-25 DIAGNOSIS — F41.1 GENERALIZED ANXIETY DISORDER: Primary | ICD-10-CM

## 2020-09-25 DIAGNOSIS — J45.40 MODERATE PERSISTENT ASTHMA WITHOUT COMPLICATION: ICD-10-CM

## 2020-09-25 DIAGNOSIS — F84.0 AUTISM SPECTRUM DISORDER: ICD-10-CM

## 2020-09-30 ENCOUNTER — TELEPHONE (OUTPATIENT)
Dept: PSYCHIATRY | Facility: CLINIC | Age: 35
End: 2020-09-30

## 2020-09-30 ENCOUNTER — VIRTUAL VISIT (OUTPATIENT)
Dept: PSYCHIATRY | Facility: CLINIC | Age: 35
End: 2020-09-30
Attending: NURSE PRACTITIONER
Payer: COMMERCIAL

## 2020-09-30 DIAGNOSIS — G47.00 INSOMNIA, UNSPECIFIED TYPE: ICD-10-CM

## 2020-09-30 DIAGNOSIS — F33.1 MODERATE EPISODE OF RECURRENT MAJOR DEPRESSIVE DISORDER (H): Primary | ICD-10-CM

## 2020-09-30 RX ORDER — TRAZODONE HYDROCHLORIDE 50 MG/1
TABLET, FILM COATED ORAL
Qty: 45 TABLET | Refills: 2 | Status: SHIPPED | OUTPATIENT
Start: 2020-09-30 | End: 2021-11-05

## 2020-09-30 RX ORDER — BUPROPION HYDROCHLORIDE 150 MG/1
150 TABLET ORAL EVERY MORNING
Qty: 30 TABLET | Refills: 0 | Status: SHIPPED | OUTPATIENT
Start: 2020-09-30 | End: 2020-10-28
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
## 2020-09-30 NOTE — TELEPHONE ENCOUNTER
On 09/30/2020, at 1019, writer called patient at [phone] to confirm Virtual Visit. Writer unable to make contact with patient. Writer left detailed voice message for call back. [phone] left as call back number. Ese Walsh MA

## 2020-10-19 ENCOUNTER — VIRTUAL VISIT (OUTPATIENT)
Dept: PSYCHIATRY | Facility: CLINIC | Age: 35
End: 2020-10-19
Attending: PSYCHOLOGIST
Payer: COMMERCIAL

## 2020-10-19 DIAGNOSIS — F33.1 MODERATE EPISODE OF RECURRENT MAJOR DEPRESSIVE DISORDER (H): Primary | ICD-10-CM

## 2020-10-19 DIAGNOSIS — F41.1 GENERALIZED ANXIETY DISORDER: ICD-10-CM

## 2020-10-19 PROCEDURE — 90853 GROUP PSYCHOTHERAPY: CPT | Mod: HN | Performed by: STUDENT IN AN ORGANIZED HEALTH CARE EDUCATION/TRAINING PROGRAM

## 2020-10-20 NOTE — PROGRESS NOTES
[facility]    Dialectic Behavior Therapy Clinic     Adult DBT Skills Group     DBT (Dialectic Behavior Therapy) is a cognitive behavioral therapy that includes skills group, which uses didactics, modeling, behavior rehearsal, and homework exercises to aid patients in acquiring new skills and the opportunity to practice new behaviors.    Date of Service: Oct 19, 2020  Group Length: 120 minutes  Start time: 2:30   End time: 4:30  Number of Participants: 9  Group Facilitators: Janeth Gómez, PhD, LP, Zohreh Reid MD and Anel Atwood MD    Client: Stephanie Nguyen  Pronouns: She/Her/Hers/Herself or They/Them/Their/Theirs  YOB: 1985  MRN: [id number]    Type of service:  video visit for group therapy  Reason for video visit:  Patient unable to travel due to Covid-19  Originating Site (patient location):  [facility]   Location- Patient's home  Distant Site (provider location):  Remote location  Mode of Communication:  Video Conference via Zoom  Consent:  Patient has given verbal consent for video visit?: Yes     Mindfulness: Humming song from childhood for 2 minutes.   Homework Reviewed: Emotion Regulation Worksheet 9: Steps for Reducing Vulnerability to Emotion Mind  Group Topic for Today: Mindfulness Handouts 1, 1A, 2, & 3: Goals of Mindfulness Practice, Mindfulness Definitions, Overview: Core Mindfulness Skills, and Wise Mind: States of Mind  Homework Assigned: Practice mindfulness to get to WiseMind    Assessment: Patient was on time for group. Patient did not complete the homework assigned the previous week prior to arriving at group as this was their first group. Patient was engaged in the didactic portion of group. Mood appeared Euthymic. For their diary card, Ri used PLEASE skills and TIPP skills to cope with their chronic pain and fatigue from fibromyalgia. This week Ri plans to work on feeling stuck sometimes.     Diagnosis:   Encounter Diagnoses   Name  "Primary?     Moderate episode of recurrent major depressive disorder (H) Yes     Generalized anxiety disorder        Plan: Continue in full-model outpatient DBT program.     Group Treatment Plan Reviewed: due  Group Treatment Plan Due for Review: due    I was present for and actively participated in the entire group therapy session, my observations of Paris Nguyen s progress and participation are that it was The patients first DBT group. She appeared attentive and also communicate with good boundaries. She self disclosed They/Them pronouns and Ri felt that it was ok if people referred her used she/her--stating \"no big deal.\"    Janeth Gómez, PhD LP        "

## 2020-10-26 ENCOUNTER — VIRTUAL VISIT (OUTPATIENT)
Dept: PSYCHIATRY | Facility: CLINIC | Age: 35
End: 2020-10-26
Attending: PSYCHOLOGIST
Payer: COMMERCIAL

## 2020-10-26 DIAGNOSIS — F42.9 OBSESSIVE-COMPULSIVE DISORDER, UNSPECIFIED TYPE: ICD-10-CM

## 2020-10-26 DIAGNOSIS — F84.0 AUTISM SPECTRUM DISORDER: ICD-10-CM

## 2020-10-26 DIAGNOSIS — F41.1 GENERALIZED ANXIETY DISORDER: ICD-10-CM

## 2020-10-26 DIAGNOSIS — F90.9 ATTENTION DEFICIT HYPERACTIVITY DISORDER (ADHD), UNSPECIFIED ADHD TYPE: Primary | ICD-10-CM

## 2020-10-26 PROCEDURE — 90853 GROUP PSYCHOTHERAPY: CPT | Mod: 95 | Performed by: PSYCHOLOGIST

## 2020-10-27 NOTE — PROGRESS NOTES
"     [facility]    Dialectic Behavior Therapy Clinic     Adult DBT Skills Group     DBT (Dialectic Behavior Therapy) is a cognitive behavioral therapy that includes skills group, which uses didactics, modeling, behavior rehearsal, and homework exercises to aid patients in acquiring new skills and the opportunity to practice new behaviors.    Date of Service: Oct 26, 2020  Group Length: 120 minutes  Start time: 2:30   End time: 4:30  Number of Participants: 9  Group Facilitators: Janeth Gómez, PhD, LP, Zohreh Reid MD and Anel Atwood MD    Client: Stephanie Nguyen  Pronouns: She/Her/Hers/Herself or They/Them/Their/Theirs  YOB: 1985  MRN: [id number]    Type of service:  video visit for group therapy  Reason for video visit:  Patient unable to travel due to Covid-19  Originating Site (patient location):  [facility]   Location- Patient's home  Distant Site (provider location):  Remote location  Mode of Communication:  Video Conference via Zoom  Consent:  Patient has given verbal consent for video visit?: Yes     Mindfulness: Observing Sound for 2 minutes.   Homework Reviewed: Mindfulness Worksheet 3: Wise Mind Practice  Group Topic for Today: Mindfulness Handouts 4, 4A-C: Taking Hold of Your Mind: \"What Skills\", Ideas for Practicing Observing, Ideas for Practicing Describing, Ideas for Practicing Participating  Homework Assigned: Mindfullness Worksheet 4: Mindfullness \"What\" Skills\"    Assessment: Patient was on time for group. Patient did complete the homework assigned the previous week prior to arriving at group. Patient was engaged in the didactic portion of group. Mood appeared euthymic. For their diary card, Ri used interpersonal effectiveness and boundary setting skills to deal with stressors that occurred this week. This week Ri plans to work on the dear man skill.     Diagnosis:   Encounter Diagnoses   Name Primary?     Attention deficit hyperactivity " disorder (ADHD), unspecified ADHD type Yes     Autism spectrum disorder      Generalized anxiety disorder      Obsessive-compulsive disorder, unspecified type        Plan: Continue in full-model outpatient DBT program.     Group Treatment Plan Reviewed: 9/25/2020  Group Treatment Plan Due for Review: 12/25/30

## 2020-10-28 ENCOUNTER — VIRTUAL VISIT (OUTPATIENT)
Dept: PSYCHIATRY | Facility: CLINIC | Age: 35
End: 2020-10-28
Attending: NURSE PRACTITIONER
Payer: COMMERCIAL

## 2020-10-28 DIAGNOSIS — F33.1 MODERATE EPISODE OF RECURRENT MAJOR DEPRESSIVE DISORDER (H): ICD-10-CM

## 2020-10-28 PROCEDURE — 99214 OFFICE O/P EST MOD 30 MIN: CPT | Mod: 95 | Performed by: NURSE PRACTITIONER

## 2020-10-28 RX ORDER — BUPROPION HYDROCHLORIDE 150 MG/1
150 TABLET ORAL EVERY MORNING
Qty: 90 TABLET | Refills: 0 | Status: SHIPPED | OUTPATIENT
Start: 2020-10-28 | End: 2021-01-05

## 2020-10-28 ASSESSMENT — PAIN SCALES - GENERAL: PAINLEVEL: MILD PAIN (3)

## 2020-10-28 NOTE — PATIENT INSTRUCTIONS
Thank you for coming to the [facility] [place] [facility].    Lab Testing:  If you had lab testing today and your results are reassuring or normal they will be mailed to you or sent through Ask Ziggy within 7 days. If the lab tests need quick action we will call you with the results. The phone number we will call with results is # [phone] (home) . If this is not the best number please call our clinic and change the number.    Medication Refills:  If you need any refills please call your pharmacy and they will contact us. Our fax number for refills is [phone]. Please allow three business for refill processing. If you need to  your refill at a new pharmacy, please contact the new pharmacy directly. The new pharmacy will help you get your medications transferred.     Scheduling:  If you have any concerns about today's visit or wish to schedule another appointment please call our office during normal business hours [phone] (8-5:00 M-F)    Contact Us:  Please call [phone] during business hours (8-5:00 M-F).  If after clinic hours, or on the weekend, please call  [phone].    Financial Assistance [phone]  [facility] Billing [phone]  Central Billing Office, [facility]: [phone]  [place] [place] Billing [phone]  Medical Records [phone]      MENTAL HEALTH CRISIS NUMBERS:  For a medical emergency please call  911 or go to the nearest ER.     [facility]:   [facility] -[phone]   Crisis Residence [facility] -[phone]   Walk-In Counseling [facility] -[phone]   COPE 24/7 [place] Mobile Team -[phone] (adults)/[phone] (child)  CHILD: [facility] needs assessment team - [phone]      [facility]:   [facility] - [phone]   Walk-in counseling [facility] - [phone]   Walk-in counseling [facility] - [phone]   Crisis Residence [facility]  [facility] - [phone]  Urgent Care Adult Mental Bmjveb-[phone] mobile unit/ 24/7 crisis line    National Crisis Numbers:   National Suicide Prevention Lifeline: 6-259-182-TALK (822-508-9148)  Poison Control Center - 8-791-701-8874  [place]/resources for a list of additional resources (SOS)  Trans Lifeline a hotline for transgender people 5-178-320-4776  The [employer] a hotline for LGBT youth 7-816-522-1390  Crisis Text Line: For any crisis 24/7   To: 589804  see www.crisistextline.org  - IF MAKING A CALL FEELS TOO HARD, send a text!         Again thank you for choosing [facility] [place] [facility] and please let us know how we can best partner with you to improve you and your family's health.    You may be receiving a survey regarding this appointment. We would love to have your feedback, both positive and negative. The survey is done by an external company, so your answers are anonymous.
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
## 2020-10-30 ENCOUNTER — TELEPHONE (OUTPATIENT)
Dept: PSYCHIATRY | Facility: CLINIC | Age: 35
End: 2020-10-30

## 2020-10-30 NOTE — TELEPHONE ENCOUNTER
On 10/29/2020, 2 faxed pages was received from [place] requesting records. However, they did not indicate a date range so writer faxed form back to [place] at [phone] requesting date range.Chaya Lorenzana LPN

## 2020-11-02 ENCOUNTER — VIRTUAL VISIT (OUTPATIENT)
Dept: PSYCHIATRY | Facility: CLINIC | Age: 35
End: 2020-11-02
Attending: PSYCHOLOGIST
Payer: COMMERCIAL

## 2020-11-02 DIAGNOSIS — F41.1 GENERALIZED ANXIETY DISORDER: ICD-10-CM

## 2020-11-02 DIAGNOSIS — F33.1 MODERATE EPISODE OF RECURRENT MAJOR DEPRESSIVE DISORDER (H): Primary | ICD-10-CM

## 2020-11-02 DIAGNOSIS — F90.9 ATTENTION DEFICIT HYPERACTIVITY DISORDER (ADHD), UNSPECIFIED ADHD TYPE: ICD-10-CM

## 2020-11-02 PROCEDURE — 90853 GROUP PSYCHOTHERAPY: CPT | Mod: HN | Performed by: STUDENT IN AN ORGANIZED HEALTH CARE EDUCATION/TRAINING PROGRAM

## 2020-11-06 NOTE — TELEPHONE ENCOUNTER
[facility] Call Center    Phone Message    May a detailed message be left on voicemail: yes     Reason for Call: Other: Av from [place] called to check on the status of the fax sent on 11/2 with the updates requested.      Action Taken: Other: [facility] [place] [facility]    Travel Screening: Not Applicable

## 2020-11-09 ENCOUNTER — VIRTUAL VISIT (OUTPATIENT)
Dept: PSYCHIATRY | Facility: CLINIC | Age: 35
End: 2020-11-09
Attending: PSYCHOLOGIST
Payer: COMMERCIAL

## 2020-11-09 DIAGNOSIS — F42.9 OBSESSIVE-COMPULSIVE DISORDER, UNSPECIFIED TYPE: Primary | ICD-10-CM

## 2020-11-09 DIAGNOSIS — F41.1 GENERALIZED ANXIETY DISORDER: ICD-10-CM

## 2020-11-09 DIAGNOSIS — F84.0 AUTISM SPECTRUM DISORDER: ICD-10-CM

## 2020-11-09 DIAGNOSIS — F90.9 ATTENTION DEFICIT HYPERACTIVITY DISORDER (ADHD), UNSPECIFIED ADHD TYPE: ICD-10-CM

## 2020-11-09 PROCEDURE — 90853 GROUP PSYCHOTHERAPY: CPT | Mod: HN | Performed by: STUDENT IN AN ORGANIZED HEALTH CARE EDUCATION/TRAINING PROGRAM

## 2020-11-09 NOTE — TELEPHONE ENCOUNTER
Writer received response from [employer] who is requesting Progress Notes from 10/28/2020. 14 pages was printed and faxed to [employer] at [phone]. A Quick Disclosure was entered. The original copies were sent to scanning.Chaya Lorenzana LPN

## 2020-11-09 NOTE — PROGRESS NOTES
"     [facility]    Dialectic Behavior Therapy Clinic     Adult DBT Skills Group     DBT (Dialectic Behavior Therapy) is a cognitive behavioral therapy that includes skills group, which uses didactics, modeling, behavior rehearsal, and homework exercises to aid patients in acquiring new skills and the opportunity to practice new behaviors.    Date of Service: Nov 2, 2020  Group Length: 120 minutes  Start time: 2:30   End time: 4:30  Number of Participants: 7  Group Facilitators: Janeth Gómez, PhD, LP, Zohreh Reid MD and Anel Atwood MD    Client: Stephanie Nguyen  Pronouns: She/Her/Hers/Herself or They/Them/Their/Theirs  YOB: 1985  MRN: [id number]    Type of service:  video visit for group therapy  Reason for video visit:  Patient unable to travel due to Covid-19  Originating Site (patient location):  [facility]   Location- Patient's home  Distant Site (provider location):  Remote location  Mode of Communication:  Video Conference via Zoom  Consent:  Patient has given verbal consent for video visit?: Yes     Mindfulness: Participating activity: Acceptance by the Chair  Homework Reviewed: Mindfulness Worksheet 4: Mindfulness \"What\" Skills: Observing, Describing, Participating  Group Topic for Today: Mindfulness Handouts 5, 5A-C: Taking Hold of Your Mind: \"How\" Skills, Ideas for Practicing Nonjudgmentalness, Ideas for Practicing One-Mindfulness, Ideas for Practicing Effectiveness  Homework Assigned: Mindfullness Worksheet 5B: Nonjudgmentalness, One-Mindfulness, Effectiveness Calendar    Assessment: Patient was on time for group. Patient did complete the homework assigned the previous week prior to arriving at group. Patient was engaged in homework review and the didactic portion of group. Mood appeared euthymic. For their diary card, Ri used mindfulness including body scan to cope with increased pain this week, especially headaches. This week they plan to work on " mindfulness.     Diagnosis:   Encounter Diagnoses   Name Primary?     Moderate episode of recurrent major depressive disorder (H) Yes     Generalized anxiety disorder      Attention deficit hyperactivity disorder (ADHD), unspecified ADHD type        Plan: Continue in full-model outpatient DBT program.     Group Treatment Plan Reviewed: due  Group Treatment Plan Due for Review: due    I was not present for the session. I reviewed the case and the note and I agree with the plan. Janeth Gómez, PHD, LP

## 2020-11-14 NOTE — PROGRESS NOTES
"Intake DBT Group,  60 min  Referred by RONDA Palacios /STACEY therapist  Diagnoses:  Depression, Attention Deficit D/O, Autism-Spectrum:  ASD  Pronouns: \"They/Them\" \"A-Sexual\"/Queer \"Gender non-conforming.\"   Date: 9/25/20    Video-Visit Details    Type of service:  Video Visit    Video Start Time (time video started): 10:00am    Video End Time (time video stopped):11am    Originating Location (pt. Location): Pt's home    Distant Location (provider location): Provider's Home    Mode of Communication:  Video Conference via Zoom.[facility]    Psychologist  has received verbal consent for a Video Visit from the patient? Yes      Janeth Gómez, PhD LP      Ri is seeing RONDA Palacios weeks for full-model DBT as they begin our DBT group. Ri was oriented to the DBT group and I got some back ground information regarding family, issues and concerns. A full assessment was completed by Selina Terrell who determined that she met full criteria to be included in DBT. A release will be signed to get assessment materials.      Presenting Problem:   Ri was referred by Selina due to her emotional reactivity and \"being on the spectrum.\" Pt reported that she has ASP  \"I am on the autism spectrum.\"  She reported that \"my parents didn't get me.\"She reported that she has had difficulties in interpersonal relationships due to \"my black and white\" way of seeing things, \"not getting social interventions that are not clear.\" and she gets \"very upset if people don't follow the rules.\"  She describes herself as \"super judgey\" and how she had a \"toxic boss\" so she needed to take ARODLO.      Family of Origin/Support System:  Ri (35) given name was Paris but prefers \"Ri.\"   She has a partner of 5 years who is male who also reportedly has autism and defines themselves as \"Queer\" also. They are a -teaching math.  Ri is the oldest of 2 siblings. Sister (Delmis, 32) is living with her boyfriend and also considers herself \"A-sexual.\"  " " Sister is a nurse and lives in [place]. Ri describes mother as \"neglectul\" growing up and probably is on the Autism spectrum also. Mother is 66 years old and lives in [place] also. She described her Father as also neglectful, an alcoholic and have had \"many jobs--12 job.:   Dad was a  and did manual labor:  A ,, worked for [employer], paper mill,  Dad  in 2019 from liver failure.  Dad had \"social anxiety and self-medicated.\"     Mom grew up in [place]. Her dad was described as  a 1st generation Kinyarwanda--\"stoic, Blue collar--\"sensitive and weepy man.\" \"He met a maicol at the bar and donated his kidney to the maicol.\" She has 2 living maternal uncles: Arsh (68) and   Kristopher (64) --Ri reported that she \"doesn't like Arsh.\"  Her maternal Aunt  of alcoholism in 2019 also--Ri met Leonidas her cousin in his 40's at the  of dad. Ri reported being close to \"cousin marylu\"--they text.    Medical history/Therapy:  Ri reported that there was no psychiatric hospitalizations. Her target behaviors are around  Binge-eating symptoms and perseverative thinking and behaviors. Additional medical background:  TMJ, Allergies to adhesive.  She was referred to DBT-full model and will continue to work with Selina Terrell.    MSI:  Pt was oriented x3. Linear thought patterns. Speech clear and without thought disorder symptoms. Tracking well and responding thoughtfully to questions. She appears to be a good .      Assessment:  Pt came on time. Verbal and engaging. Appears motivated for DBT treatment and accepting of the  Requirements to be in full-model dbt. Oriented to the guidelines and rules of this psycho-educational group, role of the diary card and need to complete weekly homework assignments. There is a 4-miss rule. Target behaviors around Binge-eating and poor social interactions work and home.    Plan: Start DBT group on 10/19 at 2:30-4:30. A DBT manual will be sent directly to the home. Call if " concerns.      Treatment Plan          SYMPTOMS; PROBLEMS   MEASURABLE GOALS;    FUNCTIONAL IMPROVEMENT INTERVENTIONS;   GAINS MADE DISCHARGE CRITERIA   Emotional dysregulation, irritability, edgy/judgy   1. Learn DBT skills to label and manage feelings skillfully. DBT group  Individual therapy with Selina Terrell Complete 6 months of group--4 modules of DBT   Black/White thinking; irritability, missing social cue   2. Learn DBT skills to increase interpersonal effectiveness at work and home.  A. Decrease judgmentalness.  B. Be more accepting of others and yet keep own boundaries DBT group  Continue with Indiv DBT with Selina Terrell. Complete 6 months of DBT skills, Practice outside of group to note progress.     Date of most recent treatment plan: 9/25/2020  Date next treatment plan due: 3 months    Psychotherapy services during this visit included myself and the patient. Patient agreed with treatment plan on 9/25/2020. Discussed case with supervisor who also agreed with the treatment plan. Unable to sign in person due to visit being conducted through telehealth due to COVID-19.

## 2020-11-16 ENCOUNTER — VIRTUAL VISIT (OUTPATIENT)
Dept: PSYCHIATRY | Facility: CLINIC | Age: 35
End: 2020-11-16
Attending: PSYCHOLOGIST
Payer: COMMERCIAL

## 2020-11-16 DIAGNOSIS — F42.9 OBSESSIVE-COMPULSIVE DISORDER, UNSPECIFIED TYPE: Primary | ICD-10-CM

## 2020-11-16 DIAGNOSIS — F41.1 GENERALIZED ANXIETY DISORDER: ICD-10-CM

## 2020-11-16 DIAGNOSIS — F90.9 ATTENTION DEFICIT HYPERACTIVITY DISORDER (ADHD), UNSPECIFIED ADHD TYPE: ICD-10-CM

## 2020-11-16 DIAGNOSIS — F84.0 AUTISM SPECTRUM DISORDER: ICD-10-CM

## 2020-11-16 PROCEDURE — 90853 GROUP PSYCHOTHERAPY: CPT | Mod: HN | Performed by: STUDENT IN AN ORGANIZED HEALTH CARE EDUCATION/TRAINING PROGRAM

## 2020-11-18 NOTE — PROGRESS NOTES
"     [facility]    Dialectic Behavior Therapy Clinic     Adult DBT Skills Group     DBT (Dialectic Behavior Therapy) is a cognitive behavioral therapy that includes skills group, which uses didactics, modeling, behavior rehearsal, and homework exercises to aid patients in acquiring new skills and the opportunity to practice new behaviors.    Date of Service: Nov 16, 2020  Group Length: 120 minutes  Start time: 2:30   End time: 4:30  Number of Participants: 7  Group Facilitators: Janeth Gómez, PhD, LP, Zohreh Reid MD and Anel Atwood MD    Client: Stephanie Nguyen  Pronouns: She/Her/Hers/Herself or They/Them/Their/Theirs  YOB: 1985  MRN: [id number]    Type of service:  video visit for group therapy  Reason for video visit:  Patient unable to travel due to Covid-19  Originating Site (patient location):  [facility]   Location- Patient's home  Distant Site (provider location):  Remote location  Mode of Communication:  Video Conference via Zoom  Consent:  Patient has given verbal consent for video visit?: Yes     Mindfulness: Breathe in \"Calm\" and breathe out \"Stress\"   Homework Reviewed: Interpersonal Effectiveness Worksheet 2: Challenging Myths in the Way of Obtaining Objectives  Group Topic for Today: Interpersonal Effectiveness Handouts 3, 4, and 5: Obtaining Objectives Skillfully, Clarifying Goals in Interpersonal Situations, and Guidelines for Objectives Effectiveness: Getting What You Want (DEAR MAN)  Homework Assigned: Interpersonal Effectiveness Worksheet 4: Writing Out Interpersonal Effectiveness Scripts    Assessment: Patient was on time for group. Patient did complete the homework assigned prior to arriving at group. Patient was engaged in homework review and the didactic portion of group. Mood appeared euthymic. For their diary card, Ri used please, opposite to emotional action skills to cope with feeling fatigued and having low motivation this past " week. This week they plan to work on the one-mindfully skill.     Diagnosis:   Encounter Diagnoses   Name Primary?     Obsessive-compulsive disorder, unspecified type Yes     Generalized anxiety disorder      Autism spectrum disorder      Attention deficit hyperactivity disorder (ADHD), unspecified ADHD type        Plan: Continue in full-model outpatient DBT program.     Group Treatment Plan Reviewed: due  Group Treatment Plan Due for Review: due    I was present for and actively participated in the entire group therapy session, my observations of Paris gordon progress and participation are that Ri has come prepared with homework and diary card.     Janeth Gómez, PhD LP

## 2020-11-23 ENCOUNTER — TELEPHONE (OUTPATIENT)
Dept: PSYCHIATRY | Facility: CLINIC | Age: 35
End: 2020-11-23

## 2020-11-23 ENCOUNTER — VIRTUAL VISIT (OUTPATIENT)
Dept: PSYCHIATRY | Facility: CLINIC | Age: 35
End: 2020-11-23
Attending: NURSE PRACTITIONER
Payer: COMMERCIAL

## 2020-11-23 DIAGNOSIS — F41.1 GENERALIZED ANXIETY DISORDER: Primary | ICD-10-CM

## 2020-11-23 NOTE — TELEPHONE ENCOUNTER
Attempted to reach Ri twice for scheduled appointment.  No answer.  Left message to call clinic to reschedule.

## 2020-11-23 NOTE — TELEPHONE ENCOUNTER
On November 23, 2020, at 9:32 AM, writer called patient at [phone] to confirm Virtual Visit. Writer unable to make contact with patient. Writer left detailed voice message for call back. [phone] left as call back number. Ese Walsh, CMA

## 2020-11-23 NOTE — PROGRESS NOTES
[facility]    Dialectic Behavior Therapy Clinic     Adult DBT Skills Group     DBT (Dialectic Behavior Therapy) is a cognitive behavioral therapy that includes skills group, which uses didactics, modeling, behavior rehearsal, and homework exercises to aid patients in acquiring new skills and the opportunity to practice new behaviors.    Date of Service: Nov 9, 2020  Group Length: 120 minutes  Start time: 2:30   End time: 4:30  Number of Participants: 8  Group Facilitators: Janeth Gómez, PhD, LP and Zohreh Reid MD    Client: Stephanie Nguyen  Pronouns: She/Her/Hers/Herself or They/Them/Their/Theirs  YOB: 1985  MRN: [id number]    Type of service:  video visit for group therapy  Reason for video visit:  Patient unable to travel due to Covid-19  Originating Site (patient location):  [facility]   Location- Patient's home  Distant Site (provider location):  Remote location  Mode of Communication:  Video Conference via Zoom  Consent:  Patient has given verbal consent for video visit?: Yes     Mindfulness: Thinking about good things about snow.   Homework Reviewed: Mindfulness Worksheet 5b: Nonjudgmentalness, One-Mindfulness, Effectiveness Calendar  Group Topic for Today: Interpersonal Effectiveness Handouts 1-2A and Worksheet 2: Goals of Interpersonal Effectiveness, Factors in the Way of Interpersonal Effectiveness, Myths in the Way of Interpersonal Effectiveness, and Challenging Myths in the Way of Obtaining Objectives  Homework Assigned: Interpersonal Effectiveness Worksheet 2: Challenging Myths in the Way of Obtaining Objectives    Assessment: Patient was on time for group. Patient did complete the homework assigned the previous week prior to arriving at group. Patient was engaged in homework review and the didactic portion of group. Mood appeared euthymic. For their diary card, Ri used PLEASE skills and self-soothe to cope with difficulty eating and taking  care of themselves this past week. This week, Ri plans to practice non-judgmentalness.     Diagnosis:   Encounter Diagnoses   Name Primary?     Obsessive-compulsive disorder, unspecified type Yes     Generalized anxiety disorder      Autism spectrum disorder      Attention deficit hyperactivity disorder (ADHD), unspecified ADHD type        Plan: Continue in full-model outpatient DBT program.     I was not present for the session. I reviewed the case and the note and I agree with the plan. Janeth Gómez, PHD, LP    Group Treatment Plan Reviewed: due  Group Treatment Plan Due for Review: due    I was present for and actively participated in the entire group therapy session, my observations of Paris gordon progress and participation are that Ri is actively participating in mindfulness, diary review and homework.     Janeth Gómez, PhD LP

## 2020-11-30 ENCOUNTER — VIRTUAL VISIT (OUTPATIENT)
Dept: PSYCHIATRY | Facility: CLINIC | Age: 35
End: 2020-11-30
Attending: PSYCHOLOGIST
Payer: COMMERCIAL

## 2020-11-30 DIAGNOSIS — F90.9 ATTENTION DEFICIT HYPERACTIVITY DISORDER (ADHD), UNSPECIFIED ADHD TYPE: Primary | ICD-10-CM

## 2020-11-30 DIAGNOSIS — F42.9 OBSESSIVE-COMPULSIVE DISORDER, UNSPECIFIED TYPE: ICD-10-CM

## 2020-11-30 DIAGNOSIS — F84.0 AUTISM SPECTRUM DISORDER: ICD-10-CM

## 2020-11-30 DIAGNOSIS — F41.1 GENERALIZED ANXIETY DISORDER: ICD-10-CM

## 2020-11-30 PROCEDURE — 90853 GROUP PSYCHOTHERAPY: CPT | Mod: HN | Performed by: STUDENT IN AN ORGANIZED HEALTH CARE EDUCATION/TRAINING PROGRAM

## 2020-12-03 NOTE — PROGRESS NOTES
[facility]    Dialectic Behavior Therapy Clinic     Adult DBT Skills Group     DBT (Dialectic Behavior Therapy) is a cognitive behavioral therapy that includes skills group, which uses didactics, modeling, behavior rehearsal, and homework exercises to aid patients in acquiring new skills and the opportunity to practice new behaviors.    Date of Service: Nov 30, 2020  Group Length: 120 minutes  Start time: 2:30   End time: 4:30  Number of Participants: 8  Group Facilitators: Janeth Gómez, PhD, LP, Zohreh Reid MD and Anel Atwood MD    Client: Stephanie Nguyen  Pronouns: She/Her/Hers/Herself or They/Them/Their/Theirs  YOB: 1985  MRN: [id number]    Type of service:  video visit for group therapy  Reason for video visit:  Patient unable to travel due to Covid-19  Originating Site (patient location):  [facility]   Location- Patient's home  Distant Site (provider location):  Remote location  Mode of Communication:  Video Conference via Zoom  Consent:  Patient has given verbal consent for video visit?: Yes     Mindfulness: 3 Minute Reset   Homework Reviewed: Interpersonal Effectiveness Worksheet 5: Tracking Interpersonal Effectiveness Skills Use  Group Topic for Today: Interpersonal Effectiveness Handout 7: Guidelines for Self-Respect Effectiveness: Keeping Respect for Yourself (FAST)  Homework Assigned: Interpersonal Effectiveness Worksheet 4 or 5: Writing Out Interpersonal Effectiveness Scripts or Tracking Interpersonal Effectiveness Skills Use    Assessment: Patient was on time for group. Patient did complete the homework assigned prior to arriving at group. Patient was engaged in homework review and the didactic portion of group. Mood appeared depressed. For their diary card, Ri used PLEASE, building mastery, and TIPP skills to cope with their mother having COVID and being on a ventilator. This week, they plan to continue to practice PLEASE and TIPP skills.      Diagnosis:   Encounter Diagnoses   Name Primary?     Attention deficit hyperactivity disorder (ADHD), unspecified ADHD type Yes     Autism spectrum disorder      Generalized anxiety disorder      Obsessive-compulsive disorder, unspecified type        Plan: Continue in full-model outpatient DBT program.     Group Treatment Plan Reviewed: due  Group Treatment Plan Due for Review: due    I was present for and actively participated in the entire group therapy session, my observations of Paris MIROSLAVA gordon progress and participation are Ri appropriately emailed therapist regarding her mother who has Covid-who is in e intat g.    Janeth Gómez, PhD ERIN        I was present for and actively participated in the entire group therapy session, my observations of Paris gordon progress and participation are Ri shared with the group that her mother has Covid and is in the ICU--she did send an email to me and her therapist.    Janeth Gómez, PhD LP

## 2020-12-07 ENCOUNTER — VIRTUAL VISIT (OUTPATIENT)
Dept: PSYCHIATRY | Facility: CLINIC | Age: 35
End: 2020-12-07
Attending: PSYCHOLOGIST
Payer: COMMERCIAL

## 2020-12-07 DIAGNOSIS — F42.9 OBSESSIVE-COMPULSIVE DISORDER, UNSPECIFIED TYPE: Primary | ICD-10-CM

## 2020-12-07 DIAGNOSIS — F90.9 ATTENTION DEFICIT HYPERACTIVITY DISORDER (ADHD), UNSPECIFIED ADHD TYPE: ICD-10-CM

## 2020-12-07 DIAGNOSIS — F84.0 AUTISM SPECTRUM DISORDER: ICD-10-CM

## 2020-12-07 DIAGNOSIS — F41.1 GENERALIZED ANXIETY DISORDER: ICD-10-CM

## 2020-12-07 PROCEDURE — 90853 GROUP PSYCHOTHERAPY: CPT | Mod: 95 | Performed by: PSYCHOLOGIST

## 2020-12-09 NOTE — PROGRESS NOTES
[facility]    Dialectic Behavior Therapy Clinic     Adult DBT Skills Group     DBT (Dialectic Behavior Therapy) is a cognitive behavioral therapy that includes skills group, which uses didactics, modeling, behavior rehearsal, and homework exercises to aid patients in acquiring new skills and the opportunity to practice new behaviors.    Date of Service: Dec 7, 2020  Group Length: 120 minutes  Start time: 2:30   End time: 4:30  Number of Participants: 7  Group Facilitators: Janeth Gómez, PhD, LP, Zohreh Reid MD and Anel Atwood MD    Client: Stephanie Nguyen  Pronouns: She/Her/Hers/Herself or They/Them/Their/Theirs  YOB: 1985  MRN: [id number]    Type of service:  Video visit for group therapy  Reason for video visit:  Patient unable to travel due to Covid-19  Originating Site (patient location):  [facility]   Location- Patient's home  Distant Site (provider location):  Remote location  Mode of Communication:  Video Conference via Zoom  Consent:  Patient has given verbal consent for video visit?: Yes     Mindfulness: Body Scan Meditation  Homework Reviewed: Interpersonal Effectiveness Worksheet 4 or 5: Writing Out Interpersonal Effectiveness Scripts or Tracking Interpersonal Effectiveness Skills Use  Group Topic for Today: Interpersonal Effectiveness Handout 8: Evaluating for Whether or How Intensely to Asj for Something or Say No  Homework Assigned:  Interpersonal Effectiveness Worksheet 6: The Dime Game: Figuring Out How Strongly to Ask or Say No     Assessment: Patient was on time for group. Patient did not complete the homework assigned prior to arriving at group due to her mom being sick in the hospital. Patient was not engaged in homework review and was engaged in the didactic portion of group. Mood appeared depressed. For their diary card, Ri used distress tolerance skills of accept, improve, and opposite to emotion action to cope with their mother  having COVID and being on a ventilator. This week, they plan to continue to practice the skills of mindfulness and alternate rebellion.     Diagnosis:   Encounter Diagnoses   Name Primary?     Obsessive-compulsive disorder, unspecified type Yes     Generalized anxiety disorder      Autism spectrum disorder      Attention deficit hyperactivity disorder (ADHD), unspecified ADHD type        Plan: Continue in full-model outpatient DBT program.     Group Treatment Plan Reviewed: due  Group Treatment Plan Due for Review: due    I was present for and actively participated in the entire group therapy session, my observations of Paris gordon progress and participation are Ri is very active in the group--asking clarifying questions and giving examples.     Janeth Gómze, PhD LP

## 2020-12-14 ENCOUNTER — VIRTUAL VISIT (OUTPATIENT)
Dept: PSYCHIATRY | Facility: CLINIC | Age: 35
End: 2020-12-14
Attending: PSYCHOLOGIST
Payer: COMMERCIAL

## 2020-12-14 DIAGNOSIS — F90.9 ATTENTION DEFICIT HYPERACTIVITY DISORDER (ADHD), UNSPECIFIED ADHD TYPE: ICD-10-CM

## 2020-12-14 DIAGNOSIS — F41.1 GENERALIZED ANXIETY DISORDER: ICD-10-CM

## 2020-12-14 DIAGNOSIS — F33.1 MODERATE EPISODE OF RECURRENT MAJOR DEPRESSIVE DISORDER (H): Primary | ICD-10-CM

## 2020-12-14 DIAGNOSIS — F84.0 AUTISM SPECTRUM DISORDER: ICD-10-CM

## 2020-12-14 PROCEDURE — 90853 GROUP PSYCHOTHERAPY: CPT | Mod: 95 | Performed by: PSYCHOLOGIST

## 2020-12-15 NOTE — PROGRESS NOTES
[facility]    Dialectic Behavior Therapy Clinic     Adult DBT Skills Group     DBT (Dialectic Behavior Therapy) is a cognitive behavioral therapy that includes skills group, which uses didactics, modeling, behavior rehearsal, and homework exercises to aid patients in acquiring new skills and the opportunity to practice new behaviors.    Date of Service: Dec 14, 2020  Group Length: 120 minutes  Start time: 2:30   End time: 4:30  Number of Participants: 10  Group Facilitators: Janeth Gómez, PhD, LP and Zohreh Reid MD    Client: Stephanie Nguyen  Pronouns: She/Her/Hers/Herself or They/Them/Their/Theirs  YOB: 1985  MRN: [id number]    Type of service:  Video visit for group therapy  Reason for video visit:  Patient unable to travel due to Covid-19  Originating Site (patient location):  [facility]   Location- Patient's home  Distant Site (provider location):  Remote location  Mode of Communication:  Video Conference via Zoom  Consent:  Patient has given verbal consent for video visit?: Yes     Mindfulness: Backward Writing  Homework Reviewed: Interpersonal Effectiveness Worksheet 6: The Dime Game: Figuring Out How Strongly to Ask or Say No  Group Topic for Today: DEAR MAN/GIVE FAST Role Play. Interpersonal Effectiveness Handout 11: Finding and Getting People to Like You  Homework Assigned: Interpersonal Effectiveness Handout 11A: Identifying Skills to Find People and Get Them to Like You. Interpersonal Effectiveness Worksheet 8: Finding and Getting People to Like You.     Assessment: Patient was on time for group. Patient did complete the homework assigned prior to arriving at group. Patient was engaged in homework review and was engaged in the didactic portion of group. Mood appeared euthymic. For their diary card, Ri used coping ahead, radical acceptance, and improve the moment to cope with her mother's ongoing hospitalization. This week, they plan to  practice mindfulness, building mastery, and coping ahead.     Diagnosis:   Encounter Diagnoses   Name Primary?     Moderate episode of recurrent major depressive disorder (H) Yes     Generalized anxiety disorder      Attention deficit hyperactivity disorder (ADHD), unspecified ADHD type      Autism spectrum disorder        Plan: Continue in full-model outpatient DBT program.     Group Treatment Plan Reviewed: due  Group Treatment Plan Due for Review: due    I was present for and actively participated in the entire group therapy session, my observations of Paris Nguyen s progress and participation are  That Ri come to group on time and with her assignments complete. She often refers to ASD and how she missed non-verbals or her ASD friends do too.     Janeth Gómez, PhD LP

## 2020-12-21 ENCOUNTER — VIRTUAL VISIT (OUTPATIENT)
Dept: PSYCHIATRY | Facility: CLINIC | Age: 35
End: 2020-12-21
Attending: PSYCHOLOGIST
Payer: COMMERCIAL

## 2020-12-21 DIAGNOSIS — F41.1 GENERALIZED ANXIETY DISORDER: ICD-10-CM

## 2020-12-21 DIAGNOSIS — F90.9 ATTENTION DEFICIT HYPERACTIVITY DISORDER (ADHD), UNSPECIFIED ADHD TYPE: Primary | ICD-10-CM

## 2020-12-21 DIAGNOSIS — F84.0 AUTISM SPECTRUM DISORDER: ICD-10-CM

## 2020-12-21 DIAGNOSIS — F42.9 OBSESSIVE-COMPULSIVE DISORDER, UNSPECIFIED TYPE: ICD-10-CM

## 2020-12-21 PROCEDURE — 99207 PR NO BILLABLE SERVICE THIS VISIT: CPT | Mod: HN | Performed by: STUDENT IN AN ORGANIZED HEALTH CARE EDUCATION/TRAINING PROGRAM

## 2020-12-21 NOTE — PROGRESS NOTES
Erroneous encounter.  Ri was not available for today's appointment and later sent MyChart message.  Will see again in January 2021

## 2020-12-22 NOTE — PROGRESS NOTES
"     [facility]    Dialectic Behavior Therapy Clinic     Adult DBT Skills Group     DBT (Dialectic Behavior Therapy) is a cognitive behavioral therapy that includes skills group, which uses didactics, modeling, behavior rehearsal, and homework exercises to aid patients in acquiring new skills and the opportunity to practice new behaviors.    Date of Service: Dec 21, 2020  Group Length: 120 minutes  Start time: 2:30   End time: 4:30  Number of Participants: 7  Group Facilitators: MARCY Caldwell, Zohreh Reid MD and Anel Atwood MD    Client: Stephanie Nguyen  Pronouns: She/Her/Hers/Herself or They/Them/Their/Theirs  YOB: 1985  MRN: [id number]    Type of service:  Video visit for group therapy  Reason for video visit:  Patient unable to travel due to Covid-19  Originating Site (patient location):  [facility]   Location- Patient's home  Distant Site (provider location):  Remote location  Mode of Communication:  Video Conference via Zoom  Consent:  Patient has given verbal consent for video visit?: Yes     Mindfulness: Body and Sound guided meditation  Homework Reviewed: Interpersonal Effectiveness Handout 11A: Identifying Skills to Find People and Get Them to Like You. Interpersonal Effectiveness Worksheet 8: Finding and Getting People to Like You.   Group Topic for Today: Mindfulness Handouts 1-4: Goals of Mindfulness Practice, Mindfulness Definitions, Core Mindfulness Skills, States of Mind, Ideas for Practicing Wise Mind, Taking Hold of Your Mind: \"What\" Skills  Homework Assigned: Mindfulness Handout 3 OR 4: Wise Mind Practice or Mindfulness \"What\" Skills: Observing, Describing, Participating    Assessment: Patient was on time for group. Patient did complete the homework assigned prior to arriving at group. Patient was engaged in homework review and was engaged in the didactic portion of group. Mood appeared euthymic. For their diary card, Ri used coping " ahead to pack up their house to spend the next month at their mom's house up [place]. This week, they plan to work on distract and self soothe skills to cope with anxiety.     Diagnosis:   Encounter Diagnoses   Name Primary?     Attention deficit hyperactivity disorder (ADHD), unspecified ADHD type Yes     Autism spectrum disorder      Generalized anxiety disorder      Obsessive-compulsive disorder, unspecified type        Plan: Continue in full-model outpatient DBT program.     Group Treatment Plan Reviewed: due  Group Treatment Plan Due for Review: due    I was not present for the session. I reviewed the case and the note and I agree with the plan. Janeth Gómez, PHD, LP

## 2020-12-27 ENCOUNTER — HEALTH MAINTENANCE LETTER (OUTPATIENT)
Age: 35
End: 2020-12-27

## 2021-01-04 ENCOUNTER — VIRTUAL VISIT (OUTPATIENT)
Dept: PSYCHIATRY | Facility: CLINIC | Age: 36
End: 2021-01-04
Attending: PSYCHOLOGIST
Payer: COMMERCIAL

## 2021-01-04 DIAGNOSIS — F41.1 GENERALIZED ANXIETY DISORDER: Primary | ICD-10-CM

## 2021-01-04 DIAGNOSIS — F33.1 MODERATE EPISODE OF RECURRENT MAJOR DEPRESSIVE DISORDER (H): ICD-10-CM

## 2021-01-04 PROCEDURE — 90853 GROUP PSYCHOTHERAPY: CPT | Mod: 95 | Performed by: PSYCHOLOGIST

## 2021-01-04 NOTE — PROGRESS NOTES
[facility]    Dialectic Behavior Therapy Clinic     Adult DBT Skills Group     DBT (Dialectic Behavior Therapy) is a cognitive behavioral therapy that includes skills group, which uses didactics, modeling, behavior rehearsal, and homework exercises to aid patients in acquiring new skills and the opportunity to practice new behaviors.    Date of Service: Jan 4, 2021  Group Length: 120 minutes  Start time: 2:30   End time: 4:30  Number of Participants: 5  Group Facilitators: Nicolasa Lee, PhD, LP, Janeth Gómez, PhD, LP, Yayo Jones MD and Yayo Gottlieb MD    Client: Paris Nguyen  Pronouns: They/Them/Their/Theirs  YOB: 1985  MRN: [id number]    Type of service:  video visit for group therapy  Reason for video visit:  Patient unable to travel due to Covid-19  Originating Site (patient location):  [facility]   Location- Patient's home  Distant Site (provider location):  Remote location  Mode of Communication:  Video Conference via Zoom  Consent:  Patient has given verbal consent for video visit?: Yes     Mindfulness: Intention for New Year  Homework Reviewed: Mindfulness WS3(Wise Mind) or 4 (What Skills)  Group Topic for Today: Mindfulness HO5, How Skills  Homework Assigned: Read Mindfulness HO5A, Practicing non-judgmentalness and WS5 (How skills)    Assessment: Patient was on time for group. Patient did complete the homework assigned the previous week prior to arriving at group. Patient was engaged in homework review and was engaged in the didactic portion of group. Mood appeared Euthymic. They described using the describe skill to notice the trees while visiting their mom in [place], who is currently near death associated with Covid.     Diagnosis:   Encounter Diagnoses   Name Primary?     Generalized anxiety disorder Yes     Moderate episode of recurrent major depressive disorder (H)        Plan: Continue in full-model outpatient DBT program.

## 2021-01-05 ENCOUNTER — TELEPHONE (OUTPATIENT)
Dept: PSYCHIATRY | Facility: CLINIC | Age: 36
End: 2021-01-05

## 2021-01-05 ENCOUNTER — VIRTUAL VISIT (OUTPATIENT)
Dept: PSYCHIATRY | Facility: CLINIC | Age: 36
End: 2021-01-05
Attending: NURSE PRACTITIONER
Payer: COMMERCIAL

## 2021-01-05 DIAGNOSIS — F33.1 MODERATE EPISODE OF RECURRENT MAJOR DEPRESSIVE DISORDER (H): ICD-10-CM

## 2021-01-05 PROCEDURE — 99213 OFFICE O/P EST LOW 20 MIN: CPT | Mod: 95 | Performed by: NURSE PRACTITIONER

## 2021-01-05 RX ORDER — BUPROPION HYDROCHLORIDE 150 MG/1
150 TABLET ORAL EVERY MORNING
Qty: 30 TABLET | Refills: 0 | Status: SHIPPED | OUTPATIENT
Start: 2021-01-05 | End: 2021-02-08

## 2021-01-05 NOTE — PROGRESS NOTES
"VIDEO VISIT  Paris Nguyen is a 35 year old patient who is being evaluated via a billable video visit.      The patient has been notified of following:   \"We have found that certain health care needs can be provided without the need for an in-person physical exam. This service lets us provide the care you need with a video conversation. If a prescription is necessary we can send it directly to your pharmacy. If lab work is needed we can place an order for that and you can then stop by our lab to have the test done at a later time. Insurers are generally covering virtual visits as they would in-office visits so billing should not be different than normal.  If for some reason you do get billed incorrectly, you should contact the billing office to correct it and that number is in the AVS .    Patient has given verbal consent for video visit?: Yes   How would you like to obtain your AVS?: not requested  AVS SmartPhrase [PsychAVS] has been placed in 'Patient Instructions': N/A      Video- Visit Details  Type of service:  video visit for medication management  Time of service:    Date:  01/05/2021    Video Start Time:  10:40 AM        Video End Time:  11:01am    Reason for video visit:  Patient unable to travel due to Covid-19  Originating Site (patient location):  [facility]   Location- Friend or family home  Distant Site (provider location):  Remote location  Mode of Communication:  Video Conference via AmWell  Consent:  Patient has given verbal consent for video visit?: Yes           Psychiatry Clinic Progress Note                                                                   Paris Nguyen is a 35 year old female who prefers the name Ri (\"Ree\") and pronoun they.   Therapist: Marisol (replacement therapist) @ [facility].    PCP: Radha Collins  Other Providers: ALEXANDRA Otero @ [facility]    Pertinent Background:  See previous notes.  Psych critical item history includes mutiple psychotropic " "trials.     Interim History                                                                                                        4, 4     The patient is a good historian, reports good treatment adherence and was last seen 10/28/20.  Since the last visit,  Ri reports they are \"ok.\"  Engaged in DBT and finds helpful.  Continues to look for new employment.  Their mother experienced significant medical issue and Ri is concerned that their mother's independence will be impacted.  Used Lorazepam twice since last appointment.  DBT skills has been more beneficial.  Requesting an additional 3 months for work leave due recent psychosocial stressors and to continue work on DBT.  They do not want to adjust medications and continue to focus on non-pharmacological interventions.      10/28/20: Ri reports their  leave from work was extended for a week so they could meet today.  Feels overwhelmed about returning to work.  Continues to search for new employments (86 total applications).  Reports frequent crying spells.  Engaged in DBT which may be exposing emotions.  Reports change of season also has impacted mood (increased pain and increased isolation).  Ri has been using non-pharmacological interventions to manage mental health (increased plants in home).  Wellbutrin trial has been helpful with focus on priorities which has been helpful with anxiety as they are completing more tasks.  Ri has not used Klonopin in past month and has been relying on skills.  Ri does not want to adjust medications and continues to focus on DBT.  Requesting 3 months extension to focus on DBT    9/30/20: Ri reports they went on leave from work. Approved to October 24.  Awaiting to get into DBT skills group.  Fearful that they will only have 1 session before returning to work.  Mood low.  Reports having difficulty transitioning from one task to another, maintaining focus, and motivating self to engage in undesirable activities.       8/11/20: Ri " "reports that they have been struggling with increased stress related to Covid. Work stress also has worsened.  They bought \"edibles\" in [place] and has been using to manage stress and agitation.  Periodic panic attacks.  Ri has applied to several jobs without securing new employment.  Also reports physical health has worsened.  They also are quite apathetic.  Also reports the cat they had for past 12 years needed to be euthanized. Plans to take leave and focus on non-pharmacological interventions (self care, therapy, DBT).  Ri also plans to set up appointment to see PCP to address medical needs.  They do not want to adjust psychiatric medications.      6/9/20: Ri reports the continued Covid19 and civil unrest has impacted her mental health.  They are finding their employment unfulfilling and dread going to work.  Low motivation, apathetic, reports increased dissociation.   Will work this therapist on these changes.  Experiencing diplopia and has MRI scheduled.  Using Lorazepam more frequently to promote sleep.  Recommended they use Hydroxyzine before using Ativan.  Ri agreed.  They understand that Ativan is not indicated for long term use and uses only for emergencies.    3/26/20: Ri reports they are doing well while working from home. Mood stable. Recently rejected from job but states they are coping effectively.  Sleep is \"garbage\" due to headache pain.  Does not want to adjust medications    9/10/20: Ri reports feeling \"dysregulated right now.\"  Myalgia flare-up, and migraine today has worsened irritability.  They are going to gym weekly (pilates) which helps manage dysregulation.  Describes mood as \"angry.\"  Has returned to work which they describes as \"toxic.\" Sleep is \"ok.\"  They do not want to adjust medications today as believes symptoms are situational.  Lorazepam used extremely sparingly.  Using hydroxyzine and non-pharmacological interventions instead.      8/1/19: Ri reports they found out their aunt had " "a heart attack and passed away yesterday.  Ri also lost their father and 2 friends since April.  Continues to see therapist regularly but previous therapist finished training. Ri is seeing interim therapist.  Plans to restart DBT in near future.  Ri reports that their are more agitated and \"zoning out\" more often which they attributes to feeling overwhelmed with grief.  Reports that physical activity has been helpful in managing.  Reports that anxiety was improving prior to her aunt's death.  Since then, she reports using Lorazepam 3 times per week.  Have discussed alternative for benzo, such as propranolol, but due to circumstances and additional psychosocial stressors will continue Lorazepam.       6/7/19: Ri reports their father passed away in April. They appear to be coping with loss appropriately.  They states their life after her father's passing will be \"simpler but not better.\"  They and their family are in process of redefining her familial role.  As expected, Ri reports a worsening of mood and anxiety.  They expect symptoms to resolves once they have properly grieved.  Ri does not want to adjust medications    2/6/19: Ri is currently experiencing a cold which has significant impacted her energy.  Has not been to work in 3 days.  They reports their mood and anxiety are currently well managed.  Increase in Effexor appears to be helpful as Ri describes herself as \"more chill.\"  Ri continues to work with therapist with distress tolerance and acceptance.  They reports most anxiety is work related.  Cymbalta for possible additional mood and pain management was discussed.  Explained interactions with Effexor and patient was agreeable to not start today.       1/9/19: Ri reports increased anxiety and frustration.  They attributes to receiving a substantial medical bill recently.  Financial stress is main concern overall.  Mood reported as ok.  Lorazepam has been used more frequently (up to 3 times per week).  " Requests increase Effexor XR 150mg.  Gabapentin helpful with body pain does not associate with anxiety relief.      11/27/18: Ri was able to discontinue Lexapro successfully and started Effexor XR 75mg.  Since starting Effexor, Ri reports depressive symptoms continues but less intense and less frequent.  Effexor XR started approximately 2 weeks ago.  Since increasing nighttime dose of Gabapentin, Ri reports sleep has improved.  Also Trazodone decreased to 25mg due to morning sedation.  Continues to experience some sedation in morning but positives outweigh negative.  Guanfacine tried recently to manage anxiety, panic, and irritability; but stopped due to lowered blood pressure.       Recent Symptoms:   Depression:  depressed mood, anhedonia, low energy and poor concentration /memory  Elevated:  mood dysregulation  Psychosis:  none  Anxiety:  periodic worry and feeling overwhelmed  Trauma Related:  none     Recent Substance Use:  No changes reported        Social/ Family History                                  [per patient report]                                 1ea,1ea   FINANCIAL SUPPORT- working       FEELS SAFE AT HOME- Yes    Medical / Surgical History                                                                                                                  Patient Active Problem List   Diagnosis     Fibromyalgia     TMJ arthralgia     Moderate persistent asthma without complication     ADHD     Autism spectrum disorder     Generalized anxiety disorder     IBS (irritable bowel syndrome)     OCD (obsessive compulsive disorder)     Seasonal allergic rhinitis due to pollen     Allergic rhinitis due to dust     Allergy to adhesive tape     Need for desensitization to allergens     Oral allergy syndrome     Plantar fasciitis       Past Surgical History:   Procedure Laterality Date     ABDOMEN SURGERY  10/2016    Endometriosis excision     CYSTOSCOPY N/A 10/12/2016    Procedure: CYSTOSCOPY;  Surgeon: Amanda  Eliazar Joe MD;  Location:  OR     DAVINCI ASSISTED ABLATION / EXCISION OF ENDOMETRIOSIS N/A 10/12/2016    Procedure: DAVINCI ASSISTED ABLATION / EXCISION OF ENDOMETRIOSIS;  Surgeon: Eliazar Patel MD;  Location:  OR     DAVINCI LYSIS OF ADHESIONS N/A 10/12/2016    Procedure: DAVINCI LYSIS OF ADHESIONS;  Surgeon: Eliazar Patel MD;  Location:  OR     DAVINCI PELVIC PROCEDURE N/A 10/12/2016    Procedure: DAVINCI PELVIC PROCEDURE;  Surgeon: Eliazar Patel MD;  Location:  OR     DILATION AND CURETTAGE, HYSTEROSCOPY DIAGNOSTIC, COMBINED N/A 10/12/2016    Procedure: COMBINED DILATION AND CURETTAGE, HYSTEROSCOPY DIAGNOSTIC;  Surgeon: Eliazar Patel MD;  Location:  OR     EYE SURGERY      Eye Surgery x 2 as an infant     HC TOOTH EXTRACTION W/FORCEP          Medical Review of Systems                                                                                                    2,10   The remainder of the review of systems is noncontributory  Dizziness is a regular issue, has episodes maybe twice per day. Constipation is frequently an issue. Denies N/V, headaches. Had migraines in the past, but hasn't had one in a while. Thinks diet helped with this.   Allergy                                Latex, Liquid adhesive, Seasonal allergies, Morphine, and Wound dressing adhesive  Current Medications                                                                                                       Current Outpatient Medications   Medication Sig Dispense Refill     benzonatate (TESSALON) 100 MG capsule Take 100-200 mg by mouth as needed        buPROPion (WELLBUTRIN XL) 150 MG 24 hr tablet Take 1 tablet (150 mg) by mouth every morning 90 tablet 0     EPINEPHrine (EPIPEN/ADRENACLICK/OR ANY BX GENERIC EQUIV) 0.3 MG/0.3ML injection 2-pack Inject 0.3 mg into the muscle once       fexofenadine (ALLEGRA) 180 MG tablet Take 180 mg by mouth daily       fluticasone (FLOVENT HFA) 110  MCG/ACT Inhaler Inhale 2 puffs into the lungs 2 times daily       gabapentin (NEURONTIN) 300 MG capsule TAKE 1 CAPSULE TWICE A DAY, AND 2 CAPSULES AT BEDTIME 360 capsule 1     hydrOXYzine (ATARAX) 25 MG tablet TAKE 1 TABLET  THREE TIMES A DAY AS NEEDED FOR ITCHING AND ANXIETY 270 tablet 1     levalbuterol (XOPENEX HFA) 45 MCG/ACT Inhaler Inhale 1-2 puffs into the lungs every 4 hours as needed       LORazepam (ATIVAN) 0.5 MG tablet Take 1 tablet (0.5 mg) by mouth daily as needed for anxiety 30 tablet 0     montelukast (SINGULAIR) 10 MG tablet Take 10 mg by mouth At Bedtime       multivitamin, therapeutic with minerals (MULTI-VITAMIN) TABS tablet Take 1 tablet by mouth daily       naproxen (NAPROSYN) 500 MG tablet Take 1 tablet (500 mg) by mouth 2 times daily as needed for moderate pain or headaches (migraines and fibromyalgia) 60 tablet 1     norethindrone (AYGESTIN) 5 MG tablet        ondansetron (ZOFRAN-ODT) 4 MG ODT tab Take 1 tablet (4 mg) by mouth every 6 hours as needed for nausea 30 tablet 1     polyethylene glycol (MIRALAX) powder 1 capful (17 g) in 8 oz of liquid daily (Patient not taking: Reported on 2/21/2020) 850 g 1     rifaximin (XIFAXAN) 550 MG TABS tablet Take 1 tablet (550 mg) by mouth 3 times daily 42 tablet 0     tiZANidine (ZANAFLEX) 2 MG tablet Take 1 tablet (2 mg) by mouth 3 times daily as needed for muscle spasms 90 tablet 0     traZODone (DESYREL) 50 MG tablet Take 0.5-1 tablets (25-50 mg) by mouth nightly 45 tablet 2     venlafaxine (EFFEXOR-XR) 150 MG 24 hr capsule Take 1 capsule (150 mg) by mouth daily 90 capsule 1     vitamin D3 (CHOLECALCIFEROL) 1000 units (25 mcg) tablet Take 1 tablet (1,000 Units) by mouth daily (Patient not taking: Reported on 2/21/2020) 90 tablet 3     Vitals                                                                                                                       3, 3   There were no vitals taken for this visit.   Mental Status Exam                                                                                     9, 14 cog gs     Alertness: alert  and oriented  Appearance: casually groomed  Behavior/Demeanor: cooperative, pleasant and calm, with good  eye contact   Speech: normal  Language: no obvious problem  Psychomotor: normal or unremarkable  Mood: depressed, worried and agitated  Affect: appropriate; was congruent to mood; was congruent to content  Thought Process/Associations: unremarkable  Thought Content:  Reports none;  Denies suicidal ideation and violent ideation  Perception:  Reports none;  Denies auditory hallucinations and visual hallucinations  Insight: good  Judgment: good  Cognition: (6) does  appear grossly intact; formal cognitive testing was not done  Gait/Station and/or Muscle Strength/Tone: unable to assess    Labs and Data                                                                                                                 Rating Scales:    PHQ9 and INDRA-7  INDRA-7: not completed  PHQ9 Today: not completed  PHQ-9 SCORE 8/1/2019 9/10/2019 2/21/2020   PHQ-9 Total Score MyChart - - -   PHQ-9 Total Score 13 13 16         Diagnosis and Assessment                                                                             m2, h3     Today the following issues were addressed:    1) Major Depressive Disorder, recurrent, moderate.    2) Generalized Anxiety Disorder  3) ADHD (Hx)    [place] Prescription Monitoring Program [] review was not needed today.      Plan                                                                                                                    m2, h3      1) Medication Management   Continue Effexor XR 150mg daily  Continue Trazodone 25-50mg at bedtime   Continue Hyrdoxyzine 25mg qAM and 50mg at bedtime.   Not effective for anxiety but helpful with allergies.  Requested they get further refills from specialist.    Continue Gabapentin 300 BID and 600mg at bedtime   Continue Lorazepam 0.5mg daily PRN  Continue  Wellbutrin XL 150mg     2) Therapy  Continue with current therapist  Continue with DBT    RTC: 3 months     CRISIS NUMBERS:   Provided routinely in AVS.    Treatment Risk Statement:  The patient understands the risks, benefits, adverse effects and alternatives. Agrees to treatment with the capacity to do so. No medical contraindications to treatment. Agrees to call clinic for any problems. The patient understands to call 911 or go to the nearest ED if life threatening or urgent symptoms occur.     PROVIDER:  TORSTEN Hogan CNP

## 2021-01-05 NOTE — TELEPHONE ENCOUNTER
On January 5, 2021, at 9:13 AM, writer called patient at [phone] to confirm Virtual Visit. Writer unable to make contact with patient. Writer left detailed voice message for call back. [phone] left as call back number. Ese Walsh, [facility]

## 2021-01-11 ENCOUNTER — VIRTUAL VISIT (OUTPATIENT)
Dept: PSYCHIATRY | Facility: CLINIC | Age: 36
End: 2021-01-11
Attending: PSYCHOLOGIST
Payer: COMMERCIAL

## 2021-01-11 DIAGNOSIS — F33.1 MODERATE EPISODE OF RECURRENT MAJOR DEPRESSIVE DISORDER (H): Primary | ICD-10-CM

## 2021-01-11 DIAGNOSIS — F41.1 GENERALIZED ANXIETY DISORDER: ICD-10-CM

## 2021-01-11 PROCEDURE — 90853 GROUP PSYCHOTHERAPY: CPT | Mod: 95 | Performed by: PSYCHOLOGIST

## 2021-01-11 NOTE — PROGRESS NOTES
[facility]    Dialectic Behavior Therapy Clinic     Adult DBT Skills Group     DBT (Dialectic Behavior Therapy) is a cognitive behavioral therapy that includes skills group, which uses didactics, modeling, behavior rehearsal, and homework exercises to aid patients in acquiring new skills and the opportunity to practice new behaviors.    Date of Service: Jan 11, 2021  Group Length: 120 minutes  Start time: 2:30   End time: 4:30 (patient left 3:15 due to connectivity issue)  Number of Participants: 6  Group Facilitators: Nicolasa Lee, PhD, LP, Yayo Jones MD and Yayo Gottlieb MD    Client: Paris Nguyen  Pronouns: She/Her/Hers/Herself or They/Them/Their/Theirs  YOB: 1985  MRN: [id number]    Type of service:  video visit for group therapy  Reason for video visit:  Patient unable to travel due to Covid-19  Originating Site (patient location):  [facility]   Location- Friend or family home  Distant Site (provider location):  Remote location  Mode of Communication:  Video Conference via Zoom  Consent:  Patient has given verbal consent for video visit?: Yes     Mindfulness: Listing [facility]  Homework Reviewed: Mindfulness 5A handout, Mindfulness 5 worksheet  Group Topic for Today: Distress Tolerance handouts 1, 2, 3, 4  Homework Assigned: Distress Tolerance worksheet 2, practicing the STOP Skill    Assessment: Patient was on time for group. Ri had to leave group early due to an internet connectivity issue, prior to reviewing the previous week's homework, didactics portion, or assigning of HW. She was engaged in didactic portion of group prior to leaving, and her mood appeared euthymic. Agreed with plan to have homework communicated to her after group.     Diagnosis:   Encounter Diagnoses   Name Primary?     Moderate episode of recurrent major depressive disorder (H) Yes     Generalized anxiety disorder        Plan: Continue in full-model outpatient DBT  program.     -----  I was present for the entirety of this psychotherapy group and I agree with the above progress note and plan.    Nicolasa Lee, PhD,   Clinical Psychologist  Jan 11, 2021

## 2021-01-18 ENCOUNTER — VIRTUAL VISIT (OUTPATIENT)
Dept: PSYCHIATRY | Facility: CLINIC | Age: 36
End: 2021-01-18
Attending: PSYCHOLOGIST
Payer: COMMERCIAL

## 2021-01-18 ENCOUNTER — TELEPHONE (OUTPATIENT)
Dept: PSYCHIATRY | Facility: CLINIC | Age: 36
End: 2021-01-18

## 2021-01-18 DIAGNOSIS — F33.1 MODERATE EPISODE OF RECURRENT MAJOR DEPRESSIVE DISORDER (H): Primary | ICD-10-CM

## 2021-01-18 DIAGNOSIS — F41.1 GENERALIZED ANXIETY DISORDER: ICD-10-CM

## 2021-01-18 PROCEDURE — 90853 GROUP PSYCHOTHERAPY: CPT | Mod: 95 | Performed by: PSYCHOLOGIST

## 2021-01-18 NOTE — TELEPHONE ENCOUNTER
"2 faxed pages was received from [facility] requesting \"Office Treatment Notes drom December 2020 through [facility]\" Writer found 1 appointment within that time frame on 1/5/2021. Writer printed 13 pages of Progress Notes from that encounter. A Quick Disclosure was entered. 15 pages was faxed to [facility] attn: Nicholas Cheney at [phone]..Chaya Lorenzana LPN    "

## 2021-01-18 NOTE — PROGRESS NOTES
[facility]    Dialectic Behavior Therapy Clinic     Adult DBT Skills Group     DBT (Dialectic Behavior Therapy) is a cognitive behavioral therapy that includes skills group, which uses didactics, modeling, behavior rehearsal, and homework exercises to aid patients in acquiring new skills and the opportunity to practice new behaviors.    Date of Service: Jan 18, 2021  Group Length: 120 minutes  Start time: 2:30   End time: 4:30  Number of Participants: 6  Group Facilitators: Nicolasa Lee, PhD, LP, Yayo Jones MD and Yayo Gottlieb MD    Client: Paris Nguyen  Pronouns: They/Them/Their/Theirs  YOB: 1985  MRN: [id number]    Type of service:  video visit for group therapy  Reason for video visit:  Patient unable to travel due to Covid-19  Originating Site (patient location):  [facility]   Location- Patient's home  Distant Site (provider location):  Remote location  Mode of Communication:  Video Conference via Zoom  Consent:  Patient has given verbal consent for video visit?: Yes     Mindfulness: Video guided paced breathing  Homework Reviewed: Distress tolerance worksheet #2, STOP skill  Group Topic for Today: Distress tolerance handout 6, TIP skills  Homework Assigned: Distress tolerance worksheet #4, TIP skills    Assessment: Patient was on time for group. Patient did complete the homework assigned the previous week prior to arriving at group. Patient was engaged in homework review and was engaged in the didactic portion of group. Mood appeared Euthymic. Ri shared that they had a highly stressful week between a job interview and their mother being discharged from rehab. They are planning on using the GIVE skill with their mother, as well as the PLEASE skill this upcoming week.     Diagnosis:   Encounter Diagnoses   Name Primary?     Moderate episode of recurrent major depressive disorder (H) Yes     Generalized anxiety disorder        Plan:  Continue in full-model outpatient DBT program.     -----  I was present for the entirety of this psychotherapy group and I agree with the above progress note and plan.    Nicolasa Lee, PhD,   Clinical Psychologist  Jan 18, 2021

## 2021-01-25 ENCOUNTER — VIRTUAL VISIT (OUTPATIENT)
Dept: PSYCHIATRY | Facility: CLINIC | Age: 36
End: 2021-01-25
Attending: PSYCHOLOGIST
Payer: COMMERCIAL

## 2021-01-25 DIAGNOSIS — F41.1 GENERALIZED ANXIETY DISORDER: ICD-10-CM

## 2021-01-25 DIAGNOSIS — F33.1 MODERATE EPISODE OF RECURRENT MAJOR DEPRESSIVE DISORDER (H): Primary | ICD-10-CM

## 2021-01-25 PROCEDURE — 90853 GROUP PSYCHOTHERAPY: CPT | Mod: HN | Performed by: STUDENT IN AN ORGANIZED HEALTH CARE EDUCATION/TRAINING PROGRAM

## 2021-01-25 NOTE — PROGRESS NOTES
[facility]    Dialectic Behavior Therapy Clinic     Adult DBT Skills Group     DBT (Dialectic Behavior Therapy) is a cognitive behavioral therapy that includes skills group, which uses didactics, modeling, behavior rehearsal, and homework exercises to aid patients in acquiring new skills and the opportunity to practice new behaviors.    Date of Service: Jan 25, 2021  Group Length: 120 minutes  Start time: 2:30   End time: 4:30  Number of Participants: 10  Group Facilitators: Nicolasa Lee, PhD, LP, Yayo Jones MD and Yayo Gottlieb MD    Client: Paris Nguyen  Pronouns: They/Them/Their/Theirs  YOB: 1985  MRN: [id number]    Type of service:  video visit for group therapy  Reason for video visit:  Patient unable to travel due to Covid-19  Originating Site (patient location):  [facility]   Location- Patient's home  Distant Site (provider location):  Remote location  Mode of Communication:  Video Conference via Zoom  Consent:  Patient has given verbal consent for video visit?: Yes     Mindfulness:  10 things about 1 object  Homework Reviewed: Distress Tolerance Worksheet 4, TIPP Skills  Group Topic for Today: Distress Handouts 7, 8: Distract, Self Soothe  Homework Assigned: Handout - Build a Self Soothe Kit; Worksheet 5 (Accept Skills) OR Worksheet 6 (Self Soothing Skills)    Assessment: Patient was on time for group. Patient did complete the homework assigned the previous week prior to arriving at group. Patient was engaged in homework review and was engaged in the didactic portion of group. Mood appeared Euthymic. Patient shared she'd just returned home from [place], where she'd been for the past month caring for her mom.     Diagnosis:   Encounter Diagnoses   Name Primary?     Moderate episode of recurrent major depressive disorder (H) Yes     Generalized anxiety disorder        Plan: Continue in full-model outpatient DBT program.     -----  I was  present for the entirety of this psychotherapy group and I agree with the above progress note and plan.    Nicolasa Lee, PhD,   Clinical Psychologist  Jan 25, 2021

## 2021-02-01 ENCOUNTER — VIRTUAL VISIT (OUTPATIENT)
Dept: PSYCHIATRY | Facility: CLINIC | Age: 36
End: 2021-02-01
Attending: PSYCHOLOGIST
Payer: COMMERCIAL

## 2021-02-01 DIAGNOSIS — F41.1 GENERALIZED ANXIETY DISORDER: ICD-10-CM

## 2021-02-01 DIAGNOSIS — F33.1 MODERATE EPISODE OF RECURRENT MAJOR DEPRESSIVE DISORDER (H): Primary | ICD-10-CM

## 2021-02-01 PROCEDURE — 90853 GROUP PSYCHOTHERAPY: CPT | Mod: HN | Performed by: STUDENT IN AN ORGANIZED HEALTH CARE EDUCATION/TRAINING PROGRAM

## 2021-02-01 NOTE — PROGRESS NOTES
[facility]    Dialectic Behavior Therapy Clinic     Adult DBT Skills Group     DBT (Dialectic Behavior Therapy) is a cognitive behavioral therapy that includes skills group, which uses didactics, modeling, behavior rehearsal, and homework exercises to aid patients in acquiring new skills and the opportunity to practice new behaviors.    Date of Service: Feb 1, 2021  Group Length: 120 minutes  Start time: 2:30   End time: 4:30  Number of Participants: 9  Group Facilitators: Nicolasa Lee, PhD, LP, Yayo Jones MD and Yayo Gottleib MD    Client: Paris Nguyen  Pronouns: They/Them/Their/Theirs  YOB: 1985  MRN: [id number]    Type of service:  video visit for group therapy  Reason for video visit:  Patient unable to travel due to Covid-19  Originating Site (patient location):  [facility]   Location- Patient's home  Distant Site (provider location):  Remote location  Mode of Communication:  Video Conference via Zoom  Consent:  Patient has given verbal consent for video visit?: Yes     Mindfulness: Gratitude list  Homework Reviewed: Handout - Build a Self Soothe Kit; Worksheet 5 (Accept Skills) OR Worksheet 6 (Self Soothing Skills)  Group Topic for Today: HO 9, Improving the Moment, HO 10-11, Radical Acceptance, HO 12, Turning the Mind  Homework Assigned: Read HO 11B, Distress Tolerance Worksheet 9 (Radical Acceptance)    Assessment: Patient was on time for group. Patient did not complete the homework assigned the previous week prior to arriving at group. Patient was engaged in homework review and was engaged in the didactic portion of group. Mood appeared Euthymic. Ri shared her experience at a challenging job interview this week. She discovered that some of the materials she had prepared for the job interview were not saved, so she had to redo her work. She utilized Effectiveness and coping ahead instead of panicking. She will continue to work on  coping ahead this upcoming week.     Diagnosis:   Encounter Diagnoses   Name Primary?     Moderate episode of recurrent major depressive disorder (H) Yes     Generalized anxiety disorder        Plan: Continue in full-model outpatient DBT program.     -----  I was present for the entirety of this psychotherapy group and I agree with the above progress note and plan.    Nicolasa Lee, PhD,   Clinical Psychologist  Feb 1, 2021

## 2021-02-05 DIAGNOSIS — F33.1 MODERATE EPISODE OF RECURRENT MAJOR DEPRESSIVE DISORDER (H): ICD-10-CM

## 2021-02-06 ENCOUNTER — MYC MEDICAL ADVICE (OUTPATIENT)
Dept: PSYCHIATRY | Facility: CLINIC | Age: 36
End: 2021-02-06

## 2021-02-06 DIAGNOSIS — F33.1 MODERATE EPISODE OF RECURRENT MAJOR DEPRESSIVE DISORDER (H): ICD-10-CM

## 2021-02-08 ENCOUNTER — VIRTUAL VISIT (OUTPATIENT)
Dept: PSYCHIATRY | Facility: CLINIC | Age: 36
End: 2021-02-08
Attending: PSYCHOLOGIST
Payer: COMMERCIAL

## 2021-02-08 DIAGNOSIS — F33.1 MODERATE EPISODE OF RECURRENT MAJOR DEPRESSIVE DISORDER (H): Primary | ICD-10-CM

## 2021-02-08 DIAGNOSIS — F84.0 AUTISM SPECTRUM DISORDER: ICD-10-CM

## 2021-02-08 DIAGNOSIS — F41.1 GENERALIZED ANXIETY DISORDER: ICD-10-CM

## 2021-02-08 PROCEDURE — 90853 GROUP PSYCHOTHERAPY: CPT | Mod: 95 | Performed by: PSYCHOLOGIST

## 2021-02-08 RX ORDER — BUPROPION HYDROCHLORIDE 150 MG/1
TABLET ORAL
Qty: 30 TABLET | Refills: 0 | OUTPATIENT
Start: 2021-02-08

## 2021-02-08 RX ORDER — BUPROPION HYDROCHLORIDE 150 MG/1
150 TABLET ORAL EVERY MORNING
Qty: 90 TABLET | Refills: 0 | Status: SHIPPED | OUTPATIENT
Start: 2021-02-08 | End: 2021-03-15

## 2021-02-08 NOTE — TELEPHONE ENCOUNTER
-Messaged patient via [employer] to let them know prescription has been sent  -Med tab updated to reflect this

## 2021-02-08 NOTE — TELEPHONE ENCOUNTER
Last seen: 1/5/21  RTC: 3 months  Cancel: none  No-show: none  Next appt: 3/2/21     Incoming refill from Patient via [employer]     Medication requested: buPROPion (WELLBUTRIN XL) 150 MG 24 hr tablet  Directions: Sig - Route: Take 1 tablet (150 mg) by mouth every morning - Oral  Qty: 30 tablet  Last refilled: 1/5/21 #30     Medication refill pended and routed to provider for approval as patient is requesting a 90 day supply.

## 2021-02-15 ENCOUNTER — VIRTUAL VISIT (OUTPATIENT)
Dept: PSYCHIATRY | Facility: CLINIC | Age: 36
End: 2021-02-15
Attending: PSYCHOLOGIST
Payer: COMMERCIAL

## 2021-02-15 DIAGNOSIS — F41.1 GENERALIZED ANXIETY DISORDER: ICD-10-CM

## 2021-02-15 DIAGNOSIS — F90.8 ATTENTION DEFICIT HYPERACTIVITY DISORDER (ADHD), OTHER TYPE: Primary | ICD-10-CM

## 2021-02-15 PROCEDURE — 90853 GROUP PSYCHOTHERAPY: CPT | Mod: HN | Performed by: STUDENT IN AN ORGANIZED HEALTH CARE EDUCATION/TRAINING PROGRAM

## 2021-02-15 NOTE — PROGRESS NOTES
"...................................................     [facility]    Dialectic Behavior Therapy Clinic     Adult DBT Skills Group     DBT (Dialectic Behavior Therapy) is a cognitive behavioral therapy that includes skills group, which uses didactics, modeling, behavior rehearsal, and homework exercises to aid patients in acquiring new skills and the opportunity to practice new behaviors.    Date of Service: Feb 8, 2021  Group Length: 120 minutes  Start time: 2:30   End time: 4:30  Number of Participants: 7  Group Facilitators: Janeth Gómez, PhD, LP and Alona Ge    Client: Paris Nguyen  Pronouns: They/Them/Their/Theirs  YOB: 1985  MRN: [id number]    Type of service:  video visit for group therapy  Reason for video visit:  Patient unable to travel due to Covid-19  Originating Site (patient location):  [facility]   Location- Patient's home  Distant Site (provider location):  Remote location  Mode of Communication:  Video Conference via Zoom  Consent:  Patient has given verbal consent for video visit?: Yes     Mindfulness:Music  Homework Reviewed: Distress Tolerance--Radical Acceptance  Group Topic for Today: Turning the Mind, Willingness  Homework Assigned: #10 Turning the Mind/Willingness    Assessment: Patient was on time for group. Patient did complete the homework assigned the previous week prior to arriving at group. Patient was engaged in homework review and was engaged in the didactic portion of group. Mood appeared Euthymic  Anxious. Pt reported during homework on Radical acceptance that her mother is now \"going to live\" and will be \"very upset with me for going thru all of there things sorting out finance. She is getting out of hospital/covid rehab soon.\"    Diagnosis:depression, anxiety, ASD    Plan: Continue in full-model outpatient DBT program.     Group Treatment Plan Reviewed:  Group Treatment Plan Due for Review:     "

## 2021-02-15 NOTE — PROGRESS NOTES
[facility]    Dialectic Behavior Therapy Clinic     Adult DBT Skills Group     DBT (Dialectic Behavior Therapy) is a cognitive behavioral therapy that includes skills group, which uses didactics, modeling, behavior rehearsal, and homework exercises to aid patients in acquiring new skills and the opportunity to practice new behaviors.    Date of Service: Feb 15, 2021  Group Length: 120 minutes  Start time: 2:30   End time: 4:30  Number of Participants: 8  Group Facilitators: Nicolasa Lee, PhD, LP, Yayo Jones MD and Yayo Gottlieb MD    Client: Paris Nguyen  Pronouns: They/Them/Theirs/Themself  YOB: 1985  MRN: [id number]    Type of service:  video visit for group therapy  Reason for video visit:  Patient unable to travel due to Covid-19  Originating Site (patient location):  [facility]   Location- Patient's home  Distant Site (provider location):  Remote location  Mode of Communication:  Video Conference via Zoom  Consent:  Patient has given verbal consent for video visit?: Yes     Mindfulness: Writing with opposite hand  Homework Reviewed: #10 Turning the Mind/Willingness  Group Topic for Today: #14 Half-Smiling and Willing Hands; #15 Mindfulness of Current Thoughts; Self-Soothing Toolkits  Homework Assigned: # 11 Half Smiling Willing Hands, and #12 Mindfulness of Current Thoughts    Assessment: Patient was on time for group. Patient did complete the homework assigned the previous week prior to arriving at group. Patient was engaged in homework review and was engaged in the didactic portion of group. Mood appeared Euthymic. 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 Using lots of coping ahead to prepare for this.     Diagnosis:   Encounter Diagnoses   Name Primary?     Attention deficit hyperactivity disorder (ADHD), other type Yes     Generalized anxiety disorder         Plan: Continue in full-model outpatient DBT program.     -----  I was present for the entirety of this psychotherapy group and I agree with the above progress note and plan.    Nicolasa Lee, PhD,   Clinical Psychologist  Feb 15, 2021

## 2021-02-22 ENCOUNTER — VIRTUAL VISIT (OUTPATIENT)
Dept: PSYCHIATRY | Facility: CLINIC | Age: 36
End: 2021-02-22
Attending: PSYCHOLOGIST
Payer: COMMERCIAL

## 2021-02-22 DIAGNOSIS — F41.1 GENERALIZED ANXIETY DISORDER: Primary | ICD-10-CM

## 2021-02-22 DIAGNOSIS — F33.1 MODERATE EPISODE OF RECURRENT MAJOR DEPRESSIVE DISORDER (H): ICD-10-CM

## 2021-02-22 PROCEDURE — 90853 GROUP PSYCHOTHERAPY: CPT | Mod: HN | Performed by: STUDENT IN AN ORGANIZED HEALTH CARE EDUCATION/TRAINING PROGRAM

## 2021-02-22 NOTE — PROGRESS NOTES
[facility]    Dialectic Behavior Therapy Clinic     Adult DBT Skills Group     DBT (Dialectic Behavior Therapy) is a cognitive behavioral therapy that includes skills group, which uses didactics, modeling, behavior rehearsal, and homework exercises to aid patients in acquiring new skills and the opportunity to practice new behaviors.    Date of Service: Feb 22, 2021  Group Length: 120 minutes  Start time: 2:30   End time: 3pm  Number of Participants: 5  Group Facilitators: Nicolasa Lee, PhD, LP, Yayo Jones MD and Yayo Gottlieb MD    Client: Paris Nguyen  Pronouns: They/Them/Their/Theirs  YOB: 1985  MRN: [id number]    Type of service:  video visit for group therapy  Reason for video visit:  Patient unable to travel due to Covid-19  Originating Site (patient location):  [facility]   Location- Patient's home  Distant Site (provider location):  Remote location  Mode of Communication:  Video Conference via Zoom  Consent:  Patient has given verbal consent for video visit?: Yes     Mindfulness: 5, 4, 3, 2, 1 senses  Homework Reviewed: Distress Tolerance # 11 Half Smiling and Willing Hands, and Distress Tolerance #12 Mindfulness of Current Thoughts  Group Topic for Today: Orientation to mindfulness, States of mind, What skills, Mindfulness handouts 1, 1A, 2, 3, 3A, & 4  Homework Assigned: Mindfulness worksheets 3 & 4    Assessment: Patient was on time for group. Patient did complete the homework assigned the previous week prior to arriving at group. Patient was engaged in homework review. Ri had to leave group at 3pm to return to work, so she was not present for the didactic portion of group. Mood appeared Euthymic. Ri shared her experience using PLEASE skills during recent exacerbation of pain.     Diagnosis:   Encounter Diagnoses   Name Primary?     Generalized anxiety disorder Yes     Moderate episode of recurrent major depressive disorder (H)         Plan: Continue in full-model outpatient DBT program.     -----  I was present for the entirety of this psychotherapy group and I agree with the above progress note and plan.    Nicolasa Lee, PhD,   Clinical Psychologist  Feb 22, 2021

## 2021-03-02 ENCOUNTER — TELEPHONE (OUTPATIENT)
Dept: PSYCHIATRY | Facility: CLINIC | Age: 36
End: 2021-03-02

## 2021-03-02 NOTE — TELEPHONE ENCOUNTER
On March 2, 2021, at 9:05 AM, writer called patient at [phone] to confirm Virtual Visit. Writer unable to make contact with patient. Writer left detailed voice message for call back. [phone] left as call back number. Ese Walsh CMA    On 3/2/2021, at 1015, writer called patient at mobile to confirm Virtual Visit. Writer unable to make contact with patient. NATASHA Counsell, EMT

## 2021-03-02 NOTE — TELEPHONE ENCOUNTER
Ri did not arrive or check-in for telehealth appointment. Attempted to reach Ri by phone to remind of appointment.  No answer.  Left message to call clinic to reschedule

## 2021-03-03 ENCOUNTER — MYC MEDICAL ADVICE (OUTPATIENT)
Dept: PSYCHIATRY | Facility: CLINIC | Age: 36
End: 2021-03-03

## 2021-03-08 ENCOUNTER — VIRTUAL VISIT (OUTPATIENT)
Dept: PSYCHIATRY | Facility: CLINIC | Age: 36
End: 2021-03-08
Attending: PSYCHOLOGIST
Payer: COMMERCIAL

## 2021-03-08 DIAGNOSIS — F41.1 GENERALIZED ANXIETY DISORDER: Primary | ICD-10-CM

## 2021-03-08 PROCEDURE — 90853 GROUP PSYCHOTHERAPY: CPT | Mod: U7 | Performed by: STUDENT IN AN ORGANIZED HEALTH CARE EDUCATION/TRAINING PROGRAM

## 2021-03-08 NOTE — PROGRESS NOTES
[facility]    Dialectic Behavior Therapy Clinic     Adult DBT Skills Group     DBT (Dialectic Behavior Therapy) is a cognitive behavioral therapy that includes skills group, which uses didactics, modeling, behavior rehearsal, and homework exercises to aid patients in acquiring new skills and the opportunity to practice new behaviors.    Date of Service: Mar 8, 2021  Group Length: 120 minutes  Start time: 2:30   End time: 4:30  Number of Participants: 13  Group Facilitators: Nicolasa Lee, PhD, LP, Yayo Jones MD and Yayo Gottlieb MD    Client: Paris Nguyen  Pronouns: She/Her/Hers/Herself  YOB: 1985  MRN: [id number]    Type of service:  video visit for group therapy  Reason for video visit:  Patient unable to travel due to Covid-19  Originating Site (patient location):  [facility]   Location- Patient's home  Distant Site (provider location):  Remote location  Mode of Communication:  Video Conference via Zoom  Consent:  Patient has given verbal consent for video visit?: Yes     Mindfulness: Draw by instruction  Homework Reviewed: Mindfulness worksheets 5 and/or 5A  Group Topic for Today: Emotion Regulation Handouts 1, 3, 4, 4A, 5  Homework Assigned: Emotion Regulation Handout 6, Worksheet 4 or 4A    Assessment: Patient was on time for group. Patient did not complete the homework assigned the previous week prior to arriving at group. Patient was engaged in homework review and was engaged in the didactic portion of group. Mood appeared Euthymic. Ri reported focusing on PLEASE skills in the last week, noting that she struggled with some lower moods and poor dietary choices in the context of stressors.     Diagnosis:   Encounter Diagnosis   Name Primary?     Generalized anxiety disorder Yes       Plan: Continue in full-model outpatient DBT program.     -----  I was present for the entirety of this psychotherapy group and I agree with the above  progress note and plan.    Nicolasa Lee, PhD,   Clinical Psychologist  Mar 8, 2021

## 2021-03-15 ENCOUNTER — VIRTUAL VISIT (OUTPATIENT)
Dept: PSYCHIATRY | Facility: CLINIC | Age: 36
End: 2021-03-15
Attending: NURSE PRACTITIONER
Payer: COMMERCIAL

## 2021-03-15 ENCOUNTER — VIRTUAL VISIT (OUTPATIENT)
Dept: PSYCHIATRY | Facility: CLINIC | Age: 36
End: 2021-03-15
Attending: PSYCHOLOGIST
Payer: COMMERCIAL

## 2021-03-15 ENCOUNTER — TELEPHONE (OUTPATIENT)
Dept: PSYCHIATRY | Facility: CLINIC | Age: 36
End: 2021-03-15

## 2021-03-15 DIAGNOSIS — F33.1 MODERATE EPISODE OF RECURRENT MAJOR DEPRESSIVE DISORDER (H): Primary | ICD-10-CM

## 2021-03-15 DIAGNOSIS — G47.00 INSOMNIA, UNSPECIFIED TYPE: ICD-10-CM

## 2021-03-15 DIAGNOSIS — F33.1 MODERATE EPISODE OF RECURRENT MAJOR DEPRESSIVE DISORDER (H): ICD-10-CM

## 2021-03-15 DIAGNOSIS — F41.1 GENERALIZED ANXIETY DISORDER: ICD-10-CM

## 2021-03-15 PROCEDURE — 90853 GROUP PSYCHOTHERAPY: CPT | Mod: 95 | Performed by: PSYCHOLOGIST

## 2021-03-15 PROCEDURE — 99213 OFFICE O/P EST LOW 20 MIN: CPT | Mod: 95 | Performed by: NURSE PRACTITIONER

## 2021-03-15 RX ORDER — BUPROPION HYDROCHLORIDE 150 MG/1
150 TABLET ORAL EVERY MORNING
Qty: 90 TABLET | Refills: 0 | Status: SHIPPED | OUTPATIENT
Start: 2021-03-15 | End: 2021-06-17

## 2021-03-15 RX ORDER — VENLAFAXINE HYDROCHLORIDE 150 MG/1
150 CAPSULE, EXTENDED RELEASE ORAL DAILY
Qty: 90 CAPSULE | Refills: 0 | Status: SHIPPED | OUTPATIENT
Start: 2021-03-15 | End: 2021-06-17

## 2021-03-15 ASSESSMENT — PAIN SCALES - GENERAL: PAINLEVEL: MODERATE PAIN (4)

## 2021-03-15 NOTE — PROGRESS NOTES
[facility]    Dialectic Behavior Therapy Clinic     Adult DBT Skills Group     DBT (Dialectic Behavior Therapy) is a cognitive behavioral therapy that includes skills group, which uses didactics, modeling, behavior rehearsal, and homework exercises to aid patients in acquiring new skills and the opportunity to practice new behaviors.    Date of Service: Mar 15, 2021  Group Length: 120 minutes  Start time: 2:30   End time: 4:30  Number of Participants: 6  Group Facilitators: Nicloasa Lee, PhD, LP, Yayo Jones MD and Yayo Gottlieb MD    Client: Paris Nguyen  Pronouns: They/Them/Their/Theirs  YOB: 1985  MRN: [id number]    Type of service:  video visit for group therapy  Reason for video visit:  Patient unable to travel due to Covid-19  Originating Site (patient location):  [facility]   Location- Patient's home  Distant Site (provider location):  Remote location  Mode of Communication:  Video Conference via Zoom  Consent:  Patient has given verbal consent for video visit?: Yes     Mindfulness: Body scan recording  Homework Reviewed: Emotion regulation handout 6, Emotion regulation worksheet 4 and/or 4A  Group Topic for Today: Emotion regulation handouts 6 (Ways to Describe Emotions) 7 (overview) and 8 and 8A (Check the Facts),   Homework Assigned: Emotional regulation worksheet 5    Assessment: Patient was on time for group. Patient did complete the homework assigned the previous week prior to arriving at group. Patient was engaged in homework review and was engaged in the didactic portion of group. Mood appeared Euthymic. Ri described using opposite to emotion action when her upstairs neighbor was being noisy. She did this by sending a friendly reminder text about the noise rather than act on her frustration.     Diagnosis:   Encounter Diagnoses   Name Primary?     Moderate episode of recurrent major depressive disorder (H) Yes     Generalized  anxiety disorder        Plan: Continue in full-model outpatient DBT program.     -----  I was present for the entirety of this psychotherapy group and I agree with the above progress note and plan.    Nicolasa Lee, PhD,   Clinical Psychologist  Mar 15, 2021

## 2021-03-15 NOTE — PATIENT INSTRUCTIONS
**For crisis resources, please see the information at the end of this document**     Patient Education      Thank you for coming to the [facility] [place] [facility].    Lab Testing:  If you had lab testing today and your results are reassuring or normal they will be mailed to you or sent through "[facility]" within 7 days. If the lab tests need quick action we will call you with the results. The phone number we will call with results is # [phone] (home) . If this is not the best number please call our clinic and change the number.    Medication Refills:  If you need any refills please call your pharmacy and they will contact us. Our fax number for refills is [phone]. Please allow three business for refill processing. If you need to  your refill at a new pharmacy, please contact the new pharmacy directly. The new pharmacy will help you get your medications transferred.     Scheduling:  If you have any concerns about today's visit or wish to schedule another appointment please call our office during normal business hours [phone] (8-5:00 M-F)    Contact Us:  Please call [phone] during business hours (8-5:00 M-F).  If after clinic hours, or on the weekend, please call  [phone].    Financial Assistance [phone]  [facility] Billing [phone]  Central Billing Office, [facility]: [phone]  [facility] Billing [phone]  Medical Records [phone]  [facility] Patient Bill of Rights [email or web address]       MENTAL HEALTH CRISIS NUMBERS:  For a medical emergency please call  911 or go to the nearest ER.     [facility]:   [facility] -[phone]   Crisis Residence [facility] -[phone]   Walk-In Counseling Center [facility] -[phone]   COPE 24/7 [place] Mobile Team -[phone] (adults)/[phone] (child)  CHILD: [facility] needs assessment  team - [phone]      [facility]:   [facility] - [phone]   Walk-in counseling [facility] - [phone]   Walk-in counseling [facility] - [phone]   Crisis Residence [facility] - [phone]  Urgent Care Adult Mental [facility]-[phone] mobile unit/ 24/7 crisis line    National Crisis Numbers:   National Suicide Prevention Lifeline: 7-742-638-TALK (465-737-8177)  Poison Control Center - 2-232-265-4799  [employer]/resources for a list of additional resources (SOS)  Trans Lifeline a hotline for transgender people 1-423.258.9454  The [employer] a hotline for LGBT youth 4-753-272-7445  Crisis Text Line: For any crisis 24/7   To: 817190  see www.crisistextline.org  - IF MAKING A CALL FEELS TOO HARD, send a text!         Again thank you for choosing [facility] MENTAL HEALTH & ADDICTION [facility] and please let us know how we can best partner with you to improve you and your family's health.    You may be receiving a survey regarding this appointment. We would love to have your feedback, both positive and negative. The survey is done by an external company, so your answers are anonymous.
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
## 2021-03-15 NOTE — TELEPHONE ENCOUNTER
On March 15, 2021 at 9:54 AM writer called patient and completed the rooming process. Ese Walsh CMA

## 2021-03-15 NOTE — TELEPHONE ENCOUNTER
On 3/15/2021, at 9:14, writer called patient at [phone] to confirm Virtual Visit. Writer unable to make contact with patient. Writer left detailed voice message for call back. [phone] left as call back number. Ericka Khanna CMA    On 3/15/2021, at 0950, writer called patient at mobile to confirm Virtual Visit. Writer unable to make contact with patient. NATASHA Counsell, EMT

## 2021-03-22 ENCOUNTER — VIRTUAL VISIT (OUTPATIENT)
Dept: PSYCHIATRY | Facility: CLINIC | Age: 36
End: 2021-03-22
Attending: PSYCHOLOGIST
Payer: COMMERCIAL

## 2021-03-22 DIAGNOSIS — F41.1 GENERALIZED ANXIETY DISORDER: ICD-10-CM

## 2021-03-22 DIAGNOSIS — F33.1 MODERATE EPISODE OF RECURRENT MAJOR DEPRESSIVE DISORDER (H): Primary | ICD-10-CM

## 2021-03-22 PROCEDURE — 90853 GROUP PSYCHOTHERAPY: CPT | Mod: 95 | Performed by: PSYCHOLOGIST

## 2021-03-22 NOTE — PROGRESS NOTES
[facility]    Dialectic Behavior Therapy Clinic     Adult DBT Skills Group     DBT (Dialectic Behavior Therapy) is a cognitive behavioral therapy that includes skills group, which uses didactics, modeling, behavior rehearsal, and homework exercises to aid patients in acquiring new skills and the opportunity to practice new behaviors.    Date of Service: Mar 22, 2021  Group Length: 120 minutes  Start time: 2:30   End time: 4:30  Number of Participants: 7  Group Facilitators: Nicolasa Lee, PhD, LP and Yayo Joens MD    Client: Paris Nguyen  Pronouns: They/Them/Their/Theirs  YOB: 1985  MRN: [id number]    Type of service:  video visit for group therapy  Reason for video visit:  Patient unable to travel due to Covid-19  Originating Site (patient location):  [facility]   Location- Patient's home  Distant Site (provider location):  Remote location  Mode of Communication:  Video Conference via Zoom  Consent:  Patient has given verbal consent for video visit?: Yes     Mindfulness: Poetry reading  Homework Reviewed: Emotion Regulation Worksheet 5  Group Topic for Today: Emotion Regulation Handout 9 (Opposite Action and Problem Solving: Deciding Which to Use), 10 (Opposite Action), and 11 (Figuring Out Opposite Action)  Homework Assigned: Emotion Regulation Worksheet 7    Assessment: Patient was on time for group. Patient did complete the homework assigned the previous week prior to arriving at group. Patient was engaged in homework review and was engaged in the didactic portion of group. Mood appeared Euthymic. Ri shared an example of experiencing fear after hearing a comment that their partner made. They completed a behavior chain on their own initiative and processed it with their individual therapist.     Diagnosis:   Encounter Diagnoses   Name Primary?     Moderate episode of recurrent major depressive disorder (H) Yes     Generalized anxiety  disorder        Plan: Continue in full-model outpatient DBT program.       -----  I did not see this patient directly. This patient was discussed with me in individual psychotherapy supervision, and I agree with the plan as documented.    Nicolasa Lee, PhD,   Clinical Psychologist  Mar 22, 2021

## 2021-03-29 ENCOUNTER — VIRTUAL VISIT (OUTPATIENT)
Dept: PSYCHIATRY | Facility: CLINIC | Age: 36
End: 2021-03-29
Attending: PSYCHOLOGIST
Payer: COMMERCIAL

## 2021-03-29 DIAGNOSIS — F33.1 MODERATE EPISODE OF RECURRENT MAJOR DEPRESSIVE DISORDER (H): Primary | ICD-10-CM

## 2021-03-29 PROCEDURE — 90853 GROUP PSYCHOTHERAPY: CPT | Mod: U7 | Performed by: STUDENT IN AN ORGANIZED HEALTH CARE EDUCATION/TRAINING PROGRAM

## 2021-03-29 NOTE — PROGRESS NOTES
[facility]    Dialectic Behavior Therapy Clinic     Adult DBT Skills Group     DBT (Dialectic Behavior Therapy) is a cognitive behavioral therapy that includes skills group, which uses didactics, modeling, behavior rehearsal, and homework exercises to aid patients in acquiring new skills and the opportunity to practice new behaviors.    Date of Service: Mar 29, 2021  Group Length: 120 minutes  Start time: 2:30   End time: 4:30  Number of Participants: 6  Group Facilitators: Janeth Gómez, PhD, LP and Yayo Jones MD    Client: Paris Nguyen  Pronouns: They/Them/Their/Theirs  YOB: 1985  MRN: [id number]    Type of service:  video visit for group therapy  Reason for video visit:  Patient unable to travel due to Covid-19  Originating Site (patient location):  [facility]   Location- Patient's home  Distant Site (provider location):  Remote location  Mode of Communication:  Video Conference via Zoom  Consent:  Patient has given verbal consent for video visit?: Yes     Mindfulness: Mindfully listening to music  Homework Reviewed: Emotion Regulation worksheet #7  Group Topic for Today: Emotion Regulation handout 12, Problem Solving  Homework Assigned: Emotion Regulation worksheet #8    Assessment: Patient was on time for group. Patient did complete the homework assigned the previous week prior to arriving at group. Patient was engaged in homework review and was engaged in the didactic portion of group. Mood appeared Euthymic. Ri shared feeling sad after she learned a friend has unexplained masses in her abdomen. She utilized distraction to attenuate the strong emotions. Discussed utilizing problem solving vs opposite to emotion action based on the situation.     Diagnosis:   Encounter Diagnosis   Name Primary?     Moderate episode of recurrent major depressive disorder (H) Yes       Plan: Continue in full-model outpatient DBT program.

## 2021-04-05 ENCOUNTER — VIRTUAL VISIT (OUTPATIENT)
Dept: PSYCHIATRY | Facility: CLINIC | Age: 36
End: 2021-04-05
Attending: PSYCHOLOGIST
Payer: COMMERCIAL

## 2021-04-05 DIAGNOSIS — F33.1 MODERATE EPISODE OF RECURRENT MAJOR DEPRESSIVE DISORDER (H): Primary | ICD-10-CM

## 2021-04-05 DIAGNOSIS — F41.1 GENERALIZED ANXIETY DISORDER: ICD-10-CM

## 2021-04-05 PROCEDURE — 90853 GROUP PSYCHOTHERAPY: CPT | Mod: U7 | Performed by: STUDENT IN AN ORGANIZED HEALTH CARE EDUCATION/TRAINING PROGRAM
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
## 2021-04-19 ENCOUNTER — VIRTUAL VISIT (OUTPATIENT)
Dept: PSYCHIATRY | Facility: CLINIC | Age: 36
End: 2021-04-19
Attending: PSYCHOLOGIST
Payer: COMMERCIAL

## 2021-04-19 DIAGNOSIS — G47.00 INSOMNIA, UNSPECIFIED TYPE: ICD-10-CM

## 2021-04-19 DIAGNOSIS — F41.1 GENERALIZED ANXIETY DISORDER: ICD-10-CM

## 2021-04-19 DIAGNOSIS — F33.1 MODERATE EPISODE OF RECURRENT MAJOR DEPRESSIVE DISORDER (H): Primary | ICD-10-CM

## 2021-04-19 PROCEDURE — 90853 GROUP PSYCHOTHERAPY: CPT | Mod: 95 | Performed by: PSYCHOLOGIST

## 2021-04-21 NOTE — PROGRESS NOTES
[facility]    D[facility] Behavior Therapy Clinic     Adult DBT Skills Group     DBT (Dialectic Behavior Therapy) is a cognitive behavioral therapy that includes skills group, which uses didactics, modeling, behavior rehearsal, and homework exercises to aid patients in acquiring new skills and the opportunity to practice new behaviors.    Date of Service: Apr 19, 2021  Group Length: 120 minutes  Start time: 2:30   End time: 4:30  Number of Participants: 8  Group Facilitators: Nicolasa Lee, PhD, LP    Client: Paris Nguyen  Pronouns: She/Her/Hers/Herself  YOB: 1985  MRN: [id number]    Type of service:  video visit for group therapy  Reason for video visit:  Patient unable to travel due to Covid-19  Originating Site (patient location):  [facility]   Location- Patient's home  Distant Site (provider location):  Remote location  Mode of Communication:  Video Conference via Zoom  Consent:  Patient has given verbal consent for video visit?: Yes     Mindfulness: Mindful breathing  Homework Reviewed: Accumulate positives, long term (11a)  Group Topic for Today: Build Mastery, [place] Ahead, Please  Homework Assigned: Emotion Regulation WS 9    Assessment: Patient was on time for group. Patient did not complete the homework assigned the previous week prior to arriving at group. Patient was engaged in homework review and was engaged in the didactic portion of group. Mood appeared Euthymic. Ri reported on using skills to cope with a difficult week.     Diagnosis:   Encounter Diagnoses   Name Primary?     Moderate episode of recurrent major depressive disorder (H) Yes     Generalized anxiety disorder      Insomnia, unspecified type        Plan: Continue in full-model outpatient DBT program.

## 2021-04-26 ENCOUNTER — VIRTUAL VISIT (OUTPATIENT)
Dept: PSYCHIATRY | Facility: CLINIC | Age: 36
End: 2021-04-26
Attending: PSYCHOLOGIST
Payer: COMMERCIAL

## 2021-04-26 DIAGNOSIS — F33.1 MODERATE EPISODE OF RECURRENT MAJOR DEPRESSIVE DISORDER (H): Primary | ICD-10-CM

## 2021-04-26 DIAGNOSIS — F41.1 GENERALIZED ANXIETY DISORDER: ICD-10-CM

## 2021-04-26 PROCEDURE — 90853 GROUP PSYCHOTHERAPY: CPT | Mod: U7 | Performed by: STUDENT IN AN ORGANIZED HEALTH CARE EDUCATION/TRAINING PROGRAM

## 2021-04-26 NOTE — PROGRESS NOTES
[facility]    Dialectic Behavior Therapy Clinic     Adult DBT Skills Group     DBT (Dialectic Behavior Therapy) is a cognitive behavioral therapy that includes skills group, which uses didactics, modeling, behavior rehearsal, and homework exercises to aid patients in acquiring new skills and the opportunity to practice new behaviors.    Date of Service: Apr 26, 2021  Group Length: 120 minutes  Start time: 2:30   End time: 4:30  Number of Participants: 8  Group Facilitators: Nicolasa Lee, PhD, LP and Yayo Jones MD    Client: Paris Nguyen  Pronouns: They/Them/Their/Theirs  YOB: 1985  MRN: [id number]    Type of service:  video visit for group therapy  Reason for video visit:  Patient unable to travel due to Covid-19  Originating Site (patient location):  [facility]   Location- Patient's home  Distant Site (provider location):  Remote location  Mode of Communication:  Video Conference via Zoom  Consent:  Patient has given verbal consent for video visit?: Yes     Mindfulness: List of words that begin with letter  F  for 2 minutes  Homework Reviewed: Emotion Regulation worksheet 9  Group Topic for Today: Orientation to DBT skills group (General Handout 1 & 3), Orientation to mindfulness (Mindfulness Handouts 1, 1A, 2, 3, and 3A)  Homework Assigned: Mindfulness worksheet 3    Assessment: Patient was on time for group. Patient did complete the homework assigned the previous week prior to arriving at group. Patient was engaged in homework review and was engaged in the didactic portion of group. Mood appeared Euthymic. Ri shared her experience working on PLEASE skills this week. She specifically worked on treating her physical conditions by tending to a severe headache this weekend.     Diagnosis:   Encounter Diagnoses   Name Primary?     Moderate episode of recurrent major depressive disorder (H) Yes     Generalized anxiety disorder        Plan:  Continue in full-model outpatient DBT program.     -----  I was present for the entirety of this psychotherapy group and I agree with the above progress note and plan.    Nicolasa Lee, PhD,   Clinical Psychologist  Apr 26, 2021

## 2021-05-03 ENCOUNTER — VIRTUAL VISIT (OUTPATIENT)
Dept: PSYCHIATRY | Facility: CLINIC | Age: 36
End: 2021-05-03
Attending: PSYCHOLOGIST
Payer: COMMERCIAL

## 2021-05-03 DIAGNOSIS — F41.1 GENERALIZED ANXIETY DISORDER: ICD-10-CM

## 2021-05-03 DIAGNOSIS — G47.00 INSOMNIA, UNSPECIFIED TYPE: ICD-10-CM

## 2021-05-03 DIAGNOSIS — F33.1 MODERATE EPISODE OF RECURRENT MAJOR DEPRESSIVE DISORDER (H): Primary | ICD-10-CM

## 2021-05-03 PROCEDURE — 90853 GROUP PSYCHOTHERAPY: CPT | Mod: 95 | Performed by: PSYCHOLOGIST

## 2021-05-04 NOTE — PROGRESS NOTES
[facility]    Dialectic Behavior Therapy Clinic     Adult DBT Skills Group     DBT (Dialectic Behavior Therapy) is a cognitive behavioral therapy that includes skills group, which uses didactics, modeling, behavior rehearsal, and homework exercises to aid patients in acquiring new skills and the opportunity to practice new behaviors.    Date of Service: May 3, 2021  Group Length: 120 minutes  Start time: 2:30   End time: 4:30  Number of Participants: 7  Group Facilitators: Nicolasa Lee, PhD, LP    Client: Paris Nguyen  Pronouns: She/Her/Hers/Herself  YOB: 1985  MRN: [id number]    Type of service:  video visit for group therapy  Reason for video visit:  Patient unable to travel due to Covid-19  Originating Site (patient location):  [facility]   Location- Patient's home  Distant Site (provider location):  Remote location  Mode of Communication:  Video Conference via Zoom  Consent:  Patient has given verbal consent for video visit?: Yes     Mindfulness: Progressive muscle relaxation  Homework Reviewed: Wise Mind Worksheet, 3A  Group Topic for Today: What and How Skills  Homework Assigned: Mindfulness WS 4 or 5    Assessment: Patient was on time for group. Patient did complete the homework assigned the previous week prior to arriving at group. Patient was engaged in homework review and was engaged in the didactic portion of group. Mood appeared Euthymic. Ri described some recent conflict with her partner/roommate and noted that she could identify strong emotions based on physical sensations and used skills to avoid ineffective communication.     Diagnosis:   Encounter Diagnoses   Name Primary?     Moderate episode of recurrent major depressive disorder (H) Yes     Generalized anxiety disorder      Insomnia, unspecified type        Plan: Continue in full-model outpatient DBT program.

## 2021-05-10 ENCOUNTER — VIRTUAL VISIT (OUTPATIENT)
Dept: PSYCHIATRY | Facility: CLINIC | Age: 36
End: 2021-05-10
Attending: PSYCHOLOGIST
Payer: COMMERCIAL

## 2021-05-10 DIAGNOSIS — F33.1 MODERATE EPISODE OF RECURRENT MAJOR DEPRESSIVE DISORDER (H): Primary | ICD-10-CM

## 2021-05-10 PROCEDURE — 90853 GROUP PSYCHOTHERAPY: CPT | Mod: U7 | Performed by: STUDENT IN AN ORGANIZED HEALTH CARE EDUCATION/TRAINING PROGRAM

## 2021-05-10 NOTE — PROGRESS NOTES
[facility]    Dialect[facility] Behavior Therapy Clinic     Adult DBT Skills Group     DBT (Dialectic Behavior Therapy) is a cognitive behavioral therapy that includes skills group, which uses didactics, modeling, behavior rehearsal, and homework exercises to aid patients in acquiring new skills and the opportunity to practice new behaviors.    Date of Service: May 10, 2021  Group Length: 120 minutes  Start time: 2:30   End time: 4:30  Number of Participants: 5  Group Facilitators: Nicolasa Lee, PhD, LP and Yayo Jones MD    Client: Paris Nguyen  Pronouns: They/Them/Their/Theirs  YOB: 1985  MRN: [id number]    Type of service:  video visit for group therapy  Reason for video visit:  Patient unable to travel due to Covid-19  Originating Site (patient location):  [facility]   Location- Patient's home  Distant Site (provider location):  Remote location  Mode of Communication:  Video Conference via Zoom  Consent:  Patient has given verbal consent for video visit?: Yes     Mindfulness: Poetry recording  Homework Reviewed: Mindfulness worksheet 4 or 5  Group Topic for Today: Interpersonal Effectiveness handouts 1 (goals of IE), 2 (Factors in the way of IE), 3 (Overview: obtaining objectives skillfully), and 4 (clarifying goals in interpersonal situations)  Homework Assigned: Interpersonal Effectiveness worksheet 3    Assessment: Patient was on time for group. Patient did complete the homework assigned the previous week prior to arriving at group. Patient was engaged in homework review and was engaged in the didactic portion of group. Mood appeared Euthymic. Ri shared their experience of collecting and sorting river walks, which was a highly effective Participate skill for them.     Diagnosis:   Encounter Diagnosis   Name Primary?     Moderate episode of recurrent major depressive disorder (H) Yes       Plan: Continue in full-model outpatient DBT program.      -----  I was present for the entirety of this psychotherapy group and I agree with the above progress note and plan.    Nicolasa Lee, PhD,   Clinical Psychologist  May 10, 2021

## 2021-05-17 ENCOUNTER — VIRTUAL VISIT (OUTPATIENT)
Dept: PSYCHIATRY | Facility: CLINIC | Age: 36
End: 2021-05-17
Attending: PSYCHOLOGIST
Payer: COMMERCIAL

## 2021-05-17 DIAGNOSIS — F33.1 MODERATE EPISODE OF RECURRENT MAJOR DEPRESSIVE DISORDER (H): Primary | ICD-10-CM

## 2021-05-17 DIAGNOSIS — F41.1 GENERALIZED ANXIETY DISORDER: ICD-10-CM

## 2021-05-17 PROCEDURE — 90853 GROUP PSYCHOTHERAPY: CPT | Mod: 95 | Performed by: PSYCHOLOGIST

## 2021-05-17 NOTE — PROGRESS NOTES
[facility]    Dialectic Behavior Therapy Clinic     Adult DBT Skills Group     DBT (Dialectic Behavior Therapy) is a cognitive behavioral therapy that includes skills group, which uses didactics, modeling, behavior rehearsal, and homework exercises to aid patients in acquiring new skills and the opportunity to practice new behaviors.    Date of Service: May 17, 2021  Group Length: 120 minutes  Start time: 2:30   End time: 4:30  Number of Participants: 6  Group Facilitators: Nicolasa Lee, PhD, LP and Yayo Jones MD    Client: Paris Nguyen  Pronouns: They/Them/Their/Theirs  YOB: 1985  MRN: [id number]    Type of service:  video visit for group therapy  Reason for video visit:  Patient unable to travel due to Covid-19  Originating Site (patient location):  [facility]   Location- Patient's home  Distant Site (provider location):  Remote location  Mode of Communication:  Video Conference via Zoom  Consent:  Patient has given verbal consent for video visit?: Yes     Mindfulness: 5 senses while looking out window  Homework Reviewed: Interpersonal Effectiveness worksheet 3  Group Topic for Today: Interpersonal Effectiveness Handout 5 (DEAR MAN)  Homework Assigned: Interpersonal Effectiveness worksheet 4 (Writing Out Interpersonal Effectiveness Scripts)    Assessment: Patient was on time for group. Patient did complete the homework assigned the previous week prior to arriving at group. Patient was engaged in homework review and was engaged in the didactic portion of group. Mood appeared Euthymic. Ri shared a difficult situation with their partner this week that required interpersonal effectiveness skills to manage. They noted that they placed self-respect as the highest priority in this situation.     Diagnosis:   Encounter Diagnoses   Name Primary?     Moderate episode of recurrent major depressive disorder (H) Yes     Generalized anxiety disorder         Plan: Continue in full-model outpatient DBT program.     -----  I was present for the entirety of this psychotherapy group and I agree with the above progress note and plan.    Nicolasa Lee, PhD,   Clinical Psychologist  May 17, 2021

## 2021-05-24 ENCOUNTER — VIRTUAL VISIT (OUTPATIENT)
Dept: PSYCHIATRY | Facility: CLINIC | Age: 36
End: 2021-05-24
Attending: PSYCHOLOGIST
Payer: COMMERCIAL

## 2021-05-24 DIAGNOSIS — F33.1 MODERATE EPISODE OF RECURRENT MAJOR DEPRESSIVE DISORDER (H): Primary | ICD-10-CM

## 2021-05-24 PROCEDURE — 90853 GROUP PSYCHOTHERAPY: CPT | Mod: U7 | Performed by: STUDENT IN AN ORGANIZED HEALTH CARE EDUCATION/TRAINING PROGRAM

## 2021-05-24 NOTE — PROGRESS NOTES
[facility]    Dialectic Behavior Therapy Clinic     Adult DBT Skills Group     DBT (Dialectic Behavior Therapy) is a cognitive behavioral therapy that includes skills group, which uses didactics, modeling, behavior rehearsal, and homework exercises to aid patients in acquiring new skills and the opportunity to practice new behaviors.    Date of Service: May 24, 2021  Group Length: 120 minutes  Start time: 2:30   End time: 4:30  Number of Participants: 6  Group Facilitators: Nicolasa Lee, PhD, LP and Yayo Jones MD    Client: Paris Nguyen  Pronouns: They/Them/Their/Theirs  YOB: 1985  MRN: [id number]    Type of service:  video visit for group therapy  Reason for video visit:  Patient unable to travel due to Covid-19  Originating Site (patient location):  [facility]   Location- Patient's home  Distant Site (provider location):  Remote location  Mode of Communication:  Video Conference via Zoom  Consent:  Patient has given verbal consent for video visit?: Yes     Mindfulness: Mindful movement (balancing)  Homework Reviewed: Interpersonal Effectiveness worksheet 4 (Writing Out Interpersonal Effectiveness Scripts)  Group Topic for Today: Interpersonal Effectiveness Handout 6 (GIVE)  Homework Assigned: Interpersonal Effectiveness Worksheet 12 (validating others)    Assessment: Patient was on time for group. Patient did complete the homework assigned the previous week prior to arriving at group. Patient was engaged in homework review and was engaged in the didactic portion of group. Mood appeared Euthymic. Ri shared that she is experiencing more depression recently. She has utilized opposite to emotion action to help her get out of bed each day. She also shared an example of using check the facts and wise mind during an interaction at work.     Diagnosis:   Encounter Diagnosis   Name Primary?     Moderate episode of recurrent major depressive disorder  (H) Yes       Plan: Continue in full-model outpatient DBT program.     -----  I was present for the entirety of this psychotherapy group and I agree with the above progress note and plan.    Nicolasa Lee, PhD,   Clinical Psychologist  May 24, 2021

## 2021-05-27 NOTE — TELEPHONE ENCOUNTER
"Medication requested: TRAZODONE HCL TABS 50MG  Take 0.5-1 tablets (25-50 mg) by mouth nightly as needed for sleep   Last refilled: 9/10/2019  Qty: 45/1      Last seen: 8/1/2019\"Continue Trazodone 25-50mg at bedtime PRN\"  RTC: 2 months  Cancel: 0  No-show: 0  Next appt: 3/10/20    Refill decision:   Refill pended and routed to the provider for review/determination due to outside of time frame, appt upcoming 3/10/20. Pended x 30 days.      "
all other ROS negative except as per HPI

## 2021-06-07 ENCOUNTER — VIRTUAL VISIT (OUTPATIENT)
Dept: PSYCHIATRY | Facility: CLINIC | Age: 36
End: 2021-06-07
Attending: PSYCHOLOGIST
Payer: COMMERCIAL

## 2021-06-07 DIAGNOSIS — F41.1 GENERALIZED ANXIETY DISORDER: Primary | ICD-10-CM

## 2021-06-07 PROCEDURE — 90853 GROUP PSYCHOTHERAPY: CPT | Mod: 95 | Performed by: PSYCHOLOGIST

## 2021-06-07 NOTE — PROGRESS NOTES
[facility]    Dialectic Behavior Therapy Clinic     Adult DBT Skills Group     DBT (Dialectic Behavior Therapy) is a cognitive behavioral therapy that includes skills group, which uses didactics, modeling, behavior rehearsal, and homework exercises to aid patients in acquiring new skills and the opportunity to practice new behaviors.    Date of Service: Jun 7, 2021  Group Length: 120 minutes  Start time: 2:30   End time: 4:30  Number of Participants: 7  Group Facilitators: Nicolasa Lee, PhD, LP and Yayo Gottlieb MD    Client: Paris Nguyen  Pronouns: They/Them/Their/Theirs  YOB: 1985  MRN: [id number]    Type of service:  video visit for group therapy  Reason for video visit:  Patient unable to travel due to Covid-19  Originating Site (patient location):  [facility]   Location- Patient's home  Distant Site (provider location):  Remote location  Mode of Communication:  Video Conference via Zoom  Consent:  Patient has given verbal consent for video visit?: Yes     Mindfulness: Mindful Dancing  Homework Reviewed: Interpersonal Effectiveness, 'Validating Others'  Group Topic for Today: IE Handout 7, FAST  Homework Assigned: IE WKSHT 5    Assessment: Patient was on time for group. Patient did not complete the homework assigned the previous week prior to arriving at group. Patient was engaged in homework review and was engaged in the didactic portion of group. Mood appeared Euthymic. Focused on PLEASE skill in the last week; used opposite emotion action to begin engaging socially in groups in context of COVID-related anxiety.     Diagnosis:   Encounter Diagnosis   Name Primary?     Generalized anxiety disorder Yes       Plan: Continue in full-model outpatient DBT program.   -----  I was present for the entirety of this psychotherapy group and I agree with the above progress note and plan.    Nicolasa Lee, PhD, LP  Clinical Psychologist  Jun 7,  2021

## 2021-06-17 ENCOUNTER — VIRTUAL VISIT (OUTPATIENT)
Dept: PSYCHIATRY | Facility: CLINIC | Age: 36
End: 2021-06-17
Attending: NURSE PRACTITIONER
Payer: COMMERCIAL

## 2021-06-17 ENCOUNTER — TELEPHONE (OUTPATIENT)
Dept: PSYCHIATRY | Facility: CLINIC | Age: 36
End: 2021-06-17

## 2021-06-17 DIAGNOSIS — F41.1 GENERALIZED ANXIETY DISORDER: ICD-10-CM

## 2021-06-17 DIAGNOSIS — F33.1 MODERATE EPISODE OF RECURRENT MAJOR DEPRESSIVE DISORDER (H): ICD-10-CM

## 2021-06-17 PROCEDURE — 99213 OFFICE O/P EST LOW 20 MIN: CPT | Mod: 95 | Performed by: NURSE PRACTITIONER

## 2021-06-17 RX ORDER — BUPROPION HYDROCHLORIDE 150 MG/1
150 TABLET ORAL EVERY MORNING
Qty: 90 TABLET | Refills: 0 | Status: SHIPPED | OUTPATIENT
Start: 2021-06-17 | End: 2021-10-11

## 2021-06-17 RX ORDER — VENLAFAXINE HYDROCHLORIDE 150 MG/1
150 CAPSULE, EXTENDED RELEASE ORAL DAILY
Qty: 90 CAPSULE | Refills: 0 | Status: SHIPPED | OUTPATIENT
Start: 2021-06-17 | End: 2021-09-16

## 2021-06-17 NOTE — TELEPHONE ENCOUNTER
On June 17, 2021, at 9:01 AM, writer called patient at mobile to confirm Virtual Visit. Writer unable to make contact with patient. Writer left detailed voice message for call back. [phone] left as call back number. Kirill Rodriguez, EMT
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
## 2021-06-21 ENCOUNTER — VIRTUAL VISIT (OUTPATIENT)
Dept: PSYCHIATRY | Facility: CLINIC | Age: 36
End: 2021-06-21
Attending: PSYCHOLOGIST
Payer: COMMERCIAL

## 2021-06-21 DIAGNOSIS — F41.1 GENERALIZED ANXIETY DISORDER: ICD-10-CM

## 2021-06-21 DIAGNOSIS — F33.1 MODERATE EPISODE OF RECURRENT MAJOR DEPRESSIVE DISORDER (H): Primary | ICD-10-CM

## 2021-06-21 PROCEDURE — 90853 GROUP PSYCHOTHERAPY: CPT | Mod: 95 | Performed by: PSYCHOLOGIST

## 2021-06-21 NOTE — PROGRESS NOTES
[facility]    Dialectic Behavior Therapy Clinic     Adult DBT Skills Group     DBT (Dialectic Behavior Therapy) is a cognitive behavioral therapy that includes skills group, which uses didactics, modeling, behavior rehearsal, and homework exercises to aid patients in acquiring new skills and the opportunity to practice new behaviors.    Date of Service: Jun 21, 2021  Group Length: 120 minutes  Start time: 2:30   End time: 4:30  Number of Participants: 5  Group Facilitators: Janeth Gómez, PhD, LP, Yayo Jones MD and Yayo Gottlieb MD    Client: Paris Nguyen  Pronouns: They/Them/Their/Theirs  YOB: 1985  MRN: [id number]    Type of service:  video visit for group therapy  Reason for video visit:  Patient unable to travel due to Covid-19  Originating Site (patient location):  [facility]   Location- Patient's home  Distant Site (provider location):  Remote location  Mode of Communication:  Video Conference via Zoom  Consent:  Patient has given verbal consent for video visit?: Yes     Mindfulness: Mindfulness of seeing  Homework Reviewed: Interpersonal Effectiveness worksheet 6 (The Dime game)  Group Topic for Today: Interpersonal Effectiveness handouts 10-12  Homework Assigned: Interpersonal Effectiveness worksheet 8 (Finding and Getting People to Like You)    Assessment: Patient was on time for group. Patient did not complete the homework assigned the previous week prior to arriving at group. Patient was engaged in homework review and was engaged in the didactic portion of group. Mood appeared Depressed. Ri shared that she is experience side effects from a medication, which made it difficult to complete her diary card this week. She focussed on the PLEASE skills this week.     Diagnosis:   Encounter Diagnoses   Name Primary?     Moderate episode of recurrent major depressive disorder (H) Yes     Generalized anxiety disorder        Plan: Continue in  full-model outpatient DBT program.

## 2021-06-28 ENCOUNTER — VIRTUAL VISIT (OUTPATIENT)
Dept: PSYCHIATRY | Facility: CLINIC | Age: 36
End: 2021-06-28
Attending: PSYCHOLOGIST
Payer: COMMERCIAL

## 2021-06-28 DIAGNOSIS — F33.1 MODERATE EPISODE OF RECURRENT MAJOR DEPRESSIVE DISORDER (H): Primary | ICD-10-CM

## 2021-06-28 DIAGNOSIS — F42.9 OBSESSIVE-COMPULSIVE DISORDER, UNSPECIFIED TYPE: ICD-10-CM

## 2021-06-28 DIAGNOSIS — F41.1 GENERALIZED ANXIETY DISORDER: ICD-10-CM

## 2021-06-28 PROCEDURE — 90853 GROUP PSYCHOTHERAPY: CPT | Mod: 95 | Performed by: PSYCHOLOGIST

## 2021-06-29 ENCOUNTER — TRANSFERRED RECORDS (OUTPATIENT)
Dept: HEALTH INFORMATION MANAGEMENT | Facility: CLINIC | Age: 36
End: 2021-06-29

## 2021-07-12 ENCOUNTER — VIRTUAL VISIT (OUTPATIENT)
Dept: PSYCHIATRY | Facility: CLINIC | Age: 36
End: 2021-07-12
Attending: PSYCHOLOGIST
Payer: COMMERCIAL

## 2021-07-12 DIAGNOSIS — F33.1 MODERATE EPISODE OF RECURRENT MAJOR DEPRESSIVE DISORDER (H): Primary | ICD-10-CM

## 2021-07-12 DIAGNOSIS — F41.1 GENERALIZED ANXIETY DISORDER: ICD-10-CM

## 2021-07-12 DIAGNOSIS — F42.9 OBSESSIVE-COMPULSIVE DISORDER, UNSPECIFIED TYPE: ICD-10-CM

## 2021-07-12 PROCEDURE — 90853 GROUP PSYCHOTHERAPY: CPT | Mod: 52 | Performed by: PSYCHOLOGIST

## 2021-07-19 ENCOUNTER — VIRTUAL VISIT (OUTPATIENT)
Dept: PSYCHIATRY | Facility: CLINIC | Age: 36
End: 2021-07-19
Attending: PSYCHOLOGIST
Payer: COMMERCIAL

## 2021-07-19 ENCOUNTER — TRANSFERRED RECORDS (OUTPATIENT)
Dept: HEALTH INFORMATION MANAGEMENT | Facility: CLINIC | Age: 36
End: 2021-07-19

## 2021-07-19 DIAGNOSIS — F33.1 MODERATE EPISODE OF RECURRENT MAJOR DEPRESSIVE DISORDER (H): Primary | ICD-10-CM

## 2021-07-19 PROCEDURE — 90853 GROUP PSYCHOTHERAPY: CPT | Mod: 95 | Performed by: PSYCHOLOGIST

## 2021-07-19 NOTE — PROGRESS NOTES
[facility]    Dialectic Behavior Therapy Clinic     Adult DBT Skills Group     DBT (Dialectic Behavior Therapy) is a cognitive behavioral therapy that includes skills group, which uses didactics, modeling, behavior rehearsal, and homework exercises to aid patients in acquiring new skills and the opportunity to practice new behaviors.    Date of Service: Jul 19, 2021  Group Length: 120 minutes  Start time: 2:30   End time: 4:30  Number of Participants: 3          Group Facilitators: Janeth Gómez, PhD, LP and Altagracia Coombs MA    Client: Paris Nguyen  Pronouns: They/Them/Their/Theirs  YOB: 1985  MRN: [id number]    Type of service:  video visit for group therapy  Reason for video visit:  Patient unable to travel due to Covid-19  Originating Site (patient location):  [facility]   Location- Patient's home  Distant Site (provider location):  Remote location  Mode of Communication:  Video Conference via Zoom  Consent:  Patient has given verbal consent for video visit?: Yes       Mindfulness: Valdo 8's  Homework Reviewed: Mindfulness Worksheet 4a and 5  Group Topic for Today: Distress Tolerance Handout 1-4, Worksheet 2 (STOP SKILLS)  Homework Assigned: Distress Tolerance Worksheet 2a     Assessment: Patient was on time for group. Patient did complete the homework assigned the previous week prior to arriving at group. Patient was engaged in homework review and was engaged in the didactic portion of group. Mood appeared Euthymic. Patient indicated they had a headache when group began. Despite this, they were still engaged and contributed to meaningfully to didactic discussion and homework review.    Diagnosis: Major Depression  Plan: Continue in full-model outpatient DBT program.

## 2021-07-26 ENCOUNTER — VIRTUAL VISIT (OUTPATIENT)
Dept: PSYCHIATRY | Facility: CLINIC | Age: 36
End: 2021-07-26
Attending: PSYCHOLOGIST
Payer: COMMERCIAL

## 2021-07-26 DIAGNOSIS — F33.1 MODERATE EPISODE OF RECURRENT MAJOR DEPRESSIVE DISORDER (H): Primary | ICD-10-CM

## 2021-07-26 DIAGNOSIS — F41.1 GENERALIZED ANXIETY DISORDER: ICD-10-CM

## 2021-07-26 PROCEDURE — 90853 GROUP PSYCHOTHERAPY: CPT | Mod: 95 | Performed by: PSYCHOLOGIST

## 2021-07-26 NOTE — PROGRESS NOTES
"     [facility]    Dialectic Behavior Therapy Clinic     Adult DBT Skills Group     DBT (Dialectic Behavior Therapy) is a cognitive behavioral therapy that includes skills group, which uses didactics, modeling, behavior rehearsal, and homework exercises to aid patients in acquiring new skills and the opportunity to practice new behaviors.    Date of Service: Jul 26, 2021  Group Length: 120 minutes  Start time: 2:30   End time: 4:30  Number of Participants: 3          Group Facilitators: Janeth Gómez, PhD, LP and Altagracia Coombs MA    Client: Paris Nguyen  Pronouns: They/Them/Their/Theirs  YOB: 1985  MRN: [id number]    Type of service:  video visit for group therapy  Reason for video visit:  Patient unable to travel due to Covid-19  Originating Site (patient location):  [facility]   Location- Patient's home  Distant Site (provider location):  Remote location  Mode of Communication:  Video Conference via Zoom  Consent:  Patient has given verbal consent for video visit?: Yes       Mindfulness: Drawing: \"Mirroring\"-marker in each hand for 2 min draw same shapes without picking up.  Homework Reviewed:  Worksheet 2 (STOP SKILLS)  Group Topic for Today: Distress Tolerance Handout  5&6: pros/cons and TIPP  Homework Assigned: Distress Tolerance Worksheet 3 and 4      Assessment: Patient was on time for group. Patient did complete the homework assigned the previous week prior to arriving at group. Patient was engaged in homework review and was engaged in the didactic portion of group. She gave the example of \"quitting my job impulsively\" as the topic for the Pros/Cons.  Mood appeared Euthymic. Patient indicated they had a headache when group began. Despite this, they were still engaged and contributed to meaningfully to didactic discussion and homework review.    Diagnosis: Major Depression.  Plan: Continue in full-model outpatient DBT program to work on goals,, " See Treatment plan.

## 2021-07-29 NOTE — PROGRESS NOTES
"     [facility]    Dialectic Behavior Therapy Clinic     Adult DBT Skills Group     DBT (Dialectic Behavior Therapy) is a cognitive behavioral therapy that includes skills group, which uses didactics, modeling, behavior rehearsal, and homework exercises to aid patients in acquiring new skills and the opportunity to practice new behaviors.    Date of Service: Jul 12, 2021  Group Length: 120 minutes  Start time: 2:30   End time: 4:30  Number of Participants: 2 Other group members missed due to work and \"sick,.\"  Group Facilitators: Janeth Gómez, PhD, LP, Yayo Jones MD and Yayo Gottlieb MD    Client: Paris Nguyen  Pronouns: They/Them/Their/Theirs  YOB: 1985  MRN: [id number]    Type of service:  video visit for group therapy  Reason for video visit:  Patient unable to travel due to Covid-19  Originating Site (patient location):  [facility]   Location- Patient's home  Distant Site (provider location):  Remote location  Mode of Communication:  Video Conference via Zoom  Consent:  Patient has given verbal consent for video visit?: Yes     Mindfulness: \"Sideways Crazy Eights\"  Homework Reviewed:  Mindfulness , Worksheets 4a and 5   Group Topic for Today: Introduction to Distress Tolerance Skills  Homework Assigned: Distress Tolerance , Worksheets 2a STOP Skill    Assessment: Patient was on time for group. Patient didcomplete the homework assigned the previous week prior to arriving at group. Patient was engaged in homework review and was engaged in the didactic portion of group. Mood appeared Depressed. Ri shared that she is experience side effects from a medication, which made it difficult to complete her diary card this week. She focussed on the PLEASE and opposite to emotion action  skills this week.     Diagnosis: Major depression, Moderate; PTSD; OCD; Anxiety    Plan: Continue in full-model outpatient DBT program.   "

## 2021-08-02 ENCOUNTER — VIRTUAL VISIT (OUTPATIENT)
Dept: PSYCHIATRY | Facility: CLINIC | Age: 36
End: 2021-08-02
Attending: PSYCHOLOGIST
Payer: COMMERCIAL

## 2021-08-02 ENCOUNTER — THERAPY VISIT (OUTPATIENT)
Dept: PSYCHIATRY | Facility: CLINIC | Age: 36
End: 2021-08-02

## 2021-08-02 DIAGNOSIS — F41.1 GENERALIZED ANXIETY DISORDER: ICD-10-CM

## 2021-08-02 DIAGNOSIS — F42.9 OBSESSIVE-COMPULSIVE DISORDER, UNSPECIFIED TYPE: ICD-10-CM

## 2021-08-02 DIAGNOSIS — F33.1 MODERATE EPISODE OF RECURRENT MAJOR DEPRESSIVE DISORDER (H): Primary | ICD-10-CM

## 2021-08-02 PROCEDURE — 90853 GROUP PSYCHOTHERAPY: CPT | Mod: 95 | Performed by: PSYCHOLOGIST

## 2021-08-04 NOTE — PROGRESS NOTES
[facility]    Dialectic Behavior Therapy Clinic     Adult DBT Skills Group     DBT (Dialectic Behavior Therapy) is a cognitive behavioral therapy that includes skills group, which uses didactics, modeling, behavior rehearsal, and homework exercises to aid patients in acquiring new skills and the opportunity to practice new behaviors.    Date of Service: Aug 2, 2021  Group Length: 120 minutes  Start time: 2:30   End time: 4:30  Number of Participants: 3          Group Facilitators: Janeth Gómez, PhD, LP and Altagracia Coombs MA    Client: Paris Nguyen  Pronouns: They/Them/Their/Theirs  YOB: 1985  MRN: [id number]    Type of service:  video visit for group therapy  Reason for video visit:  Patient unable to travel due to Covid-19  Originating Site (patient location):  [facility]   Location- Patient's home  Distant Site (provider location):  Remote location  Mode of Communication:  Video Conference via Zoom  Consent:  Patient has given verbal consent for video visit?: Yes       Mindfulness: Progressive Muscle Relaxation   Homework Reviewed:  DT WS3 (pros and cons), WS4 (try TIP)  Group Topic for Today: DT HO7 Distract/ACCEPTS, DT HO8: Self soothe    Assessment: Patient was on time for group. Patient did complete the homework assigned the previous week prior to arriving at group. Patient was engaged in homework review and was engaged in the didactic portion of group. Mood appeared Euthymic. Patient frequently volunteered to go first during discussion and reading. They were able to identify multiple examples about how they incorporated skills into their week and shared plans to continue to work on mastery. Patient offered support to other group members when they presented problems and asked clarifying questions when material appeared unclear.     Diagnosis: Major Depression.  Plan: Continue in full-model outpatient DBT program to work on goals,, See  Treatment plan.

## 2021-08-16 ENCOUNTER — VIRTUAL VISIT (OUTPATIENT)
Dept: PSYCHIATRY | Facility: CLINIC | Age: 36
End: 2021-08-16
Attending: PSYCHOLOGIST
Payer: COMMERCIAL

## 2021-08-16 DIAGNOSIS — F33.1 MODERATE EPISODE OF RECURRENT MAJOR DEPRESSIVE DISORDER (H): Primary | ICD-10-CM

## 2021-08-16 PROCEDURE — 90853 GROUP PSYCHOTHERAPY: CPT | Mod: U7

## 2021-08-17 NOTE — PROGRESS NOTES
[facility]    Dialectic Behavior Therapy Clinic     Adult DBT Skills Group     DBT (Dialectic Behavior Therapy) is a cognitive behavioral therapy that includes skills group, which uses didactics, modeling, behavior rehearsal, and homework exercises to aid patients in acquiring new skills and the opportunity to practice new behaviors.    Date of Service: Aug 16, 2021  Group Length: 120 minutes  Start time: 2:30   End time: 4:30  Number of Participants: 3          Group Facilitators: Janeth Gómez, PhD, LP and Altagracia Coombs MA    Client: Paris Nguyen  Pronouns: They/Them/Their/Theirs  YOB: 1985  MRN: [id number]    Type of service:  video visit for group therapy  Reason for video visit:  Patient unable to travel due to Covid-19  Originating Site (patient location):  [facility]   Location- Patient's home  Distant Site (provider location):  Remote location  Mode of Communication:  Video Conference via Zoom  Consent:  Patient has given verbal consent for video visit?: Yes     Mindfulness: Convey Belt  Homework Reviewed:  WS9 (radical acceptance), WS7 (improve)  Group Topic for Today: Turning the mind, willingness/willfulness, half-smile  Homework Assigned: Distress Tolerance Kit, DTWS10, WS11    Assessment: Patient was on time for group. Patient did not complete the homework assigned and the diary card for the previous week have been sick since the last session. Although they did not finish, patient was engaged in homework review and was engaged in the didactic portion of group. Mood appeared Euthymic. Patient shared they were still sick and was observed to be congested. They were able to identify multiple examples about how they incorporated skills into their week and shared plans to continue to work on mastery. Patient offered support to other group members by validating their experiences and provided examples of how they were able to generalize  skills into their life.    Diagnosis: Major Depression.  Plan: Patient will be graduating in the next month.  DBT therapist will follow-up about date. For now,continue in full-model outpatient DBT program to work on goals,, See Treatment plan.

## 2021-08-23 NOTE — PROGRESS NOTES
"     [facility]    Dialectic Behavior Therapy Clinic     Adult DBT Skills Group     DBT (Dialectic Behavior Therapy) is a cognitive behavioral therapy that includes skills group, which uses didactics, modeling, behavior rehearsal, and homework exercises to aid patients in acquiring new skills and the opportunity to practice new behaviors.    Date of Service: Aug 2, 2021  Group Length: 120 minutes  Start time: 2:30   End time: 4:30  Number of Participants: 4          Group Facilitators: Janeth Gómez, PhD, LP and Altagracia Coombs MA    Client: Paris Nguyen  Pronouns: They/Them/Their/Theirs  YOB: 1985  MRN: [id number]    Type of service:  video visit for group therapy  Reason for video visit:  Patient unable to travel due to Covid-19  Originating Site (patient location):  [facility]   Location- Patient's home  Distant Site (provider location):  Remote location  Mode of Communication:  Video Conference via Zoom  Consent:  Patient has given verbal consent for video visit?: Yes     Mindfulness: Progressive Muscle Relaxation   Homework Reviewed:  DT WS3 (pros and cons), WS4 (try TIP)  Group Topic for Today: DT HO7 Distract/ACCEPTS, DT HO8: Self soothe    Assessment: Patient was on time for group. Patient did complete the homework assigned the previous week prior to arriving at group. Patient was engaged in homework review and was engaged in the didactic portion of group. She gave the example of \"quitting my job impulsively\" as the topic for the Pros/Cons.  Mood appeared Euthymic.  Diagnosis: Major Depression.  Plan: Continue in full-model outpatient DBT program to work on goals,, See Treatment plan.    "

## 2021-08-25 NOTE — PROGRESS NOTES
[facility]    Dialectic Behavior Therapy Clinic     Adult DBT Skills Group     DBT (Dialectic Behavior Therapy) is a cognitive behavioral therapy that includes skills group, which uses didactics, modeling, behavior rehearsal, and homework exercises to aid patients in acquiring new skills and the opportunity to practice new behaviors.    Date of Service: Jun 28, 2021  Group Length: 120 minutes  Start time: 2:30   End time: 4:30  Number of Participants: 3  Group Facilitators: Janeth Gómez, PhD, LP    Client: Paris Nguyen  Pronouns: They/Them/Their/Theirs  YOB: 1985  MRN: [id number]    Type of service:  video visit for group therapy  Reason for video visit:  Patient unable to travel due to Covid-19  Originating Site (patient location):  [facility]   Location- Patient's home  Distant Site (provider location):  Remote location  Mode of Communication:  Video Conference via Zoom  Consent:  Patient has given verbal consent for video visit?: Yes     MMindfulness: Mindfulness --breathing  Homework Reviewed: Interpersonal Effectiveness worksheet 8 (Finding and Getting People to Like You)  Group Topic for Today: Mindfulness -3 minds, Orientation to mindfulness   Homework Assigned:Worksheets 1 & 3    Assessment: Patient was on time for group. Patient did complete the homework assigned the previous week prior to arriving at group. Patient was engaged in homework review and was engaged in the didactic portion of group. Mood appeared less  Depressed. Ri shared that she is experience side effects from a medication.  She focussed on the PLEASE skills this week.     Diagnosis: Major Depression  Plan: Continue in full-model outpatient DBT program.

## 2021-08-30 ENCOUNTER — VIRTUAL VISIT (OUTPATIENT)
Dept: PSYCHIATRY | Facility: CLINIC | Age: 36
End: 2021-08-30
Attending: PSYCHOLOGIST
Payer: COMMERCIAL

## 2021-08-30 DIAGNOSIS — F33.1 MODERATE EPISODE OF RECURRENT MAJOR DEPRESSIVE DISORDER (H): Primary | ICD-10-CM

## 2021-08-30 PROCEDURE — 90853 GROUP PSYCHOTHERAPY: CPT | Mod: U7

## 2021-09-01 NOTE — PROGRESS NOTES
"     [facility]    Dialectic Behavior Therapy Clinic     Adult DBT Skills Group     DBT (Dialectic Behavior Therapy) is a cognitive behavioral therapy that includes skills group, which uses didactics, modeling, behavior rehearsal, and homework exercises to aid patients in acquiring new skills and the opportunity to practice new behaviors.    Date of Service: Aug 30, 2021  Group Length: 120 minutes  Start time: 2:30   End time: 4:30  Number of Participants: 3          Group Facilitators: Janeth Gómez, PhD, LP and Altagracia Coombs MA    Client: Paris Nguyen  Pronouns: They/Them/Their/Theirs  YOB: 1985  MRN: [id number]    Type of service:  video visit for group therapy  Reason for video visit:  Patient unable to travel due to Covid-19  Originating Site (patient location):  [facility]   Location- Patient's home  Distant Site (provider location):  Remote location  Mode of Communication:  Video Conference via Zoom  Consent:  Patient has given verbal consent for video visit?: Yes     Mindfulness: Participate Activity (non dominate hand writing)  Homework Reviewed:  DT WS 10 and 11  Group Topic for Today: Mindfulness of Current Thoughts, DT tool kit show and tell  Homework Assigned: DT WS 12    Assessment: Patient was on time for group. Patient did complete the homework assigned and the diary card for the previous week. Patient was engaged in homework review and was engaged in the didactic portion of group. Mood appeared Euthymic. Patient shared they are working on \"blending\" their skills together as they approach graduate. They were able to identify multiple examples about how they incorporated skills into their week and shared plans to continue to work on mastery. Patient offered support to other group members by validating their experiences and provided examples of how they were able to generalize skills into their life. They reflected on how DBT has helped " them change drastically in the past year.    Diagnosis: Major Depression.  Plan: Patient will be graduating in the coming weeks.  DBT therapist will follow-up about date. For now, continue in full-model outpatient DBT program to work on goals, See Treatment plan.

## 2021-09-13 ENCOUNTER — VIRTUAL VISIT (OUTPATIENT)
Dept: PSYCHIATRY | Facility: CLINIC | Age: 36
End: 2021-09-13
Attending: PSYCHOLOGIST
Payer: COMMERCIAL

## 2021-09-13 DIAGNOSIS — F33.1 MODERATE EPISODE OF RECURRENT MAJOR DEPRESSIVE DISORDER (H): Primary | ICD-10-CM

## 2021-09-13 DIAGNOSIS — F41.1 GENERALIZED ANXIETY DISORDER: ICD-10-CM

## 2021-09-13 DIAGNOSIS — F42.9 OBSESSIVE-COMPULSIVE DISORDER, UNSPECIFIED TYPE: ICD-10-CM

## 2021-09-13 PROCEDURE — 90853 GROUP PSYCHOTHERAPY: CPT | Mod: 95 | Performed by: PSYCHOLOGIST

## 2021-09-14 DIAGNOSIS — F33.1 MODERATE EPISODE OF RECURRENT MAJOR DEPRESSIVE DISORDER (H): ICD-10-CM

## 2021-09-14 DIAGNOSIS — F41.1 GENERALIZED ANXIETY DISORDER: ICD-10-CM

## 2021-09-16 NOTE — TELEPHONE ENCOUNTER
venlafaxine (EFFEXOR-XR) 150 MG  Last refilled: 6/17/21  Qty:90    Last seen: 6/17/21  RTC: 2-3 MOS  Cancel: 0  No-show: 0  Next appt: NONE  Scheduling has been notified to contact the pt for appointment.  14 day ana refill sent to the pharmacy - including instructions for patient to call the clinic and schedule an appointment.

## 2021-09-21 NOTE — PROGRESS NOTES
"     [facility]    Dialectic Behavior Therapy Clinic     Adult DBT Skills Group     DBT (Dialectic Behavior Therapy) is a cognitive behavioral therapy that includes skills group, which uses didactics, modeling, behavior rehearsal, and homework exercises to aid patients in acquiring new skills and the opportunity to practice new behaviors.    Date of Service: Sep 13, 2021  Group Length: 120 minutes  Start time: 2:30   End time: 4:30  Number of Participants: 3          Group Facilitators: Janeth Gómez, PhD, LP and Altagracia Coombs MA    Client: Paris Nguyen  Pronouns: They/Them/Their/Theirs  YOB: 1985  MRN: [id number]    Type of service:  video visit for group therapy  Reason for video visit:  Patient unable to travel due to Covid-19  Originating Site (patient location):  [facility]   Location- Patient's home  Distant Site (provider location):  Remote location  Mode of Communication:  Video Conference via Zoom  Consent:  Patient has given verbal consent for video visit?: Yes     Mindfulness: Mindfulness breathing-  Homework Reviewed:  DT WS 12  Group Topic for Today: Wise mind,3 Minds,  Homework Assigned:Mindfulness #1 and #3 and practice DBT skills for the rest of their  life.     Assessment: Patient was on time for group.This is Ri's last DBT group--after completing her 12 month contract.  Patient did complete the homework assigned and the diary card for the previous week. Patient was engaged in homework review and was engaged in the didactic portion of group. Mood appeared bright and in many ways nostoldgic as she reflected back on all the contracts she made. Patient shared they are working on \"blending\" their skills together as they approach [facility]. They were able to identify multiple examples about how they incorporated skills into their week and shared plans to continue to work on mastery. Patient offered support to other group members by " validating their experiences and provided examples of how they were able to generalize skills into their life. They reflected on how DBT has helped them change drastically in the past year.    Diagnosis: Major Depression.  Plan: Ri is e graduating from this DBT group today.   She plans to get involved in the community and so some hobbies.  She has completed the  full-model outpatient DBT program and  Worked  on goals, See Treatment plan.

## 2021-09-28 RX ORDER — VENLAFAXINE HYDROCHLORIDE 150 MG/1
150 CAPSULE, EXTENDED RELEASE ORAL DAILY
Qty: 30 CAPSULE | Refills: 0 | Status: SHIPPED | OUTPATIENT
Start: 2021-09-28 | End: 2021-10-11

## 2021-09-30 ENCOUNTER — TRANSFERRED RECORDS (OUTPATIENT)
Dept: HEALTH INFORMATION MANAGEMENT | Facility: CLINIC | Age: 36
End: 2021-09-30

## 2021-10-09 ENCOUNTER — HEALTH MAINTENANCE LETTER (OUTPATIENT)
Age: 36
End: 2021-10-09

## 2021-10-11 ENCOUNTER — VIRTUAL VISIT (OUTPATIENT)
Dept: PSYCHIATRY | Facility: CLINIC | Age: 36
End: 2021-10-11
Attending: NURSE PRACTITIONER
Payer: COMMERCIAL

## 2021-10-11 DIAGNOSIS — F41.1 GENERALIZED ANXIETY DISORDER: ICD-10-CM

## 2021-10-11 DIAGNOSIS — F33.1 MODERATE EPISODE OF RECURRENT MAJOR DEPRESSIVE DISORDER (H): ICD-10-CM

## 2021-10-11 PROCEDURE — 99214 OFFICE O/P EST MOD 30 MIN: CPT | Mod: 95 | Performed by: NURSE PRACTITIONER

## 2021-10-11 RX ORDER — GALCANEZUMAB 120 MG/ML
INJECTION, SOLUTION SUBCUTANEOUS
COMMUNITY
Start: 2021-08-30 | End: 2024-08-23

## 2021-10-11 RX ORDER — VENLAFAXINE HYDROCHLORIDE 150 MG/1
150 CAPSULE, EXTENDED RELEASE ORAL DAILY
Qty: 90 CAPSULE | Refills: 0 | Status: SHIPPED | OUTPATIENT
Start: 2021-10-11 | End: 2022-01-07

## 2021-10-11 RX ORDER — BUPROPION HYDROCHLORIDE 150 MG/1
150 TABLET ORAL EVERY MORNING
Qty: 90 TABLET | Refills: 0 | Status: SHIPPED | OUTPATIENT
Start: 2021-10-11 | End: 2022-01-07

## 2021-10-11 RX ORDER — UBROGEPANT 50 MG/1
TABLET ORAL
COMMUNITY
Start: 2021-08-04 | End: 2024-06-17

## 2021-10-11 ASSESSMENT — PAIN SCALES - GENERAL: PAINLEVEL: MODERATE PAIN (4)
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
## 2021-10-11 NOTE — PATIENT INSTRUCTIONS
**For crisis resources, please see the information at the end of this document**     Patient Education      Thank you for coming to the [facility] [facility].    Lab Testing:  If you had lab testing today and your results are reassuring or normal they will be mailed to you or sent through [id number] within 7 days. If the lab tests need quick action we will call you with the results. The phone number we will call with results is # [phone] (home) . If this is not the best number please call our clinic and change the number.    Medication Refills:  If you need any refills please call your pharmacy and they will contact us. Our fax number for refills is [phone]. Please allow three business for refill processing. If you need to  your refill at a new pharmacy, please contact the new pharmacy directly. The new pharmacy will help you get your medications transferred.     Scheduling:  If you have any concerns about today's visit or wish to schedule another appointment please call our office during normal business hours [phone] (8-5:00 M-F)    Contact Us:  Please call [phone] during business hours (8-5:00 M-F).  If after clinic hours, or on the weekend, please call  [phone].    Financial Assistance [phone]  [facility] Billing [phone]  Central Billing Office, [facility]: [phone]  [facility] Billing [phone]  Medical Records [phone]  [facility] Patient Bill of Rights [email or web address]       MENTAL HEALTH CRISIS NUMBERS:  For a medical emergency please call  911 or go to the nearest ER.     [facility]:   [facility] -[phone]   Crisis Residence [facility] -[phone]   Walk-In Counseling Center [facility] -[phone]   COPE 24/7 [facility] Mobile Team -[phone] (adults)/[phone] (child)  CHILD: [facility] needs assessment  team - [phone]      [facility]:   [facility] - [phone]   Walk-in counseling [facility] - [phone]   Walk-in counseling [facility] - [phone]   Crisis Residence [facility] - [phone]  Urgent Care Adult Mental Rzpp[facility]-[phone] mobile unit/ 24/7 crisis line    National Crisis Numbers:   National Suicide Prevention Lifeline: 4-527-849-TALK (357-997-9242)  Poison Control Center - 7-038-269-7699  [employer]/resources for a list of additional resources (SOS)  Trans Lifeline a hotline for transgender people 1-375.283.3615  The [employer] a hotline for LGBT youth 8-615-299-3608  Crisis Text Line: For any crisis 24/7   To: 249056  see www.crisistextline.org  - IF MAKING A CALL FEELS TOO HARD, send a text!         Again thank you for choosing [facility] MENTAL HEALTH & ADDICTION [facility] and please let us know how we can best partner with you to improve you and your family's health.    You may be receiving a survey regarding this appointment. We would love to have your feedback, both positive and negative. The survey is done by an external company, so your answers are anonymous.

## 2021-11-02 DIAGNOSIS — G47.00 INSOMNIA, UNSPECIFIED TYPE: ICD-10-CM

## 2021-11-04 NOTE — TELEPHONE ENCOUNTER
TRAZODONE  50MG  Last refilled: 9/30/20  Qty: 45:2     Last seen: 10/11/21  RTC: 3 MOS  Cancel: 0  No-show: 0  Next appt: NONE  Scheduling has been notified to contact the pt for appointment.  Refill pended and routed to the provider for review/determination due to  : RF GAP   Is pt taking med?  DOSE ?

## 2021-11-05 RX ORDER — TRAZODONE HYDROCHLORIDE 50 MG/1
TABLET, FILM COATED ORAL
Qty: 45 TABLET | Refills: 0 | Status: SHIPPED | OUTPATIENT
Start: 2021-11-05 | End: 2022-04-26

## 2021-11-05 NOTE — TELEPHONE ENCOUNTER
Reviewed outside med rec. Medication was actually filled last on 2/1/21. Rx is a PRN with a range for dosing. Appears pt truly uses medication as PRN. Provider also confirmed pt is taking this in his last office visit note. Will only send #45, as Rogelio will be leaving clinic later this month.

## 2021-11-28 DIAGNOSIS — F41.1 GENERALIZED ANXIETY DISORDER: ICD-10-CM

## 2021-11-28 DIAGNOSIS — G47.00 INSOMNIA, UNSPECIFIED TYPE: ICD-10-CM

## 2021-12-01 NOTE — TELEPHONE ENCOUNTER
Last seen: 10/11/21  RTC: 3 months  Cancel: none  No-show: none  Next appt: none- will be transferring to a community provider     Incoming refill from [facility] via fax     Medication requested: gabapentin (NEURONTIN) 300 MG capsule  Directions: TAKE 1 CAPSULE TWICE A DAY, AND 2 CAPSULES AT BEDTIME  Qty: 360  Last refilled: per [place]  and outside med rec, the medication was last filled on 2/1/21 for #360    Appears SW team sent pt a [employer] message about Rogelio's departure, which she has not read. Called the patient to discuss her refill request and transfer of care. No answer. LVM with reason for phone call and requested a c/b to discuss further.

## 2021-12-08 ENCOUNTER — TELEPHONE (OUTPATIENT)
Dept: INTERNAL MEDICINE | Facility: CLINIC | Age: 36
End: 2021-12-08
Payer: COMMERCIAL

## 2021-12-08 NOTE — TELEPHONE ENCOUNTER
Call Ri to help her schedule a follow-up with PCP Radha Collins sometime in January. Either virtual or in-person, whatever she prefers. Left message with PCC number to call to schedule.

## 2021-12-20 DIAGNOSIS — F33.1 MODERATE EPISODE OF RECURRENT MAJOR DEPRESSIVE DISORDER (H): ICD-10-CM

## 2021-12-21 ENCOUNTER — TRANSFERRED RECORDS (OUTPATIENT)
Dept: HEALTH INFORMATION MANAGEMENT | Facility: CLINIC | Age: 36
End: 2021-12-21
Payer: COMMERCIAL

## 2021-12-22 NOTE — TELEPHONE ENCOUNTER
Medication requested: buPROPion (WELLBUTRIN XL) 150 MG 24 hr tablet  Last refilled: 10/11/21  Qty: 90      Last seen: 10/11/21  RTC: 3 months  Cancel: 0  No-show: 0  Next appt: 0 transferring to a community provider    Refill decision:   Refill pended and routed to the provider/nurse for review/determination due to   Unclear if pt has found a new provider

## 2021-12-23 RX ORDER — BUPROPION HYDROCHLORIDE 150 MG/1
150 TABLET ORAL EVERY MORNING
Qty: 90 TABLET | Refills: 0 | OUTPATIENT
Start: 2021-12-23

## 2021-12-23 RX ORDER — GABAPENTIN 300 MG/1
CAPSULE ORAL
Qty: 120 CAPSULE | Refills: 0 | OUTPATIENT
Start: 2021-12-23

## 2021-12-23 NOTE — TELEPHONE ENCOUNTER
Writer has not received a response from the patient regarding her gabapentin refill. Will refuse request with note that patient should contact the clinic if she needs a refill.

## 2022-01-07 ENCOUNTER — OFFICE VISIT (OUTPATIENT)
Dept: INTERNAL MEDICINE | Facility: CLINIC | Age: 37
End: 2022-01-07
Payer: COMMERCIAL

## 2022-01-07 VITALS
HEART RATE: 97 BPM | HEIGHT: 64 IN | TEMPERATURE: 98 F | SYSTOLIC BLOOD PRESSURE: 135 MMHG | BODY MASS INDEX: 38.07 KG/M2 | RESPIRATION RATE: 16 BRPM | DIASTOLIC BLOOD PRESSURE: 86 MMHG | OXYGEN SATURATION: 96 % | WEIGHT: 223 LBS

## 2022-01-07 DIAGNOSIS — G47.00 INSOMNIA, UNSPECIFIED TYPE: ICD-10-CM

## 2022-01-07 DIAGNOSIS — F41.9 ANXIETY: ICD-10-CM

## 2022-01-07 DIAGNOSIS — F41.1 GENERALIZED ANXIETY DISORDER: ICD-10-CM

## 2022-01-07 DIAGNOSIS — F33.1 MODERATE EPISODE OF RECURRENT MAJOR DEPRESSIVE DISORDER (H): ICD-10-CM

## 2022-01-07 PROCEDURE — 99214 OFFICE O/P EST MOD 30 MIN: CPT | Performed by: NURSE PRACTITIONER

## 2022-01-07 RX ORDER — BUPROPION HYDROCHLORIDE 150 MG/1
150 TABLET ORAL EVERY MORNING
Qty: 90 TABLET | Refills: 3 | Status: SHIPPED | OUTPATIENT
Start: 2022-01-07 | End: 2022-08-24

## 2022-01-07 RX ORDER — HYDROXYZINE HYDROCHLORIDE 25 MG/1
TABLET, FILM COATED ORAL
Qty: 270 TABLET | Refills: 1 | Status: SHIPPED | OUTPATIENT
Start: 2022-01-07

## 2022-01-07 RX ORDER — VENLAFAXINE HYDROCHLORIDE 150 MG/1
150 CAPSULE, EXTENDED RELEASE ORAL DAILY
Qty: 90 CAPSULE | Refills: 3 | Status: SHIPPED | OUTPATIENT
Start: 2022-01-07 | End: 2022-08-24

## 2022-01-07 RX ORDER — GABAPENTIN 300 MG/1
CAPSULE ORAL
Qty: 360 CAPSULE | Refills: 1 | Status: SHIPPED | OUTPATIENT
Start: 2022-01-07 | End: 2022-07-02

## 2022-01-07 ASSESSMENT — ANXIETY QUESTIONNAIRES
2. NOT BEING ABLE TO STOP OR CONTROL WORRYING: NOT AT ALL
7. FEELING AFRAID AS IF SOMETHING AWFUL MIGHT HAPPEN: NOT AT ALL
6. BECOMING EASILY ANNOYED OR IRRITABLE: SEVERAL DAYS
IF YOU CHECKED OFF ANY PROBLEMS ON THIS QUESTIONNAIRE, HOW DIFFICULT HAVE THESE PROBLEMS MADE IT FOR YOU TO DO YOUR WORK, TAKE CARE OF THINGS AT HOME, OR GET ALONG WITH OTHER PEOPLE: NOT DIFFICULT AT ALL
1. FEELING NERVOUS, ANXIOUS, OR ON EDGE: MORE THAN HALF THE DAYS
GAD7 TOTAL SCORE: 7
3. WORRYING TOO MUCH ABOUT DIFFERENT THINGS: SEVERAL DAYS
5. BEING SO RESTLESS THAT IT IS HARD TO SIT STILL: MORE THAN HALF THE DAYS

## 2022-01-07 ASSESSMENT — MIFFLIN-ST. JEOR: SCORE: 1683.11

## 2022-01-07 ASSESSMENT — PAIN SCALES - GENERAL: PAINLEVEL: NO PAIN (0)

## 2022-01-07 ASSESSMENT — PATIENT HEALTH QUESTIONNAIRE - PHQ9
5. POOR APPETITE OR OVEREATING: SEVERAL DAYS
SUM OF ALL RESPONSES TO PHQ QUESTIONS 1-9: 5

## 2022-01-07 NOTE — PROGRESS NOTES
Assessment & Plan     Generalized anxiety disorder  Insomnia, unspecified type  Feels current regimen is working well to help manage anxiety, insomnia. Refills provided. She has several specialists she has been following with since I last saw her in 2019, will continue to work with Gyn, Allergy, and her therapist.   - gabapentin (NEURONTIN) 300 MG capsule; Take 1 capsule (300 mg) by mouth 2 times daily AND 2 capsules (600 mg) At Bedtime. TAKE 1 CAPSULE TWICE A DAY, AND 2 CAPSULES AT BEDTIME  - venlafaxine (EFFEXOR-XR) 150 MG 24 hr capsule; Take 1 capsule (150 mg) by mouth daily    Anxiety  - hydrOXYzine (ATARAX) 25 MG tablet; TAKE 1 TABLET  THREE TIMES A DAY AS NEEDED FOR ITCHING AND ANXIETY    Moderate episode of recurrent major depressive disorder (H)  - buPROPion (WELLBUTRIN XL) 150 MG 24 hr tablet; Take 1 tablet (150 mg) by mouth every morning  - venlafaxine (EFFEXOR-XR) 150 MG 24 hr capsule; Take 1 capsule (150 mg) by mouth daily    Return in about 6 months (around 7/7/2022).    TORSTEN Chao [facility] INTERNAL MEDICINE [facility] is a 36 year old who presents for the following health issues     HPI     Previously followed with Rogelio Green in psychiatry clinic, transferring back to primary care. Feels like current meds are working well-- wellbutrin 150 mg, and venlafaxine 150 mg. Gabapentin 300 mg (1 in AM, 2 at bedtime, and 1 in afternoon prn). Has had some dry mouth and shakiness as side effects, annoying but tolerable because they feel like they help. Occasional hydroxyzine 1 in AM and 2 at night for allergy. Skin and ears become very itchy. Trazodone 1/2 tab nightly.   Working with therapist, private practice. Selina Staley, planning to retire, helped with DBT, also does EMDR. Helped with trauma and hypersensitivity.   Otherwise feeling well overall.   Currently house shopping, likes to have a goal/purpose to focus on, looking in [place].  Vit D through  "neurologist, B12, and Migrelief, Emgality has been helpful.   Occasionally uses Tizandidine, had been working with 2nd computer monitor that bothered neck/shoulder for a month, looking to the left too often.  Norethindrone 5 mg. Following with endometriosis specialist in a couple weeks, Dr. Eliazar Patel at [facility].   Dicyclomine prn for IBS.   [facility]    Review of Systems   Constitutional, HEENT, cardiovascular, pulmonary, gi and gu systems are negative, except as otherwise noted.      Objective    /86   Pulse 97   Temp 98  F (36.7  C) (Oral)   Resp 16   Ht 1.628 m (5' 4.1\")   Wt 101.2 kg (223 lb)   SpO2 96%   BMI 38.16 kg/m    Body mass index is 38.16 kg/m .  Physical Exam   GENERAL: healthy, alert and no distress  RESP: lungs clear to auscultation - no rales, rhonchi or wheezes  CV: regular rate and rhythm, normal S1 S2, no S3 or S4, no murmur, click or rub, no peripheral edema and peripheral pulses strong  ABDOMEN: soft, nontender, no hepatosplenomegaly, no masses and bowel sounds normal  PSYCH: mentation appears normal, affect normal/bright                "

## 2022-01-07 NOTE — NURSING NOTE
Chief Complaint   Patient presents with     RECHECK     Patient comes in to discuss medications.          Cruz Gonzalez MA on 1/7/2022 at 3:01 PM  
1st Trimester Sonogram/20 Week Level II Sonogram

## 2022-01-08 ASSESSMENT — ANXIETY QUESTIONNAIRES: GAD7 TOTAL SCORE: 7

## 2022-01-09 PROBLEM — Z51.6 NEED FOR DESENSITIZATION TO ALLERGENS: Status: ACTIVE | Noted: 2018-04-26

## 2022-01-09 PROBLEM — M25.642 FINGER STIFFNESS, LEFT: Status: ACTIVE | Noted: 2021-09-28

## 2022-01-09 PROBLEM — Z91.09 ENVIRONMENTAL ALLERGIES: Status: ACTIVE | Noted: 2020-02-12

## 2022-01-09 PROBLEM — M79.89 SWELLING OF FINGER OF LEFT HAND: Status: ACTIVE | Noted: 2021-09-28

## 2022-01-11 ENCOUNTER — TELEPHONE (OUTPATIENT)
Dept: INTERNAL MEDICINE | Facility: CLINIC | Age: 37
End: 2022-01-11
Payer: COMMERCIAL

## 2022-01-29 ENCOUNTER — HEALTH MAINTENANCE LETTER (OUTPATIENT)
Age: 37
End: 2022-01-29

## 2022-04-26 DIAGNOSIS — G47.00 INSOMNIA, UNSPECIFIED TYPE: ICD-10-CM

## 2022-04-26 RX ORDER — TRAZODONE HYDROCHLORIDE 50 MG/1
TABLET, FILM COATED ORAL
Qty: 45 TABLET | Refills: 1 | Status: SHIPPED | OUTPATIENT
Start: 2022-04-26 | End: 2022-08-24

## 2022-04-26 NOTE — TELEPHONE ENCOUNTER
[facility] Call Center    Phone Message    May a detailed message be left on voicemail: yes     Reason for Call: Medication Refill Request    Has the patient contacted the pharmacy for the refill? Yes   Name of medication being requested:    traZODone (DESYREL) 50 MG tablet     Provider who prescribed the medication: Radha Collins CNP  Pharmacy: [facility] - [address] (Pharmacy)  Date medication is needed: as soon as able, Per pharmacy patient is requesting a 90 day supply so a new script will be needed.          Action Taken: Message routed to:  [facility] med refill    Travel Screening: Not Applicable

## 2022-04-26 NOTE — TELEPHONE ENCOUNTER
Centralized Medication Refill Team note:   traZODone (DESYREL) 50 MG tablet  Last Written Prescription Date:  11/5/21  Last Fill Quantity: 45,   # refills: 0  Written by Psychiatry on 11/5/21  Per notes:  Pt to see PCP to take over medication management. Appt set for 1/13/2022 with provider       Last Office Visit : 1/7/22 Karina  Future Office visit:  6 months    Routing refill request to provider for review/approval because:  Previously prescribed psychiatry [place]( no longer at [facility])> recommendation was for PCP to take over med management( saw Karina 1/7/22 in this regard)

## 2022-05-31 ENCOUNTER — MYC MEDICAL ADVICE (OUTPATIENT)
Dept: INTERNAL MEDICINE | Facility: CLINIC | Age: 37
End: 2022-05-31
Payer: COMMERCIAL

## 2022-06-24 ENCOUNTER — TRANSFERRED RECORDS (OUTPATIENT)
Dept: HEALTH INFORMATION MANAGEMENT | Facility: CLINIC | Age: 37
End: 2022-06-24

## 2022-06-29 ENCOUNTER — OFFICE VISIT (OUTPATIENT)
Dept: INTERNAL MEDICINE | Facility: CLINIC | Age: 37
End: 2022-06-29
Payer: COMMERCIAL

## 2022-06-29 ENCOUNTER — LAB (OUTPATIENT)
Dept: LAB | Facility: CLINIC | Age: 37
End: 2022-06-29
Payer: COMMERCIAL

## 2022-06-29 VITALS
HEART RATE: 98 BPM | HEIGHT: 64 IN | BODY MASS INDEX: 37.3 KG/M2 | OXYGEN SATURATION: 98 % | DIASTOLIC BLOOD PRESSURE: 81 MMHG | WEIGHT: 218.5 LBS | SYSTOLIC BLOOD PRESSURE: 122 MMHG

## 2022-06-29 DIAGNOSIS — F33.1 MODERATE EPISODE OF RECURRENT MAJOR DEPRESSIVE DISORDER (H): Primary | ICD-10-CM

## 2022-06-29 DIAGNOSIS — Z11.59 ENCOUNTER FOR HEPATITIS C SCREENING TEST FOR LOW RISK PATIENT: ICD-10-CM

## 2022-06-29 DIAGNOSIS — F33.1 MODERATE EPISODE OF RECURRENT MAJOR DEPRESSIVE DISORDER (H): ICD-10-CM

## 2022-06-29 DIAGNOSIS — Z11.4 SCREENING FOR HUMAN IMMUNODEFICIENCY VIRUS WITHOUT PRESENCE OF RISK FACTORS: ICD-10-CM

## 2022-06-29 DIAGNOSIS — Z13.220 SCREENING FOR HYPERLIPIDEMIA: ICD-10-CM

## 2022-06-29 DIAGNOSIS — F41.1 GENERALIZED ANXIETY DISORDER: ICD-10-CM

## 2022-06-29 DIAGNOSIS — Z83.3 FAMILY HISTORY OF DIABETES MELLITUS: ICD-10-CM

## 2022-06-29 DIAGNOSIS — E55.9 VITAMIN D DEFICIENCY: ICD-10-CM

## 2022-06-29 LAB
ALBUMIN SERPL-MCNC: 3.9 G/DL (ref 3.4–5)
ALP SERPL-CCNC: 21 U/L (ref 40–150)
ALT SERPL W P-5'-P-CCNC: 27 U/L (ref 0–70)
ANION GAP SERPL CALCULATED.3IONS-SCNC: 10 MMOL/L (ref 3–14)
AST SERPL W P-5'-P-CCNC: 15 U/L (ref 0–45)
BILIRUB SERPL-MCNC: 0.4 MG/DL (ref 0.2–1.3)
BUN SERPL-MCNC: 8 MG/DL (ref 7–30)
CALCIUM SERPL-MCNC: 9.1 MG/DL (ref 8.5–10.1)
CHLORIDE BLD-SCNC: 106 MMOL/L (ref 94–109)
CHOLEST SERPL-MCNC: 216 MG/DL
CO2 SERPL-SCNC: 26 MMOL/L (ref 20–32)
CREAT SERPL-MCNC: 0.71 MG/DL (ref 0.52–1.25)
FASTING STATUS PATIENT QL REPORTED: NO
GFR SERPL CREATININE-BSD FRML MDRD: >90 ML/MIN/1.73M2
GLUCOSE BLD-MCNC: 115 MG/DL (ref 70–99)
HBA1C MFR BLD: 5.1 % (ref 0–5.6)
HDLC SERPL-MCNC: 45 MG/DL
LDLC SERPL CALC-MCNC: 145 MG/DL
NONHDLC SERPL-MCNC: 171 MG/DL
POTASSIUM BLD-SCNC: 3.7 MMOL/L (ref 3.4–5.3)
PROT SERPL-MCNC: 7.4 G/DL (ref 6.8–8.8)
SODIUM SERPL-SCNC: 142 MMOL/L (ref 133–144)
TRIGL SERPL-MCNC: 131 MG/DL

## 2022-06-29 PROCEDURE — 83036 HEMOGLOBIN GLYCOSYLATED A1C: CPT | Performed by: PATHOLOGY

## 2022-06-29 PROCEDURE — 80061 LIPID PANEL: CPT | Performed by: PATHOLOGY

## 2022-06-29 PROCEDURE — 80053 COMPREHEN METABOLIC PANEL: CPT | Performed by: PATHOLOGY

## 2022-06-29 PROCEDURE — 99214 OFFICE O/P EST MOD 30 MIN: CPT | Performed by: NURSE PRACTITIONER

## 2022-06-29 PROCEDURE — 87389 HIV-1 AG W/HIV-1&-2 AB AG IA: CPT | Performed by: NURSE PRACTITIONER

## 2022-06-29 PROCEDURE — 86803 HEPATITIS C AB TEST: CPT | Performed by: NURSE PRACTITIONER

## 2022-06-29 PROCEDURE — 36415 COLL VENOUS BLD VENIPUNCTURE: CPT | Performed by: PATHOLOGY

## 2022-06-29 PROCEDURE — 84443 ASSAY THYROID STIM HORMONE: CPT | Performed by: PATHOLOGY

## 2022-06-29 PROCEDURE — 82306 VITAMIN D 25 HYDROXY: CPT | Performed by: NURSE PRACTITIONER

## 2022-06-29 NOTE — NURSING NOTE
Paris Nguyen is a 37 year old adult patient that presents today in clinic for the following:    Chief Complaint   Patient presents with     RECHECK     CMP, blood pressure     The patient's allergies and medications were reviewed as noted. A set of vitals were recorded as noted without incident. The patient does not have any other questions for the provider.    Tab Thompson, EMT at 11:51 AM on 6/29/2022

## 2022-06-29 NOTE — PROGRESS NOTES
"  Assessment & Plan     Generalized anxiety disorder  Moderate episode of recurrent major depressive disorder (H)  Continues on Bupropion 150 mg and Effexor- mg, feels these are helpful. Recently completed group DBT. Will continue working on implementing these techniques to help manage stressful situations.  - Comprehensive metabolic panel; Future    Vitamin D deficiency  On Vit D 2000 units daily.  - Vitamin D Deficiency; Future    Screening for hyperlipidemia  - Lipid Profile NON-FASTING; Future    Family history of diabetes mellitus  - Hemoglobin A1c; Future    Screening for human immunodeficiency virus without presence of risk factors  - HIV Antigen Antibody Combo; Future    Encounter for hepatitis C screening test for low risk patient  - HCV Screen with Reflex; Future      33 minutes spent on the date of the encounter doing chart review, history and exam, documentation and further activities per the note       Return in about 6 months (around 12/29/2022). or sooner prn for any changes or concerns    TORSTEN Chao CNP  [facility] INTERNAL MEDICINE [facility] is a 37 year old, presenting for the following health issues:  RECHECK (CMP, blood pressure)      HPI     Here for follow-up BP.   Going through stressful things. 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 Working 60 hrs/week. Looking for a new position.   Had to fight with insurance to get Emgality covered. Continues following with Mahesh Guerrero PA-C, at [facility].  Concerned if she gets a new job, insurance would lapse and be without medication.  Things have been hard but feels \"it will be fine\". Has had several different interviews, gets depressed afterwards, feels like jumping through hoops. Hoping to work for a company with values-based mission, in tech sector. Bought a house in march, needed some construction and tree removal. " "  Occ feels a \"whooshing\" in her ears, wonders if related to BP. Not ear pressure/URI sx.   Asthma--doing pretty well. Niece gave her a virus a few weeks ago, flared up sx for a bit, did steroids from the allergist, finished a couple weeks ago. Still a little \"honky\", still recovering.       Review of Systems   Constitutional, HEENT, cardiovascular, pulmonary, gi and gu systems are negative, except as otherwise noted.      Objective    /81 (BP Location: Right arm)   Pulse 98   Ht 1.628 m (5' 4.1\")   Wt 99.1 kg (218 lb 8 oz)   SpO2 98%   BMI 37.39 kg/m    Body mass index is 37.39 kg/m .  Physical Exam   GENERAL: healthy, alert and no distress  HENT: ear canals and TM's normal, nose and mouth without ulcers or lesions  NECK: no adenopathy, no asymmetry, masses, or scars and thyroid normal to palpation  RESP: lungs clear to auscultation - no rales, rhonchi or wheezes  CV: regular rate and rhythm, normal S1 S2, no S3 or S4, no murmur, click or rub, no peripheral edema and peripheral pulses strong  ABDOMEN: soft, nontender, no hepatosplenomegaly, no masses and bowel sounds normal  NEURO: Normal strength and tone, mentation intact and speech normal  PSYCH: mentation appears normal, affect normal/bright                    .  ..  "

## 2022-06-30 LAB
DEPRECATED CALCIDIOL+CALCIFEROL SERPL-MC: 30 UG/L (ref 20–75)
HCV AB SERPL QL IA: NONREACTIVE
HIV 1+2 AB+HIV1 P24 AG SERPL QL IA: NONREACTIVE

## 2022-07-01 DIAGNOSIS — G47.00 INSOMNIA, UNSPECIFIED TYPE: ICD-10-CM

## 2022-07-01 DIAGNOSIS — F41.1 GENERALIZED ANXIETY DISORDER: ICD-10-CM

## 2022-07-01 LAB — TSH SERPL DL<=0.005 MIU/L-ACNC: 1.53 MU/L (ref 0.4–4)

## 2022-07-01 NOTE — TELEPHONE ENCOUNTER
----- Message from TORSTEN Bhatt CNP sent at 12/7/2021  4:58 PM CST -----  Regarding: RE: Taking over psychiatric care  Sounds good, thanks Rosa.    Clinic coordinators--could you call Ri to help her schedule a follow-up with me sometime in January? Either virtual or in-person, whatever she prefers.    Thanks!  -Radha  ----- Message -----  From: Telma Aguilar LICSW  Sent: 12/6/2021   2:59 PM CST  To: TORSTEN Bhatt CNP  Subject: RE: Taking over psychiatric care                 Marcelino Garcia,    Thank you for taking on her care! Should I just instruct Ri to call your clinic to get scheduled?    And, yes, it's a major bummer that Rogelio left. He was wonderful to work with. Thankfully he's still working in psychiatry, but not so much at the outpatient level. We are most certainly feeling the shortage of psychiatric providers!    Thank you again for your help with this patient.    Rosa  ----- Message -----  From: Radha Collins APRN CNP  Sent: 12/6/2021   2:50 PM CST  To: ANDREA Tapia  Subject: RE: Taking over psychiatric care                 Hi, Rosa,    That's no problem, I looked through her med list and that should be fine. I think sometimes patients are concerned about transferring to primary care when they've always had a psychiatrist, but we have resources available for short-term consults if she feels that things change down the road, if that helps. Bummer that Rogelio left, we're already so short on psychiatric providers!     Thanks!   -Radha  ----- Message -----  From: Telma Aguilar LICSW  Sent: 12/6/2021   1:47 PM CST  To: TORSTEN Bhatt CNP  Subject: Taking over psychiatric care                     Charlie Collins,    My name is Rosa, and I'm a  at the psychiatry clinic. Recently, one of our providers, Rogelio Green, left the clinic, so we are coordinating the transfer of care for most of his patients.    I spoke with Ri about her ongoing  Called patient. No answer. Left detailed VM for patient to call back to discuss results. psychiatric care, and she expressed interest in remaining within the [facility] system. Unfortunately, we are not able to keep Ri at the psychiatry clinic, so I am reaching out to you to discuss the possibility of you taking over managing her psychiatric medications. Please let me know what you think about this possibility. If you determine that Ri's psychiatric care is outside your scope of practice, I can help Ri find another provider in the community.    Thank you!    RONDA Rose, [facility]  [phone]

## 2022-07-05 RX ORDER — GABAPENTIN 300 MG/1
CAPSULE ORAL
Qty: 360 CAPSULE | Refills: 1 | Status: SHIPPED | OUTPATIENT
Start: 2022-07-05 | End: 2022-08-24

## 2022-08-24 ENCOUNTER — MYC MEDICAL ADVICE (OUTPATIENT)
Dept: INTERNAL MEDICINE | Facility: CLINIC | Age: 37
End: 2022-08-24

## 2022-08-24 DIAGNOSIS — R11.0 NAUSEA: ICD-10-CM

## 2022-08-24 DIAGNOSIS — F41.1 GENERALIZED ANXIETY DISORDER: ICD-10-CM

## 2022-08-24 DIAGNOSIS — G47.00 INSOMNIA, UNSPECIFIED TYPE: ICD-10-CM

## 2022-08-24 DIAGNOSIS — F33.1 MODERATE EPISODE OF RECURRENT MAJOR DEPRESSIVE DISORDER (H): ICD-10-CM

## 2022-08-24 RX ORDER — BUPROPION HYDROCHLORIDE 150 MG/1
150 TABLET ORAL EVERY MORNING
Qty: 90 TABLET | Refills: 0 | Status: SHIPPED | OUTPATIENT
Start: 2022-08-24 | End: 2023-03-15

## 2022-08-24 RX ORDER — VENLAFAXINE HYDROCHLORIDE 150 MG/1
150 CAPSULE, EXTENDED RELEASE ORAL DAILY
Qty: 90 CAPSULE | Refills: 0 | Status: SHIPPED | OUTPATIENT
Start: 2022-08-24 | End: 2023-03-15

## 2022-08-24 RX ORDER — GABAPENTIN 300 MG/1
CAPSULE ORAL
Qty: 360 CAPSULE | Refills: 1 | Status: SHIPPED | OUTPATIENT
Start: 2022-09-05 | End: 2023-05-19

## 2022-08-24 RX ORDER — ONDANSETRON 4 MG/1
4 TABLET, ORALLY DISINTEGRATING ORAL EVERY 6 HOURS PRN
Qty: 30 TABLET | Refills: 1 | Status: SHIPPED | OUTPATIENT
Start: 2022-08-24 | End: 2023-04-04

## 2022-08-24 RX ORDER — TRAZODONE HYDROCHLORIDE 50 MG/1
TABLET, FILM COATED ORAL
Qty: 90 TABLET | Refills: 3 | Status: SHIPPED | OUTPATIENT
Start: 2022-08-24 | End: 2024-01-25

## 2022-08-24 NOTE — TELEPHONE ENCOUNTER
gabapentin (NEURONTIN) 300 MG capsule      Last Written Prescription Date:  7/5/22  Last Fill Quantity: 360,   # refills: 1  Last Office Visit : 6/29/22  Future Office visit:  None scheduled    Routing refill request to provider for review/approval because:  Drug not on primary care refill protocol      buPROPion (WELLBUTRIN XL) 150 MG 24 hr tablet      Last Written Prescription Date:  1/7/22  Last Fill Quantity: 90,   # refills: 3  Last Office Visit : 6/29/22  Future Office visit:  None scheduled    Routing refill request to provider for review/approval because:  Overdue for PHQ-9  PHQ-9 and GAD7 Scores  1/9/2019   1/25/2019   6/7/2019   8/1/2019   9/10/2019   2/21/2020   1/7/2022    PHQ9 TOTAL SCORE - 14 (Moderate depression) - - - - -   PHQ-9 Total Score 6 14 13 13 13 16 5   INDRA-7 Total Score - 11 13 - 13 6 7    1/9/2019 1/25/2019 6/7/2019 8/1/2019 9/10/2019 2/21/2020 1/7/2022     90 day refill per protocol, routed to clinic for follow up    venlafaxine (EFFEXOR-XR) 150 MG 24 hr capsule     Last Written Prescription Date:  1/7/22  Last Fill Quantity: 90,   # refills: 3  Last Office Visit : 6/29/22  Future Office visit:  None scheduled    Routing refill request to provider for review/approval because:  Overdue for PHQ-9  PHQ-9 and GAD7 Scores  1/9/2019   1/25/2019   6/7/2019   8/1/2019   9/10/2019   2/21/2020   1/7/2022    PHQ9 TOTAL SCORE - 14 (Moderate depression) - - - - -   PHQ-9 Total Score 6 14 13 13 13 16 5   INDRA-7 Total Score - 11 13 - 13 6 7    1/9/2019 1/25/2019 6/7/2019 8/1/2019 9/10/2019 2/21/2020 1/7/2022     90 day refill per protocol, routed to clinic for follow up

## 2022-09-11 ENCOUNTER — HEALTH MAINTENANCE LETTER (OUTPATIENT)
Age: 37
End: 2022-09-11

## 2022-09-13 ENCOUNTER — TELEPHONE (OUTPATIENT)
Dept: INTERNAL MEDICINE | Facility: CLINIC | Age: 37
End: 2022-09-13

## 2022-09-27 ENCOUNTER — MYC MEDICAL ADVICE (OUTPATIENT)
Dept: INTERNAL MEDICINE | Facility: CLINIC | Age: 37
End: 2022-09-27

## 2022-09-30 NOTE — TELEPHONE ENCOUNTER
gabapentin (NEURONTIN) 300 MG capsule  Last Written Prescription Date: 1/7/22  Last Fill Quantity: 360,   # refills: 1  Last Office Visit : 6/29/22  Future Office visit: none    Routing refill request to provider for review/approval because: not on protocol   done

## 2022-10-21 ENCOUNTER — NURSE TRIAGE (OUTPATIENT)
Dept: NURSING | Facility: CLINIC | Age: 37
End: 2022-10-21

## 2022-10-22 NOTE — TELEPHONE ENCOUNTER
Covid positive via home test and symptoms started 10-19-22.  Fatigue, cough, headache, had a fever 101.5 but better today.  Denies chest pain, new dyspnea.    Gather patient reported symptoms   Assessment   Current Symptoms at time of phone call, reported by patient: (if no symptoms, document No symptoms] Fatigue, dry cough, headache   Date of Symptom(s) onset (if applicable) 10-19-22     If at time of call, Patients symptoms hare worsened, the Patient should contact 911 or have someone drive them to Emergency Dept promptly:      If Patient calling 911, inform 911 personal that you have tested positive for the Coronavirus (COVID-19).  Place mask on and await 911 to arrive.    If Emergency Dept, If possible, please have another adult drive you to the Emergency Dept but you need to wear mask when in contact with other people.      Monoclonal Antibody Administration    You may be eligible to receive a new treatment with a monoclonal antibody for preventing hospitalization in patients at high risk for complications from COVID-19. This medication is still experimental and available on a limited basis; it is given through an IV and must be given at an infusion center. Please note that not all people who are eligible will receive the medication since it is in limited supply.  Is the patient symptomatic and onset of symptoms within the last 7 days?  Yes  Is the patient interested in a visit with a provider to discuss treatment options?: Yes  Is the patient seen at [facility]?  No: Warm transfer caller to [phone] to be scheduled with a virtual urgent provider.  During transfer, instruct  on appropriate time frame for visit     Review information with Patient    Your result was positive. This means you have COVID-19 (coronavirus).      How can I protect others?    These guidelines are for isolating before returning to work, school or .       If you DO have symptoms:  o Stay home and away from  others  - For at least 5 days after your symptoms started, AND   - You are fever free for 24 hours (with no medicine that reduces fever), AND  - Your other symptoms are better.  o Wear a mask for 10 full days any time you are around others.    If you DON'T have symptoms:  o Stay at home and away from others for at least 5 days after your positive test.  o Wear a mask for 10 full days any time you are around others.    There may be different guidelines for healthcare facilities. Please check with the specific sites before arriving.     If you've been told by a doctor that you were severely ill with COVID-19 or are immunocompromised, you should isolate for at least 10 days.    You should not go back to work until you meet the guidelines above for ending your home isolation. You don't need to be retested for COVID-19 before going back to work--studies show that you won't spread the virus if it's been at least 10 days since your symptoms started (or 20 days, if you have a weak immune system).    Employers, schools, and daycares: This is an official notice for this person's medical guidelines for returning in-person. They must meet the above guidelines before going back to work, school, or  in person.    You will receive a positive COVID-19 letter via [employer] or the mail soon with additional self-care information.      Would you like me to review some of that information with you now?  Yes    How can I take care of myself?      Get lots of rest. Drink extra fluids (unless a doctor has told you not to).      Take Tylenol (acetaminophen) for fever or pain. If you have liver or kidney problems, ask your family doctor if it's okay to take Tylenol.     Take either:     650 mg (two 325 mg pills) every 4 to 6 hours, or     1,000 mg (two 500 mg pills) every 8 hours as needed.     Note: Do not take more than 3,000 mg in one day. Acetaminophen is found in many medicines (both prescribed and over-the-counter medicines). Read  all labels to be sure you don't take too much.    For children, check the Tylenol bottle for the right dose (based on their age or weight).      If you have other health problems (like cancer, heart failure, an organ transplant or severe kidney disease): Call your specialty clinic if you don't feel better in the next 2 days.      Know when to call 911: Emergency warning signs include:    Trouble breathing or shortness of breath    Pain or pressure in the chest that doesn't go away    Feeling confused like you haven't felt before, or not being able to wake up    Bluish-colored lips or face        If you were tested for an upcoming procedure, please contact your provider for next steps.     Floresita Ribeiro RN        Additional Information    Negative: SEVERE difficulty breathing (e.g., struggling for each breath, speaks in single words)    Negative: Difficult to awaken or acting confused (e.g., disoriented, slurred speech)    Negative: Bluish (or gray) lips or face now    Negative: Shock suspected (e.g., cold/pale/clammy skin, too weak to stand, low BP, rapid pulse)    Negative: Sounds like a life-threatening emergency to the triager    Negative: SEVERE or constant chest pain or pressure  (Exception: Mild central chest pain, present only when coughing.)    Negative: MODERATE difficulty breathing (e.g., speaks in phrases, SOB even at rest, pulse 100-120)     At baseline for asthma    Negative: [1] Headache AND [2] stiff neck (can't touch chin to chest)    Negative: Oxygen level (e.g., pulse oximetry) 90 percent or lower    Negative: Chest pain or pressure    Negative: Patient sounds very sick or weak to the triager    Negative: MILD difficulty breathing (e.g., minimal/no SOB at rest, SOB with walking, pulse <100)    Negative: Fever > 103 F (39.4 C)    Negative: [1] Fever > 101 F (38.3 C) AND [2] age > 60 years    Negative: [1] Fever > 100.0 F (37.8 C) AND [2] bedridden (e.g., nursing home patient, CVA, chronic  illness, recovering from surgery)    Protocols used: CORONAVIRUS (COVID-19) DIAGNOSED OR [id number] 1.18.2022

## 2022-10-23 ENCOUNTER — VIRTUAL VISIT (OUTPATIENT)
Dept: URGENT CARE | Facility: CLINIC | Age: 37
End: 2022-10-23
Payer: COMMERCIAL

## 2022-10-23 DIAGNOSIS — U07.1 CLINICAL DIAGNOSIS OF COVID-19: Primary | ICD-10-CM

## 2022-10-23 PROCEDURE — 99213 OFFICE O/P EST LOW 20 MIN: CPT | Mod: CS

## 2022-10-23 RX ORDER — ALBUTEROL SULFATE 90 UG/1
2 AEROSOL, METERED RESPIRATORY (INHALATION) EVERY 6 HOURS PRN
Qty: 18 G | Refills: 0 | Status: SHIPPED | OUTPATIENT
Start: 2022-10-23 | End: 2022-11-15

## 2022-10-23 NOTE — PROGRESS NOTES
"Ri is a 37 year old who is being evaluated via a billable phone visit.      Tested + Thursday AM.  Sx started W 10/19.  COVID vaccinated and boosted.  Wellbutrin/FLovent/OCPs/Ubrelvy/trazodone  Running at 95% by oximeter. Fatigue, cough    Assessment & Plan     Clinical diagnosis of COVID-19    - nirmatrelvir and ritonavir (PAXLOVID) therapy pack; Take 3 tablets by mouth 2 times daily for 5 days (Take 2 Nirmatrelvir tablets and 1 Ritonavir tablet twice daily for 5 days)    COVID-19 positive patient.  Encounter for consideration of medication intervention. Patient does qualify for a prescription. Full discussion with patient including medication options, risks and benefits. Potential drug interactions reviewed with patient.     Treatment Planned Paxlovid RX sent to [facility] [address]    Temporary change to home medications:   Hold Ubrelvy while on Paxlovid x 5 days.  Reduce dose of trazodone by 50% while on Paxlovid x 5 days.  Discussed back up form of contraception until next pill pack of birth control pills with reduced effectiveness while on Paxlovid.  Discussed potential reduced effect of Wellbutrin while on Paxlovid.  Hold Flovent inhaler while on Paxlovid- may use albuterol as needed.    Estimated body mass index is 37.39 kg/m  as calculated from the following:    Height as of 6/29/22: 1.628 m (5' 4.1\").    Weight as of 6/29/22: 99.1 kg (218 lb 8 oz).  GFR Estimate   Date Value Ref Range Status   06/29/2022 >90 >60 mL/min/1.73m2 Final     Comment:     GFR not calculated when sex unspecified or nonbinary.  Effective December 21, 2021 eGFRcr in adults is calculated using the 2021 CKD-EPI creatinine equation which includes age and gender (Star darby al., NEJM, DOI: 10.1056/STAKxu5122066)   10/16/2019 >90 >60 mL/min/[1.73_m2] Final     Comment:     Non  GFR Calc  Starting 12/18/2018, serum creatinine based estimated GFR (eGFR) will be   calculated using the Chronic Kidney Disease " Epidemiology Collaboration   (CKD-EPI) equation.       Kathy Perez MD  [facility] VIRTUAL URGENT CARE    Subjective   Ri is a 37 year old, presenting for the following health issues:  No chief complaint on file.      HPI     Tested + Thursday AM.  Sx started W 10/19.  COVID vaccinated and boosted.  Wellbutrin/FLovent/OCPs/Ubrelvy/trazodone  Running at 95% by oximeter.      Review of Systems   Constitutional, HEENT, cardiovascular, pulmonary, GI, , musculoskeletal, neuro, skin, endocrine and psych systems are negative, except as otherwise noted.      Objective           Vitals:  No vitals were obtained today due to virtual visit.    Physical Exam   GENERAL: Healthy, alert and no distress  PSYCH: mentation appears normal and affect normal/bright    Phone call duration # 10 minutes.

## 2023-02-24 ENCOUNTER — MYC MEDICAL ADVICE (OUTPATIENT)
Dept: INTERNAL MEDICINE | Facility: CLINIC | Age: 38
End: 2023-02-24
Payer: COMMERCIAL

## 2023-03-03 DIAGNOSIS — G47.00 INSOMNIA, UNSPECIFIED TYPE: ICD-10-CM

## 2023-03-07 RX ORDER — TRAZODONE HYDROCHLORIDE 50 MG/1
TABLET, FILM COATED ORAL
Qty: 45 TABLET | Refills: 7 | OUTPATIENT
Start: 2023-03-07

## 2023-03-10 ENCOUNTER — MYC MEDICAL ADVICE (OUTPATIENT)
Dept: INTERNAL MEDICINE | Facility: CLINIC | Age: 38
End: 2023-03-10
Payer: COMMERCIAL

## 2023-03-15 ENCOUNTER — LAB (OUTPATIENT)
Dept: LAB | Facility: CLINIC | Age: 38
End: 2023-03-15
Payer: COMMERCIAL

## 2023-03-15 ENCOUNTER — OFFICE VISIT (OUTPATIENT)
Dept: INTERNAL MEDICINE | Facility: CLINIC | Age: 38
End: 2023-03-15
Payer: COMMERCIAL

## 2023-03-15 VITALS
SYSTOLIC BLOOD PRESSURE: 137 MMHG | OXYGEN SATURATION: 97 % | WEIGHT: 231.1 LBS | DIASTOLIC BLOOD PRESSURE: 85 MMHG | HEART RATE: 93 BPM | BODY MASS INDEX: 39.46 KG/M2 | HEIGHT: 64 IN

## 2023-03-15 DIAGNOSIS — R53.83 OTHER FATIGUE: Primary | ICD-10-CM

## 2023-03-15 DIAGNOSIS — F41.1 GENERALIZED ANXIETY DISORDER: ICD-10-CM

## 2023-03-15 DIAGNOSIS — R53.83 OTHER FATIGUE: ICD-10-CM

## 2023-03-15 DIAGNOSIS — I10 ESSENTIAL HYPERTENSION: ICD-10-CM

## 2023-03-15 DIAGNOSIS — F33.1 MODERATE EPISODE OF RECURRENT MAJOR DEPRESSIVE DISORDER (H): ICD-10-CM

## 2023-03-15 LAB
ALBUMIN SERPL BCG-MCNC: 4.6 G/DL (ref 3.5–5.2)
ALP SERPL-CCNC: 20 U/L (ref 35–129)
ALT SERPL W P-5'-P-CCNC: 18 U/L (ref 10–50)
ANION GAP SERPL CALCULATED.3IONS-SCNC: 12 MMOL/L (ref 7–15)
AST SERPL W P-5'-P-CCNC: 18 U/L (ref 10–50)
BASOPHILS # BLD AUTO: 0.1 10E3/UL (ref 0–0.2)
BASOPHILS NFR BLD AUTO: 1 %
BILIRUB SERPL-MCNC: 0.3 MG/DL
BUN SERPL-MCNC: 15.1 MG/DL (ref 6–20)
CALCIUM SERPL-MCNC: 9.4 MG/DL (ref 8.6–10)
CHLORIDE SERPL-SCNC: 103 MMOL/L (ref 98–107)
CREAT SERPL-MCNC: 0.99 MG/DL (ref 0.51–1.17)
DEPRECATED CALCIDIOL+CALCIFEROL SERPL-MC: 26 UG/L (ref 20–75)
DEPRECATED HCO3 PLAS-SCNC: 26 MMOL/L (ref 22–29)
EOSINOPHIL # BLD AUTO: 0.2 10E3/UL (ref 0–0.7)
EOSINOPHIL NFR BLD AUTO: 3 %
ERYTHROCYTE [DISTWIDTH] IN BLOOD BY AUTOMATED COUNT: 12.9 % (ref 10–15)
GFR SERPL CREATININE-BSD FRML MDRD: 74 ML/MIN/1.73M2
GLUCOSE SERPL-MCNC: 92 MG/DL (ref 70–99)
HBA1C MFR BLD: 5.7 %
HCT VFR BLD AUTO: 41.1 % (ref 35–53)
HGB BLD-MCNC: 14 G/DL (ref 11.7–17.7)
IMM GRANULOCYTES # BLD: 0.1 10E3/UL
IMM GRANULOCYTES NFR BLD: 1 %
LYMPHOCYTES # BLD AUTO: 3.4 10E3/UL (ref 0.8–5.3)
LYMPHOCYTES NFR BLD AUTO: 42 %
MCH RBC QN AUTO: 28.4 PG (ref 26.5–33)
MCHC RBC AUTO-ENTMCNC: 34.1 G/DL (ref 31.5–36.5)
MCV RBC AUTO: 83 FL (ref 78–100)
MONOCYTES # BLD AUTO: 0.9 10E3/UL (ref 0–1.3)
MONOCYTES NFR BLD AUTO: 11 %
NEUTROPHILS # BLD AUTO: 3.5 10E3/UL (ref 1.6–8.3)
NEUTROPHILS NFR BLD AUTO: 42 %
NRBC # BLD AUTO: 0 10E3/UL
NRBC BLD AUTO-RTO: 0 /100
PLATELET # BLD AUTO: 347 10E3/UL (ref 150–450)
POTASSIUM SERPL-SCNC: 4.2 MMOL/L (ref 3.4–5.3)
PROT SERPL-MCNC: 7.2 G/DL (ref 6.4–8.3)
RBC # BLD AUTO: 4.93 10E6/UL (ref 3.8–5.9)
SODIUM SERPL-SCNC: 141 MMOL/L (ref 136–145)
T4 FREE SERPL-MCNC: 1.13 NG/DL (ref 0.9–1.7)
TSH SERPL DL<=0.005 MIU/L-ACNC: 2.44 UIU/ML (ref 0.3–4.2)
WBC # BLD AUTO: 8.1 10E3/UL (ref 4–11)

## 2023-03-15 PROCEDURE — 80053 COMPREHEN METABOLIC PANEL: CPT | Performed by: NURSE PRACTITIONER

## 2023-03-15 PROCEDURE — 99214 OFFICE O/P EST MOD 30 MIN: CPT | Mod: 25 | Performed by: NURSE PRACTITIONER

## 2023-03-15 PROCEDURE — 36415 COLL VENOUS BLD VENIPUNCTURE: CPT | Performed by: PATHOLOGY

## 2023-03-15 PROCEDURE — 82306 VITAMIN D 25 HYDROXY: CPT | Performed by: NURSE PRACTITIONER

## 2023-03-15 PROCEDURE — 0124A COVID-19 VACCINE BIVALENT BOOSTER 12+ (PFIZER): CPT | Performed by: NURSE PRACTITIONER

## 2023-03-15 PROCEDURE — 91312 COVID-19 VACCINE BIVALENT BOOSTER 12+ (PFIZER): CPT | Performed by: NURSE PRACTITIONER

## 2023-03-15 PROCEDURE — 84443 ASSAY THYROID STIM HORMONE: CPT | Performed by: NURSE PRACTITIONER

## 2023-03-15 PROCEDURE — 84439 ASSAY OF FREE THYROXINE: CPT | Performed by: NURSE PRACTITIONER

## 2023-03-15 PROCEDURE — 80050 GENERAL HEALTH PANEL: CPT | Performed by: PATHOLOGY

## 2023-03-15 PROCEDURE — 83036 HEMOGLOBIN GLYCOSYLATED A1C: CPT | Performed by: NURSE PRACTITIONER

## 2023-03-15 RX ORDER — ALBUTEROL SULFATE 5 MG/ML
2.5 SOLUTION RESPIRATORY (INHALATION) EVERY 6 HOURS PRN
COMMUNITY

## 2023-03-15 RX ORDER — VENLAFAXINE HYDROCHLORIDE 150 MG/1
150 CAPSULE, EXTENDED RELEASE ORAL DAILY
Qty: 90 CAPSULE | Refills: 3 | Status: SHIPPED | OUTPATIENT
Start: 2023-03-15 | End: 2024-03-26

## 2023-03-15 RX ORDER — DICYCLOMINE HYDROCHLORIDE 10 MG/1
10 CAPSULE ORAL
COMMUNITY

## 2023-03-15 RX ORDER — LISINOPRIL 10 MG/1
10 TABLET ORAL DAILY
Qty: 30 TABLET | Refills: 3 | Status: SHIPPED | OUTPATIENT
Start: 2023-03-15 | End: 2024-01-25

## 2023-03-15 RX ORDER — BUPROPION HYDROCHLORIDE 150 MG/1
150 TABLET ORAL EVERY MORNING
Qty: 90 TABLET | Refills: 3 | Status: SHIPPED | OUTPATIENT
Start: 2023-03-15 | End: 2024-03-26

## 2023-03-15 RX ORDER — FLUTICASONE PROPIONATE 220 UG/1
1 AEROSOL, METERED RESPIRATORY (INHALATION) 2 TIMES DAILY
COMMUNITY
End: 2024-08-23 | Stop reason: ALTCHOICE

## 2023-03-15 NOTE — NURSING NOTE
Paris Nguyen is a 38 year old adult patient that presents today in clinic for the following:    Chief Complaint   Patient presents with     RECHECK     CMP, med refills, COVID vaccine     The patient's allergies and medications were reviewed as noted. A set of vitals were recorded as noted without incident. The patient does not have any other questions for the provider.    Tab Thompson, EMT at 5:31 PM on 3/15/2023

## 2023-03-15 NOTE — PROGRESS NOTES
"  Assessment & Plan     Other fatigue  Check CMP, A1c, Vit D, CBC and thyroid studies given persistent fatigue. If all normal, would consider referring for sleep study.  - Comprehensive metabolic panel (BMP + Alb, Alk Phos, ALT, AST, Total. Bili, TP); Future  - Hemoglobin A1c; Future  - Vitamin D Deficiency; Future  - CBC with platelets and differential; Future  - TSH; Future  - T4 free; Future    Essential hypertension  BP has been consistently elevated in recent visits. Start Lisinopril 10 mg. Reviewed potential side effects, continue working on healthy lifestyle as able.  - lisinopril (ZESTRIL) 10 MG tablet; Take 1 tablet (10 mg) by mouth daily    Generalized anxiety disorder  Moderate episode of recurrent major depressive disorder (H)  Feels Effexor and Wellbutrin working well at current dose, refill provided.  - venlafaxine (EFFEXOR XR) 150 MG 24 hr capsule; Take 1 capsule (150 mg) by mouth daily  - buPROPion (WELLBUTRIN XL) 150 MG 24 hr tablet; Take 1 tablet (150 mg) by mouth every morning      31 minutes spent on the date of the encounter doing chart review, history and exam, documentation and further activities per the note       Return in about 8 weeks (around 5/10/2023). to follow-up BP, fatigue, sleep    Radha Collins, TORSTEN CNP  M [facility] is a 38 year old, presenting for the following health issues:  RECHECK (CMP, med refills, COVID vaccine)      HPI       Thinks hormones are \"acting up\". Has an appt in 2 weeks with new endometriosis specialist, Dr. Josseline Thakur.  Wants to make sure nothing in metabolic panel is off that might be impacting her symptoms--feels incredibly lethargic, abdominal pain. Endometriosis surgery in 2017, over the last 6 months, cycle seems to be \"breaking through meds\", having PMS symptoms but not bleeding, gets morning sickness for a couple weeks, increased facial hair growth, increased acne. Thought just a fluke " "the first 2 months,   Ongoing abdominal discomfort over last 4 months.   Gaining weight but working from home, almost 10 lbs. 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 Stressful as company tries to fix some organizational issues. Down by 7 people on their team, one just  last week.   Usually feels stress differently (chest/head); usually wants to nap if she gets bad news but the fatigue feels different. Sleeps solid through the nght for 9 hours, doesn't feel like she got any sleep, every day feels like she's gone through the week of work already.   Normally BP 120s/80s. Was elevated at allergy clinic, was on steroids at the time.   Vitamin D 2000 units per day; has had elevated Vit D levels in the past.   B12 5000/d for migraines. Migrelief.  Nebulizer, just got this recently.   Horrible case of COVID end of Oct while traveling in [place].    Review of Systems   Constitutional, HEENT, cardiovascular, pulmonary, gi and gu systems are negative, except as otherwise noted.      Objective    BP (!) 137/90 (BP Location: Right arm, Patient Position: Sitting, Cuff Size: Adult Large)   Pulse 93   Ht 1.628 m (5' 4.09\")   Wt 104.8 kg (231 lb 1.6 oz)   SpO2 97%   BMI 39.55 kg/m    Body mass index is 39.55 kg/m .  Physical Exam   GENERAL: healthy, alert and no distress  HENT: ear canals and TM's normal, nose and mouth without ulcers or lesions  NECK: no adenopathy, no asymmetry, masses, or scars and thyroid normal to palpation  RESP: lungs clear to auscultation - no rales, rhonchi or wheezes  CV: regular rate and rhythm, normal S1 S2, no S3 or S4, no murmur, click or rub, no peripheral edema and peripheral pulses strong  ABDOMEN: soft, nontender, no hepatosplenomegaly, no masses and bowel sounds normal  MS: no gross musculoskeletal defects noted, no edema  NEURO: Normal strength and tone, mentation intact and speech normal  PSYCH: mentation appears normal, affect normal/bright                    "

## 2023-03-27 NOTE — TELEPHONE ENCOUNTER
"7/30/2019, writer holding forms in Nurse Triage and will give forms to provider before pt.'s appointment on 8/1/2019.Chaya Lorenzana LPN    On 7/29/2019, writer reprinted FMLA forms from [employer] [employer] that were emailed to Psychiatry Intake. Writer spoke to Norberto [phone] from [place] to get an extension on these forms since the patient needs to see the provider before he can complete the forms. Norberto said that the pt already put an extension on these forms and that they were \"due\" today, 7/29/2019. Writer explained that the first opening for the provider to see this pt was Thursday, August 1, 2019 at 1345 to complete the forms. Norberto said that this FMLA claim would be now listed as  \"Pending Claim Denial\". Norberto said there is a 10 day Maryam Period and that these forms need to be  returned to [place] by August 7, 2019 or the claim would be filed as a denial. Writer informed Norberto of patient's upcoming appointment and asked for his direct fax number ([phone] fax attn: Norberto) so that once forms are completed this writer would fax them directly to him at stated fax. Writer also said that writer would call Norberto once fax was sent to verify receipt of fax.Chaya Lorenzana LPN A note was routed to Rogelio Green and Zohreh FRAZIER RN.Chaya Lorenzana LPN      " [FreeTextEntry1] : 65M HLD, DM who presents for cardiac evaluation\par \par Pt recently dx with DM, HLD\par Feeling well in general without complaints\par Patient denies chest pain, shortness of breath, palpitations, dizziness, lightheadedness, syncope.\par Goes to gym, does elliptical x 40 minutes without symptoms\par \par Nonsmoker. Father  of an MI at 72, brother  of an MI at 66. Uncles have passed away from MI's 60s-70s. Works as a .  \par \par ECG: SR, no ST-T wave changes\par \par Lipitor 10mg\par Rybelsus 3mg daily \par \par

## 2023-04-03 ENCOUNTER — MYC MEDICAL ADVICE (OUTPATIENT)
Dept: INTERNAL MEDICINE | Facility: CLINIC | Age: 38
End: 2023-04-03
Payer: COMMERCIAL

## 2023-04-03 DIAGNOSIS — R11.0 NAUSEA: ICD-10-CM

## 2023-04-04 RX ORDER — ONDANSETRON 4 MG/1
4 TABLET, ORALLY DISINTEGRATING ORAL EVERY 6 HOURS PRN
Qty: 27 TABLET | Refills: 1 | Status: SHIPPED | OUTPATIENT
Start: 2023-04-04

## 2023-04-04 NOTE — TELEPHONE ENCOUNTER
Prescription sent for 27 day supply versus 30 day supply.     IRAIDA FOUNTAIN RN on 4/4/2023 at 10:03 AM

## 2023-05-06 ENCOUNTER — HEALTH MAINTENANCE LETTER (OUTPATIENT)
Age: 38
End: 2023-05-06

## 2023-05-11 ENCOUNTER — OFFICE VISIT (OUTPATIENT)
Dept: INTERNAL MEDICINE | Facility: CLINIC | Age: 38
End: 2023-05-11
Payer: COMMERCIAL

## 2023-05-11 VITALS
DIASTOLIC BLOOD PRESSURE: 85 MMHG | HEART RATE: 94 BPM | HEIGHT: 64 IN | SYSTOLIC BLOOD PRESSURE: 121 MMHG | WEIGHT: 231.9 LBS | OXYGEN SATURATION: 98 % | BODY MASS INDEX: 39.59 KG/M2

## 2023-05-11 DIAGNOSIS — T73.2XXD FATIGUE DUE TO EXPOSURE, SUBSEQUENT ENCOUNTER: Primary | ICD-10-CM

## 2023-05-11 PROCEDURE — 99213 OFFICE O/P EST LOW 20 MIN: CPT | Performed by: NURSE PRACTITIONER

## 2023-05-11 NOTE — NURSING NOTE
Paris Nguyen is a 38 year old adult patient that presents today in clinic for the following:    Chief Complaint   Patient presents with     RECHECK     Medication concerns     The patient's allergies and medications were reviewed as noted. A set of vitals were recorded as noted without incident. The patient does not have any other questions for the provider.    Tab Thompson, EMT at 10:01 AM on 5/11/2023

## 2023-05-11 NOTE — PROGRESS NOTES
"S: Paris Nguyen is a 38 year old adult here to follow-up from 3/15/23 visit with Radha Mckee NP. At that time she was experiencing extreme fatigue .  SHe believes this might have been allergy related.  SHe has seen an allergist and testing indicates she was allergic \"to everything\".  She attempted allergies shots twice before but they ere interrupted, once by Covid and another time by another illness.     She has completed  a sleep study in the past. She does not believe repeating it would give further information.             Patient Active Problem List   Diagnosis     Fibromyalgia     TMJ arthralgia     Moderate persistent asthma without complication     ADHD     Autism spectrum disorder     Generalized anxiety disorder     IBS (irritable bowel syndrome)     OCD (obsessive compulsive disorder)     Seasonal allergic rhinitis due to pollen     Allergic rhinitis due to dust     Allergy to adhesive tape     Need for desensitization to allergens     Oral allergy syndrome     Endometriosis     Environmental allergies     Finger stiffness, left     Swelling of finger of left hand          Past Medical History:   Diagnosis Date     Abnormal Pap smear 11/24/2017    HPV     Anxiety      Chronic constipation      Depression      Depressive disorder 1/1/2001    in high school     Earache or other ear, nose, or throat complaint     Chronic sinus and allergy issues     Endometriosis      Esophageal reflux     Was on prilosec for years. Controlled now by diet     GERD (gastroesophageal reflux disease)      History of steroid therapy     predisone burst for asthma     IBS (irritable bowel syndrome)      Migraines      Pelvic and perineal pain      Stomach problems     Possible hemorrhoids?     Uncomplicated asthma      Vision disorder     Crossed-eye. Surgery at age 1 and 2            Past Surgical History:   Procedure Laterality Date     ABDOMEN SURGERY  10/2016    Endometriosis excision     CYSTOSCOPY N/A 10/12/2016    " Procedure: CYSTOSCOPY;  Surgeon: Eliazar Patel MD;  Location: [facility] OR     DAVINCI ASSISTED ABLATION / EXCISION OF ENDOMETRIOSIS N/A 10/12/2016    Procedure: DAVINCI ASSISTED ABLATION / EXCISION OF ENDOMETRIOSIS;  Surgeon: Eliazar Patel MD;  Location: [facility] OR     DAVINCI LYSIS OF ADHESIONS N/A 10/12/2016    Procedure: DAVINCI LYSIS OF ADHESIONS;  Surgeon: Eliazar Patel MD;  Location: [facility] OR     DAVINCI PELVIC PROCEDURE N/A 10/12/2016    Procedure: DAVINCI PELVIC PROCEDURE;  Surgeon: Eliazar Patel MD;  Location:  OR     DILATION AND CURETTAGE, HYSTEROSCOPY DIAGNOSTIC, COMBINED N/A 10/12/2016    Procedure: COMBINED DILATION AND CURETTAGE, HYSTEROSCOPY DIAGNOSTIC;  Surgeon: Eliazar Patel MD;  Location:  OR     EYE SURGERY      Eye Surgery x 2 as an infant     HC TOOTH EXTRACTION W/FORCEP              Social History     Tobacco Use     Smoking status: Never     Smokeless tobacco: Never   Vaping Use     Vaping status: Not on file   Substance Use Topics     Alcohol use: Yes     Comment: less than 1 drink a week            Family History   Problem Relation Age of Onset     Hypothyroidism Mother      Kidney Disease Mother         IgA Nephropathy     Diabetes Mother      Thyroid Disease Mother      Obesity Mother      Alcoholism Father      Pancreatitis Father         Alcohol related     Depression Father      Anxiety Disorder Father      Substance Abuse Father         alcholic     Diabetes Father      Myocardial Infarction Father      Coronary Artery Disease Father      Hyperlipidemia Father      Attention Deficit Disorder Sister      Anxiety Disorder Sister      Myocardial Infarction Paternal Grandfather 62     Breast Cancer Paternal Grandmother      Other Cancer Paternal Grandmother         Skin     Uterine Cancer Maternal Grandmother      Myocardial Infarction Maternal Aunt      Alcoholism Maternal Aunt      Osteoporosis Maternal Aunt                Allergies   Allergen  "Reactions     Latex Rash     Rash and blisters     Liquid Adhesive Rash     Rash and blisters       Seasonal Allergies      Morphine Anxiety, GI Disturbance and Nausea     Wound Dressing Adhesive Rash     PN: \"blisters\"            Current Outpatient Medications   Medication Sig Dispense Refill     albuterol (PROVENTIL) (5 MG/ML) 0.5% neb solution Take 2.5 mg by nebulization every 6 hours as needed for shortness of breath, wheezing or cough       benzonatate (TESSALON) 100 MG capsule Take 100-200 mg by mouth as needed        buPROPion (WELLBUTRIN XL) 150 MG 24 hr tablet Take 1 tablet (150 mg) by mouth every morning 90 tablet 3     dicyclomine (BENTYL) 10 MG capsule Take 10 mg by mouth 4 times daily (before meals and nightly)       fluticasone (FLOVENT HFA) 220 MCG/ACT inhaler Inhale 1 puff into the lungs 2 times daily       gabapentin (NEURONTIN) 300 MG capsule Take 1 capsule (300 mg) by mouth 2 times daily AND 2 capsules (600 mg) At Bedtime 360 capsule 1     galcanezumab-gnlm (EMGALITY) 120 MG/ML injection        hydrOXYzine (ATARAX) 25 MG tablet TAKE 1 TABLET  THREE TIMES A DAY AS NEEDED FOR ITCHING AND ANXIETY 270 tablet 1     montelukast (SINGULAIR) 10 MG tablet Take 10 mg by mouth At Bedtime       multivitamin w/minerals (THERA-VIT-M) tablet Take 1 tablet by mouth daily       naproxen (NAPROSYN) 500 MG tablet Take 1 tablet (500 mg) by mouth 2 times daily as needed for moderate pain or headaches (migraines and fibromyalgia) 60 tablet 1     norethindrone (AYGESTIN) 5 MG tablet        ondansetron (ZOFRAN ODT) 4 MG ODT tab Take 1 tablet (4 mg) by mouth every 6 hours as needed for nausea 27 tablet 1     tiZANidine (ZANAFLEX) 2 MG tablet Take 1 tablet (2 mg) by mouth 3 times daily as needed for muscle spasms 90 tablet 0     traZODone (DESYREL) 50 MG tablet TAKE ONE-HALF (1/2) TO ONE TABLET NIGHTLY 90 tablet 3     ubrogepant (UBRELVY) 50 MG tablet        venlafaxine (EFFEXOR XR) 150 MG 24 hr capsule Take 1 capsule (150 " "mg) by mouth daily 90 capsule 3     vitamin D3 (CHOLECALCIFEROL) 1000 units (25 mcg) tablet Take 1 tablet (1,000 Units) by mouth daily 90 tablet 3     albuterol (PROAIR HFA/PROVENTIL HFA/VENTOLIN HFA) 108 (90 Base) MCG/ACT inhaler Inhale 2 puffs into the lungs every 6 hours as needed for shortness of breath / dyspnea or wheezing (Patient not taking: Reported on 3/15/2023) 18 g 1     lisinopril (ZESTRIL) 10 MG tablet Take 1 tablet (10 mg) by mouth daily (Patient not taking: Reported on 5/11/2023) 30 tablet 3       REVIEW OF SYSTEMS:  See above.    O:   /85 (BP Location: Right arm, Patient Position: Sitting, Cuff Size: Adult Large)   Pulse 94   Ht 1.628 m (5' 4.09\")   Wt 105.2 kg (231 lb 14.4 oz)   SpO2 98%   BMI 39.69 kg/m    GENERAL APPEARANCE: healthy, alert and no distress  EYES: sclera white, conjunctiva normal.  HENT: ear canals and TM's normal and mouth without ulcers or lesions  RESP: lungs clear to auscultation - no rales, rhonchi or wheezes  CV: regular rates and rhythm, normal S1 S2, no S3 or S4 and no murmur, click or rub.    ABDOMEN:  soft, nontender, no HSM or masses and bowel sounds normal  NEURO: alert and oriented.  Good historian.  MUSK: ambulatory.  SKIN: WNL  LYMPH: neg  cervical, supra and infraclavicular nodes.  EXT: warm.  Edema : none   PSYCHE: pleasant, normal.    A/P:  Fatigue.  She feels her fatigue may be due to her allergies. She will continue to perform sinus lavage and discuss further with her Allergist  Her lab results were reviewed.    The patient voiced understanding of the information discussed and all questions were answered.     I spent a total of 25 minutes in the care of this pt during today's office visit. This time includes reviewing the patient's chart and prior history, obtaining a history, performing an examination and evaluation and counseling the patient. This time also includes ordering medications or tests necessary in addition to communication to other " member's of the patient's health care team. Time spent in documentation and care coordination is included.     Vonda SARMIENTO, CNP

## 2023-05-17 DIAGNOSIS — F41.1 GENERALIZED ANXIETY DISORDER: ICD-10-CM

## 2023-05-17 DIAGNOSIS — G47.00 INSOMNIA, UNSPECIFIED TYPE: ICD-10-CM

## 2023-05-19 RX ORDER — GABAPENTIN 300 MG/1
CAPSULE ORAL
Qty: 360 CAPSULE | Refills: 1 | Status: SHIPPED | OUTPATIENT
Start: 2023-08-14 | End: 2024-01-25

## 2023-05-19 NOTE — TELEPHONE ENCOUNTER
PDMP site reviewed today. Gabapentin 300 mg capsule last filled 5/18/23 for #360, a 90-day supply.     Refill not yet due. Refills sent with start date for August when next refills will be due.    Marisa Lassiter RN ([facility])

## 2023-06-05 ENCOUNTER — TRANSFERRED RECORDS (OUTPATIENT)
Dept: HEALTH INFORMATION MANAGEMENT | Facility: CLINIC | Age: 38
End: 2023-06-05
Payer: COMMERCIAL

## 2024-01-24 ENCOUNTER — OFFICE VISIT (OUTPATIENT)
Dept: INTERNAL MEDICINE | Facility: CLINIC | Age: 39
End: 2024-01-24
Payer: COMMERCIAL

## 2024-01-24 VITALS
BODY MASS INDEX: 38.05 KG/M2 | WEIGHT: 222.3 LBS | SYSTOLIC BLOOD PRESSURE: 116 MMHG | DIASTOLIC BLOOD PRESSURE: 88 MMHG | OXYGEN SATURATION: 96 % | HEART RATE: 96 BPM

## 2024-01-24 DIAGNOSIS — G47.00 INSOMNIA, UNSPECIFIED TYPE: ICD-10-CM

## 2024-01-24 DIAGNOSIS — I10 ESSENTIAL HYPERTENSION: ICD-10-CM

## 2024-01-24 DIAGNOSIS — M25.50 HYPERMOBILITY ARTHRALGIA: ICD-10-CM

## 2024-01-24 DIAGNOSIS — G43.019 INTRACTABLE MIGRAINE WITHOUT AURA AND WITHOUT STATUS MIGRAINOSUS: Primary | ICD-10-CM

## 2024-01-24 DIAGNOSIS — F41.1 GENERALIZED ANXIETY DISORDER: ICD-10-CM

## 2024-01-24 PROBLEM — M54.81 OCCIPITAL NEURALGIA: Status: ACTIVE | Noted: 2020-06-23

## 2024-01-24 PROBLEM — M79.89 SWELLING OF FINGER OF LEFT HAND: Status: RESOLVED | Noted: 2021-09-28 | Resolved: 2024-01-24

## 2024-01-24 PROCEDURE — 91320 SARSCV2 VAC 30MCG TRS-SUC IM: CPT | Performed by: NURSE PRACTITIONER

## 2024-01-24 PROCEDURE — 90471 IMMUNIZATION ADMIN: CPT | Performed by: NURSE PRACTITIONER

## 2024-01-24 PROCEDURE — 90677 PCV20 VACCINE IM: CPT | Performed by: NURSE PRACTITIONER

## 2024-01-24 PROCEDURE — 90480 ADMN SARSCOV2 VAC 1/ONLY CMP: CPT | Performed by: NURSE PRACTITIONER

## 2024-01-24 PROCEDURE — 99214 OFFICE O/P EST MOD 30 MIN: CPT | Mod: 25 | Performed by: NURSE PRACTITIONER

## 2024-01-24 ASSESSMENT — ANXIETY QUESTIONNAIRES
6. BECOMING EASILY ANNOYED OR IRRITABLE: MORE THAN HALF THE DAYS
1. FEELING NERVOUS, ANXIOUS, OR ON EDGE: MORE THAN HALF THE DAYS
3. WORRYING TOO MUCH ABOUT DIFFERENT THINGS: NOT AT ALL
GAD7 TOTAL SCORE: 8
2. NOT BEING ABLE TO STOP OR CONTROL WORRYING: SEVERAL DAYS
5. BEING SO RESTLESS THAT IT IS HARD TO SIT STILL: SEVERAL DAYS
GAD7 TOTAL SCORE: 8
IF YOU CHECKED OFF ANY PROBLEMS ON THIS QUESTIONNAIRE, HOW DIFFICULT HAVE THESE PROBLEMS MADE IT FOR YOU TO DO YOUR WORK, TAKE CARE OF THINGS AT HOME, OR GET ALONG WITH OTHER PEOPLE: SOMEWHAT DIFFICULT
7. FEELING AFRAID AS IF SOMETHING AWFUL MIGHT HAPPEN: SEVERAL DAYS

## 2024-01-24 ASSESSMENT — PATIENT HEALTH QUESTIONNAIRE - PHQ9
5. POOR APPETITE OR OVEREATING: SEVERAL DAYS
SUM OF ALL RESPONSES TO PHQ QUESTIONS 1-9: 12

## 2024-01-24 ASSESSMENT — ASTHMA QUESTIONNAIRES: ACT_TOTALSCORE: 21

## 2024-01-24 NOTE — PROGRESS NOTES
"Ri is a 38 year old that presents in clinic today for the following:     Chief Complaint   Patient presents with    Follow Up     Pt here to follow up and would like to discuss current blood pressure concerns    Blood Draw     Pt would like to discuss blood draw for EDS concerns           1/24/2024     4:10 PM   Additional Questions   Roomed by STAN, EMT     Screenings from encounters over the past 10 days    No data recorded     Jeromy Aguilar at 4:17 PM on 1/24/2024    Assessment & Plan     Intractable migraine without aura and without status migrainosus  Neurology referral placed due to current neurologist moving from [place] to [place], [place]. Migraines well controlled on Emgality with 4 migraines/month and pt would like to continue with this.  - Adult Neurology  Referral; Future    Essential hypertension  Patient to start taking Lisinopril 5 mg daily (1/2 tablet) given DBPs consistently >80s. Discussed medication side-effects, blood pressure monitoring, and reportable symptoms. Patient to follow-up with Pharmacy to determine if blood pressure cuff can be re-calibrated. Discussed low sodium diet and AHA exercise guidelines. Patient to follow-up in 4 months for recheck.     Hypermobility arthralgia   Endorses longstanding hypermobility and joint pain, requesting work-up for EDS.  They understand there are not genetic markers to assess for hypermobility-type EDS, but is interested in consultation with my colleague Dr. Marin to discuss diagnostic criteria further.     BMI  Estimated body mass index is 38.05 kg/m  as calculated from the following:    Height as of 5/11/23: 1.628 m (5' 4.09\").    Weight as of this encounter: 100.8 kg (222 lb 4.8 oz).   Weight management plan: Discussed healthy diet and exercise guidelines      Return in about 4 months (around 5/24/2024).    MDM: 2+ problem visit, prescription drug management    Subjective   Ri is a 38 year old, presenting for the following health issues:  Follow " Up (Pt here to follow up and would like to discuss current blood pressure concerns) and Blood Draw (Pt would like to discuss blood draw for EDS concerns)        1/24/2024     4:10 PM   Additional Questions   Roomed by STAN, YOU     HPI     Ri is a 38 year old that with a past  medical history of migraines and fibromyalgia, presents to clinic today for blood pressure follow-up, neurology referral for migraine treatment, and desire for Erin Danlos Syndrome (EDS) testing due to history of hypermobility.     Blood Pressure Follow-up  Reports they are monitoring their blood pressure at home regularly. Blood pressure ranges from 140-150/80-90s. Reports blood pressure cuff may not be accurate. Denies headaches other than migraines. Denies vision changes, lightheadedness, or dizziness. She was prescribed Lisinopril 10 mg, but never started due to waiting to be off steroids from asthma and clinic follow-ups showing normal blood pressure ranges.   Last eye exam in 2023 with eye exam scheduled.     Erin Danlos Syndrome (EDS) concern  Patient reports multiple occurrences of hypermobility - flexible joints, subluxation, and dislocation of joints including her wrists and collar bone. She has a history of fibromyalgia and reports treatment for this was helping but has recently stopped. She desires further testing or referral for Erin Danlos Syndrome (EDS).    Migraines  Patient reports current neurologist moved from [place], [place] to [place], [place]. Patient desires referral to new neurologist within the [place] area. Well controlled on Emgality (went from 16 migraines/month to currently 4/month).     Review of Systems  CONSTITUTIONAL: NEGATIVE for fever, chills, change in weight  RESP: NEGATIVE for significant cough or SOB  CV: NEGATIVE for chest pain, palpitations or peripheral edema  PSYCHIATRIC: NEGATIVE for changes in mood or affect      Objective    /88 (BP Location: Right arm, Patient Position: Sitting, Cuff  Size: Adult Large)   Pulse 96   Wt 100.8 kg (222 lb 4.8 oz)   SpO2 96%   BMI 38.05 kg/m    Body mass index is 38.05 kg/m .  Physical Exam   GENERAL: alert and no distress  RESP: lungs clear to auscultation - no rales, rhonchi or wheezes  CV: regular rate and rhythm, normal S1 S2, no S3 or S4, no murmur, click or rub, no peripheral edema  ABDOMEN: soft, nontender, no hepatosplenomegaly, no masses and bowel sounds normal  MSK: no gross musculoskeletal defects noted, no edema  PSYCH: mentation appears normal, affect normal/bright        Chirag Anand DNP student    I saw and examined this patient with the NP student and agree with the student's findings and plan of care as documented in the student's note with the following modifications: none    Signed Electronically by: TORSTEN Chao CNP

## 2024-01-25 RX ORDER — GABAPENTIN 300 MG/1
CAPSULE ORAL
Qty: 360 CAPSULE | Refills: 1 | Status: SHIPPED | OUTPATIENT
Start: 2024-01-25 | End: 2024-03-01

## 2024-01-25 RX ORDER — LISINOPRIL 10 MG/1
5 TABLET ORAL DAILY
Qty: 30 TABLET | Refills: 3 | Status: SHIPPED | OUTPATIENT
Start: 2024-01-25 | End: 2024-03-16 | Stop reason: SINTOL

## 2024-02-23 ENCOUNTER — MYC MEDICAL ADVICE (OUTPATIENT)
Dept: INTERNAL MEDICINE | Facility: CLINIC | Age: 39
End: 2024-02-23
Payer: COMMERCIAL

## 2024-02-23 DIAGNOSIS — M25.50 HYPERMOBILITY ARTHRALGIA: Primary | ICD-10-CM

## 2024-02-24 ENCOUNTER — HEALTH MAINTENANCE LETTER (OUTPATIENT)
Age: 39
End: 2024-02-24

## 2024-03-01 ENCOUNTER — OFFICE VISIT (OUTPATIENT)
Dept: INTERNAL MEDICINE | Facility: CLINIC | Age: 39
End: 2024-03-01
Payer: COMMERCIAL

## 2024-03-01 VITALS
HEART RATE: 101 BPM | SYSTOLIC BLOOD PRESSURE: 118 MMHG | WEIGHT: 217.9 LBS | OXYGEN SATURATION: 98 % | DIASTOLIC BLOOD PRESSURE: 84 MMHG | HEIGHT: 64 IN | BODY MASS INDEX: 37.2 KG/M2

## 2024-03-01 DIAGNOSIS — R05.1 ACUTE COUGH: ICD-10-CM

## 2024-03-01 DIAGNOSIS — F41.1 GENERALIZED ANXIETY DISORDER: ICD-10-CM

## 2024-03-01 DIAGNOSIS — R07.0 THROAT PAIN: ICD-10-CM

## 2024-03-01 DIAGNOSIS — Z12.31 VISIT FOR SCREENING MAMMOGRAM: Primary | ICD-10-CM

## 2024-03-01 DIAGNOSIS — G47.00 INSOMNIA, UNSPECIFIED TYPE: ICD-10-CM

## 2024-03-01 LAB
DEPRECATED S PYO AG THROAT QL EIA: NEGATIVE
GROUP A STREP BY PCR: NOT DETECTED

## 2024-03-01 PROCEDURE — 87651 STREP A DNA AMP PROBE: CPT | Performed by: NURSE PRACTITIONER

## 2024-03-01 PROCEDURE — 99214 OFFICE O/P EST MOD 30 MIN: CPT | Performed by: NURSE PRACTITIONER

## 2024-03-01 PROCEDURE — 99000 SPECIMEN HANDLING OFFICE-LAB: CPT | Performed by: PATHOLOGY

## 2024-03-01 RX ORDER — GABAPENTIN 300 MG/1
CAPSULE ORAL
Qty: 360 CAPSULE | Refills: 1 | Status: SHIPPED | OUTPATIENT
Start: 2024-03-01 | End: 2024-08-23

## 2024-03-01 RX ORDER — PREDNISONE 20 MG/1
40 TABLET ORAL DAILY
Qty: 10 TABLET | Refills: 0 | Status: SHIPPED | OUTPATIENT
Start: 2024-03-01 | End: 2024-08-23

## 2024-03-01 NOTE — PROGRESS NOTES
Assessment & Plan     Insomnia, unspecified type  Generalized anxiety disorder  Refill provided.   - gabapentin (NEURONTIN) 300 MG capsule; TAKE 1 CAPSULE (300 MG) TWICE A DAY AND 2 CAPSULES (600 MG) AT BEDTIME    Visit for screening mammogram  Due for baseline mammogram.  - MA SCREENING DIGITAL BILAT - Future  (s+30); Future    Acute cough  Home COVID test negative. Cough persisting for 1.5 weeks--no longer febrile and lungs are clear on exam.  Will do course of prednisone for viral-induced bronchitis.  Continue with symptomatic management, and okay to try the Tessalon that was prescribed by her asthma specialist.  Continue with Flovent inhaler and montelukast as prescribed. Reviewed red flags, such as new SOB unrelieved by nebulizer, CP, hemoptysis, etc., seek medical attention if any of these occur. Follow-up if symptoms worsening or not improving.   - predniSONE (DELTASONE) 20 MG tablet; Take 2 tablets (40 mg) by mouth daily    Throat pain  Reported fever (though fever now >1 week ago), but given ongoing throat pain, cervical lymphadenopathy, tonsillar hypertrophy with exudates, will check for strep--this is negative. Encouraged symptomatic management with salt water gargles, throat lozenges, push fluids, tea with honey, etc.  - Streptococcus A Rapid Screen w/Reflex to PCR - Clinic Collect  - Group A Streptococcus PCR Throat Swab    MDM: 2 problem visit, prescription drug management      Subjective   Ri is a 39 year old, presenting for the following health issues:  Consult (Still sick not EDS consult, see paper)      3/1/2024     4:12 PM   Additional Questions   Roomed by SK EMT     History of Present Illness       Reason for visit:  Diagnose acute illness  Symptom onset:  1-2 weeks ago  Symptoms include:  Sore throat, swollen lymphnodes, scratchy throat, post nasal drip, laryngitis, abdominal pain  Symptom intensity:  Moderate  Symptom progression:  Worsening  Had these symptoms before:  No  What makes it  "worse:  Talking. Exerting myself.  What makes it better:  Sleeping forever, eating sorbet    Stephanie Nguyen eats 2-3 servings of fruits and vegetables daily.Stephanie Nguyen consumes 3 sweetened beverage(s) daily.Ri A Zoldak exercises with enough effort to increase Ri MIROSLAVA Ramosk's heart rate 9 or less minutes per day.  Ri A Zoldak exercises with enough effort to increase Ri MIROSLAVA Toledodak's heart rate 3 or less days per week.   Stephanie Nguyen is taking medications regularly.       Symptoms started 2/19, feeling a little off. Then over the next several days, developed thick nasal drainage, congestion, generally feeling sick. Used neti pot and tylenol. Temp 101, ? Blood in phlegm.  Humidity, Nyquil, tylenol, cough drops.  Ears feel stuffy.  Neck sore from swollen lymph nodes.  Cough is dry, keeps up all night, constant itch in the throat. Has benzonatate from asthma doctor but hasn't used.  Low appetite, Abd pain, got constipated d/t not eating as much, only cough drops, but GI symptoms seem to be improving. No vomiting, but coughed so hard she gagged. Some wheezing, no crackling.    Continues on montelukast q night.   Issues with nasal sprays in the past, cause gagging when goes down back of throat.   Home COVID test negative.         Review of Systems  Constitutional, HEENT, cardiovascular, pulmonary, gi and gu systems are negative, except as otherwise noted.      Objective    /84 (BP Location: Right arm, Patient Position: Sitting, Cuff Size: Adult Large)   Pulse 101   Ht 1.628 m (5' 4.09\")   Wt 98.8 kg (217 lb 14.4 oz)   SpO2 98%   BMI 37.29 kg/m    Body mass index is 37.29 kg/m .  Physical Exam   GENERAL: alert and no distress  EYES: strabismus, conjunctivae and sclerae normal  HENT: TMs mildly distended with clear fluid, no erythema, nose and mouth without ulcers or lesions, oropharyngeal erythema, 3+ tonsillar hypertrophy with exudates  NECK: +cervical lymphadenopathy, no asymmetry, masses, or scars  RESP: lungs clear " to auscultation - no rales, rhonchi or wheezes, frequent cough  CV: regular rate and rhythm, normal S1 S2, no S3 or S4, no murmur, click or rub, no peripheral edema  ABDOMEN: soft, nontender, no hepatosplenomegaly, no masses and bowel sounds normal  MS: no gross musculoskeletal defects noted, no edema  NEURO: Normal strength and tone, mentation intact and speech normal  PSYCH: mentation appears normal, affect normal/bright            Signed Electronically by: TORSTEN Chao CNP

## 2024-03-13 ENCOUNTER — MYC MEDICAL ADVICE (OUTPATIENT)
Dept: INTERNAL MEDICINE | Facility: CLINIC | Age: 39
End: 2024-03-13
Payer: COMMERCIAL

## 2024-03-13 DIAGNOSIS — I10 ESSENTIAL HYPERTENSION: Primary | ICD-10-CM

## 2024-03-16 RX ORDER — LOSARTAN POTASSIUM 25 MG/1
25 TABLET ORAL DAILY
Qty: 30 TABLET | Refills: 3 | Status: SHIPPED | OUTPATIENT
Start: 2024-03-16 | End: 2024-04-09

## 2024-03-21 DIAGNOSIS — F41.1 GENERALIZED ANXIETY DISORDER: ICD-10-CM

## 2024-03-21 DIAGNOSIS — F33.1 MODERATE EPISODE OF RECURRENT MAJOR DEPRESSIVE DISORDER (H): ICD-10-CM

## 2024-03-25 ENCOUNTER — MYC MEDICAL ADVICE (OUTPATIENT)
Dept: INTERNAL MEDICINE | Facility: CLINIC | Age: 39
End: 2024-03-25
Payer: COMMERCIAL

## 2024-03-26 RX ORDER — VENLAFAXINE HYDROCHLORIDE 150 MG/1
150 CAPSULE, EXTENDED RELEASE ORAL DAILY
Qty: 90 CAPSULE | Refills: 0 | Status: SHIPPED | OUTPATIENT
Start: 2024-03-26 | End: 2024-05-31

## 2024-03-26 RX ORDER — BUPROPION HYDROCHLORIDE 150 MG/1
150 TABLET ORAL EVERY MORNING
Qty: 90 TABLET | Refills: 0 | Status: SHIPPED | OUTPATIENT
Start: 2024-03-26 | End: 2024-05-31

## 2024-03-26 NOTE — CONFIDENTIAL NOTE
Sheyla, please contact patient and:   -- mention the myChart that I sent   -- Offer an in-person appointment with me, for 1 hour. No FLORIDALMA or video.  Thanks     Willie Marin MD  Internal Medicine & Pediatrics  [facility] [place]

## 2024-03-26 NOTE — TELEPHONE ENCOUNTER
buPROPion (WELLBUTRIN XL) 150 MG 24 hr tablet 90 tablet 3 3/15/2023    venlafaxine (EFFEXOR XR) 150 MG 24 hr capsule 90 capsule 3 3/15/2023  ----------------------  Last Office Visit : 03/01/2024 -Karina  Future Office visit:  03/27/24 - Tomas    ----------------------      Routing refill request to provider for review/approval because:  Last PHQ9 score 12, please review per protocol. 90 day ana refill given buproprion and venlafaxine.   [facility] - [address] [phone]       Pass/Fail Protocol Criteria:  SSRIs Protocol Tyvcuv2803/26/2024 05:11 PM   Protocol Details PHQ-9 score less than 5 in past 6 months    Medication is Bupropion    Medication is active on med list    Patient is age 18 or older    No active pregnancy on record    No positive pregnancy test in last 12 months    Recent (6 mo) or future (30 days) visit within the authorizing provider's specialty     PHQ-9 score:        1/24/2024     5:33 PM   PHQ   PHQ-9 Total Score 12   Q9: Thoughts of better off dead/self-harm past 2 weeks Not at all     Serotonin-Norepinephrine Reuptake Inhibitors  Kdiucm8803/26/2024 05:12 PM   Protocol Details PHQ-9 score of less than 5 in past 6 months    Normal serum creatinine on file in past 12 months    Blood pressure under 140/90 in past 12 months    Medication is active on med list    INDRA-7 score of less than 5 in past 6 months.    Patient is age 18 or older    No active pregnancy on record    No positive pregnancy test in past 12 months    Recent (6 mo) or future (30 days) visit within the authorizing provider's specialty       [facility]  Outside Information      Contains abnormal data Basic Metabolic Profile  Specimen: Blood  Component  Ref Range & Units 7 mo ago Comments   Sodium  136 - 145 mmol/L 135 Low     Potassium  3.4 - 5.1 mmol/L 3.8 Interpret with caution, specimen slightly hemolyzed. Results may be affected   Chloride  98 - 108 mmol/L 102     Carbon Dioxide  20 - 31 mmol/L 22    BUN (Urea Nitro)  9 - 23 mg/dL 7 Low     Creatinine  0.55 - 1.02 mg/dL 0.63    Est GFR (CKD-EPI)  >60.00 mL/min/1.73m2 >60.00 Calculation based on the Chronic Kidney Disease Epidemiology Collaboration (CKD-EPI) equation refit without adjustment for race.   Glucose  74 - 106 mg/dL 105    Calcium, Serum  8.7 - 10.4 mg/dL 9.5    Anion Gap  0.0 - 15.0 mmol/L 11.0    Resulting Agency [facility]    Specimen Collected: 08/07/23  6:58 PM    Performed by: [facility] Last Resulted: 08/07/23

## 2024-03-27 ENCOUNTER — OFFICE VISIT (OUTPATIENT)
Dept: INTERNAL MEDICINE | Facility: CLINIC | Age: 39
End: 2024-03-27
Payer: COMMERCIAL

## 2024-03-27 VITALS
OXYGEN SATURATION: 99 % | HEIGHT: 64 IN | HEART RATE: 90 BPM | SYSTOLIC BLOOD PRESSURE: 128 MMHG | BODY MASS INDEX: 37.37 KG/M2 | DIASTOLIC BLOOD PRESSURE: 92 MMHG | WEIGHT: 218.9 LBS

## 2024-03-27 DIAGNOSIS — M25.50 HYPERMOBILITY ARTHRALGIA: ICD-10-CM

## 2024-03-27 DIAGNOSIS — Q79.60 EHLERS-DANLOS SYNDROME: Primary | ICD-10-CM

## 2024-03-27 PROCEDURE — 99417 PROLNG OP E/M EACH 15 MIN: CPT | Performed by: PEDIATRICS

## 2024-03-27 PROCEDURE — 99215 OFFICE O/P EST HI 40 MIN: CPT | Performed by: PEDIATRICS

## 2024-03-27 ASSESSMENT — PATIENT HEALTH QUESTIONNAIRE - PHQ9
10. IF YOU CHECKED OFF ANY PROBLEMS, HOW DIFFICULT HAVE THESE PROBLEMS MADE IT FOR YOU TO DO YOUR WORK, TAKE CARE OF THINGS AT HOME, OR GET ALONG WITH OTHER PEOPLE: SOMEWHAT DIFFICULT
SUM OF ALL RESPONSES TO PHQ QUESTIONS 1-9: 9
SUM OF ALL RESPONSES TO PHQ QUESTIONS 1-9: 9

## 2024-03-27 NOTE — PROGRESS NOTES
"Dear patient. Thank you for visiting with me. I want you to feel respected, understood, and empowered. \"Respect\" is valuing you as much as I would a close family member. \"Empowerment\" happens when you are fully informed, and can make the best possible decision for you.  Please ask me questions!  Challenge anything that is not clear.    Medical records are primarily used as memory aids for me and my colleagues. Things to know about my documentation style:  - The 'problem list' includes current symptoms or diagnoses, and some problems that are resolved but may return. I use the past medical history for problems not expected to return.  - I use single quotation marks for things that you or I said, when I want to clarify who was speaking.  - I use double quotation marks when copying a term from elsewhere in your records. Italics (besides here) may also denote a quotation.  If you have questions or concerns, please contact me; I will reply as soon as time allows.    Stephanie Nguyen is a 39 year old adult, with concerns including:  Patient presents with:  Follow Up: Pt here for EDS consult.      PCP: Radha Collins   Visit type: consult patient, seen at the request of NP Radha Collins      Disposition comment:        Stephanie Nguyen was seen as part of the [facility] Disease program. I specialize in complex care for young adults, and have substantial experience with EDS (as well as the sometimes correlated conditions of autonomic dysfunction and/or the urticaria/edema condition sometimes known as \"mast cell\" disorder). Unfortunately, my current clinic is insufficiently organized for me to take on additional patients with EDS and other related conditions. Therefore it will be important for Ri to continue with her regular primary care provider. I am available to this provider to answer questions or help guide ongoing care.      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 Explained about this condition, what it is, and what to do about it. We discussed this in detail, and also how the official designation of EDS or subtype is no longer particularly important. Some of this information is provided in the patient instruction section of this encounter. The keys for HMS are to (a) work on muscle strength around problem joints, (b) protect problem joints in unusual circumstances of strain or effort, (c) be aware of additional conditions that may develop such as autonomic dysfunction, and (d) ensure that additional conditions do not become worse because of inactivity or other pitfalls.      Hypermobility polyarthralgia syndrome  A diagnosis of hypermobility polyarthralgia syndrome reflects joint pain and cracking/subluxing in the setting of joint hypermobility.    PLAP: on behalf of my clinical partner, I sent in a referral for PT. I suspect she will have benefit from bedtime compression outfits. Daytime may also be helpful, but it sounds like she will prefer to work on keeping her musculature as well.    Fibromyalgia has some overlap, but should be regarded as a separate condition that mostly involves muscles and related sensation. People with fibromyalgia often complain about functional joint pain, but do not have exam abnormalities or arthritis (except when chronic maladaptive positioning leads to trouble).          Time note ([id number], 69-83'): The total of my time (on the date of service) for this service was 74 minutes, including discussion/face-to-face, chart review, interpretation not otherwise reported, documentation, and updating of the computerized record.        Subjective   Ri is a 39 year old, presenting for the following health issues:  Follow Up (Pt here for EDS consult.)      3/27/2024    11:14 AM   Additional Questions   Roomed by [facility] EMT   Accompanied by N/A     History of Present Illness       Reason for visit:  EDS consult    Stephanie Nguyen  "eats 2-3 servings of fruits and vegetables daily.Ri A Zoldak consumes 3 sweetened beverage(s) daily.Ri A Zoldak exercises with enough effort to increase Ri A Zoldak's heart rate 9 or less minutes per day.  Ri A Zoldak exercises with enough effort to increase Ri A Zoldak's heart rate 3 or less days per week.   Ri A Zoldak is taking medications regularly.     Joint comments  Joints slip while sleeping, e.g. clavicle out some morning, as has been for the last few months. Has had trouble with left shoulder due to feeling of instability so she protects.   Can roll ankles without pain, even running. Walk on toe knuckles.  Worst joints are hips (e.g. with weather and some uses).  Less so with knees.    Wears compression sleeves for restless arms.    If stays in one position is really stiff - cat is annoyed about moving. Always sits cross legged when able - feels like things get slowly locked.  Feels swelling joints and warm to touch. but hasn't seen changes.    Feels that weather changes may worsen.    Fibromyalgia diagnosis based on 2016 ankles, joint, wrist, and shoulder pain. Was wondering about arthritis and had evaluation including rheum and neurology.'  Gets muscle pain and massage twice monthly. Has frequent alex horses and other muscle twitches.    If sore will go to a walk.    Family 'dies young,' so hard to know for sure about Fhx. Mom and MGM had arthritis. Mom is a kidney TXP recipient on prednisone. Father had joint pain related to manual labor as a . PGF had CAD and suddenly . No known aneurysm.    Walks with cane because of balance (counseled about this)    Additional issues:  IBS, bloating, nausea  Endometriosis  Migraines        Objective    BP (!) 128/92 (BP Location: Right arm, Patient Position: Sitting, Cuff Size: Adult Large)   Pulse 90   Ht 1.628 m (5' 4.09\")   Wt 99.3 kg (218 lb 14.4 oz)   SpO2 99%   BMI 37.46 kg/m    Body mass index is 37.46 kg/m .    armspam = 169    Physical " Exam  Constitutional:       General: Stephanie Nguyen is not in acute distress.     Appearance: Normal appearance. Stephanie Nguyen is not ill-appearing.   HENT:      Head: Normocephalic.      Nose: Nose normal.   Eyes:      General: No scleral icterus.        Right eye: No discharge.         Left eye: No discharge.      Extraocular Movements: Extraocular movements intact.   Cardiovascular:      Rate and Rhythm: Normal rate and regular rhythm.      Heart sounds: No murmur heard.  Pulmonary:      Effort: Pulmonary effort is normal. No respiratory distress.   Neurological:      Mental Status: Stephanie Nguyen is alert.   Psychiatric:         Mood and Affect: Mood normal.         Behavior: Behavior normal.          A Beighton exam was performed, based on published recommendations. The findings:   - Passive apposition of the thumbs to the flexor aspect of the forearm:Absent (0 points)   - Passive dorsiflexion of the little fingers beyond 90 degrees: Left only (1 point)   - Hyperextension of the elbows beyond 10 degrees: Absent (0 points)   - Hyperextension of the knees beyond 10 degrees: Both sides (2 point)   - Forward flexion of the trunk with knees fully extended so that the palms of the hand rest flat on the floor: Present (1 point)  Total points: 4/9    The following additional signs of hypermobility were noted:   - unusually soft or velvety skin   - mild skin hyperextensibilityat hand and jaw   - unexplained striae or rubrae (without a history of weight gain)axillae   - bilateral piezogenic papules of the heel   - atrophic scarring involving at least two sites and without the formation of truly papyraceous and/or hemosideric scars as would be seen in classical Erin-Danlos syndrome   - dental crowding and high and narrow palate    I also observed:   - excessive lateral flexion of the neck in both directions   - hitchhiker thumb on both sides   - mildly loose shoulder on left side    - handshake behind back, with high on both  sides   - excessive ankle eversion on right side  [place] has a limited Beighton exam, but otherwise has several exam features of a hypermobility spectrum disorder    swiming as child  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              Signed Electronically by: Twan Marin MD

## 2024-03-30 PROBLEM — Q79.60 EHLERS-DANLOS SYNDROME: Status: ACTIVE | Noted: 2024-03-30

## 2024-03-30 PROBLEM — M25.50 HYPERMOBILITY ARTHRALGIA: Status: ACTIVE | Noted: 2024-03-30

## 2024-03-30 NOTE — PATIENT INSTRUCTIONS
"-----------------------------------------------------------------------------  Dr. Marin's comments about Hypermobility and  Erin-Danlos syndrome (EDS)                          -----------------------------------------------------------------------------         Erin-Danlos syndrome (EDS) is a term used for a collection of inborn conditions that may not be closely related, but all have hypermobility of joints and some other tissues (\"Hypermobility\" means that the joint or connective tissue is more flexible or stretchy than for other people).       Hypermobility is traditionally assessed by the Beighton score, but most providers with EDS experience will allow some leeway for partial hypermobility because the Beighton score only accounts for 8 joints plus a single type of bending for the spine.          In years past, the EDS diagnosis was given to lots of people with mild joint hypermobility but no other symptoms. In 2017, experts rewrote guidelines for terms so that hypermobile-type EDS (hEDS) would only be used for patients with several other findings besides joint hypermobility. Criteria can be found by doing an Internet search for \"hypermobile Erin Danlos criteria.\"       The 2017 criteria can be a bit frustrating because they vastly restrict the EDS term for patients who were previously referred to as having EDS. In fact, the criteria are seemingly designed such that it is very difficult for youth without obvious malformations to qualify. Instead, we now use these terms:       - \"Hypermobility Syndrome\" is the term for pain symptoms AND            either global hypermobility OR partial hypermobility plus certain            additional features.        - \"Joint hypermobility\" or \"hypermobile joint(s)\" is the term for            hypermobility without joint symptoms. These people may             have other conditions, however.    Of course, the official label is not terribly important because the treatment " "approach for hypermobile EDS and hypermobility syndrome is essentially the same: strengthen the areas around problem joints, and be aware of symptoms and syndromes that may develop.         Technically, EDS and hypermobility should not be regarded as the \"cause\" of pain, but rather a variant in connective tissue that increases the risk for certain types of pain or dysautonomia. Anybody with hypermobility should have their joints assessed, and ensure that they have enough strength around the problem joints to prevent future problems.     What should I do, about my hypermobility?  I have several suggestions:      -- Most important: work on muscles around your problem joints. Pay special attention to your lower back and knees. Muscles will help your joints to stay together. You may want to work with a physical therapist or  - but make sure they know about hypermobility.      -- Subluxing or even dislocations may occur. You may be able to get these back in on your own. If the joint stays stuck, then seek medical help.      -- Warm up carefully before physical activity.      -- Keep active physically. For some reason, hypermobility-associated pain gets worse with inactivity. If activity makes you hurt, talk with me about finding the \"sweet spot\" between overuse and under-use.      -- Please don't show off your \"hypermobility party tricks.\"      -- What about joint cracking?  Never crack your neck intentionally (this can lead to pinched nerves, numbness, or pain). Try to avoid cracking your other joints.      -- For back strength, I recommend: swim laps at a gentle speed, 20-30 minutes, 3-4 times per week. Use a snorkel, don't worry about swimming form. The point is to be horizontal in the water, and move forward slowly. If you have autonomic symptoms (dizziness, heart-racing, etc), you will need additional exercise besides swimming.      -- Work on posture, by (a) increasing abdominal muscle tone, and (b) " "try holding your elbows parallel to your body.      -- What about braces?  Train without braces or tapes as much as possible. Avoid braces or taping except (a) when injured, (b) when doing unusually strenuous activity (e.g. trip to [place]), or (c) for the second half of moderate activity that lasts a long time (hiking).       -- Ice or heat? In general, ice is good for inflammation or injury in the past 24 hours. Heat is only good for tight muscles. Heat will not help most pain in your joints themselves.      -- What about anti-inflammatory medications?  Talk with me about this. Ibuprofen or naproxen can reduce pain after injury. Ideally, you should avoid taking them regularly (they lose effectiveness over time). If you have daily pain, your primary care provider will recommend a different approach.      -- Headache medicines? If you have headaches, check with me for a diagnosis and medicine options. Ibuprofen or naproxen shouldn't be needed for headaches more often than twice per month. Acetaminophen shouldn't be needed for headaches more often than twice per week.      -- Read \"The Hypermobility Handbook\" by Dr. Yash Grover (just keep in mind that the entire book may not apply to you).  A variety of autonomic symptoms can occur in people with hypermobility, although technically they are not a result of the hypermobility itself. if you have other symptoms not related to joint or muscle problems, check with your health care providers.      Answers to some other questions       What about heart or aorta complications?The risk for cardiovascular disease remains incompletely defined in hypermobile EDS, but is not thought to be predictably elevated compared to the general population. Some patients will have valve or aortic abnormalities, but these patients may turn out to be qualitatively different than their other hypermobile peers. My personal recommendation about this is to (a) keep a longitudinal relationship with a " primary care provider who is competent at detecting new murmurs, (b) through age 40 at least, have an annual well exam to ensure that murmurs are assessed, (c) have a heart exam when being evaluated for any illnesses or health problems, and (d) have an evaluation by a cardiologist and/or echocardiogram for murmurs or other unexplainable changes in exam.         Aren't there genetic blood tests for EDS? Technically there are some tests that can be done, but these tests are not very sensitive (many people with EDS or hypermobility are missed by the tests). Also, a positive or negative test doesn't affect our management. I was part of a group that was planning a much broader research program with hypermobility, but unfortunately federal funding for all research was cut and the project was stopped.         Should I see a ? You are welcome to see a  if you wish, but the geneticists don't do anything different than what I do. Also, the geneticists are a lot harder to see! The key thing is to be assessed by somebody with lots of EDS and hypermobility experience. I highly recommend that you establish with an EDS / hypermobility center close to your home, so that you can have ongoing care at an organization that knows you and your history. An important component of such a center is either Physical Therapy or Physical Medicine and Rehabilitation (PM&R).    Useful medical references regarding Erin-Danlos Syndrome   An overview of the different types of can be found at https://[email or web address].com/eds-types/  Criteria for hypermobile type EDS (hEDS) can be found at [email or web address]-[email or web address]-[email or web address]-Jhebqamh-[email or web address]-1.pdf  (the main reference for criteria is Nila darby al, Am J Med Angelina 2017, 175C:8-26)  For a nice article about hEDS versus other hypermobility conditions, see Abdias et al, Am J Med Angelina 2017, 175c:148-157.

## 2024-04-01 ENCOUNTER — ANCILLARY PROCEDURE (OUTPATIENT)
Dept: MAMMOGRAPHY | Facility: CLINIC | Age: 39
End: 2024-04-01
Attending: NURSE PRACTITIONER
Payer: COMMERCIAL

## 2024-04-01 DIAGNOSIS — Z12.31 VISIT FOR SCREENING MAMMOGRAM: ICD-10-CM

## 2024-04-01 PROCEDURE — 77067 SCR MAMMO BI INCL CAD: CPT | Mod: GC

## 2024-04-01 PROCEDURE — 77063 BREAST TOMOSYNTHESIS BI: CPT | Mod: GC

## 2024-04-08 ENCOUNTER — ANCILLARY PROCEDURE (OUTPATIENT)
Dept: MAMMOGRAPHY | Facility: CLINIC | Age: 39
End: 2024-04-08
Attending: NURSE PRACTITIONER
Payer: COMMERCIAL

## 2024-04-08 DIAGNOSIS — R92.8 ABNORMAL MAMMOGRAM OF LEFT BREAST: ICD-10-CM

## 2024-04-08 PROCEDURE — G0279 TOMOSYNTHESIS, MAMMO: HCPCS | Mod: LT | Performed by: RADIOLOGY

## 2024-04-08 PROCEDURE — 76642 ULTRASOUND BREAST LIMITED: CPT | Mod: LT | Performed by: RADIOLOGY

## 2024-04-08 PROCEDURE — 77065 DX MAMMO INCL CAD UNI: CPT | Mod: LT | Performed by: RADIOLOGY

## 2024-04-09 ENCOUNTER — MYC REFILL (OUTPATIENT)
Dept: INTERNAL MEDICINE | Facility: CLINIC | Age: 39
End: 2024-04-09
Payer: COMMERCIAL

## 2024-04-09 DIAGNOSIS — I10 ESSENTIAL HYPERTENSION: ICD-10-CM

## 2024-04-10 ENCOUNTER — TELEPHONE (OUTPATIENT)
Dept: MAMMOGRAPHY | Facility: CLINIC | Age: 39
End: 2024-04-10
Payer: COMMERCIAL

## 2024-04-10 RX ORDER — LOSARTAN POTASSIUM 25 MG/1
25 TABLET ORAL DAILY
Qty: 30 TABLET | Refills: 3 | Status: SHIPPED | OUTPATIENT
Start: 2024-04-10 | End: 2024-05-27

## 2024-04-10 NOTE — TELEPHONE ENCOUNTER
pharm change:  losartan (COZAAR) 25 MG tablet   30 tablet 3 3/16/2024 -- No  Sig - Route: Take 1 tablet (25 mg) by mouth daily - Oral  Prescribing Provider's NPI: [id number]  Radha Collins

## 2024-04-10 NOTE — TELEPHONE ENCOUNTER
Left Ri a 2nd voicemail and also sent a My Chart message regarding scheduling her recommended breast biopsy.  Awaiting return phone call.  Call back number left was [phone].

## 2024-04-13 NOTE — TELEPHONE ENCOUNTER
4/13/2024-Request for images faxed to [facility] @ 840am    Action Kelley Mtz on 4/15/2024 at 12:46 PM    Action Taken Imaging resolve into PACS. -JA     RECORDS RECEIVED FROM:    REASON FOR VISIT: Headaches    PROVIDER:    DATE OF APPT: 6/17/2024   NOTES (FOR ALL VISITS) STATUS DETAILS   OFFICE NOTE from referring provider STEPHANIE Collins-1/24/2024   OFFICE NOTE from other specialist     DISCHARGE SUMMARY from hospital     DISCHARGE REPORT from the ER     OPERATIVE REPORT     [facility] ([facility] ONLY)     EMG     EEG     MEDICATION LIST     IMAGING  (FOR ALL VISITS)     MRI (HEAD, NECK, SPINE) PACS [facility]  6/11/20 MR Head  3/4/20 MR Cervical Spine

## 2024-04-23 ENCOUNTER — ANCILLARY PROCEDURE (OUTPATIENT)
Dept: MAMMOGRAPHY | Facility: CLINIC | Age: 39
End: 2024-04-23
Attending: NURSE PRACTITIONER
Payer: COMMERCIAL

## 2024-04-23 DIAGNOSIS — R92.8 ABNORMAL MAMMOGRAM: ICD-10-CM

## 2024-04-23 PROCEDURE — 19083 BX BREAST 1ST LESION US IMAG: CPT | Mod: LT

## 2024-04-23 PROCEDURE — 88305 TISSUE EXAM BY PATHOLOGIST: CPT | Mod: 26 | Performed by: STUDENT IN AN ORGANIZED HEALTH CARE EDUCATION/TRAINING PROGRAM

## 2024-04-23 PROCEDURE — 88305 TISSUE EXAM BY PATHOLOGIST: CPT | Mod: TC | Performed by: NURSE PRACTITIONER

## 2024-04-24 ENCOUNTER — TELEPHONE (OUTPATIENT)
Dept: MAMMOGRAPHY | Facility: CLINIC | Age: 39
End: 2024-04-24
Payer: COMMERCIAL

## 2024-04-24 LAB
PATH REPORT.COMMENTS IMP SPEC: NORMAL
PATH REPORT.COMMENTS IMP SPEC: NORMAL
PATH REPORT.FINAL DX SPEC: NORMAL
PATH REPORT.GROSS SPEC: NORMAL
PATH REPORT.MICROSCOPIC SPEC OTHER STN: NORMAL
PATH REPORT.RELEVANT HX SPEC: NORMAL
PHOTO IMAGE: NORMAL

## 2024-04-24 NOTE — TELEPHONE ENCOUNTER
Spoke to Ri about the benign finding of a fibroadenoma found during her breast biopsy done yesterday.  We discussed the Radiologist's recommendation of continuing on with yearly breast imaging.  Ri verbalized understanding and all questions and concerns were answered at this time.

## 2024-05-06 ENCOUNTER — THERAPY VISIT (OUTPATIENT)
Dept: PHYSICAL THERAPY | Facility: CLINIC | Age: 39
End: 2024-05-06
Attending: PEDIATRICS
Payer: COMMERCIAL

## 2024-05-06 DIAGNOSIS — M25.50 HYPERMOBILITY ARTHRALGIA: ICD-10-CM

## 2024-05-06 DIAGNOSIS — Q79.60 EHLERS-DANLOS SYNDROME: ICD-10-CM

## 2024-05-06 PROCEDURE — 97140 MANUAL THERAPY 1/> REGIONS: CPT | Mod: GP | Performed by: PHYSICAL THERAPIST

## 2024-05-06 PROCEDURE — 97162 PT EVAL MOD COMPLEX 30 MIN: CPT | Mod: GP | Performed by: PHYSICAL THERAPIST

## 2024-05-06 NOTE — PROGRESS NOTES
PHYSICAL THERAPY EVALUATION  Type of Visit: Evaluation    See electronic medical record for Abuse and Falls Screening details.    Subjective  Pt reports that she is very glad to have met Dr Marin and to learn that EDS affects propriaception as well as joint hypermobility..  Likes to gonzalo and knows that she can't  bend so today glute is sore because she scoots and sits. Moves chairs a lot when working  at home back and neck  hurt with prolonged sitting.  C-vertebrae are her number one concern. Feels like shs always wakes up with back of head aching and head aches.   Clavicles also move a bit and that is new this years.   Looks down for a lot of her tasks. She is on Migraine injection  which brought down from 20 HA days / mo to 3 HA days and now back up to 10 she thinks due to neck mm pain.          Presenting condition or subjective complaint: Cervical instability and a need for custom brace  Date of onset: 03/27/24    Relevant medical history: Asthma; Depression; Dizziness; Fibromyalgia; High blood pressure; Menopause; Migraines or headaches; Numbness or tingling in perianal area; Overweight; Severe headaches   Dates & types of surgery: Eye surgery - 1986/1987, endometriosis excision - 2017, emergency gallbladder removal- 2023    Prior diagnostic imaging/testing results:       Prior therapy history for the same diagnosis, illness or injury: No      Prior Level of Function  Transfers: Independent  Ambulation: Independent  ADL: Independent  IADL:  indep    Living Environment  Social support: With a significant other or spouse   Type of home: House; Multi-level   Stairs to enter the home: Yes 13 Is there a railing: Yes   Ramp: No   Stairs inside the home: Yes 13 Is there a railing: Yes   Help at home: Home management tasks (cooking, cleaning); Other  Equipment owned:       Employment: Yes Work form [employer] job  Hobbies/Interests: Gardening, cross stitch, foraging    Patient goals for therapy: Exists without  "constantly worrying about joint sipport    Pain assessment: Pain present no numbers given biggest compleint is upper quarter pain and HA particularly in the morning.      Objective      Cognitive Status Examination  Orientation: Oriented to person, place and time   Level of Consciousness: Alert  Follows Commands and Answers Questions: 100% of the time  Personal Safety and Judgement: Intact  Memory: Intact    OBSERVATION: NAD moves well, good mm bulk  INTEGUMENTARY: Intact, New Tatoo on R UE  POSTURE: WFL  PALPATION: NT  RANGE OF MOTION:  Hyper mobile  STRENGTH:  can carry a 25 poind bag of dirt with minimal to no additional pain.     BED MOBILITY: Independent    TRANSFERS: Independent    Vitals:  98%  98bpm  126/78mmhg    GAIT:   Level of [place]: WFL  Assistive Device(s): None  Gait Deviations: WFL  Gait Distance: 20'   Stairs: NT    BALANCE:  Sitting balance is indep. Standing and dynamic balance not fully assessed.       SENSATION:  Not fully assessed this date.     REFLEXES: WNL  COORDINATION: WFL  MUSCLE TONE: WFL    Per Dr. Marin's note 3/27/24    \" A Beighton exam was performed, based on published recommendations. The findings:   - Passive apposition of the thumbs to the flexor aspect of the forearm:Absent (0 points)   - Passive dorsiflexion of the little fingers beyond 90 degrees: Left only (1 point)   - Hyperextension of the elbows beyond 10 degrees: Absent (0 points)   - Hyperextension of the knees beyond 10 degrees: Both sides (2 point)   - Forward flexion of the trunk with knees fully extended so that the palms of the hand rest flat on the floor: Present (1 point)  Total points: 4/9    The following additional signs of hypermobility were noted:   - unusually soft or velvety skin   - mild skin hyperextensibilityat hand and jaw   - unexplained striae or rubrae (without a history of weight gain)axillae   - bilateral piezogenic papules of the heel   - atrophic scarring involving at least two sites and " "without the formation of truly papyraceous and/or hemosideric scars as would be seen in classical Erin-Danlos syndrome   - dental crowding and high and narrow palate     I also observed:   - excessive lateral flexion of the neck in both directions   - hitchhiker thumb on both sides   - mildly loose shoulder on left side    - handshake behind back, with high on both sides   - excessive ankle eversion on right side  Paris has a limited Beighton exam, but otherwise has several exam features of a hypermobility spectrum disorder\"      These findings were also observed by this writer.    Pt also reports:   IBS, bloating, nausea   Endometriosis   Migraines    Symptomatic pes planus? Yes - hind foot valgus and pronation present    Coat  Phenomenon (suboccipital and paracervical pain that worsens in upright posture): present    [place]ley Impact of Hypermobility Questionnaire: 206/360 (57.22%)      Dysautonomia Screening - Active Stand Test- Not completed today. Pt denies dizziness/ lightheadedness.  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  The following significant findings have been identified: Pain, Decreased strength, Decreased proprioception, Impaired muscle performance, and Instability. These impairments interfere with their ability to perform self care tasks, work tasks, recreational activities, and household chores as compared to previous level of function.     Clinical Decision Making (Complexity):  Clinical Presentation: Evolving/Changing  Clinical Presentation Rationale: based on medical and personal factors listed in PT evaluation  Clinical Decision Making (Complexity): Moderate complexity    PLAN OF CARE  Treatment " Interventions:  Interventions: Gait Training, Manual Therapy, Neuromuscular Re-education, Therapeutic Activity, Therapeutic Exercise, Self-Care/Home Management    Long Term Goals     PT Goal 1  Goal Identifier: Headache and neck pain  Goal Description: Pt able to report waking  up with minimal to no HA or neck pain 5/7 days per week. Pt will  also report return to no more than 5 headache days per month.  Target Date: 08/04/24  PT Goal 2  Goal Identifier: Orthotics  Goal Description: pt will obtain appropriate orthotics in order to manage pes planus and ankle position.  Target Date: 08/04/24  PT Goal 3  Goal Identifier: HEP  Goal Description: pt will demonstrate indep with HEP for postural and joint stability. with consistent performance of  HEP 10 mins (or 3 exercises) per day. 4/7 days per week.  Target Date: 08/04/24  PT Goal 4  Goal Identifier: [place] index of hypermobility  Goal Description: Patient to demo a 10% or greater reduction in score on BIoH questionnaire to demo improved subjective reports of impairement related to EDS and to improve overall function.  Goal Progress: at eval 206/360 = 57.22%  Target Date: 08/04/24  PT Goal 5  Goal Identifier: pain management  Goal Description: Patient to report and/or demo at least 3 pain management strategies to reduce neck, shoulder and hip pain to perform activities with ease.  Goal Progress: -Consider compression for bed time.  Target Date: 08/04/24      Frequency of Treatment: 1x/week  Duration of Treatment: 90    Recommended Referrals to Other Professionals:  Orthotics and prosthetics for custom AFOS  Education Assessment:   Learner/Method: Patient  Education Comments: PT poc and referrals.    Risks and benefits of evaluation/treatment have been explained.   Patient/Family/caregiver agrees with Plan of Care.     Evaluation Time:     PT Eval, Moderate Complexity Minutes (03019): 30       Signing Clinician: Elisa Levine, PT

## 2024-05-06 NOTE — PATIENT INSTRUCTIONS
Primary Care:   Dr Willie Marin  (not currently accepting pts, but can be seen for a single visit diagnostic consult through the Rare Disease program, needs a referral from PCP for this program. Dr Jatin Hooks and Dr Jeff Araiza in his clinic are accepting new pts and have access to him for consults.)   [facility], Primary Care Clinic,  [address] [place] [address]; [place], [place], [address] [phone]    Moraima Montoya MD  [facility]  [address] [facility] [address] ROJAS  [facility], [address]  [place], [place] [address] [phone]    Dr. Negro Heredia   [email or web address]/  [facility]  [address] [place] [address]  Appointments: [phone]     Dr. Patrick Hadorn (adults and peds)  [email or web address] Rehabilitation [email or web address]/[email or web address]/  [facility] [place]  [address] [place] [address] N, [place] [address] [phone]    Dr. Maite Rockwell (FM and Integrated med)  [email or web address]://[email or web address]  [facility]  [address] [place] [address] [phone]    Miah Castellon MD (does a lot of ME/CFS cares)  [facility]  [address] [place] [address]  [phone]    Dr Tiffanie Andrade, DO  FREE CLINIC FOR ME/CFS  [email or web address]/    Suyapa Morrow MD (private pay for most - not accepting Medicare)   [employer]   Office [phone]   Scheduling [phone]   Fax [phone]   Email  [email or web address]  [address], [place], [facility], [address]    Dr. Hero Montano (IM and Sports Med) at [facility] though he is quick to point out doesn't know much beyond the musculoskeletal aspect of how EDS could affect someone.    Cris Anderson MD (not currently accepting new patients)  [facility],  "[place]  [address] [facility]    [place] [address]  Phone: [phone]      Rheumatology  Royal Parr MD  [facility]  [email or web address]_[email or web address]&gclid=[email or web address]  \"Dr. Parr does not manage chronic pain, chronic Lyme, long COVID-19, Erin-Danlos syndrome, hypermobility syndromes, chronic fatigue syndrome (myalgic encephalomyelitis), non-inflammatory spine problems (degenerative arthritis of the spine, spinal stenosis, degenerative disc disease). He is able to help establish the diagnosis of fibromyalgia but does not manage it long term.\" However, could still be useful in establishing diagnosis and ruling out inflammatory rheumatologic issues.    Dr Crispin Porras at [facility] Rheumatology  [email or web address]://[facility]/staff/ruchi/  Note: will establish EDS diagnosis but not manage it. Per pts, experiences are mixed as to whether useful or not.      ENT  Rob Giordano M.D.  [facility] Special[facility]  [email or web address]    Genetics:  Dr Crispin Dominguez  [email or web address]  [address], [place] [address]   [phone]    Hematology:  Dr. Barb Weaver, hematologist, who is at the  in the [facility] building for Bleeding and Clotting disorders    Dr. Kristopher Momin MD, FACP  Professor of Medicine, Division of Hematology, Oncology and Transplantation      Cardiology (POTS, dysautonomia):  Dr. Murtaza Perkins  [email or web address]  [facility] ([place])  [address] [place] [address] [phone]    Dr Artie Messer MD, PHD  [email or web address]/[email or web address]    Ismael Palomares MD (peds)  [email or web address]://[email or web address].[facility].org/provider/Ismael+Uche+Jelani/[email or web address]     Dr Diony Campoverde at [facility]" health  https://www.[email or web address].[email or web address]/provider/yann-ms/      POTS exercises  http://www.dysautonomiainternational.org/page.php?ID=43  POST-VIRAL POTS/POST-COVID REHAB PROGRAM by Susie Barry DO       Gastroenterology   [place] [facility] ([place] and [place] locations only)  All appts/locations: [phone]   MD Adriana Reid MD Sandeep Bahadur Naveen Chaudhary Erica Roberson April Grudell      Dietician  Tiarra Summersronharika MonteroEcrdrdez92@[email or web address] Centersicians.[facility] or [facility] [place]  [phone]      Allergists ([facility]) (both recommended by Dr. Cruz):  Selina Christian MD (recent word is she might not be best option)  [address] [place] [address] [phone]    Twan Jarrell MD  [facility]  [address] [place] [address] [phone]      Orthopedics/Spine  Junior Viramontes MD (he requires MRI prior to seeing him)  [address] [place] [address] [phone]    Dr. Crispin Galvan DO (trained with someone who had EDS)  [address] [address] [place] [address] [phone]    Dr Patricio Lee DO (shoulder)   Orthopedics  P: [phone]  F: [phone]    Orthopedic surgery: Shereen Herbert MD (had a patient go for shoulder, says she sees a lot of hypermobile shoulders)   [facility]     TOS Surgeons  Dr. Zohaib [facility]eves at [facility]  Dr Rob Gutierrez at [facility]    CCI  Dr Akhil Granger, Sr    Ortho/Hip  Jack Arguelles MD  [facility]  Phone: [phone]    Chiropractor  Dr. Esperanza Tenorio at [facility]  [address] [place] [address]  [email or web address]   Office: [phone]  Cell:[phone]  Fax: [phone]  [facility]    Delmis HartleyAbhijit  [facility]  [address], [place], [place]  [address]  Phone: [phone]  Fax: [phone]  [email or web address]    [place], [place]  Bonilla West DC  [address] [place] [address], [address]  [place], [place] [address] [place] [address]  [email or web address]    Dr Elena Palacios Chiro in [place]    Acupuncture  Dr Chino Elam  [email or web address] - private practice  [email or web address]     She is at this clinic: [email or web address]-health-[email or web address]-families  [address] [facility] [address], [address], [place] [address]  Phone: [phone], Fax: [phone]    Massage/Body Work  [facility], [facility]  Sana Cooper, PT  [phone]  Cash, check or Venmo only    Ara at [facility]ness [facility]  [address] [place] [address] [phone]  Insurance only if MVA, otherwise cash/check/credit card    Podiatrist  Dr. Willy Chambers, at [facility] in [place]    Dentist/TMJ  [facility]  Dr. Christian Owen and Dr. Omar Owen is the supervising faculty on Tuesdays and Dr. Jara is the supervising faculty on Thursdays, and they each work one Friday per month seeing patients on their own as part of the faculty practice. When patients schedule, they should ask for a Tuesday or Thursday appointment with any resident. Otherwise they can see when they have faculty practice appointments available.  [phone]    SIMONE RamachandranT, CCTT (Certified Cervical & Temporomandibular Therapist by the PT Board of Craniofacial & Cervical Therapeutics [email or web address])  [facility]  Email: [email or web address]  Need to get referral from dentist to see PT for TMJ at the dental clinic    OB-GYN  Odilia Johns MD  [facility]  [phone]  [address], [address], [place] [address]    Maria Eugenia Santiago MD MPH  [facility]  [address] [place] [address]  [phone]    Pain Clinic  Dr. Arely Pickard and colleagues  [facility]  [address] [facility]  [place] [address] [phone]      Orthotics:  [facility] for Custom Compression Garments  Nicci Rivas  Custom Fitter of Mastectomy & Lymphedema  [facility]  Orthotics & Prosthetics  [address]  [address]  [place] [address]  [email or web address]  Office: [phone], and select option #2 for Orthotics - call to schedule at any site  Fax: [phone]    Thoracic stabilizer: https://www.weave energy/womens-posture-corrector-and- (not covered by insurance)  Corset: luciano Heavy Duty 26 Double Steel Boned Waist Training Cotton Overbust Tight Shaper Corset #8851-TC  https://Education Development Center (EDC)/Au FINANCIERS-Double-Training-Culnulkc-9414-PZ/dp/J055PSEH6X  Body braid support strap: https://bodybraid.com/    Compression tops:  Nicci Rivas is an [facility] orthotist that can help with trying to find some options that might be covered by insurance. (Office: [phone] - call to schedule at any site, Fax: [phone]; Monday: [place], Wednesday: [place], Tuesday, Thursday, Friday: [place])    Here are some patient favorites that are over the counter:  https://www.fromAtoBin.net/collections/all  https://Coeurative.MetaJure/collections/shop-all  Honey Love  https://www.Thinglink.MetaJure/en/  I find that people like the versions that have the zip up the front (shirts and bras) so they don't need to overstrain their shoulders to get them on and off.      Shoulder instability - [place] brace    Medcline side sleeping pillow: https://Logical Therapeutics/?utm_source=google&utm_medium=cpc&utm_campaign=search-branded&gclsrc=aw.ds&gclid=Yv6QXTsj3K-[id number]caApFyEALw_wcB    Mediflow pillow    Cranio- cervical instability- [place] (offloading to jaw,  protects TMJ)      Sitting supports:    https://Million Dollar Earth.io/offer-01/?cshe=9189&utm_source=8580&utm_medium=&utm_term=556&utm_content=&utm_campaign=0&aff_id=8580&camp_id=0&sub_id=&req_id=[id number]&contract_id=0&iyu=161&device_type=PC&country_name=[place]%20[place]&rt_t=[id number]    Crazy creek chair    Shoes for AFOs/swelling:   https://www.Qyuki/  https://www.Gallus BioPharmaceuticals/ember-moc/6288172.html - indoor shoes/slippers   Mciah footwear (zipper option) - https://Tang Song/    Wrist support brace (recommended by hand therapist) - Howard Sports Wrist Brace   https://www.amazon.com/Howard-Fitted-Wrist-Brace-Medium/dp/U195RULXX5/ref=sr_1_5?yubthjz=5543452455502535&jkplkp=22014892108510&hvbmt=be&hvdev=c&clrgosnw=313641&hvnetw=o&hvqmt=e&hvtargid=kwd-66645007823876%3Aloc-190&frlaelj=21883_10490222&keywords=howard+sports+wrist+brace&ser=9476587472&qu=dpLbs2XaNuXzUyOmIkaijRGtTleaFW29AMPsYjEjpPA6UzSvRLAonB%3D%3D&sr=8-5          Physical Therapy    SIMONE CoronadoT, NCS   Physical Therapist - Adults  [facility]  [address]  [address] [place] [address]   [email or web address]  [email or web address]  Office: [phone]   Pager:[phone]  Fax: [phone]        Cranio-sacral PTs with EDS experience  Nicol Levine, PT, PhD  [facility]    Twan [facility] PT, DPT, ATC  [facility]    Narcisa Fish, PT and Emily Mendoza, PT  [facility]    Alla Zaldivar, PT  [facility]   [facility]   Emily Petty, PT  [address] [place] [address]  Fax: [phone]  Get directions  [phone]      Pelvic floor PTs  Madalyn Costello, PT, ATC/R, SCS  [email or web address]  [address] [facility]  [address]  [place] [address]  Appointments: [phone]  Clinic: [phone]  Fax: [phone]    [facility]   [address], [address], [place] [address]  [email or web address] Office: [phone]  Spring Vanegas PT, OCS, WCS, Cert. MDT  [facility] Physical Therapy Residency

## 2024-05-08 ENCOUNTER — TELEPHONE (OUTPATIENT)
Dept: INTERNAL MEDICINE | Facility: CLINIC | Age: 39
End: 2024-05-08
Payer: COMMERCIAL

## 2024-05-08 DIAGNOSIS — R14.0 ABDOMINAL BLOATING: ICD-10-CM

## 2024-05-08 DIAGNOSIS — K58.9 IRRITABLE BOWEL SYNDROME, UNSPECIFIED TYPE: ICD-10-CM

## 2024-05-08 DIAGNOSIS — R11.0 NAUSEA: ICD-10-CM

## 2024-05-08 DIAGNOSIS — M21.40 PES PLANUS, UNSPECIFIED LATERALITY: Primary | ICD-10-CM

## 2024-05-08 NOTE — TELEPHONE ENCOUNTER
Spoke to patient, she would like her DME order for AFO to be faxed to [facility].  Spoke to [facility] faxed order to [phone].     Angelita Lopez RN on 5/8/2024 at 3:31 PM

## 2024-05-08 NOTE — PROGRESS NOTES
PT recommends hind foot control with custom AFO for pes planus., recommends Yayo at [facility].  DME order signed.  Also questions GI referral for pt--referral declined by GI, appears pt previously established with [facility]. Can schedule with me to discuss further, otherwise would recommend following up with [facility] (appears last seen by Awilda Espino CNP, in GI 9/2022).  TORSTEN Chao CNP

## 2024-05-08 NOTE — TELEPHONE ENCOUNTER
----- Message from TORSTEN Bhatt CNP sent at 5/8/2024 10:59 AM CDT -----  Regarding: RE: Orders for Orthotics and GI referral?  Nicol Benson,    Thanks for the update and for seeing her.  I can write orders for AFOs with [facility]--appreciate the recommendation.    I can place a GI referral but I know that it will be several months before they can see her.  I'd recommend she schedule with me to discuss her symptoms, any needed work-up and potential treatment options in the meantime.     Clinic coordinators--can you please offer her an appointment with me (virtual is okay) for her GI symptoms?     Thanks!  Radha  ----- Message -----  From: Elisa Levine, PT  Sent: 5/7/2024   9:03 PM CDT  To: TORSTEN Bhatt CNP; #  Subject: Orders for Orthotics and GI referral?            Charlie Marin and Dr Collins,     I wanted to write to ask for orders for Ri for custom AFOS. She definitely has the pes planus and I think she needs the hind foot control of a custom AFO.  I think she should specifically see Yayo at [facility].     She is also complaining of some GI distress.  Dr. Collins I am not sure if she has discussed that much yet with you. I am thinking a referral to GI might be really helpful for her. Do you want to see her again about that?    Thank you both very much for considering.     Have a great evening!  Nicol Hopkins

## 2024-05-10 ENCOUNTER — TELEPHONE (OUTPATIENT)
Dept: INTERNAL MEDICINE | Facility: CLINIC | Age: 39
End: 2024-05-10
Payer: COMMERCIAL

## 2024-05-10 NOTE — TELEPHONE ENCOUNTER
----- Message from TROSTEN Bhatt CNP sent at 5/8/2024 10:59 AM CDT -----  Regarding: RE: Orders for Orthotics and GI referral?  Nicol Benson,    Thanks for the update and for seeing her.  I can write orders for AFOs with [facility]--appreciate the recommendation.    I can place a GI referral but I know that it will be several months before they can see her.  I'd recommend she schedule with me to discuss her symptoms, any needed work-up and potential treatment options in the meantime.     Clinic coordinators--can you please offer her an appointment with me (virtual is okay) for her GI symptoms?     Thanks!  Radha  ----- Message -----  From: Elisa Levine, PT  Sent: 5/7/2024   9:03 PM CDT  To: TORSTEN Bhatt CNP; #  Subject: Orders for Orthotics and GI referral?            Charlie Marin and Dr Collins,     I wanted to write to ask for orders for Ri for custom AFOS. She definitely has the pes planus and I think she needs the hind foot control of a custom AFO.  I think she should specifically see Yayo at [facility].     She is also complaining of some GI distress.  Dr. Collins I am not sure if she has discussed that much yet with you. I am thinking a referral to GI might be really helpful for her. Do you want to see her again about that?    Thank you both very much for considering.     Have a great evening!  Nicol Hopkins

## 2024-05-27 ENCOUNTER — MYC REFILL (OUTPATIENT)
Dept: INTERNAL MEDICINE | Facility: CLINIC | Age: 39
End: 2024-05-27
Payer: COMMERCIAL

## 2024-05-27 DIAGNOSIS — I10 ESSENTIAL HYPERTENSION: ICD-10-CM

## 2024-05-29 RX ORDER — LOSARTAN POTASSIUM 25 MG/1
25 TABLET ORAL DAILY
Qty: 30 TABLET | Refills: 2 | Status: SHIPPED | OUTPATIENT
Start: 2024-05-29 | End: 2024-05-31

## 2024-05-29 NOTE — TELEPHONE ENCOUNTER
Patient Comment: Please change this to 90 day so that [facility] will fill it OR send 30 day rx to [facility] on [address]           see 4-9-24 my chart:remaining rf 30t 2, sent to [facility]   losartan (COZAAR) 25 MG tablet 30 tablet 3 4/10/2024 -- No   Sig - Route: Take 1 tablet (25 mg) by mouth daily - Oral

## 2024-05-30 ASSESSMENT — PATIENT HEALTH QUESTIONNAIRE - PHQ9
SUM OF ALL RESPONSES TO PHQ QUESTIONS 1-9: 11
SUM OF ALL RESPONSES TO PHQ QUESTIONS 1-9: 11
10. IF YOU CHECKED OFF ANY PROBLEMS, HOW DIFFICULT HAVE THESE PROBLEMS MADE IT FOR YOU TO DO YOUR WORK, TAKE CARE OF THINGS AT HOME, OR GET ALONG WITH OTHER PEOPLE: VERY DIFFICULT

## 2024-05-31 ENCOUNTER — OFFICE VISIT (OUTPATIENT)
Dept: INTERNAL MEDICINE | Facility: CLINIC | Age: 39
End: 2024-05-31
Payer: COMMERCIAL

## 2024-05-31 VITALS
DIASTOLIC BLOOD PRESSURE: 84 MMHG | HEIGHT: 65 IN | SYSTOLIC BLOOD PRESSURE: 125 MMHG | HEART RATE: 94 BPM | WEIGHT: 219.7 LBS | BODY MASS INDEX: 36.6 KG/M2 | RESPIRATION RATE: 14 BRPM | OXYGEN SATURATION: 98 %

## 2024-05-31 DIAGNOSIS — I10 ESSENTIAL HYPERTENSION: ICD-10-CM

## 2024-05-31 DIAGNOSIS — Q79.60 EHLERS-DANLOS SYNDROME: ICD-10-CM

## 2024-05-31 DIAGNOSIS — K58.9 IRRITABLE BOWEL SYNDROME, UNSPECIFIED TYPE: Primary | ICD-10-CM

## 2024-05-31 DIAGNOSIS — F41.1 GENERALIZED ANXIETY DISORDER: ICD-10-CM

## 2024-05-31 DIAGNOSIS — F33.1 MODERATE EPISODE OF RECURRENT MAJOR DEPRESSIVE DISORDER (H): ICD-10-CM

## 2024-05-31 PROCEDURE — 99214 OFFICE O/P EST MOD 30 MIN: CPT | Performed by: NURSE PRACTITIONER

## 2024-05-31 RX ORDER — FLUTICASONE FUROATE 100 UG/1
POWDER RESPIRATORY (INHALATION)
COMMUNITY
Start: 2024-03-14

## 2024-05-31 RX ORDER — BUPROPION HYDROCHLORIDE 150 MG/1
150 TABLET ORAL EVERY MORNING
Qty: 90 TABLET | Refills: 1 | Status: SHIPPED | OUTPATIENT
Start: 2024-05-31 | End: 2024-08-23

## 2024-05-31 RX ORDER — TESTOSTERONE GEL, 1% 10 MG/G
12.5 GEL TRANSDERMAL
COMMUNITY
Start: 2024-05-04

## 2024-05-31 RX ORDER — LOSARTAN POTASSIUM 25 MG/1
25 TABLET ORAL DAILY
Qty: 90 TABLET | Refills: 3 | Status: SHIPPED | OUTPATIENT
Start: 2024-05-31

## 2024-05-31 RX ORDER — VENLAFAXINE HYDROCHLORIDE 150 MG/1
150 CAPSULE, EXTENDED RELEASE ORAL DAILY
Qty: 90 CAPSULE | Refills: 0 | Status: SHIPPED | OUTPATIENT
Start: 2024-05-31 | End: 2024-08-23

## 2024-05-31 NOTE — PROGRESS NOTES
Assessment & Plan     Essential hypertension  Tolerating Losartan 25 mg well with improvement in BPs, refills provided.  - losartan (COZAAR) 25 MG tablet; Take 1 tablet (25 mg) by mouth daily    Irritable bowel syndrome, unspecified type  Erin-Danlos syndrome  Keep upcoming appt with GI at [facility] next week as scheduled.  Continue avoiding triggers, and following low-FODMAP diet, and continue with fiber supplement.     Moderate episode of recurrent major depressive disorder (H)  Generalized anxiety disorder  Refills provided.    - buPROPion (WELLBUTRIN XL) 150 MG 24 hr tablet; Take 1 tablet (150 mg) by mouth every morning  - venlafaxine (EFFEXOR XR) 150 MG 24 hr capsule; Take 1 capsule (150 mg) by mouth daily    Return in about 3 months (around 8/31/2024).    Subjective   Ri is a 39 year old, presenting for the following health issues:  EDS and Gastrointestinal Problem      5/31/2024     8:17 AM   Additional Questions   Roomed by ms emt     History of Present Illness       Reason for visit:  GI and EDS follow up    Ri eats 2-3 servings of fruits and vegetables daily.Ri consumes 2 sweetened beverage(s) daily.Ri exercises with enough effort to increase Ri's heart rate 9 or less minutes per day.  Ri exercises with enough effort to increase Ri's heart rate 3 or less days per week. Ri is missing 1 dose(s) of medications per week.  Ri is not taking prescribed medications regularly due to remembering to take.     Sleepy but otherwise okay.  Tired last few days, allergies on overdrive, eyes are dry.  Using hydration drops Systane, which help, and allergy eye drops.      [facility], only fills 90-d.  Tolerating Losartan well.     GI symptoms--GI appt Indu Real through [facility], video visit next week.    EDS complications, wonders about gastroparesis.   Wonders about possible MAL ligament syndrome throwing off autonomic system.    IBS--diagnosed about 10 years ago; gets really bloated from  "the start of the meal to half-way through, feels like stomach is crushed by bloating. Or if eats something body has a reaction to, develops immediate food aversion, tastes like acid and texture like mealy cardboard; once bloating goes down is better.    Tested for SIBO, crohns/coliits, celiac disease, all negative.  Low-FODMAP diet seems to help; sometimes even drinking water makes her bloated.  Gets acid reflux if hungry.  Takes Tums for this 1-2x per week, but used to take Prilosec (even as a kid).  Sleeps on left side to help manage.    Sometimes will use Dicyclomine, AM/PM and prn mid-day if feeling stomach is acting up to help with spasms.    \"Safe food\"--sliced beef fernando, B&J ice cream, white rice, homemade fruit smoothies (berries with collagen protein powder).  Feels like she's bad at digesting food--psyllium husk, Vit D, B12, multivitamin.  If could get bloating down, would significantly improve life to avoid temporary food aversions.  Diaphragm and psoas releases with massage therapist.  Helps temporarily, sometimes gets cramps right in upper abd/epigastric.    Appt with Dr. Marin was helpful, appreciated the conversation and understanding more about EDS and associated symptoms. Wonders about GI providers who are EDS-informed, or who would evaluate for intercranial HTN.  Denies acute vision changes. Has seen ophthalmology in the past year, glasses prescription didn't change, but near eye vision changed somewhat. Sometimes feels \"filmy\" when wakes up, uses drops.  No cataracts or glaucoma.    Joined [facility] EDS group, has been helpful.      Seeing Christina Cotter CNP in Neurology next month for chronic migraines.  Wants to stay on Emgality, reduces headache days from 20+ to 2/month.  Injection is painful, but feels worth it.          Review of Systems  Constitutional, HEENT, cardiovascular, pulmonary, gi and gu systems are negative, except as otherwise noted.      Objective    /84 (BP Location: Right " "arm, Patient Position: Sitting, Cuff Size: Adult Regular)   Pulse 94   Resp 14   Ht 1.638 m (5' 4.5\")   Wt 99.7 kg (219 lb 11.2 oz)   SpO2 98%   Breastfeeding No   BMI 37.13 kg/m    Body mass index is 37.13 kg/m .  Physical Exam   GENERAL: alert and no distress  EYES: Eyes grossly normal to inspection, and conjunctivae and sclerae normal  HENT: ear canals and TM's normal, nose and mouth without ulcers or lesions  NECK: no adenopathy, no asymmetry, masses, or scars  RESP: lungs clear to auscultation - no rales, rhonchi or wheezes  CV: regular rate and rhythm, normal S1 S2, no S3 or S4, no murmur, click or rub, no peripheral edema  ABDOMEN: soft, mild epigastric tenderness, no hepatosplenomegaly, no masses and bowel sounds normal  MS: no gross musculoskeletal defects noted, no edema  SKIN: no suspicious lesions or rashes, multiple tattoos  NEURO: Normal strength and tone, mentation intact and speech normal  PSYCH: mentation appears normal, affect normal/bright            Signed Electronically by: Radha Collins, TORSTEN CNP    "

## 2024-06-03 ENCOUNTER — THERAPY VISIT (OUTPATIENT)
Dept: PHYSICAL THERAPY | Facility: CLINIC | Age: 39
End: 2024-06-03
Attending: PEDIATRICS
Payer: COMMERCIAL

## 2024-06-03 DIAGNOSIS — Q79.60 EHLERS-DANLOS SYNDROME: ICD-10-CM

## 2024-06-03 DIAGNOSIS — M25.50 HYPERMOBILITY ARTHRALGIA: Primary | ICD-10-CM

## 2024-06-03 PROCEDURE — 97535 SELF CARE MNGMENT TRAINING: CPT | Mod: GP | Performed by: PHYSICAL THERAPIST

## 2024-06-03 PROCEDURE — 97110 THERAPEUTIC EXERCISES: CPT | Mod: GP | Performed by: PHYSICAL THERAPIST

## 2024-06-03 NOTE — PATIENT INSTRUCTIONS
"Gastroenterology   [place] [facility] ([place] and [place] locations only)  All appts/locations: [phone]   MD Adriana Reid MD Sandeep Bahadur Naveen Chaudhary Erica Roberson April Grudell MALS  [email or web address]    Dietician  [facility] [place]  [phone]      Orthotics  Please call the location that is most convenient to you to schedule.    [place] [facility]: [phone] (Fax [phone])  Orthotist: Yayo DUNLAP [facility], [place]  [address], [address]  [place] [address]    [place] [facility]: [phone]  Orthotists: Edgar Trejo and Eddie DUNLAP [facility] [place] [address]  [place] [address]        Neck pain while working  - Make sure ears over shoulders over hips  - Use a rolled towel and/or pillows to support your back  - Stand up every hour!  - Abdominal binder        Dysautonomia management  - use the neck fan (especially for gardening)  - try the heat sink  - cold showers  - Change positions  - Compression - biker shorts or shapewear, abdominal binder (46-62\")  - Weighted blanket  "

## 2024-06-13 ENCOUNTER — THERAPY VISIT (OUTPATIENT)
Dept: PHYSICAL THERAPY | Facility: CLINIC | Age: 39
End: 2024-06-13
Attending: PEDIATRICS
Payer: COMMERCIAL

## 2024-06-13 DIAGNOSIS — Q79.60 EHLERS-DANLOS SYNDROME: ICD-10-CM

## 2024-06-13 DIAGNOSIS — M25.50 HYPERMOBILITY ARTHRALGIA: Primary | ICD-10-CM

## 2024-06-13 PROCEDURE — 97535 SELF CARE MNGMENT TRAINING: CPT | Mod: GP | Performed by: PHYSICAL THERAPIST

## 2024-06-13 PROCEDURE — 97110 THERAPEUTIC EXERCISES: CPT | Mod: GP | Performed by: PHYSICAL THERAPIST

## 2024-06-13 NOTE — PATIENT INSTRUCTIONS
Make an intentional space for doing your exercises laying down      Make the appt for Yayo at [facility].    Orthotics  Please call the location that is most convenient to you to schedule.    [place] [facility]: [phone] (Fax [phone])  Orthotist: Yayo DUNLAP [facility], [place]  [address]  [place], [place] [address] [place] [address] O&P Clinic: [phone]  Orthotists: Eddie Simmons / Yayo DUNLAP [facility] [place]  [address] [place] [address], [address]  [place] [address]

## 2024-06-16 ASSESSMENT — HEADACHE IMPACT TEST (HIT 6)
HOW OFTEN DO HEADACHES LIMIT YOUR DAILY ACTIVITIES: VERY OFTEN
HOW OFTEN HAVE YOU FELT FED UP OR IRRITATED BECAUSE OF YOUR HEADACHES: VERY OFTEN
HIT6 TOTAL SCORE: 69
WHEN YOU HAVE A HEADACHE HOW OFTEN DO YOU WISH YOU COULD LIE DOWN: ALWAYS
HOW OFTEN DID HEADACHS LIMIT CONCENTRATION ON WORK OR DAILY ACTIVITY: VERY OFTEN
HOW OFTEN HAVE YOU FELT TOO TIRED TO WORK BECAUSE OF YOUR HEADACHES: SOMETIMES
WHEN YOU HAVE HEADACHES HOW OFTEN IS THE PAIN SEVERE: ALWAYS

## 2024-06-16 ASSESSMENT — MIGRAINE DISABILITY ASSESSMENT (MIDAS)
HOW OFTEN WERE SOCIAL ACTIVITIES MISSED DUE TO HEADACHES: 2
ON A SCALE FROM 0-10 ON AVERAGE HOW PAINFUL WERE HEADACHES: 5
HOW MANY DAYS DID YOU MISS WORK OR SCHOOL BECAUSE OF HEADACHES: 3
TOTAL SCORE: 14
HOW MANY DAYS WAS YOUR PRODUCTIVITY CUT IN HALF BECAUSE OF HEADACHES: 3
HOW MANY DAYS WAS HOUSEWORK PRODUCTIVITY CUT IN HALF DUE TO HEADACHES: 4
HOW MANY DAYS DID YOU NOT DO HOUSEWORK BECAUSE OF HEADACHES: 2
HOW MANY DAYS IN THE PAST 3 MONTHS HAVE YOU HAD A HEADACHE: 8

## 2024-06-17 ENCOUNTER — OFFICE VISIT (OUTPATIENT)
Dept: NEUROLOGY | Facility: CLINIC | Age: 39
End: 2024-06-17
Attending: NURSE PRACTITIONER
Payer: COMMERCIAL

## 2024-06-17 ENCOUNTER — PRE VISIT (OUTPATIENT)
Dept: NEUROLOGY | Facility: CLINIC | Age: 39
End: 2024-06-17

## 2024-06-17 VITALS
OXYGEN SATURATION: 98 % | SYSTOLIC BLOOD PRESSURE: 131 MMHG | RESPIRATION RATE: 16 BRPM | HEART RATE: 102 BPM | DIASTOLIC BLOOD PRESSURE: 92 MMHG

## 2024-06-17 DIAGNOSIS — G43.019 INTRACTABLE MIGRAINE WITHOUT AURA AND WITHOUT STATUS MIGRAINOSUS: ICD-10-CM

## 2024-06-17 DIAGNOSIS — G43.709 CHRONIC MIGRAINE WITHOUT AURA WITHOUT STATUS MIGRAINOSUS, NOT INTRACTABLE: ICD-10-CM

## 2024-06-17 DIAGNOSIS — G43.009 MIGRAINE WITHOUT AURA AND WITHOUT STATUS MIGRAINOSUS, NOT INTRACTABLE: Primary | ICD-10-CM

## 2024-06-17 PROCEDURE — 99215 OFFICE O/P EST HI 40 MIN: CPT | Performed by: NURSE PRACTITIONER

## 2024-06-17 PROCEDURE — 99417 PROLNG OP E/M EACH 15 MIN: CPT | Performed by: NURSE PRACTITIONER

## 2024-06-17 ASSESSMENT — PAIN SCALES - GENERAL: PAINLEVEL: NO PAIN (0)

## 2024-06-17 NOTE — PATIENT INSTRUCTIONS
Acute Treatment:  -For acute treatment of mild headache, take naproxen as needed. Do not exceed more than 14 days per month to avoid medication overuse.  -For acute treatment of moderate to severe headache, take Ubrelvy  at the onset of headache, with a repeat dose in two hours if needed. 90-day supply.   -For headache related nausea, or as a rescue medicine for headache, take ondasetron as needed.     Preventive Treatment:  -For headache prevention, continue Emgality -90 days supply     Follow up in 6-12 months or sooner if needed if any new symptoms or worsening headaches

## 2024-06-17 NOTE — PROGRESS NOTES
[facility]   Headache Neurology Consult  June 17, 2024     Paris Nguyen MRN# [id number]   YOB: 1985 Age: 39 year old            Assessment and Recommendations:     Ri is a 39 year old adult   Chronic migraine   Patient was seen at [facility] and transferring headache care to our Headache Clinic. Brain without a contrast in 2020 was reported as unremarkable per chart review.   No positional or worsening headaches with Valsalva   Patient has questions of Low CSF headache and EDS No symptoms at this time. Etiology/symptoms reviewed. No high CSF symptoms as well  No new headache symptoms  Headaches managed with Emgality and Ubrelvy   Last eye exam in 2023 and 2022 -both stable  Acute Treatment:  -For acute treatment of mild headache, take naproxen as needed. Do not exceed more than 14 days per month to avoid medication overuse.  -For acute treatment of moderate to severe headache, take Ubrelvy  at the onset of headache, with a repeat dose in two hours if needed. 90-day supply.   -For headache related nausea, or as a rescue medicine for headache, take ondasetron as needed.     Preventive Treatment:  -For headache prevention, continue Emgality -90 days supply     Follow up in 6-12 months or sooner if needed if any new symptoms or worsening headaches       All of patient's questions were answered from the best of my knowledge.  Patient is in agreement with the plan.     70 minutes spent on the date of the encounter doing face to face visit, chart  review, exam, results review,  meds review, treatment plan, documentation and further activities as noted above    TORSTEN Bobo, CNP [facility]  Headache certified  [facility]                Chief Complaint:     Chief Complaint   Patient presents with    Consult           History is obtained from the patient and medical record.   hypermobile-type Erin-Danlos syndrome (hEDS), chronic migraines   "  Ri is a 39 year old adult     Seen at [facility] Dr Ayala /Mahesh Guerrero PA-C but closed [place] location.   Brain MRI and orbits in 2020  Pankajtamela notes reviewed and was seen in 2016 and than from [id number]    Before Emgality 15-20/month and down to 3 headache days per month since starting Emgalitty Aug-Sep 2021  Ubrelvy started in 2021 as well.     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 Feel like \"going blind\" -know can see but brain cannot organize what patient seeing. Patient would take triptan /NSAIDs  and would nap and would feel incredibly intense pain all over 3-4 hours   \"Typical migraines\" in the back of the head, wake up with them when overheated or neck did not have enough support. Associated brain fog, irritability, no appetite, photophobia severe, sounds feel more acute but do not hurt but light absolutely hurt. No visual symptoms.   Might get one 3 days at the end of the month before Emgality reinjection and before Emgality headaches frequent more than half of the days and lasting up to a week.   Ubrelvy as needed and it helps -will reduce symptoms and helps.   No side effects with Emgality or Ubrelvy     Headaches Tx  Naproxen as needed but no longer really needed   TENS units, chirp wheel, ice packs, muscle rubs and balms   TPs -\"most painful\" and did not help   Migralief   B12  Rizatriptan and sumatriptan after one pill -legs \"swimmy\", disoriented feeling and trice to think \"no thoughts\"   Gabapentin,   Topiramate  Tizanidine  Citalopram  Escitalopram  Paroxetine  Venlafaxine  Bupropion  Trazadone  Magnesium  Acupuncture  Biofeedback  ONB   Ketroloac  Medrol pack -does fine but does not seem to help long term     FH-unknown       SH:  Minors in linguistic and photography and major in cultural anthropology   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 Controlled now by diet    GERD (gastroesophageal reflux disease)     History of steroid therapy     predisone burst for asthma    Hypertension 2023    IBS (irritable bowel syndrome)     Migraines     Pelvic and perineal pain     Stomach problems     Possible hemorrhoids?    Uncomplicated asthma     Vision disorder     Crossed-eye. Surgery at age 1 and 2              Past Surgical History:     Past Surgical History:   Procedure Laterality Date    ABDOMEN SURGERY  10/2016    Endometriosis excision    CHOLECYSTECTOMY  8/2023    Laparoscopic emergency removal    CYSTOSCOPY N/A 10/12/2016    Procedure: CYSTOSCOPY;  Surgeon: Eliazar Patel MD;  Location: [facility] OR    DAVINCI ASSISTED ABLATION / EXCISION OF ENDOMETRIOSIS N/A 10/12/2016    Procedure: DAVINCI ASSISTED ABLATION / EXCISION OF ENDOMETRIOSIS;  Surgeon: Eliazar Patel MD;  Location: [facility] OR    DAVINCI LYSIS OF ADHESIONS N/A 10/12/2016    Procedure: DAVINCI LYSIS OF ADHESIONS;  Surgeon: Eliazar Patel MD;  Location: [facility] OR    DAVINCI PELVIC PROCEDURE N/A 10/12/2016    Procedure: DAVINCI PELVIC PROCEDURE;  Surgeon: Eliazar Patel MD;  Location: [facility] OR    DILATION AND CURETTAGE, HYSTEROSCOPY DIAGNOSTIC, COMBINED N/A 10/12/2016    Procedure: COMBINED DILATION AND CURETTAGE, HYSTEROSCOPY DIAGNOSTIC;  Surgeon: Eliazar Patel MD;  Location:  OR    EYE SURGERY      Eye Surgery x 2 as an infant    HC TOOTH EXTRACTION W/FORCEP               Social History:            Family History:     Family History   Problem Relation Age of Onset    Hypothyroidism Mother     Kidney Disease Mother         IgA Nephropathy    Diabetes Mother     Thyroid Disease Mother         hypothyroidism    Obesity Mother     Alcoholism Father   "   Pancreatitis Father         Alcohol related    Depression Father     Anxiety Disorder Father     Substance Abuse Father         Alcoholic    Diabetes Father     Myocardial Infarction Father     Coronary Artery Disease Father     Hyperlipidemia Father     Attention Deficit Disorder Sister     Anxiety Disorder Sister     Myocardial Infarction Paternal Grandfather 62    Breast Cancer Paternal Grandmother     Other Cancer Paternal Grandmother         Skin on face    Uterine Cancer Maternal Grandmother     Myocardial Infarction Maternal Aunt     Alcoholism Maternal Aunt     Osteoporosis Maternal Aunt              Allergies:      Allergies   Allergen Reactions    Latex Rash     Rash and blisters    Liquid Adhesive Rash     Rash and blisters      Seasonal Allergies     Tramadol GI Disturbance and Other (See Comments)    Morphine Anxiety, GI Disturbance and Nausea    Wound Dressing Adhesive Rash     PN: \"blisters\"             Medications:     Current Outpatient Medications:     albuterol (PROVENTIL) (5 MG/ML) 0.5% neb solution, Take 2.5 mg by nebulization every 6 hours as needed for shortness of breath, wheezing or cough, Disp: , Rfl:     ARNUITY ELLIPTA 100 MCG/ACT inhaler, , Disp: , Rfl:     benzonatate (TESSALON) 100 MG capsule, Take 100-200 mg by mouth as needed , Disp: , Rfl:     buPROPion (WELLBUTRIN XL) 150 MG 24 hr tablet, Take 1 tablet (150 mg) by mouth every morning, Disp: 90 tablet, Rfl: 1    cyanocobalamin (VITAMIN B-12) 25 mcg TABS quarter-tab, Take 1,000 mcg by mouth daily, Disp: , Rfl:     dicyclomine (BENTYL) 10 MG capsule, Take 10 mg by mouth 4 times daily (before meals and nightly), Disp: , Rfl:     gabapentin (NEURONTIN) 300 MG capsule, TAKE 1 CAPSULE (300 MG) TWICE A DAY AND 2 CAPSULES (600 MG) AT BEDTIME, Disp: 360 capsule, Rfl: 1    galcanezumab-gnlm (EMGALITY) 120 MG/ML injection, , Disp: , Rfl:     hydrOXYzine (ATARAX) 25 MG tablet, TAKE 1 TABLET  THREE TIMES A DAY AS NEEDED FOR ITCHING AND " ANXIETY, Disp: 270 tablet, Rfl: 1    losartan (COZAAR) 25 MG tablet, Take 1 tablet (25 mg) by mouth daily, Disp: 90 tablet, Rfl: 3    montelukast (SINGULAIR) 10 MG tablet, Take 10 mg by mouth At Bedtime, Disp: , Rfl:     multivitamin w/minerals (THERA-VIT-M) tablet, Take 1 tablet by mouth daily MigreLiief tablet (riboflavin, magnesium), Disp: , Rfl:     naproxen (NAPROSYN) 500 MG tablet, Take 1 tablet (500 mg) by mouth 2 times daily as needed for moderate pain or headaches (migraines and fibromyalgia), Disp: 60 tablet, Rfl: 1    norethindrone (AYGESTIN) 5 MG tablet, , Disp: , Rfl:     ondansetron (ZOFRAN ODT) 4 MG ODT tab, Take 1 tablet (4 mg) by mouth every 6 hours as needed for nausea, Disp: 27 tablet, Rfl: 1    predniSONE (DELTASONE) 20 MG tablet, Take 2 tablets (40 mg) by mouth daily, Disp: 10 tablet, Rfl: 0    PSYLLIUM HUSK PO, , Disp: , Rfl:     testosterone (ANDROGEL 1 % PUMP) 12.5 MG/ACT (1%) gel, Place 12.5 mg onto the skin, Disp: , Rfl:     tiZANidine (ZANAFLEX) 2 MG tablet, Take 1 tablet (2 mg) by mouth 3 times daily as needed for muscle spasms, Disp: 90 tablet, Rfl: 0    venlafaxine (EFFEXOR XR) 150 MG 24 hr capsule, Take 1 capsule (150 mg) by mouth daily, Disp: 90 capsule, Rfl: 0    vitamin D3 (CHOLECALCIFEROL) 1000 units (25 mcg) tablet, Take 1 tablet (1,000 Units) by mouth daily, Disp: 90 tablet, Rfl: 3    fluticasone (FLOVENT HFA) 220 MCG/ACT inhaler, Inhale 1 puff into the lungs 2 times daily (Patient not taking: Reported on 3/27/2024), Disp: , Rfl:     ubrogepant (UBRELVY) 50 MG tablet, , Disp: , Rfl:           Physical Exam:   BP (!) 131/92   Pulse 102   Resp 16   SpO2 98%    Physical Exam:   General: NAD  Neurologic:   Mental Status Exam: Alert, awake and oriented to situation. No dysarthria. Speech of normal fluency.   Cranial Nerves: Fundoscopic exam with clear disc margins bilaterally. PERRLA, EOMs intact, no nystagmus, facial movements symmetric, facial sensation intact to light touch,  hearing intact to conversation, trapezius and SCMs 5/5 bilaterally, tongue midline and fully mobile. No tongue atrophy or fasciculations.    Motor: Normal tone in all four extremities, no atrophy or fasciculations. 5/5 strength bilaterally in shoulder abduction, elbow extensors and flexors, wrist extensors and flexors, hip flexors, knee extensors and flexors, dorsi- and plantarflexion.  Sensory: Sensation intact to light touch on arms and legs bilaterally.    Coordination: Finger-nose-finger intact bilaterally.  Normal Romberg.   Reflexes: 2+ and symmetric in triceps, biceps, brachioradialis, patellar, Achilles, and plantars downgoing bilaterally.   Gait: Normal casual gait but uses cane for walking uses occasionally Head: Normocephalic, atraumatic. No radiating pain with palpation over the supraorbital notches, occipital nerves.  Temporal pulses intact.   Neck: Normal range of motion with lateral head movements and neck flexion.  Eyes: No conjunctival injection, no scleral icterus.           Data:     MRI brain from 2020 without contrast and reported as unremarkable

## 2024-06-17 NOTE — LETTER
6/17/2024       RE: Paris Nguyen  [address] [facility] [address]     Dear Colleague,    Thank you for referring your patient, Paris Nguyen, to the [facility] [place] [facility]. Please see a copy of my visit note below.      [facility]   Headache Neurology Consult  June 17, 2024     Paris Nguyen MRN# [id number]   YOB: 1985 Age: 39 year old            Assessment and Recommendations:     Ri is a 39 year old adult   Chronic migraine   Patient was seen at [facility] and transferring headache care to our Headache Clinic. Brain without a contrast in 2020 was reported as unremarkable per chart review.   No positional or worsening headaches with Valsalva   Patient has questions of Low CSF headache and EDS No symptoms at this time. Etiology/symptoms reviewed. No high CSF symptoms as well  No new headache symptoms  Headaches managed with Emgality and Ubrelvy   Last eye exam in 2023 and 2022 -both stable  Acute Treatment:  -For acute treatment of mild headache, take naproxen as needed. Do not exceed more than 14 days per month to avoid medication overuse.  -For acute treatment of moderate to severe headache, take Ubrelvy  at the onset of headache, with a repeat dose in two hours if needed. 90-day supply.   -For headache related nausea, or as a rescue medicine for headache, take ondasetron as needed.     Preventive Treatment:  -For headache prevention, continue Emgality -90 days supply     Follow up in 6-12 months or sooner if needed if any new symptoms or worsening headaches       All of patient's questions were answered from the best of my knowledge.  Patient is in agreement with the plan.     70 minutes spent on the date of the encounter doing face to face visit, chart  review, exam, results review,  meds review, treatment plan, documentation and further activities as  "noted above    TORSTEN Bobo, CNP [facility]  Headache certified  [facility]                Chief Complaint:     Chief Complaint   Patient presents with    Consult           History is obtained from the patient and medical record.   hypermobile-type Erin-Danlos syndrome (hEDS), chronic migraines    Ri is a 39 year old adult     Seen at [facility] /Mahesh Guerrero PA-C but closed [place] location.   Brain MRI and orbits in 2020  [facility] notes reviewed and was seen in 2016 and than from [id number]    Before Emgality 15-20/month and down to 3 headache days per month since starting Emgalitty Aug-Sep 2021  Ubrelvy started in 2021 as well.     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 Feel like \"going blind\" -know can see but brain cannot organize what patient seeing. Patient would take triptan /NSAIDs  and would nap and would feel incredibly intense pain all over 3-4 hours   \"Typical migraines\" in the back of the head, wake up with them when overheated or neck did not have enough support. Associated brain fog, irritability, no appetite, photophobia severe, sounds feel more acute but do not hurt but light absolutely hurt. No visual symptoms.   Might get one 3 days at the end of the month before Emgality reinjection and before Emgality headaches frequent more than half of the days and lasting up to a week.   Ubrelvy as needed and it helps -will reduce symptoms and helps.   No side effects with Emgality or Ubrelvy     Headaches Tx  Naproxen as needed but no longer really needed   TENS units, chirp wheel, ice packs, muscle rubs and balms   TPs -\"most painful\" and did not help   Migralief   B12  Rizatriptan and sumatriptan after one pill -legs \"swimmy\", disoriented feeling and trcie to think \"no thoughts\"   Gabapentin,   Topiramate  Tizanidine  Citalopram  Escitalopram  Paroxetine  Venlafaxine  Bupropion  Trazadone  Magnesium  Acupuncture  Biofeedback  ONB   Ketroloac  Medrol pack -does fine but " does not seem to help long term     FH-unknown       SH:  Minors in linguistic and photography and major in cultural anthropology   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 Controlled now by diet    GERD (gastroesophageal reflux disease)     History of steroid therapy     predisone burst for asthma    Hypertension 2023    IBS (irritable bowel syndrome)     Migraines     Pelvic and perineal pain     Stomach problems     Possible hemorrhoids?    Uncomplicated asthma     Vision disorder     Crossed-eye. Surgery at age 1 and 2              Past Surgical History:     Past Surgical History:   Procedure Laterality Date    ABDOMEN SURGERY  10/2016    Endometriosis excision    CHOLECYSTECTOMY  8/2023    Laparoscopic emergency removal    CYSTOSCOPY N/A 10/12/2016    Procedure: CYSTOSCOPY;  Surgeon: Eliazar Patel MD;  Location:  OR    DAVINCI ASSISTED ABLATION / EXCISION OF ENDOMETRIOSIS N/A 10/12/2016    Procedure: DAVINCI ASSISTED ABLATION / EXCISION OF ENDOMETRIOSIS;  Surgeon: Eliazar Patel MD;  Location:  OR    DAVINCI LYSIS OF ADHESIONS N/A 10/12/2016    Procedure: DAVINCI LYSIS OF ADHESIONS;  Surgeon: Eliazar Patel MD;  Location:  OR    DAVINCI PELVIC PROCEDURE N/A 10/12/2016    Procedure: DAVINCI PELVIC PROCEDURE;  Surgeon: Eliazar Patel MD;  Location:  OR    DILATION AND CURETTAGE, HYSTEROSCOPY DIAGNOSTIC, COMBINED N/A 10/12/2016    Procedure: COMBINED DILATION AND CURETTAGE, HYSTEROSCOPY DIAGNOSTIC;  Surgeon: Eliazar Patel MD;  Location:  OR    EYE SURGERY      Eye Surgery x 2 as an infant    HC  "TOOTH EXTRACTION W/FORCEP               Social History:            Family History:     Family History   Problem Relation Age of Onset    Hypothyroidism Mother     Kidney Disease Mother         IgA Nephropathy    Diabetes Mother     Thyroid Disease Mother         hypothyroidism    Obesity Mother     Alcoholism Father     Pancreatitis Father         Alcohol related    Depression Father     Anxiety Disorder Father     Substance Abuse Father         Alcoholic    Diabetes Father     Myocardial Infarction Father     Coronary Artery Disease Father     Hyperlipidemia Father     Attention Deficit Disorder Sister     Anxiety Disorder Sister     Myocardial Infarction Paternal Grandfather 62    Breast Cancer Paternal Grandmother     Other Cancer Paternal Grandmother         Skin on face    Uterine Cancer Maternal Grandmother     Myocardial Infarction Maternal Aunt     Alcoholism Maternal Aunt     Osteoporosis Maternal Aunt              Allergies:      Allergies   Allergen Reactions    Latex Rash     Rash and blisters    Liquid Adhesive Rash     Rash and blisters      Seasonal Allergies     Tramadol GI Disturbance and Other (See Comments)    Morphine Anxiety, GI Disturbance and Nausea    Wound Dressing Adhesive Rash     PN: \"blisters\"             Medications:     Current Outpatient Medications:     albuterol (PROVENTIL) (5 MG/ML) 0.5% neb solution, Take 2.5 mg by nebulization every 6 hours as needed for shortness of breath, wheezing or cough, Disp: , Rfl:     ARNUITY ELLIPTA 100 MCG/ACT inhaler, , Disp: , Rfl:     benzonatate (TESSALON) 100 MG capsule, Take 100-200 mg by mouth as needed , Disp: , Rfl:     buPROPion (WELLBUTRIN XL) 150 MG 24 hr tablet, Take 1 tablet (150 mg) by mouth every morning, Disp: 90 tablet, Rfl: 1    cyanocobalamin (VITAMIN B-12) 25 mcg TABS quarter-tab, Take 1,000 mcg by mouth daily, Disp: , Rfl:     dicyclomine (BENTYL) 10 MG capsule, Take 10 mg by mouth 4 times daily (before meals and nightly), Disp: , " Rfl:     gabapentin (NEURONTIN) 300 MG capsule, TAKE 1 CAPSULE (300 MG) TWICE A DAY AND 2 CAPSULES (600 MG) AT BEDTIME, Disp: 360 capsule, Rfl: 1    galcanezumab-gnlm (EMGALITY) 120 MG/ML injection, , Disp: , Rfl:     hydrOXYzine (ATARAX) 25 MG tablet, TAKE 1 TABLET  THREE TIMES A DAY AS NEEDED FOR ITCHING AND ANXIETY, Disp: 270 tablet, Rfl: 1    losartan (COZAAR) 25 MG tablet, Take 1 tablet (25 mg) by mouth daily, Disp: 90 tablet, Rfl: 3    montelukast (SINGULAIR) 10 MG tablet, Take 10 mg by mouth At Bedtime, Disp: , Rfl:     multivitamin w/minerals (THERA-VIT-M) tablet, Take 1 tablet by mouth daily MigreLiief tablet (riboflavin, magnesium), Disp: , Rfl:     naproxen (NAPROSYN) 500 MG tablet, Take 1 tablet (500 mg) by mouth 2 times daily as needed for moderate pain or headaches (migraines and fibromyalgia), Disp: 60 tablet, Rfl: 1    norethindrone (AYGESTIN) 5 MG tablet, , Disp: , Rfl:     ondansetron (ZOFRAN ODT) 4 MG ODT tab, Take 1 tablet (4 mg) by mouth every 6 hours as needed for nausea, Disp: 27 tablet, Rfl: 1    predniSONE (DELTASONE) 20 MG tablet, Take 2 tablets (40 mg) by mouth daily, Disp: 10 tablet, Rfl: 0    PSYLLIUM HUSK PO, , Disp: , Rfl:     testosterone (ANDROGEL 1 % PUMP) 12.5 MG/ACT (1%) gel, Place 12.5 mg onto the skin, Disp: , Rfl:     tiZANidine (ZANAFLEX) 2 MG tablet, Take 1 tablet (2 mg) by mouth 3 times daily as needed for muscle spasms, Disp: 90 tablet, Rfl: 0    venlafaxine (EFFEXOR XR) 150 MG 24 hr capsule, Take 1 capsule (150 mg) by mouth daily, Disp: 90 capsule, Rfl: 0    vitamin D3 (CHOLECALCIFEROL) 1000 units (25 mcg) tablet, Take 1 tablet (1,000 Units) by mouth daily, Disp: 90 tablet, Rfl: 3    fluticasone (FLOVENT HFA) 220 MCG/ACT inhaler, Inhale 1 puff into the lungs 2 times daily (Patient not taking: Reported on 3/27/2024), Disp: , Rfl:     ubrogepant (UBRELVY) 50 MG tablet, , Disp: , Rfl:           Physical Exam:   BP (!) 131/92   Pulse 102   Resp 16   SpO2 98%    Physical  Exam:   General: NAD  Neurologic:   Mental Status Exam: Alert, awake and oriented to situation. No dysarthria. Speech of normal fluency.   Cranial Nerves: Fundoscopic exam with clear disc margins bilaterally. PERRLA, EOMs intact, no nystagmus, facial movements symmetric, facial sensation intact to light touch, hearing intact to conversation, trapezius and SCMs 5/5 bilaterally, tongue midline and fully mobile. No tongue atrophy or fasciculations.    Motor: Normal tone in all four extremities, no atrophy or fasciculations. 5/5 strength bilaterally in shoulder abduction, elbow extensors and flexors, wrist extensors and flexors, hip flexors, knee extensors and flexors, dorsi- and plantarflexion.  Sensory: Sensation intact to light touch on arms and legs bilaterally.    Coordination: Finger-nose-finger intact bilaterally.  Normal Romberg.   Reflexes: 2+ and symmetric in triceps, biceps, brachioradialis, patellar, Achilles, and plantars downgoing bilaterally.   Gait: Normal casual gait but uses cane for walking uses occasionally Head: Normocephalic, atraumatic. No radiating pain with palpation over the supraorbital notches, occipital nerves.  Temporal pulses intact.   Neck: Normal range of motion with lateral head movements and neck flexion.  Eyes: No conjunctival injection, no scleral icterus.           Data:     MRI brain from 2020 without contrast and reported as unremarkable            Again, thank you for allowing me to participate in the care of your patient.      Sincerely,    TORSTEN [place] CNP

## 2024-06-18 ENCOUNTER — TELEPHONE (OUTPATIENT)
Dept: NEUROLOGY | Facility: CLINIC | Age: 39
End: 2024-06-18
Payer: COMMERCIAL

## 2024-06-18 NOTE — TELEPHONE ENCOUNTER
Left Voicemail (1st Attempt) and Sent Mychart (1st Attempt) for the patient to call back and schedule the following:    Appointment type: Return Headache  Provider: Christina Cotter  Return date: 6-12 month   Specialty phone number: [phone]  Additional appointment(s) needed:   Additonal Notes:     Kathy Anglin on 6/18/2024 at 5:04 PM

## 2024-06-19 ENCOUNTER — THERAPY VISIT (OUTPATIENT)
Dept: PHYSICAL THERAPY | Facility: CLINIC | Age: 39
End: 2024-06-19
Attending: PEDIATRICS
Payer: COMMERCIAL

## 2024-06-19 ENCOUNTER — TELEPHONE (OUTPATIENT)
Dept: NEUROLOGY | Facility: CLINIC | Age: 39
End: 2024-06-19

## 2024-06-19 DIAGNOSIS — M25.50 HYPERMOBILITY ARTHRALGIA: Primary | ICD-10-CM

## 2024-06-19 DIAGNOSIS — Q79.60 EHLERS-DANLOS SYNDROME: ICD-10-CM

## 2024-06-19 PROCEDURE — 97110 THERAPEUTIC EXERCISES: CPT | Mod: GP | Performed by: PHYSICAL THERAPIST

## 2024-06-19 NOTE — TELEPHONE ENCOUNTER
Prior Authorization Retail Medication Request    Medication/Dose: Emgality 120 mg every 30 days  Diagnosis and ICD code (if different than what is on RX):    New/renewal/insurance change PA/secondary ins. PA:  Previously Tried and Failed:      Rationale:  Migraine  Transfer of care.  Continue with Emgality    Insurance   Primary: [employer]  Insurance ID:  [id number]

## 2024-06-19 NOTE — TELEPHONE ENCOUNTER
Prior Authorization Retail Medication Request    Medication/Dose: Ubrelvy 50 mg  Diagnosis and ICD code (if different than what is on RX):    New/renewal/insurance change PA/secondary ins. PA:  Previously Tried and Failed:    Rationale:      Insurance   Primary: Migraine  Transfer of care.  Continue with Emgality    Insurance   Primary: [employer]  Insurance ID:  [id number]         To Dr. Sandra   See attached photo and TE from today, 4/29/24.

## 2024-06-20 ENCOUNTER — DOCUMENTATION ONLY (OUTPATIENT)
Dept: INTERNAL MEDICINE | Facility: CLINIC | Age: 39
End: 2024-06-20
Payer: COMMERCIAL

## 2024-06-20 DIAGNOSIS — Q79.60 EHLERS-DANLOS SYNDROME: Primary | ICD-10-CM

## 2024-06-20 DIAGNOSIS — M25.50 HYPERMOBILITY ARTHRALGIA: ICD-10-CM

## 2024-06-20 NOTE — TELEPHONE ENCOUNTER
Patient confirmed scheduled appointment:  Date: 6/13/2025  Time: 9:30am  Visit type: Return Headache  Provider: Chrisitna Cotter  Location: virtual  Testing/imaging:   Additional notes:     Kathy Anglin on 6/20/2024 at 11:02 AM

## 2024-06-24 ENCOUNTER — DOCUMENTATION ONLY (OUTPATIENT)
Dept: INTERNAL MEDICINE | Facility: CLINIC | Age: 39
End: 2024-06-24
Payer: COMMERCIAL

## 2024-06-24 DIAGNOSIS — M21.42 ACQUIRED PES PLANUS OF BOTH FEET: ICD-10-CM

## 2024-06-24 DIAGNOSIS — M21.41 ACQUIRED PES PLANUS OF BOTH FEET: ICD-10-CM

## 2024-06-24 DIAGNOSIS — M25.50 HYPERMOBILITY ARTHRALGIA: ICD-10-CM

## 2024-06-24 DIAGNOSIS — Q79.60 EHLERS-DANLOS SYNDROME: Primary | ICD-10-CM

## 2024-06-25 NOTE — TELEPHONE ENCOUNTER
Retail Pharmacy Prior Authorization Team   Phone: [phone]    PA Initiation    Medication: UBRELVY 50 MG PO TABS  Insurance Company: [employer] Non-Specialty PA's - Phone [phone] Fax [phone]  Pharmacy Filling the Rx: [facility] - [place], [place] - [address] [facility]  Filling Pharmacy Phone: [phone]  Filling Pharmacy Fax:    Start Date: 6/25/2024      Note: Due to record-high volumes, our turn-around time is taking longer than usual . We are currently 8 days behind in the pools.   We are working diligently to submit all requests in a timely manner and in the order they are received. Please only flag TRUE URGENT requests as high priority to the pool at this time.   If you have questions on status of PA's,  please send a note/message in the active PA encounter and send back to the [facility] PA pool [[id number]].    If you have questions about the turn-around time or about our process, please reach out to our supervisor Ce Ye.   Thank you!   RPPA (Retail Pharmacy Prior Authorization) team

## 2024-06-25 NOTE — TELEPHONE ENCOUNTER
Retail Pharmacy Prior Authorization Team   Phone: [phone]    Prior Authorization Not Needed per Insurance    Medication: EMGALITY 120 MG/ML SC SOAJ  Insurance Company: [employer] Non-Specialty PA's - Phone [phone] Fax [phone]  Expected CoPay: $    Pharmacy Filling the Rx: [facility] - [address]  Pharmacy Notified: YES  Patient Notified: YES **Instructed pharmacy to notify patient when script is ready to /ship.**

## 2024-06-25 NOTE — TELEPHONE ENCOUNTER
Retail Pharmacy Prior Authorization Team   Phone: [phone]    Prior Authorization Approval    Medication: UBRELVY 50 MG PO TABS  Authorization Effective Date: 5/26/2024  Authorization Expiration Date: 6/24/2025  Insurance Company: [employer] Non-Specialty PA's - Phone [phone] Fax [phone]  Which Pharmacy is filling the prescription: [facility] [address]  Pharmacy Notified: YES  Patient Notified: YES **Instructed pharmacy to notify patient when script is ready to /ship.**

## 2024-06-26 ENCOUNTER — THERAPY VISIT (OUTPATIENT)
Dept: PHYSICAL THERAPY | Facility: CLINIC | Age: 39
End: 2024-06-26
Attending: PEDIATRICS
Payer: COMMERCIAL

## 2024-06-26 DIAGNOSIS — Q79.60 EHLERS-DANLOS SYNDROME: ICD-10-CM

## 2024-06-26 DIAGNOSIS — G43.009 MIGRAINE WITHOUT AURA AND WITHOUT STATUS MIGRAINOSUS, NOT INTRACTABLE: ICD-10-CM

## 2024-06-26 DIAGNOSIS — M25.50 HYPERMOBILITY ARTHRALGIA: Primary | ICD-10-CM

## 2024-06-26 PROCEDURE — 97110 THERAPEUTIC EXERCISES: CPT | Mod: GP | Performed by: PHYSICAL THERAPIST

## 2024-06-26 PROCEDURE — 97535 SELF CARE MNGMENT TRAINING: CPT | Mod: GP | Performed by: PHYSICAL THERAPIST

## 2024-06-26 NOTE — TELEPHONE ENCOUNTER
Rx Authorization:  Requested Medication/ Dose ubrogepant (UBRELVY) 50 MG tablet   Date last refill ordered: 6/17/24  Quantity ordered: 30 tablets  # refills: 4  Date of last clinic visit with ordering provider: 6/17/24  Date of next clinic visit with ordering provider:   All pertinent protocol data (lab date/result):   Include pertinent information from patients message:

## 2024-06-26 NOTE — PATIENT INSTRUCTIONS
Try wearing abdominal binder when gardening or doing other bending over tasks.    Try using a rolled towel vertically along your spine when you are waking up - does this help to reset your clavicles?  - can combo with reverse chicken

## 2024-07-03 ENCOUNTER — THERAPY VISIT (OUTPATIENT)
Dept: PHYSICAL THERAPY | Facility: CLINIC | Age: 39
End: 2024-07-03
Attending: PEDIATRICS
Payer: COMMERCIAL

## 2024-07-03 DIAGNOSIS — Q79.60 EHLERS-DANLOS SYNDROME: ICD-10-CM

## 2024-07-03 DIAGNOSIS — M25.50 HYPERMOBILITY ARTHRALGIA: Primary | ICD-10-CM

## 2024-07-03 PROCEDURE — 97110 THERAPEUTIC EXERCISES: CPT | Mod: GP | Performed by: PHYSICAL THERAPIST

## 2024-07-03 PROCEDURE — 97140 MANUAL THERAPY 1/> REGIONS: CPT | Mod: GP | Performed by: PHYSICAL THERAPIST

## 2024-07-10 ENCOUNTER — THERAPY VISIT (OUTPATIENT)
Dept: PHYSICAL THERAPY | Facility: CLINIC | Age: 39
End: 2024-07-10
Attending: PEDIATRICS
Payer: COMMERCIAL

## 2024-07-10 DIAGNOSIS — M25.50 HYPERMOBILITY ARTHRALGIA: Primary | ICD-10-CM

## 2024-07-10 DIAGNOSIS — Q79.60 EHLERS-DANLOS SYNDROME: ICD-10-CM

## 2024-07-10 PROCEDURE — 97110 THERAPEUTIC EXERCISES: CPT | Mod: GP | Performed by: PHYSICAL THERAPIST

## 2024-07-10 NOTE — PATIENT INSTRUCTIONS
Get a 2nd abdominal binder for dirty tasks.      Proprioceptive work for your hands/wrists  - Strike a pose with eyes closed, match it with the other hand - double check with your eyes open  - Swirling water both clockwise and counterclockwise with both hands  - practice pouring water from one cup to the other (over a sink or outside)

## 2024-07-13 ENCOUNTER — HEALTH MAINTENANCE LETTER (OUTPATIENT)
Age: 39
End: 2024-07-13

## 2024-07-30 ENCOUNTER — THERAPY VISIT (OUTPATIENT)
Dept: PHYSICAL THERAPY | Facility: CLINIC | Age: 39
End: 2024-07-30
Attending: PEDIATRICS
Payer: COMMERCIAL

## 2024-07-30 DIAGNOSIS — M25.50 HYPERMOBILITY ARTHRALGIA: Primary | ICD-10-CM

## 2024-07-30 DIAGNOSIS — Q79.60 EHLERS-DANLOS SYNDROME: ICD-10-CM

## 2024-07-30 PROCEDURE — 97140 MANUAL THERAPY 1/> REGIONS: CPT | Mod: GP | Performed by: PHYSICAL THERAPIST

## 2024-07-30 PROCEDURE — 97110 THERAPEUTIC EXERCISES: CPT | Mod: GP | Performed by: PHYSICAL THERAPIST

## 2024-08-14 ENCOUNTER — THERAPY VISIT (OUTPATIENT)
Dept: PHYSICAL THERAPY | Facility: CLINIC | Age: 39
End: 2024-08-14
Attending: PEDIATRICS
Payer: COMMERCIAL

## 2024-08-14 DIAGNOSIS — M25.50 HYPERMOBILITY ARTHRALGIA: Primary | ICD-10-CM

## 2024-08-14 DIAGNOSIS — Q79.60 EHLERS-DANLOS SYNDROME: ICD-10-CM

## 2024-08-14 PROCEDURE — 97110 THERAPEUTIC EXERCISES: CPT | Mod: GP | Performed by: PHYSICAL THERAPIST

## 2024-08-14 PROCEDURE — 97140 MANUAL THERAPY 1/> REGIONS: CPT | Mod: GP | Performed by: PHYSICAL THERAPIST

## 2024-08-19 ENCOUNTER — TELEPHONE (OUTPATIENT)
Dept: PHYSICAL THERAPY | Facility: CLINIC | Age: 39
End: 2024-08-19
Payer: COMMERCIAL

## 2024-08-19 SDOH — HEALTH STABILITY: PHYSICAL HEALTH: ON AVERAGE, HOW MANY DAYS PER WEEK DO YOU ENGAGE IN MODERATE TO STRENUOUS EXERCISE (LIKE A BRISK WALK)?: 3 DAYS

## 2024-08-19 SDOH — HEALTH STABILITY: PHYSICAL HEALTH: ON AVERAGE, HOW MANY MINUTES DO YOU ENGAGE IN EXERCISE AT THIS LEVEL?: 60 MIN

## 2024-08-19 ASSESSMENT — ASTHMA QUESTIONNAIRES
QUESTION_1 LAST FOUR WEEKS HOW MUCH OF THE TIME DID YOUR ASTHMA KEEP YOU FROM GETTING AS MUCH DONE AT WORK, SCHOOL OR AT HOME: MOST OF THE TIME
QUESTION_2 LAST FOUR WEEKS HOW OFTEN HAVE YOU HAD SHORTNESS OF BREATH: THREE TO SIX TIMES A WEEK
ACT_TOTALSCORE: 15
ACT_TOTALSCORE: 15
QUESTION_3 LAST FOUR WEEKS HOW OFTEN DID YOUR ASTHMA SYMPTOMS (WHEEZING, COUGHING, SHORTNESS OF BREATH, CHEST TIGHTNESS OR PAIN) WAKE YOU UP AT NIGHT OR EARLIER THAN USUAL IN THE MORNING: ONCE OR TWICE
QUESTION_5 LAST FOUR WEEKS HOW WOULD YOU RATE YOUR ASTHMA CONTROL: SOMEWHAT CONTROLLED
QUESTION_4 LAST FOUR WEEKS HOW OFTEN HAVE YOU USED YOUR RESCUE INHALER OR NEBULIZER MEDICATION (SUCH AS ALBUTEROL): TWO OR THREE TIMES PER WEEK

## 2024-08-19 ASSESSMENT — SOCIAL DETERMINANTS OF HEALTH (SDOH): HOW OFTEN DO YOU GET TOGETHER WITH FRIENDS OR RELATIVES?: THREE TIMES A WEEK

## 2024-08-20 DIAGNOSIS — F41.1 GENERALIZED ANXIETY DISORDER: ICD-10-CM

## 2024-08-20 DIAGNOSIS — G47.00 INSOMNIA, UNSPECIFIED TYPE: ICD-10-CM

## 2024-08-22 ASSESSMENT — PATIENT HEALTH QUESTIONNAIRE - PHQ9
10. IF YOU CHECKED OFF ANY PROBLEMS, HOW DIFFICULT HAVE THESE PROBLEMS MADE IT FOR YOU TO DO YOUR WORK, TAKE CARE OF THINGS AT HOME, OR GET ALONG WITH OTHER PEOPLE: SOMEWHAT DIFFICULT
SUM OF ALL RESPONSES TO PHQ QUESTIONS 1-9: 10
SUM OF ALL RESPONSES TO PHQ QUESTIONS 1-9: 10

## 2024-08-23 ENCOUNTER — OFFICE VISIT (OUTPATIENT)
Dept: INTERNAL MEDICINE | Facility: CLINIC | Age: 39
End: 2024-08-23
Payer: COMMERCIAL

## 2024-08-23 ENCOUNTER — LAB (OUTPATIENT)
Dept: LAB | Facility: CLINIC | Age: 39
End: 2024-08-23
Payer: COMMERCIAL

## 2024-08-23 VITALS
DIASTOLIC BLOOD PRESSURE: 81 MMHG | RESPIRATION RATE: 16 BRPM | BODY MASS INDEX: 36.67 KG/M2 | HEIGHT: 65 IN | OXYGEN SATURATION: 96 % | WEIGHT: 220.1 LBS | HEART RATE: 97 BPM | SYSTOLIC BLOOD PRESSURE: 123 MMHG

## 2024-08-23 DIAGNOSIS — R73.03 PREDIABETES: ICD-10-CM

## 2024-08-23 DIAGNOSIS — F33.1 MODERATE EPISODE OF RECURRENT MAJOR DEPRESSIVE DISORDER (H): ICD-10-CM

## 2024-08-23 DIAGNOSIS — M62.838 MUSCLE SPASM: ICD-10-CM

## 2024-08-23 DIAGNOSIS — Z00.00 ROUTINE HISTORY AND PHYSICAL EXAMINATION OF ADULT: Primary | ICD-10-CM

## 2024-08-23 DIAGNOSIS — E55.9 VITAMIN D DEFICIENCY: ICD-10-CM

## 2024-08-23 DIAGNOSIS — F41.1 GENERALIZED ANXIETY DISORDER: ICD-10-CM

## 2024-08-23 DIAGNOSIS — Z00.00 ROUTINE HISTORY AND PHYSICAL EXAMINATION OF ADULT: ICD-10-CM

## 2024-08-23 DIAGNOSIS — R53.83 OTHER FATIGUE: ICD-10-CM

## 2024-08-23 DIAGNOSIS — G47.00 INSOMNIA, UNSPECIFIED TYPE: ICD-10-CM

## 2024-08-23 DIAGNOSIS — R05.1 ACUTE COUGH: ICD-10-CM

## 2024-08-23 DIAGNOSIS — M79.7 FIBROMYALGIA: ICD-10-CM

## 2024-08-23 LAB
ALBUMIN SERPL BCG-MCNC: 4.4 G/DL (ref 3.5–5.2)
ALP SERPL-CCNC: 25 U/L (ref 40–150)
ALT SERPL W P-5'-P-CCNC: 15 U/L (ref 0–70)
ANION GAP SERPL CALCULATED.3IONS-SCNC: 12 MMOL/L (ref 7–15)
AST SERPL W P-5'-P-CCNC: 16 U/L (ref 0–45)
BILIRUB SERPL-MCNC: 0.2 MG/DL
BUN SERPL-MCNC: 14 MG/DL (ref 6–20)
CALCIUM SERPL-MCNC: 9.5 MG/DL (ref 8.8–10.4)
CHLORIDE SERPL-SCNC: 103 MMOL/L (ref 98–107)
CREAT SERPL-MCNC: 0.73 MG/DL (ref 0.51–1.17)
EGFRCR SERPLBLD CKD-EPI 2021: >90 ML/MIN/1.73M2
GLUCOSE SERPL-MCNC: 88 MG/DL (ref 70–99)
HBA1C MFR BLD: 5.6 %
HCO3 SERPL-SCNC: 25 MMOL/L (ref 22–29)
POTASSIUM SERPL-SCNC: 3.9 MMOL/L (ref 3.4–5.3)
PROT SERPL-MCNC: 7.5 G/DL (ref 6.4–8.3)
SODIUM SERPL-SCNC: 140 MMOL/L (ref 135–145)
TSH SERPL DL<=0.005 MIU/L-ACNC: 2.45 UIU/ML (ref 0.3–4.2)
VIT D+METAB SERPL-MCNC: 33 NG/ML (ref 20–50)

## 2024-08-23 PROCEDURE — 99396 PREV VISIT EST AGE 40-64: CPT | Performed by: NURSE PRACTITIONER

## 2024-08-23 PROCEDURE — 36415 COLL VENOUS BLD VENIPUNCTURE: CPT | Performed by: PATHOLOGY

## 2024-08-23 PROCEDURE — 84443 ASSAY THYROID STIM HORMONE: CPT | Performed by: PATHOLOGY

## 2024-08-23 PROCEDURE — 99000 SPECIMEN HANDLING OFFICE-LAB: CPT | Performed by: PATHOLOGY

## 2024-08-23 PROCEDURE — 80053 COMPREHEN METABOLIC PANEL: CPT | Performed by: PATHOLOGY

## 2024-08-23 PROCEDURE — 82306 VITAMIN D 25 HYDROXY: CPT | Performed by: NURSE PRACTITIONER

## 2024-08-23 PROCEDURE — 83036 HEMOGLOBIN GLYCOSYLATED A1C: CPT | Performed by: NURSE PRACTITIONER

## 2024-08-23 RX ORDER — VITAMIN B COMPLEX
25 TABLET ORAL DAILY
Qty: 90 TABLET | Refills: 3 | Status: SHIPPED | OUTPATIENT
Start: 2024-08-23

## 2024-08-23 RX ORDER — GABAPENTIN 300 MG/1
CAPSULE ORAL
Qty: 360 CAPSULE | Refills: 3 | OUTPATIENT
Start: 2024-08-23

## 2024-08-23 RX ORDER — BUPROPION HYDROCHLORIDE 150 MG/1
150 TABLET ORAL EVERY MORNING
Qty: 90 TABLET | Refills: 3 | Status: SHIPPED | OUTPATIENT
Start: 2024-08-23

## 2024-08-23 RX ORDER — ELAGOLIX 200 MG/1
TABLET, FILM COATED ORAL
COMMUNITY
Start: 2024-08-23

## 2024-08-23 RX ORDER — TIZANIDINE 2 MG/1
2 TABLET ORAL 3 TIMES DAILY PRN
Qty: 90 TABLET | Refills: 0 | Status: SHIPPED | OUTPATIENT
Start: 2024-08-23

## 2024-08-23 RX ORDER — VENLAFAXINE HYDROCHLORIDE 150 MG/1
150 CAPSULE, EXTENDED RELEASE ORAL DAILY
Qty: 90 CAPSULE | Refills: 3 | Status: SHIPPED | OUTPATIENT
Start: 2024-08-23

## 2024-08-23 RX ORDER — PREDNISONE 20 MG/1
40 TABLET ORAL DAILY
Qty: 10 TABLET | Refills: 0 | Status: SHIPPED | OUTPATIENT
Start: 2024-08-23

## 2024-08-23 RX ORDER — SPIRONOLACTONE 25 MG/1
25 TABLET ORAL 2 TIMES DAILY
COMMUNITY
Start: 2024-08-22 | End: 2025-08-22

## 2024-08-23 RX ORDER — GABAPENTIN 300 MG/1
CAPSULE ORAL
Qty: 360 CAPSULE | Refills: 1 | Status: SHIPPED | OUTPATIENT
Start: 2024-08-23

## 2024-08-23 NOTE — LETTER
My Depression Action Plan  Name: Paris Nguyen   Date of Birth 1985  Date: 8/23/2024    My doctor: Radha Collins   My clinic: [facility] INTERNAL MEDICINE [address] [id number] [phone]            GREEN    ZONE   Good Control    What it looks like:   Things are going generally well. You have normal ups and downs. You may even feel depressed from time to time, but bad moods usually last less than a day.   What you need to do:  Continue to care for yourself (see self care plan)  Check your depression survival kit and update it as needed  Follow your physician s recommendations including any medication.  Do not stop taking medication unless you consult with your physician first.             YELLOW         ZONE Getting Worse    What it looks like:   Depression is starting to interfere with your life.   It may be hard to get out of bed; you may be starting to isolate yourself from others.  Symptoms of depression are starting to last most all day and this has happened for several days.   You may have suicidal thoughts but they are not constant.   What you need to do:     Call your care team. Your response to treatment will improve if you keep your care team informed of your progress. Yellow periods are signs an adjustment may need to be made.     Continue your self-care.  Just get dressed and ready for the day.  Don't give yourself time to talk yourself out of it.    Talk to someone in your support network.    Open up your Depression Self-Care Plan/Wellness Kit.             RED    ZONE Medical Alert - Get Help    What it looks like:   Depression is seriously interfering with your life.   You may experience these or other symptoms: You can t get out of bed most days, can t work or engage in other necessary activities, you have trouble taking care of basic hygiene, or basic responsibilities, thoughts of suicide or death that  will not go away, self-injurious behavior.     What you need to do:  Call your care team and request a same-day appointment. If they are not available (weekends or after hours) call your local crisis line, emergency room or 911.          Depression Self-Care Plan / Wellness Kit    Many people find that medication and therapy are helpful treatments for managing depression. In addition, making small changes to your everyday life can help to boost your mood and improve your wellbeing. Below are some tips for you to consider. Be sure to talk with your medical provider and/or behavioral health consultant if your symptoms are worsening or not improving.     Sleep   Sleep hygiene  means all of the habits that support good, restful sleep. It includes maintaining a consistent bedtime and wake time, using your bedroom only for sleeping or sex, and keeping the bedroom dark and free of distractions like a computer, smartphone, or television.     Develop a Healthy Routine  Maintain good hygiene. Get out of bed in the morning, make your bed, brush your teeth, take a shower, and get dressed. Don t spend too much time viewing media that makes you feel stressed. Find time to relax each day.    Exercise  Get some form of exercise every day. This will help reduce pain and release endorphins, the  feel good  chemicals in your brain. It can be as simple as just going for a walk or doing some gardening, anything that will get you moving.      Diet  Strive to eat healthy foods, including fruits and vegetables. Drink plenty of water. Avoid excessive sugar, caffeine, alcohol, and other mood-altering substances.     Stay Connected with Others  Stay in touch with friends and family members.    Manage Your Mood  Try deep breathing, massage therapy, biofeedback, or meditation. Take part in fun activities when you can. Try to find something to smile about each day.     Psychotherapy  Be open to working with a therapist if your provider  recommends it.     Medication  Be sure to take your medication as prescribed. Most anti-depressants need to be taken every day. It usually takes several weeks for medications to work. Not all medicines work for all people. It is important to follow-up with your provider to make sure you have a treatment plan that is working for you. Do not stop your medication abruptly without first discussing it with your provider.    Crisis Resources   These hotlines are for both adults and children. They and are open 24 hours a day, 7 days a week unless noted otherwise.    National Suicide Prevention Lifeline   988 or 2-764-069-SBLC (6824)    Crisis Text Line    www.crisistextline.org  Text HOME to 532998 from anywhere in the [place], anytime, about any type of crisis. A live, trained crisis counselor will receive the text and respond quickly.    [employer] Lifeline for LGBTQ Youth  A national crisis intervention and suicide lifeline for LGBTQ youth under 25. Provides a safe place to talk without judgement. Call 1-130.972.6617; text START to 317306 or visit www.thetrevorproject.org to talk to a trained counselor.    For [facility] crisis numbers, visit the [facility] website at:  https://[place].gov/dhs/people-we-serve/adults/health-care/mental-health/resources/crisis-contacts.jsp

## 2024-08-23 NOTE — PROGRESS NOTES
Preventive Care Visit  [facility]  TORSTEN Chao CNP, Internal Medicine  Aug 23, 2024      Assessment & Plan     Routine history and physical examination of adult  Encouraged pt to continue with work on healthy lifestyle with increased physical activity, nutritious diet with good portion control, focusing on lean proteins and vegetable/fruit intake, stress management and safety.  - Comprehensive metabolic panel (BMP + Alb, Alk Phos, ALT, AST, Total. Bili, TP); Future    Moderate episode of recurrent major depressive disorder (H)  Overall feels mood is doing well on current regimen and continues following with therapist regularly.  Continue working on self-care and good stress management tools.    - buPROPion (WELLBUTRIN XL) 150 MG 24 hr tablet; Take 1 tablet (150 mg) by mouth every morning.  - venlafaxine (EFFEXOR XR) 150 MG 24 hr capsule; Take 1 capsule (150 mg) by mouth daily.    Generalized anxiety disorder  Insomnia, unspecified type  Refills for gabapentin and Effexor.     - gabapentin (NEURONTIN) 300 MG capsule; TAKE 1 CAPSULE (300 MG) TWICE A DAY AND 2 CAPSULES (600 MG) AT BEDTIME  - venlafaxine (EFFEXOR XR) 150 MG 24 hr capsule; Take 1 capsule (150 mg) by mouth daily.    Vitamin D deficiency  - Vitamin D3 (CHOLECALCIFEROL) 25 mcg (1000 units) tablet; Take 1 tablet (25 mcg) by mouth daily.  - Vitamin D Deficiency; Future    Other fatigue  FMH of thyroid disease, has chronic fatigue, will repeat thyroid studies.    - TSH with free T4 reflex; Future    Acute cough  Refill for prednisone burst to have available if needed in case of acute asthma exacerbation.  Pt continues following with her Asthma/Allergy specialist Dr. Leo.  Continues on Arnuity ellipta.    - predniSONE (DELTASONE) 20 MG tablet; Take 2 tablets (40 mg) by mouth daily.    Fibromyalgia  Muscle spasm  Uses tizanidine infrequently for muscle spasms, which helps.  Last filled in 2019, will  "provide refill to have on hand if needed.   - tiZANidine (ZANAFLEX) 2 MG tablet; Take 1 tablet (2 mg) by mouth 3 times daily as needed for muscle spasms.    Prediabetes  A1c 5.7 last year, will repeat A1c today.    - Hemoglobin A1c; Future            BMI  Estimated body mass index is 37.2 kg/m  as calculated from the following:    Height as of this encounter: 1.638 m (5' 4.5\").    Weight as of this encounter: 99.8 kg (220 lb 1.6 oz).         Counseling  Appropriate preventive services were addressed with this patient via screening, questionnaire, or discussion as appropriate for fall prevention, nutrition, physical activity, Tobacco-use cessation, social engagement, weight loss and cognition.  Checklist reviewing preventive services available has been given to the patient.  Reviewed patient's diet, addressing concerns and/or questions.   Ri is at risk for lack of exercise and has been provided with information to increase physical activity for the benefit of Ri's well-being.   The patient was instructed to see the dentist every 6 months.   Ri is at risk for psychosocial distress and has been provided with information to reduce risk.   The patient's PHQ-9 score is consistent with moderate depression. Ri was provided with information regarding depression.           Return in about 6 months (around 2/23/2025). Or sooner prn for any changes or concerns    Subjective   Ri is a 39 year old, presenting for the following:  Physical and Follow Up (EDS labs)        8/23/2024     9:21 AM   Additional Questions   Roomed by rosana          [facility] [place]    3 appts in 3 days, has been knocking things off the list.  Massage later today.  CBC through health [employer] yesterday, WNL.  Would like to update metabolic panel, Vit D level. Curious about electrolytes and balancing these, has been using electrolyte tabs, not sure if better or placebo effect.  Saw Dr. Ames at [facility],   Dr. Maria Eugenia Leo at [email or web address], Asthma & " Allergy.  Recently used a prednisone burst for flare-up.  Indu Real in GI through [facility].    Braces help, but feels joints rely on these now, has been an adjustment but knows these are good.  Continues with PT  Mood okay, situational stressors.   Mammogram with US and biopsy in April, negative         8/19/2024   General Health   How would you rate your overall physical health? (!) FAIR   Feel stress (tense, anxious, or unable to sleep) Only a little      (!) STRESS CONCERN      8/19/2024   Nutrition   Three or more servings of calcium each day? Yes   Diet: Other   If other, please elaborate: Low FODMAP   How many servings of fruit and vegetables per day? (!) 2-3   How many sweetened beverages each day? (!) 2            8/19/2024   Exercise   Days per week of moderate/strenous exercise 3 days   Average minutes spent exercising at this level 60 min            8/19/2024   Social Factors   Frequency of gathering with friends or relatives Three times a week   Worry food won't last until get money to buy more No   Food not last or not have enough money for food? No   Do you have housing? (Housing is defined as stable permanent housing and does not include staying ouside in a car, in a tent, in an abandoned building, in an overnight shelter, or couch-surfing.) Yes   Are you worried about losing your housing? No   Lack of transportation? No   Unable to get utilities (heat,electricity)? No            8/19/2024   Dental   Dentist two times every year? (!) NO            8/19/2024   TB Screening   Were you born outside of the [place]? No          Today's PHQ-9 Score:       8/22/2024    11:40 AM   PHQ-9 SCORE   PHQ-9 Total Score MyChart 10 (Moderate depression)   PHQ-9 Total Score 10         8/19/2024   Substance Use   Alcohol more than 3/day or more than 7/wk Not Applicable   Do you use any other substances recreationally? (!) CANNABIS PRODUCTS        Social History     Tobacco Use    Smoking status: Never     Smokeless tobacco: Never   Vaping Use    Vaping status: Never Used   Substance Use Topics    Alcohol use: Not Currently     Comment: less than 1 drink a week    Drug use: Yes     Types: Marijuana           4/1/2024   LAST FHS-7 RESULTS   1st degree relative breast or ovarian cancer No   Any relative bilateral breast cancer Yes   Any male have breast cancer No   Any ONE woman have BOTH breast AND ovarian cancer No   Any woman with breast cancer before 50yrs No   2 or more relatives with breast AND/OR ovarian cancer Yes   2 or more relatives with breast AND/OR bowel cancer No           Mammogram Screening - Mammogram every 1-2 years updated in Health Maintenance based on mutual decision making        8/19/2024   STI Screening   New sexual partner(s) since last STI/HIV test? No        History of abnormal Pap smear: No - age 30-64 HPV with reflex Pap every 5 years recommended        Latest Ref Rng & Units 12/14/2018     4:40 PM 12/14/2018     2:45 PM   PAP / HPV   PAP (Historical)  NIL     HPV 16 DNA NEG^Negative  Negative    HPV 18 DNA NEG^Negative  Negative    Other HR HPV NEG^Negative  Negative      ASCVD Risk   The 10-year ASCVD risk score (Miri BRYSON, et al., 2019) is: 1.2%    Values used to calculate the score:      Age: 39 years      Sex: Female      Is Non- : No      Diabetic: No      Tobacco smoker: No      Systolic Blood Pressure: 123 mmHg      Is BP treated: Yes      HDL Cholesterol: 45 mg/dL      Total Cholesterol: 216 mg/dL        8/19/2024   Contraception/Family Planning   Questions about contraception or family planning No           Reviewed and updated as needed this visit by Provider                          Review of Systems  Constitutional, HEENT, cardiovascular, pulmonary, gi and gu systems are negative, except as otherwise noted.     Objective    Exam  /81 (BP Location: Right arm, Patient Position: Sitting, Cuff Size: Adult Large)   Pulse 97   Resp 16   Ht  "1.638 m (5' 4.5\")   Wt 99.8 kg (220 lb 1.6 oz)   SpO2 96%   Breastfeeding No   BMI 37.20 kg/m     Estimated body mass index is 37.2 kg/m  as calculated from the following:    Height as of this encounter: 1.638 m (5' 4.5\").    Weight as of this encounter: 99.8 kg (220 lb 1.6 oz).    Physical Exam  GENERAL: alert and no distress  EYES: Eyes grossly normal to inspection, strabismus, and conjunctivae and sclerae normal  HENT: ear canals and TM's normal, nose and mouth without ulcers or lesions  NECK: no adenopathy, no asymmetry, masses, or scars  RESP: lungs clear to auscultation - no rales, rhonchi or wheezes  CV: regular rate and rhythm, normal S1 S2, no S3 or S4, no murmur, click or rub, no peripheral edema  ABDOMEN: soft, nontender, no hepatosplenomegaly, no masses and bowel sounds normal  MS: no gross musculoskeletal defects noted, no edema  SKIN: no suspicious lesions or rashes  NEURO: Normal strength and tone, mentation intact and speech normal  PSYCH: mentation appears normal, affect normal/bright        Signed Electronically by: TORSTEN Chao CNP    Answers submitted by the patient for this visit:  Patient Health Questionnaire (Submitted on 8/22/2024)  If you checked off any problems, how difficult have these problems made it for you to do your work, take care of things at home, or get along with other people?: Somewhat difficult  PHQ9 TOTAL SCORE: 10    "

## 2024-08-28 ENCOUNTER — THERAPY VISIT (OUTPATIENT)
Dept: PHYSICAL THERAPY | Facility: CLINIC | Age: 39
End: 2024-08-28
Attending: PEDIATRICS
Payer: COMMERCIAL

## 2024-08-28 DIAGNOSIS — M25.50 HYPERMOBILITY ARTHRALGIA: Primary | ICD-10-CM

## 2024-08-28 DIAGNOSIS — Q79.60 EHLERS-DANLOS SYNDROME: ICD-10-CM

## 2024-08-28 PROCEDURE — 97535 SELF CARE MNGMENT TRAINING: CPT | Mod: GP | Performed by: PHYSICAL THERAPIST

## 2024-08-28 PROCEDURE — 97140 MANUAL THERAPY 1/> REGIONS: CPT | Mod: GP | Performed by: PHYSICAL THERAPIST

## 2024-08-28 PROCEDURE — 97110 THERAPEUTIC EXERCISES: CPT | Mod: GP | Performed by: PHYSICAL THERAPIST

## 2024-08-28 NOTE — PATIENT INSTRUCTIONS
Use your chirp wheel on your neck every other day for a week or so to see if this helps with your shoulder.    Use your soft cervical collar for ~10 mins in the afternoon to try to relax your neck during your work days.    Serratus anterior releases using bharat or racketball in a sheet.      Here are some patient favorites that are over the counter:  https://www.nooked.valuklik/collections/all  https://Fundrise/collections/shop-all        Chiropractor  Dr. Esperanza Tenorio at [facility]  [address] [place] [address]  [email or web address]   Office: [phone]  Cell:[phone]  Fax: [phone]  [facility].[employer]    [facility]  [address], [address], [place], [place] [address]  Phone: [phone]  Fax: [phone]  [email or web address]    [address], [address]  [place] [address]  [email or web address]    Dr Elena Palacios Chiro in [facility]    Dr Ara [facility]  [facility]  [address], [place], [place] [address] [phone]

## 2024-08-29 NOTE — PROGRESS NOTES
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 Pt will  also report return to no more than 5 headache days per month.   Goal Progress 6/26/24: Saw Neri, going to continue previous meds. In the AM since starting exercises, not waking up with headaches. Everything is stiff - not sure if due to exercises or not. 8/28/28: Bad this last week, likely due to L shoulder being off. Did massage on Fri and Sun - did help some. Not waking up with headaches - more waking up with neck and muscle strain. Having 4 headaches days per month.   Target Date 08/04/24   Date Met 08/28/24   PT Goal 2   Goal Identifier Orthotics   Goal Description pt will obtain appropriate orthotics in order to manage pes planus and ankle position.   Goal Progress 6/26/24: Has appt for AFOs 7/3 with Eddie. Delivery on 7/23. 8/28/24: Attained AFOs and they are going well. Ankles/calves will complain if not wearing them.   Target Date 08/04/24   Date Met 08/28/24   PT Goal 3   Goal Identifier HEP   Goal Description pt will demonstrate indep with HEP for postural and joint stability. with consistent performance of  HEP 10 mins (or 3 exercises) per day. 4/7 days per week.   Goal Progress 8/28/24: Doing daily.   Target Date 11/25/24   PT Goal 4   Goal Identifier Independence index  of hypermobility   Goal Description Patient to demo a 10% or greater reduction in score on BIoH questionnaire to demo improved subjective reports of impairement related to EDS and to improve overall function.   Goal Progress at eval 206/360 = 57.22%. 8/28/24: pt didn't fill it out before session today.   Target Date 11/25/24   PT Goal 5   Goal Identifier pain management   Goal Description Patient to report and/or demo at least 3 pain management strategies to reduce neck, shoulder and hip pain to perform activities with ease.   Goal Progress 8/28/24: Has AFOs which are helping. Considering compression for bed time (often waking up with pain due to involuntary positioning while sleeping).   Target Date 11/25/24   Subjective Report   Subjective Report Feet/ankles immediately took to the AFOs, hurt a lot more if I don't use them. I am really sore at glut max and HS insertion. More pain in L shoulder, neck and 3rd(?) rib. Intermittent weakness. Have a L shoulder brace to use with icing. 8/22/24 Gender Clinic: decrease Orilissa dose to 150mg daily. 8/23/24 PCP: depression stable, asthma exacerbation.   Therapeutic Procedure/Exercise   Therapeutic Procedures: strength, endurance, ROM, flexibility minutes (01845) 8   PTRx Ther Proc 1 Finger Stability   PTRx Ther Proc 1 - Details NT today   PTRx Ther Proc 2 Shotgun MET for Hemipelvis Rotation   PTRx Ther Proc 2 - Details Pt notes not needing to do this as much with use of AFOs 1x in the last week.   PTRx Ther Proc 3 Supine Shoulder Extensions   PTRx Ther Proc 3 - Details Transitioned to sitting - holding for 10-15 secs now.   PTRx Ther Proc 4 Supine Cervical Retraction   PTRx Ther Proc 4 - Details Transitioned to sitting - holding for 10-15 secs now.   PTRx Ther Proc 5 Supine Shoulder External Rotation   PTRx Ther Proc 5 - Details HOLD due to increased wrist pain and weakness even with loops.   PTRx Ther Proc 6 Abdominal Brace Transverse Abdominis   PTRx Ther Proc 6 -  Details NT in clinic today.   PTRx Ther Proc 7 Bridging #1   PTRx Ther Proc 7 - Details NT in clinic today   PTRx Ther Proc 8 Prone Terminal Knee Extension   PTRx Ther Proc 8 - Details HOLD - pt feels like it is too hard on calves.    PTRx Ther Proc 9 Glueteus Ferdinand Re Training   PTRx Ther Proc 9 - Details Regressed to previous exercise due to low back pain.   PTRx Ther Proc 10 Education Sheet General   PTRx Ther Proc 10 - Details Theraband color progression   PTRx Ther Proc 11 First Rib Self Mobilization   PTRx Ther Proc 11 - Details Noted that this is ok for pt to do even if issue is collarbone being out of place - may just not fix the pain they are having.   Manual Therapy   Manual Therapy: Mobilization, MFR, MLD, friction massage minutes (17968) 18   Skilled Intervention Focus on L shoulder pain. In supine, noted that L distal clavicle was upslipped - Gr III downglide x4 with good correction eventually. Increased fibrotic tone L upper trap, prox SCM and scalenes, B suboccipitals. Completed trigger point releases to all of the above. Afterward, B 1st ribs noted to be upslipped - completed Gr III downglides B x1 with good correction. Also L SA releases x2. Instructed in home releases via use of racketball in a sheet.   Self Care/home Management   ADL/Home Mgmt Training (02528) 15   Skilled Intervention Discussed pt has had good response to small chirp wheel to help with suboccipital releases with migraines - recommended they try doing this e/o day for a week or so to see if helps with L shoulder. Also ed re: use of soft cervical collar for 10 mins in the PMs of work days to allow neck to relax a little. Provided with EDS knowelageable chiros as they have found this useful in the past. Discussed possibility of compression top while sleeping as pt is not able to tolerate KT and pt feels that sleeping funny is the reason L shoulder got so out of alignment. Issued info on Alignmed and Intelliskin. Pt also interested  in medical grade - will request order to see Nicci.   Education   Learner/Method Patient   Education Comments Self management   Plan   Plan for next session How did useing chirp wheel, soft collar help with L shoulder pain? Check L clavicle position again - MT as needed. Review and progress HEP. Did we get order for compression top? Order any online?   Comments   Comments Inbasket to Dr Collins re: custom compression top order.   Total Session Time   Timed Code Treatment Minutes 41   Total Treatment Time (sum of timed and untimed services) 41       PLAN  Continue therapy per current plan of care.    Beginning/End Dates of Progress Note Reporting Period:  03/27/24 to 08/28/2024    Referring Provider:  Twan Marin

## 2024-09-04 DIAGNOSIS — G89.29 CHRONIC LEFT SHOULDER PAIN: Primary | ICD-10-CM

## 2024-09-04 DIAGNOSIS — M25.30 JOINT INSTABILITY: ICD-10-CM

## 2024-09-04 DIAGNOSIS — M25.512 CHRONIC LEFT SHOULDER PAIN: Primary | ICD-10-CM

## 2024-09-04 DIAGNOSIS — Q79.60 EDS (EHLERS-DANLOS SYNDROME): ICD-10-CM

## 2024-09-04 NOTE — PROGRESS NOTES
DME (Durable Medical Equipment) Orders and Documentation  Orders Placed This Encounter   Procedures    Orthotics, Mastectomy and Custom Compression Orders        The patient was assessed and it was determined the patient is in need of the following listed DME Supplies/Equipment. Please complete supporting documentation below to demonstrate medical necessity.      OT recommending Custom Compression Top for patient's hx EDS, left shoulder pain and instability.  TORSTEN Chao CNP

## 2024-09-05 ENCOUNTER — THERAPY VISIT (OUTPATIENT)
Dept: PHYSICAL THERAPY | Facility: CLINIC | Age: 39
End: 2024-09-05
Attending: PEDIATRICS
Payer: COMMERCIAL

## 2024-09-05 DIAGNOSIS — Q79.60 EHLERS-DANLOS SYNDROME: ICD-10-CM

## 2024-09-05 DIAGNOSIS — M25.50 HYPERMOBILITY ARTHRALGIA: Primary | ICD-10-CM

## 2024-09-05 PROCEDURE — 97535 SELF CARE MNGMENT TRAINING: CPT | Mod: GP | Performed by: PHYSICAL THERAPIST

## 2024-09-05 PROCEDURE — 97140 MANUAL THERAPY 1/> REGIONS: CPT | Mod: GP | Performed by: PHYSICAL THERAPIST

## 2024-09-05 NOTE — PATIENT INSTRUCTIONS
"Orthotics  Custom Compression Garments  Compression Specialist: Nicci Rivas  Office: [phone], and select option #2 for Orthotics - call to schedule at any site  Fax: [phone]    Monday, Wednesday: Mignon  Tuesday, Thursday, Friday: Maureen ([facility]  Orthotics & Prosthetics  [address], [address], [place] [address])        Compression measurements  Chest 45\"  Waist 42.25\"  Hips 49\"  Right arm 16.25\"        \"Collins  for foot of bed\"      "

## 2024-09-12 ENCOUNTER — THERAPY VISIT (OUTPATIENT)
Dept: PHYSICAL THERAPY | Facility: CLINIC | Age: 39
End: 2024-09-12
Attending: PEDIATRICS
Payer: COMMERCIAL

## 2024-09-12 DIAGNOSIS — M25.50 HYPERMOBILITY ARTHRALGIA: Primary | ICD-10-CM

## 2024-09-12 DIAGNOSIS — Q79.60 EHLERS-DANLOS SYNDROME: ICD-10-CM

## 2024-09-12 PROCEDURE — 97110 THERAPEUTIC EXERCISES: CPT | Mod: GP | Performed by: PHYSICAL THERAPIST

## 2024-09-12 PROCEDURE — 97140 MANUAL THERAPY 1/> REGIONS: CPT | Mod: GP | Performed by: PHYSICAL THERAPIST

## 2024-09-12 PROCEDURE — 97535 SELF CARE MNGMENT TRAINING: CPT | Mod: GP | Performed by: PHYSICAL THERAPIST

## 2024-09-18 ENCOUNTER — THERAPY VISIT (OUTPATIENT)
Dept: PHYSICAL THERAPY | Facility: CLINIC | Age: 39
End: 2024-09-18
Attending: PEDIATRICS
Payer: COMMERCIAL

## 2024-09-18 DIAGNOSIS — M25.50 HYPERMOBILITY ARTHRALGIA: Primary | ICD-10-CM

## 2024-09-18 DIAGNOSIS — Q79.60 EHLERS-DANLOS SYNDROME: ICD-10-CM

## 2024-09-18 PROCEDURE — 97140 MANUAL THERAPY 1/> REGIONS: CPT | Mod: GP | Performed by: PHYSICAL THERAPIST

## 2024-09-18 PROCEDURE — 97110 THERAPEUTIC EXERCISES: CPT | Mod: GP | Performed by: PHYSICAL THERAPIST

## 2024-09-25 ENCOUNTER — THERAPY VISIT (OUTPATIENT)
Dept: PHYSICAL THERAPY | Facility: CLINIC | Age: 39
End: 2024-09-25
Attending: PEDIATRICS
Payer: COMMERCIAL

## 2024-09-25 DIAGNOSIS — M25.50 HYPERMOBILITY ARTHRALGIA: Primary | ICD-10-CM

## 2024-09-25 DIAGNOSIS — Q79.60 EHLERS-DANLOS SYNDROME: ICD-10-CM

## 2024-09-25 PROCEDURE — 97110 THERAPEUTIC EXERCISES: CPT | Mod: GP | Performed by: PHYSICAL THERAPIST

## 2024-09-25 PROCEDURE — 97140 MANUAL THERAPY 1/> REGIONS: CPT | Mod: GP | Performed by: PHYSICAL THERAPIST

## 2024-09-30 ENCOUNTER — MYC MEDICAL ADVICE (OUTPATIENT)
Dept: INTERNAL MEDICINE | Facility: CLINIC | Age: 39
End: 2024-09-30
Payer: COMMERCIAL

## 2024-10-02 ENCOUNTER — THERAPY VISIT (OUTPATIENT)
Dept: PHYSICAL THERAPY | Facility: CLINIC | Age: 39
End: 2024-10-02
Attending: PEDIATRICS
Payer: COMMERCIAL

## 2024-10-02 DIAGNOSIS — Q79.60 EHLERS-DANLOS SYNDROME: Primary | ICD-10-CM

## 2024-10-02 DIAGNOSIS — M25.50 HYPERMOBILITY ARTHRALGIA: ICD-10-CM

## 2024-10-02 PROCEDURE — 97140 MANUAL THERAPY 1/> REGIONS: CPT | Mod: GP | Performed by: PHYSICAL THERAPIST

## 2024-10-02 PROCEDURE — 97110 THERAPEUTIC EXERCISES: CPT | Mod: GP | Performed by: PHYSICAL THERAPIST

## 2024-10-02 PROCEDURE — 97112 NEUROMUSCULAR REEDUCATION: CPT | Mod: GP | Performed by: PHYSICAL THERAPIST

## 2024-10-15 ENCOUNTER — THERAPY VISIT (OUTPATIENT)
Dept: PHYSICAL THERAPY | Facility: CLINIC | Age: 39
End: 2024-10-15
Attending: PEDIATRICS
Payer: COMMERCIAL

## 2024-10-15 DIAGNOSIS — M25.50 HYPERMOBILITY ARTHRALGIA: ICD-10-CM

## 2024-10-15 DIAGNOSIS — Q79.60 EHLERS-DANLOS SYNDROME: Primary | ICD-10-CM

## 2024-10-15 PROCEDURE — 97110 THERAPEUTIC EXERCISES: CPT | Mod: GP | Performed by: PHYSICAL THERAPIST

## 2024-10-15 PROCEDURE — 97112 NEUROMUSCULAR REEDUCATION: CPT | Mod: GP | Performed by: PHYSICAL THERAPIST

## 2024-10-15 PROCEDURE — 97140 MANUAL THERAPY 1/> REGIONS: CPT | Mod: GP | Performed by: PHYSICAL THERAPIST

## 2024-10-23 ENCOUNTER — THERAPY VISIT (OUTPATIENT)
Dept: PHYSICAL THERAPY | Facility: CLINIC | Age: 39
End: 2024-10-23
Attending: PEDIATRICS
Payer: COMMERCIAL

## 2024-10-23 DIAGNOSIS — M25.50 HYPERMOBILITY ARTHRALGIA: ICD-10-CM

## 2024-10-23 DIAGNOSIS — Q79.60 EHLERS-DANLOS SYNDROME: Primary | ICD-10-CM

## 2024-10-23 PROCEDURE — 97112 NEUROMUSCULAR REEDUCATION: CPT | Mod: GP | Performed by: PHYSICAL THERAPIST

## 2024-10-23 PROCEDURE — 97140 MANUAL THERAPY 1/> REGIONS: CPT | Mod: GP | Performed by: PHYSICAL THERAPIST

## 2024-10-23 PROCEDURE — 97110 THERAPEUTIC EXERCISES: CPT | Mod: GP | Performed by: PHYSICAL THERAPIST

## 2024-10-23 PROCEDURE — 97535 SELF CARE MNGMENT TRAINING: CPT | Mod: GP | Performed by: PHYSICAL THERAPIST

## 2024-10-23 NOTE — PATIENT INSTRUCTIONS
Consider a modified arm sling for sleeping.    Example Medline Elastic Shoulder Immobilizers: https://www.Konokopia.Age of Learning/a/products/064259/Medline-Elastic-Shoulder-Immobilizers-Large-35/?utm_source=[employer]&utm_medium=[facility]&utm_campaign=pla_cor_evg_cbfs_general_unid_prch_non-match&cqleisjhosxcrks=[id number]&utm_source=[employer]&utm_medium=cpc&gad_source=1&zsbqr=[email or web address]&gclsrc=aw.ds

## 2024-10-30 ENCOUNTER — THERAPY VISIT (OUTPATIENT)
Dept: PHYSICAL THERAPY | Facility: CLINIC | Age: 39
End: 2024-10-30
Attending: PEDIATRICS
Payer: COMMERCIAL

## 2024-10-30 DIAGNOSIS — M25.50 HYPERMOBILITY ARTHRALGIA: ICD-10-CM

## 2024-10-30 DIAGNOSIS — Q79.60 EHLERS-DANLOS SYNDROME: Primary | ICD-10-CM

## 2024-10-30 PROCEDURE — 97140 MANUAL THERAPY 1/> REGIONS: CPT | Mod: GP | Performed by: PHYSICAL THERAPIST

## 2024-11-06 ENCOUNTER — THERAPY VISIT (OUTPATIENT)
Dept: PHYSICAL THERAPY | Facility: CLINIC | Age: 39
End: 2024-11-06
Attending: PEDIATRICS
Payer: COMMERCIAL

## 2024-11-06 DIAGNOSIS — Q79.60 EHLERS-DANLOS SYNDROME: Primary | ICD-10-CM

## 2024-11-06 PROCEDURE — 97140 MANUAL THERAPY 1/> REGIONS: CPT | Mod: GP | Performed by: PHYSICAL THERAPIST

## 2024-11-06 PROCEDURE — 97535 SELF CARE MNGMENT TRAINING: CPT | Mod: GP | Performed by: PHYSICAL THERAPIST

## 2024-11-06 PROCEDURE — 97110 THERAPEUTIC EXERCISES: CPT | Mod: GP | Performed by: PHYSICAL THERAPIST

## 2024-11-13 ENCOUNTER — THERAPY VISIT (OUTPATIENT)
Dept: PHYSICAL THERAPY | Facility: CLINIC | Age: 39
End: 2024-11-13
Attending: PEDIATRICS
Payer: COMMERCIAL

## 2024-11-13 DIAGNOSIS — Q79.60 EHLERS-DANLOS SYNDROME: Primary | ICD-10-CM

## 2024-11-13 DIAGNOSIS — M25.50 HYPERMOBILITY ARTHRALGIA: ICD-10-CM

## 2024-11-13 PROCEDURE — 97110 THERAPEUTIC EXERCISES: CPT | Mod: GP | Performed by: PHYSICAL THERAPIST

## 2024-11-13 PROCEDURE — 97535 SELF CARE MNGMENT TRAINING: CPT | Mod: GP | Performed by: PHYSICAL THERAPIST

## 2024-11-20 ENCOUNTER — THERAPY VISIT (OUTPATIENT)
Dept: PHYSICAL THERAPY | Facility: CLINIC | Age: 39
End: 2024-11-20
Attending: PEDIATRICS
Payer: COMMERCIAL

## 2024-11-20 DIAGNOSIS — M25.50 HYPERMOBILITY ARTHRALGIA: Primary | ICD-10-CM

## 2024-11-20 PROCEDURE — 97140 MANUAL THERAPY 1/> REGIONS: CPT | Mod: GP | Performed by: PHYSICAL THERAPIST

## 2024-11-20 PROCEDURE — 97110 THERAPEUTIC EXERCISES: CPT | Mod: GP | Performed by: PHYSICAL THERAPIST

## 2024-11-20 NOTE — PROGRESS NOTES
11/20/24 0500   Appointment Info   Signing clinician's name / credentials Elisa Levine PT, Board Certified Geriatric Clinical Specialist   Total/Authorized Visits [facility]. 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 Pt will  also report return to no more than 5 headache days per month.   Goal Progress 6/26/24: Saw Neri, going to continue previous meds. In the AM since starting exercises, not waking up with headaches. Everything is stiff - not sure if due to exercises or not. 8/28/28: Bad this last week, likely due to L shoulder being off. Did massage on Fri and Sun - did help some. Not waking up with headaches - more waking up with neck and muscle strain. Having 4 headaches days per month.   Target Date 08/04/24   Date Met 08/28/24   PT Goal 2   Goal Identifier Orthotics   Goal Description pt will obtain appropriate orthotics in order to manage pes planus and ankle position.   Goal Progress 6/26/24: Has appt for AFOs 7/3 with Eddie. Delivery on 7/23. 8/28/24: Attained AFOs and they are going well. Ankles/calves will complain if not wearing them.   Target Date 08/04/24   Date Met 08/28/24   PT Goal 3   Goal Identifier HEP   Goal Description pt will demonstrate indep with HEP for postural and joint stability. with consistent performance of  HEP 10 mins (or 3 exercises) per day. 4/7 days per week.   Goal Progress 11/20/24 resuming HEP as able 8/28/24: Doing daily.  "  Target Date 02/13/25   PT Goal 4   Goal Identifier [place]lys[place] index of hypermobility   Goal Description Patient to demo a 10% or greater reduction in score on BIoH questionnaire to demo improved subjective reports of impairement related to EDS and to improve overall function.   Goal Progress 11/20/24 233/360 = 64.7% at eval 206/360 = 57.22%. 8/28/24: pt didn't fill it out before session today.   Target Date 02/13/25   PT Goal 5   Goal Identifier pain management   Goal Description Patient to report and/or demo at least 3 pain management strategies to reduce neck, shoulder and hip pain to perform activities with ease.   Goal Progress 11/20/24 using home massage tools, heat, supports.  Still having lots of pain.  8/28/24: Has AFOs which are helping. Considering compression for bed time (often waking up with pain due to involuntary positioning while sleeping).   Target Date 02/13/25   Subjective Report   Subjective Report went to [place] [place] this weekend, hung out with friends and took naps.  Friends were able to put rib back in but sub scaps are crabby, traps are crabby and L collar bone is also being less stable. Took 5 swings with a bat.   Therapeutic Procedure/Exercise   Therapeutic Procedures: strength, endurance, ROM, flexibility minutes (13179) 15   Ther Proc 1 HEP   Ther Proc 1 - Details did not do isometric but did do prone cervical retraction   PTRx Ther Proc 1 Shotgun MET for Hemipelvis Rotation   PTRx Ther Proc 1 - Details has not needed as much, going well at home   PTRx Ther Proc 2 Supine Abdominal Exercise #3 (Marching)   PTRx Ther Proc 2 - Details verbal review. will try to restart if not painful   PTRx Ther Proc 3 Roll Ins Hooklying   PTRx Ther Proc 3 - Details verbal review. will try to restart if not painful   PTRx Ther Proc 4 Roll Outs Hooklying   PTRx Ther Proc 4 - Details verbal review. will try to restart if not painful   PTRx Ther Proc 5 Bridging #1   PTRx Ther Proc 5 - Details verbal " review. will try to restart if not painful   PTRx Ther Proc 6 Glueteus Ferdinand Re Training   PTRx Ther Proc 6 - Details verbal review. will try to restart if not painful   PTRx Ther Proc 7 Supine Cervical Retraction   PTRx Ther Proc 7 - Details verbal review. will try to restart if not painful   PTRx Ther Proc 8 Supine Shoulder Extensions   PTRx Ther Proc 8 - Details verbal review. will try to restart if not painful   PTRx Ther Proc 9 Supine Shoulder External Rotation   PTRx Ther Proc 9 - Details hidden   PTRx Ther Proc 10 Isometric Shoulder Flexion   PTRx Ther Proc 10 - Details verbal review. will try to restart if not painful   PTRx Ther Proc 11 Isometric Shoulder Extension   PTRx Ther Proc 11 - Details verbal review. will try to restart if not painful   PTRx Ther Proc 12 Isometric Shoulder Abduction    PTRx Ther Proc 12 - Details verbal review. will try to restart if not painful   Skilled Intervention selection of activity   Patient Response/Progress verbalized understanding   Neuromuscular Re-education   PTRx Neuro Re-ed 1 Abdominal Brace Transverse Abdominis   PTRx Neuro Re-ed 1 - Details verbal review gonig ok at home   Manual Therapy   Manual Therapy: Mobilization, MFR, MLD, friction massage minutes (03691) 30   Manual Therapy 1 in prone   Manual Therapy 1 - Details tool assisted release to UT's middle traps, levators, intercostals levels 3-9   Manual Therapy 2 manual release in prone and STM to cervical paraspinals   Manual Therapy 2 - Details in supine release to scalenes , scms suboccipital release.   Manual Therapy 3 GHJ in better position today. no mobs   Skilled Intervention selection of activity   Patient Response/Progress tolerated well decreased pain, shoulders able to retract with minimal clicking and popping   Education   Learner/Method Patient   Plan   Home program trial of gentle resumption of ex. keep doing gentle things that do not irritate shoulder. keep icing   Plan for next session how are  shoulders feeling? did she remember isometrics this interval? continue with manual and exercise as tolerated   Total Session Time   Timed Code Treatment Minutes 45   Total Treatment Time (sum of timed and untimed services) 45       PLAN  Continue therapy per current plan of care.    Beginning/End Dates of Progress Note Reporting Period:  08/28/24 to 11/20/2024    Referring Provider:  Twan Marin

## 2024-12-04 ENCOUNTER — THERAPY VISIT (OUTPATIENT)
Dept: PHYSICAL THERAPY | Facility: CLINIC | Age: 39
End: 2024-12-04
Attending: PEDIATRICS
Payer: COMMERCIAL

## 2024-12-04 DIAGNOSIS — Q79.60 EHLERS-DANLOS SYNDROME: ICD-10-CM

## 2024-12-04 DIAGNOSIS — M25.50 HYPERMOBILITY ARTHRALGIA: Primary | ICD-10-CM

## 2024-12-04 PROCEDURE — 97110 THERAPEUTIC EXERCISES: CPT | Mod: GP | Performed by: PHYSICAL THERAPIST

## 2024-12-04 PROCEDURE — 97140 MANUAL THERAPY 1/> REGIONS: CPT | Mod: GP | Performed by: PHYSICAL THERAPIST

## 2024-12-18 ENCOUNTER — THERAPY VISIT (OUTPATIENT)
Dept: PHYSICAL THERAPY | Facility: CLINIC | Age: 39
End: 2024-12-18
Attending: PEDIATRICS
Payer: COMMERCIAL

## 2024-12-18 DIAGNOSIS — Q79.60 EHLERS-DANLOS SYNDROME: ICD-10-CM

## 2024-12-18 DIAGNOSIS — M25.50 HYPERMOBILITY ARTHRALGIA: Primary | ICD-10-CM

## 2024-12-18 PROCEDURE — 97110 THERAPEUTIC EXERCISES: CPT | Mod: GP | Performed by: PHYSICAL THERAPIST

## 2024-12-18 NOTE — PATIENT INSTRUCTIONS
When your foot is hurting, try putting a [place] of KT tape around your foot. Does it help?  - 4 square strip, 50% stretch

## 2024-12-30 ENCOUNTER — TELEPHONE (OUTPATIENT)
Dept: NEUROLOGY | Facility: CLINIC | Age: 39
End: 2024-12-30
Payer: COMMERCIAL

## 2024-12-30 NOTE — TELEPHONE ENCOUNTER
Retail Pharmacy Prior Authorization Team   Phone: [phone]    PA Initiation    Medication: EMGALITY  Insurance Company: [employer] Non-Specialty PA's - Phone [phone] Fax [phone]  Pharmacy Filling the Rx: [facility] - [address]  Filling Pharmacy Phone: [phone]  Filling Pharmacy Fax: [phone]  Start Date: 12/30/2024    MANUALLY FAXED FORM.

## 2024-12-31 NOTE — TELEPHONE ENCOUNTER
Prior Authorization Approval    Authorization Effective Date: 11/30/2024  Authorization Expiration Date: 12/30/2025  Medication: EMGALITY - APPROVED  Approved Dose/Quantity:   Reference #:     Insurance Company: [employer] Non-Specialty PA's - Phone [phone] Fax [phone]  Expected CoPay:       CoPay Card Available:      Foundation Assistance Needed:    Which Pharmacy is filling the prescription (Not needed for infusion/clinic administered): [facility] - [address]  Pharmacy Notified:  YES  Patient Notified:  YES

## 2025-01-22 ENCOUNTER — THERAPY VISIT (OUTPATIENT)
Dept: PHYSICAL THERAPY | Facility: CLINIC | Age: 40
End: 2025-01-22
Attending: PEDIATRICS
Payer: COMMERCIAL

## 2025-01-22 DIAGNOSIS — M25.50 HYPERMOBILITY ARTHRALGIA: Primary | ICD-10-CM

## 2025-01-22 DIAGNOSIS — Q79.60 EHLERS-DANLOS SYNDROME: ICD-10-CM

## 2025-01-22 PROCEDURE — 97110 THERAPEUTIC EXERCISES: CPT | Mod: GP | Performed by: PHYSICAL THERAPIST

## 2025-01-28 NOTE — MR AVS SNAPSHOT
After Visit Summary   10/25/2018    Paris Nguyen    MRN: [id number]           Patient Information     Date Of Birth          1985        Visit Information        Provider Department      10/25/2018 7:45 AM Radha Collins APRN CNP [facility]        Today's Diagnoses     Fibromyalgia    -  1    Other migraine without status migrainosus, not intractable        Arthralgia of temporomandibular joint, unspecified laterality          Care Instructions    Primary Care Center [phone] ([facility] [place], 4th Floor N)     [phone] ([facility] [place], 4th Floor)        [facility] [phone]          Follow-ups after your visit        Additional Services     OTOLARYNGOLOGY REFERRAL       Your provider has referred you to: [facility]: Adult Ear, Nose [facility] Throat [facility] (Otolaryngology) - [place] [phone]  [email or web address]    Please be aware that coverage of these services is subject to the terms and limitations of your health insurance plan.  Call member services at your health plan with any benefit or coverage questions.      Please bring the following with you to your appointment:    (1) Any X-Rays, CTs or MRIs which have been performed.  Contact the facility where they were done to arrange for  prior to your scheduled appointment.   (2) List of current medications  (3) This referral request   (4) Any documents/labs given to you for this referral                  Your next 10 appointments already scheduled     Nov 20, 2018  9:00 AM CST   (Arrive by 8:45 AM)   Return Visit with TORSTEN Salcedo CNP   [facility] ([facility])    [address] [place] [address]  4th [facility] [address]   [phone]            Nov 27, 2018  8:00 AM CST   Adult General Eval with TORSTEN Jhaveri CNP   Psychiatry Clinic ([facility])    [address]  Patient Name: Maura Barry  : 1948    MRN: [id number]                              Today's Date: 2025       Admit Date: 2025    Visit Dx:     ICD-10-CM ICD-9-CM   1. Nonintractable chronic migraine  G43.909 346.70   2. 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 Vaginal atrophy  N95.2 627.3   4. Urinary urgency  R39.15 788.63   5. Trigger point  M79.10 729.1   6. Spasm  R25.2 781.0   7. Spinal stenosis of lumbar region without neurogenic claudication  M48.061 724.02   8. Sensation of pressure in face  R44.8 782.0   9. Sacroiliitis  M46.1 720.2   10. Recurrent UTI  N39.0 599.0   11. Recurrent headache  R51.9 784.0   12. Pudendal neuralgia  G58.8 355.8   13. Perineum pain, female  R10.2 625.9   14. Nonallergic vasomotor rhinitis  J30.0 477.9   15. Myofascial pain  M79.18 729.1   16. Lumbar disc herniation with radiculopathy  M51.16 722.10     724.4   17. Increased frequency of urination  R35.0 788.41   18. History of episiotomy  Z98.890 V45.89   19. Urgency-frequency syndrome  N32.81 596.51   20. PFD (pelvic floor dysfunction)  M62.89 618.83   21. Cystocele with prolapse  N81.4 618.4   22. Tardive dyskinesia  G24.01 333.85   23. Seasonal allergic rhinitis due to other allergic trigger  J30.89 477.8   24. Other insomnia  G47.09 780.52   25. Memory loss  R41.3 780.93   26. OAB (overactive bladder)  N32.81 596.51   27. Menopausal osteoporosis  M81.0 733.01   28. Other fatigue  R53.83 780.79   29. Dyslipidemia  E78.5 272.4   30. Iron deficiency  E61.1 280.9   31. B12 deficiency  E53.8 266.2   32. KRISTY (obstructive sleep apnea)  G47.33 327.23     Patient Active Problem List   Diagnosis    KRISTY (obstructive sleep apnea)    B12 deficiency    Iron deficiency    Dyslipidemia    Other fatigue    Menopausal osteoporosis    OAB (overactive bladder)    Memory loss    Depression    Other insomnia    Allergic rhinitis due to allergen    Tardive dyskinesia    Chronic back pain    Cystocele with prolapse    PFD (pelvic  "F275  [address] [address] [phone]              Who to contact     Please call your clinic at [phone] to:    Ask questions about your health    Make or cancel appointments    Discuss your medicines    Learn about your test results    Speak to your doctor            Additional Information About Your Visit        MyChart Information     [employer] gives you secure access to your electronic health record. If you see a primary care provider, you can also send messages to your care team and make appointments. If you have questions, please call your primary care clinic.  If you do not have a primary care provider, please call [phone] and they will assist you.      [employer] is an electronic gateway that provides easy, online access to your medical records. With [employer], you can request a clinic appointment, read your test results, renew a prescription or communicate with your care team.     To access your existing account, please contact your [facility] or call [phone] for assistance.        Care EveryWhere ID     This is your Care EveryWhere ID. This could be used by other organizations to access your [facility] medical records  [id number]        Your Vitals Were     Pulse Height BMI (Body Mass Index)             87 1.638 m (5' 4.5\") 38.73 kg/m2          Blood Pressure from Last 3 Encounters:   10/25/18 112/76   10/16/18 131/86   10/02/18 122/79    Weight from Last 3 Encounters:   10/25/18 104 kg (229 lb 3.2 oz)   10/16/18 105.3 kg (232 lb 3.2 oz)   10/02/18 103.1 kg (227 lb 3.2 oz)              We Performed the Following     OTOLARYNGOLOGY REFERRAL          Today's Medication Changes          These changes are accurate as of 10/25/18  8:14 AM.  If you have any questions, ask your nurse or doctor.               These medicines have changed or have updated prescriptions.        Dose/Directions    naproxen 500 MG tablet   Commonly known as:  " floor dysfunction)    Urgency-frequency syndrome    DDD (degenerative disc disease), lumbar    Dementia    History of episiotomy    Increased frequency of urination    Lumbar disc herniation with radiculopathy    Myofascial pain    Nonallergic vasomotor rhinitis    Perineum pain, female    Pudendal neuralgia    Recurrent headache    Recurrent UTI    Sacroiliitis    Sensation of pressure in face    Spinal stenosis of lumbar region without neurogenic claudication    Spasm    Trigger point    Urinary urgency    Vaginal atrophy    Vulvodynia    Insomnia    Nonintractable chronic migraine    Fracture of radial head, left, closed     Past Medical History:   Diagnosis Date    Allergic     Arthritis     Backache     Depression     Depression     Dysuria     GERD (gastroesophageal reflux disease) 5 years ago    Hyperlipidemia 202s    Neck pain     Osteopenia 2022    Pelvic floor dysfunction     Pelvic pain     Scoliosis 2008    Vaginitis      Past Surgical History:   Procedure Laterality Date    ANAL FISSURECTOMY      BLADDER SUSPENSION      COLONOSCOPY  [id number]?    Clear    EYE SURGERY  2012    Cataract removal    RESECTION OF NASAL TURBINATES      SINUS SURGERY  1960 nimo    Turbinate shrinkage    SPINE SURGERY  2022    Laminectomy L2L3    WISDOM TOOTH EXTRACTION        General Information       Row Name 01/28/25 1813 01/28/25 1642       OT Time and Intention    Document Type therapy note (daily note)  -AR other (see comments)  OT arrived at 1530 as requested to assist pt w/dressing, no clothes at bedside and Pt reporting 10/10 pain LUE. OT notified RN.OT returned at 1630 as clothes at bedside and pt states she should not DC d/t 10/10 pain. RN notified. OT to f/u  -AR    Mode of Treatment individual therapy;occupational therapy  -AR --      Row Name 01/28/25 1126          OT Time and Intention    Document Type evaluation  -AR     Mode of Treatment individual therapy;occupational therapy  -AR       Row Name 01/28/25 1813  01/28/25 1126       General Information    Patient Profile Reviewed -- yes  -AR    Prior Level of Function -- independent:;all household mobility;community mobility;gait;transfer;ADL's;driving  -AR    Existing Precautions/Restrictions fall;other (see comments);left;non-weight bearing  simple sling left infraclavicular nerve catheter  -AR fall;other (see comments);left;non-weight bearing  simple sling, L infraclavicular nerve catheter  -AR    Barriers to Rehab none identified  -AR none identified  -AR      Row Name 01/28/25 1126          Living Environment    People in Home alone;other (see comments)  staying at her daughter's home since injury  -AR       Row Name 01/28/25 1126          Home Main Entrance    Number of Stairs, Main Entrance seven  -AR     Stair Railings, Main Entrance none  -AR       Row Name 01/28/25 1126          Stairs Within Home, Primary    Stairs, Within Home, Primary Pt is staying on main level of daughter's home  -AR       Row Name 01/28/25 1813 01/28/25 1126       Cognition    Orientation Status (Cognition) oriented x 4  -AR oriented x 4  -AR      Row Name 01/28/25 1813 01/28/25 1126       Safety Issues/Impairments Affecting Functional Mobility    Safety Issues Affecting Function (Mobility) awareness of need for assistance  -AR awareness of need for assistance;insight into deficits/self-awareness  -AR    Impairments Affecting Function (Mobility) motor control;pain;range of motion (ROM);sensation/sensory awareness;strength  -AR motor control;pain;range of motion (ROM);sensation/sensory awareness;strength  -AR              User Key  (r) = Recorded By, (t) = Taken By, (c) = Cosigned By      Initials Name Provider Type    Valentina Javier OT Occupational Therapist                     Mobility/ADL's       Row Name 01/28/25 1813 01/28/25 1216       Bed Mobility    Bed Mobility scooting/bridging;supine-sit  -AR scooting/bridging;supine-sit  -AR    Scooting/Bridging [place] (Bed Mobility)  NAPROSYN   This may have changed:  reasons to take this   Used for:  Fibromyalgia, Other migraine without status migrainosus, not intractable   Changed by:  Radha Collins APRN CNP        Dose:  500 mg   Take 1 tablet (500 mg) by mouth 2 times daily as needed for moderate pain or headaches (migraines and fibromyalgia)   Quantity:  60 tablet   Refills:  1            Where to get your medicines      These medications were sent to [place] [id number] [facility] [address]  [address], [place] [address]     Phone:  [phone]     naproxen 500 MG tablet                Primary Care Provider Office Phone # Fax #    Jeromy Almanzar, [phone]       [address] [address]        Equal Access to Services     FERMIN DELEON : Hadii aad ku hadasho Sopalmer, waaxda luqadaha, qaybta kaalmada adeegyada, chanel lazo. So [facility] [phone].    ATENCIÓN: Si habla español, tiene a simons disposición servicios gratuitos de asistencia lingüística. Llame al [phone].    We comply with applicable federal civil rights laws and [place] laws. We do not discriminate on the basis of race, color, national origin, age, disability, sex, sexual orientation, or gender identity.            Thank you!     Thank you for choosing [facility]  for your care. Our goal is always to provide you with excellent care. Hearing back from our patients is one way we can continue to improve our services. Please take a few minutes to complete the written survey that you may receive in the mail after your visit with us. Thank you!             Your Updated Medication List - Protect others around you: Learn how to safely use, store and throw away your medicines at www.disposemymeds.org.          This list is accurate as of 10/25/18  8:14 AM.  Always use your most recent med list.                   Brand Name Dispense Instructions for  supervision  -AR supervision  -AR    Supine-Sit [place] (Bed Mobility) supervision  -AR supervision  -AR    Assistive Device (Bed Mobility) head of bed elevated  -AR head of bed elevated  -AR    Comment, (Bed Mobility) Reviewed with pt and daughter importance of maintaining NWB LUE and safe sleeping position.  -AR Educated pt on importance of maintaining NWB LUE and safe sleeping position.  -AR      Row Name 01/28/25 1813 01/28/25 1216       Transfers    Transfers sit-stand transfer;stand-sit transfer  -AR sit-stand transfer;stand-sit transfer  -AR    Comment, (Transfers) Pt declined taking gait belt home when offered by OT. She needed cues to attend to location of nerve catheter.  -AR Cues to attend to location of nerve catheter.  -AR      Row Name 01/28/25 1813 01/28/25 1216       Sit-Stand Transfer    Sit-Stand [place] (Transfers) supervision;verbal cues  -AR supervision;verbal cues  -AR      Row Name 01/28/25 1813 01/28/25 1216       Stand-Sit Transfer    Stand-Sit [place] (Transfers) supervision;verbal cues  -AR supervision;verbal cues  -AR      Row Name 01/28/25 1216          Functional Mobility    Functional Mobility- Ind. Level supervision required;verbal cues required  -AR       Row Name 01/28/25 1813 01/28/25 1216       Activities of Daily Living    BADL Assessment/Intervention upper body dressing;lower body dressing;bathing  -AR bathing;upper body dressing;lower body dressing;feeding  -AR      Row Name 01/28/25 1813 01/28/25 1216       Mobility    Extremity Weight-bearing Status left upper extremity  -AR left upper extremity  -AR    Left Upper Extremity (Weight-bearing Status) non weight-bearing (NWB)  -AR non weight-bearing (NWB)   -AR      Row Name 01/28/25 1813 01/28/25 1216       Bathing Assessment/Intervention    Comment, (Bathing) Reviewed with pt and daughter that nerve catheter cannot get wet as well as axilla care.  -AR Educated pt that dressing and nerve catheter cannot get wet.  use Diagnosis    biotin 1000 MCG Tabs tablet      Take 5,000 mcg by mouth daily        EPINEPHrine 0.3 MG/0.3ML injection 2-pack    EPIPEN/ADRENACLICK/or ANY BX GENERIC EQUIV     Inject 0.3 mg into the muscle once        escitalopram 10 MG tablet    LEXAPRO    30 tablet    Take 1 tablet (10 mg) by mouth daily    Moderate episode of recurrent major depressive disorder (H), Generalized anxiety disorder       etonogestrel-ethinyl estradiol 0.12-0.015 MG/24HR vaginal ring    NUVARING     Place 1 each vaginally once a week        fexofenadine 180 MG tablet    ALLEGRA     Take 180 mg by mouth daily        fluticasone 110 MCG/ACT Inhaler    FLOVENT HFA     Inhale 2 puffs into the lungs 2 times daily        * gabapentin 300 MG capsule    NEURONTIN     Take 300 mg by mouth every morning        * gabapentin 300 MG capsule    NEURONTIN    60 capsule    Take 2 capsules (600 mg) by mouth At Bedtime    Insomnia, unspecified type, Generalized anxiety disorder       hydrOXYzine 25 MG tablet    ATARAX     Take 25-50 mg by mouth 2 times daily as needed for itching or anxiety        levalbuterol 45 MCG/ACT Inhaler    XOPENEX HFA     Inhale 1-2 puffs into the lungs every 4 hours as needed        LORazepam 0.5 MG tablet    ATIVAN     Take 0.5 mg by mouth daily as needed        Magnesium Glycinate Powd     100 g    400 mg At Bedtime    Primary insomnia       montelukast 10 MG tablet    SINGULAIR     Take 10 mg by mouth At Bedtime        Multi-vitamin Tabs tablet      Take 1 tablet by mouth daily        naproxen 500 MG tablet    NAPROSYN    60 tablet    Take 1 tablet (500 mg) by mouth 2 times daily as needed for moderate pain or headaches (migraines and fibromyalgia)    Fibromyalgia, Other migraine without status migrainosus, not intractable       order for DME     1 Device    Equipment being ordered: TENS All necessary equipment: leads/pads Duration of use: 36 months Apply to painful areas    Fibromyalgia, Muscle spasm, Myofascial pain        tiZANidine 2 MG tablet    ZANAFLEX    90 tablet    Take 1 tablet (2 mg) by mouth 3 times daily as needed for muscle spasms    Fibromyalgia, Muscle spasm       traZODone 50 MG tablet    DESYREL    30 tablet    Take 0.5-1 tablets (25-50 mg) by mouth nightly as needed for sleep    Insomnia, unspecified type       venlafaxine 75 MG 24 hr capsule    EFFEXOR XR    30 capsule    Take 1 capsule (75 mg) by mouth daily    Moderate episode of recurrent major depressive disorder (H), Generalized anxiety disorder       * Notice:  This list has 2 medication(s) that are the same as other medications prescribed for you. Read the directions carefully, and ask your doctor or other care provider to review them with you.       Educated her on axilla care via pendulum technique as needed for comfort and to assist while she has significant residual numbness from block.  -AR      Row Name 01/28/25 1813 01/28/25 1216       Upper Body Dressing Assessment/Training    [place] Level (Upper Body Dressing) don;doff;front opening garment;moderate assist (50% patient effort)  -AR don;doff;dependent (less than 25% patient effort)  sling  -AR    Position (Upper Body Dressing) edge of bed sitting  -AR --    Comment, (Upper Body Dressing) Reviewed mariangel-dressing, sling management and care of catheter with pt and daughter. OT attempted to assist her with dressing, however sleeves on long-sleeved shirt were snug and could not get them over dressing. Pt DC in gown and jacket over top.  -AR Educated pt on mariangel-dressing technique, care of nerve catheter to avoid dislodgement and sling wear/fit. Pt c/o neck pain, discussed that she may open neck strap with LUE supported while seated and awake.  -AR      Row Name 01/28/25 1813 01/28/25 1216       Lower Body Dressing Assessment/Training    [place] Level (Lower Body Dressing) don;pants/bottoms;minimum assist (75% patient effort);shoes/slippers;supervision  -AR don;socks;maximum assist (25% patient effort)  -AR    Position (Lower Body Dressing) edge of bed sitting;unsupported standing  -AR edge of bed sitting  -AR    Comment, (Lower Body Dressing) -- Issued reacher, shoe horn and zipper pull/button hook to assist, educated on use.  -AR      Row Name 01/28/25 1216          Self-Feeding Assessment/Training    Comment, (Feeding) Issued scoop plate and discussed use of kitchen shelf paper lining under plate to assist with self-feeding as she is L hand dominant.  -AR               User Key  (r) = Recorded By, (t) = Taken By, (c) = Cosigned By      Initials Name Provider Type    Valentina Javier, OT Occupational Therapist                   Obj/Interventions       Row Name 01/28/25 1818 01/28/25 1221        Sensory Assessment (Somatosensory)    Sensory Assessment (Somatosensory) left UE  -AR left UE  -AR    Sensory Subjective Reports numbness  -AR insensate  -AR      Row Name 01/28/25 1221          Range of Motion Comprehensive    Comment, General Range of Motion RUE WNL, deferred shoulder d/t pain, no AROM digits and educated on SROM. Elbow/FA/wrist ROM not indicated d/t splint.  -AR       Row Name 01/28/25 1221          Strength Comprehensive (MMT)    Comment, General Manual Muscle Testing (MMT) Assessment RUE WFL, LUE deferred  -AR       Row Name 01/28/25 1818 01/28/25 1221       Hand (Therapeutic Exercise)    Hand (Therapeutic Exercise) AROM (active range of motion)  -AR PROM (passive range of motion)  -AR    Hand AROM/AAROM (Therapeutic Exercise) AROM (active range of motion);finger flexion;finger extension;10 repetitions  -AR --    Hand PROM (Therapeutic Exercise) -- left;finger flexion;finger extension;10 repetitions  -AR      Row Name 01/28/25 1221          Motor Skills    Therapeutic Exercise hand  -AR       Row Name 01/28/25 1818 01/28/25 1221       Balance    Balance Assessment -- sitting static balance;sitting dynamic balance;standing static balance;standing dynamic balance  -AR    Static Sitting Balance supervision  -AR independent  -AR    Dynamic Sitting Balance supervision  -AR independent  -AR    Position, Sitting Balance unsupported;sitting edge of bed  -AR unsupported;sitting edge of bed  -AR    Static Standing Balance supervision  -AR supervision  -AR    Dynamic Standing Balance supervision  -AR supervision  -AR    Position/Device Used, Standing Balance unsupported  -AR supported  -AR              User Key  (r) = Recorded By, (t) = Taken By, (c) = Cosigned By      Initials Name Provider Type    Valentina Javier OT Occupational Therapist                   Goals/Plan       Row Name 01/28/25 1228          Bathing Goal 1 (OT)    Time Frame (Bathing Goal 1, OT) short term goal (STG);1 day  -AR      Strategies/Barriers (Bathing Goal 1, OT) Pt will verbalize/demo axilla care LUE with max 2 vc  -AR     Progress/Outcomes (Bathing Goal 1, OT) goal met  -AR       Row Name 01/28/25 1228          Dressing Goal 1 (OT)    Time Frame (Dressing Goal 1, OT) long term goal (LTG);2 days  -AR     Strategies/Barriers (Dressing Goal 1, OT) Pt will verbalize/demo shirt application, sling application and care of nerve catheter during UB ADLs with max 3 vc  -AR     Progress/Outcome (Dressing Goal 1, OT) goal ongoing  -AR       Row Name 01/28/25 1228          Problem Specific Goal 1 (OT)    Problem Specific Goal 1 (OT) Pt will maintain NWB LUE during all ADL activity with max 2 vc  -AR     Time Frame (Problem Specific Goal 1, OT) long term goal (LTG);2 days  -AR     Progress/Outcome (Problem Specific Goal 1, OT) goal ongoing  -AR       Row Name 01/28/25 1228          Therapy Assessment/Plan (OT)    Planned Therapy Interventions (OT) adaptive equipment training;BADL retraining;edema control/reduction;functional balance retraining;IADL retraining;occupation/activity based interventions;orthotic fabrication/fitting/training;patient/caregiver education/training;ROM/therapeutic exercise;transfer/mobility retraining  -AR               User Key  (r) = Recorded By, (t) = Taken By, (c) = Cosigned By      Initials Name Provider Type    Valentina Javier, OT Occupational Therapist                   Clinical Impression       Row Name 01/28/25 1818 01/28/25 1222       Pain Assessment    Pretreatment Pain Rating 8/10  -AR 0/10 - no pain  -AR    Posttreatment Pain Rating 8/10  -AR 2/10  -AR    Pain Location extremity  -AR elbow  -AR    Pain Side/Orientation left;posterior;upper  -AR left;posterior  -AR    Pain Management Interventions activity modification encouraged;cold applied;nursing notified;positioning techniques utilized  -AR activity modification encouraged;positioning techniques utilized  -AR    Response to Pain Interventions  activity participation with increased pain  -AR activity participation with tolerable pain  -AR      Row Name 01/28/25 1818 01/28/25 1222       Plan of Care Review    Plan of Care Reviewed With patient;daughter  -AR patient  -AR    Outcome Evaluation OT returned per nurse request as pain decreased and pt anticipating DC home. Daughter at bedside, reviewed NBW status, ADL retraining and sling management. Attempted UB dressing, however shirt sleeve would not accommodate dressing and pt to DC home in gown. She demo AROM L hand all digits and pre/post pain level 8/10 LUE. Recommend DC home with initial 24/7 assist and HHOT.  -AR OT educated pt on mariangel-dressing, care of nerve catheter during ADLs, sling application/wear schedule and fit, NWB LUE, digit ROM and home safety. OT issued AE to assist with ADLs at home and educated on use. She completed bed mobility with supervision and in-room mobility with supervision. Pt seen with infraclavicular pump off, no AROM noted at digits and c/o mild pain L posterior elbow. Pt lives alone and will be staying with her daughter who works during the day. Pt limited with pain, NWB/immobilization LUE (dominant), impaired sensation LUE, poor BUE integration and is performing below baseline. Recommend DC with HHOT and assist from family. OT to return in PM to assist her with dressing as her clothes are not at bedside.  -AR      Row Name 01/28/25 1222          Therapy Assessment/Plan (OT)    Rehab Potential (OT) good  -AR     Criteria for Skilled Therapeutic Interventions Met (OT) yes  -AR     Therapy Frequency (OT) daily  -AR       Row Name 01/28/25 1818 01/28/25 1222       Therapy Plan Review/Discharge Plan (OT)    Anticipated Discharge Disposition (OT) home with 24/7 care;home with home health  -AR home with assist;home with home health  -AR      Row Name 01/28/25 1818 01/28/25 1222       Vital Signs    Pre Patient Position Supine  -AR Supine  -AR    Intra Patient Position Standing  -AR  Standing  -AR    Post Patient Position Sitting  -AR Supine  -AR      Row Name 01/28/25 1818 01/28/25 1222       Positioning and Restraints    Pre-Treatment Position in bed  -AR in bed  -AR    Post Treatment Position bed  -AR bed  -AR    In Bed sitting EOB;call light within reach;encouraged to call for assist;with family/caregiver;notified nsg;with brace  -AR notified nsg;supine;call light within reach;encouraged to call for assist;exit alarm on;with brace;LUE elevated  -AR              User Key  (r) = Recorded By, (t) = Taken By, (c) = Cosigned By      Initials Name Provider Type    Valentina Javier, OT Occupational Therapist                   Outcome Measures       Row Name 01/28/25 1822 01/28/25 1229       How much help from another is currently needed...    Putting on and taking off regular lower body clothing? 3  -AR 2  -AR    Bathing (including washing, rinsing, and drying) 2  -AR 2  -AR    Toileting (which includes using toilet bed pan or urinal) 3  -AR 3  -AR    Putting on and taking off regular upper body clothing 2  -AR 2  -AR    Taking care of personal grooming (such as brushing teeth) 3  -AR 3  -AR    Eating meals 3  -AR 3  -AR    AM-PAC 6 Clicks Score (OT) 16  -AR 15  -AR      Row Name 01/28/25 0936 01/28/25 0819       How much help from another person do you currently need...    Turning from your back to your side while in flat bed without using bedrails? 4  -AB 4  -RD    Moving from lying on back to sitting on the side of a flat bed without bedrails? 4  -AB 4  -RD    Moving to and from a bed to a chair (including a wheelchair)? 3  -AB 3  -RD    Standing up from a chair using your arms (e.g., wheelchair, bedside chair)? 3  -AB 3  -RD    Climbing 3-5 steps with a railing? 3  -AB 3  -RD    To walk in hospital room? 3  -AB 3  -RD    AM-PAC 6 Clicks Score (PT) 20  -AB 20  -RD    Highest Level of Mobility Goal 6 --> Walk 10 steps or more  -AB 6 --> Walk 10 steps or more  -RD      Row Name 01/28/25 1226  01/28/25 0936       Functional Assessment    Outcome Measure Options AM-PAC 6 Clicks Daily Activity (OT)  -AR AM-PAC 6 Clicks Basic Mobility (PT)  -AB              User Key  (r) = Recorded By, (t) = Taken By, (c) = Cosigned By      Initials Name Provider Type    AR Valentina Stewart, OT Occupational Therapist    Hamida Maya, PT Physical Therapist    Nova Tobias RN Registered Nurse                    Occupational Therapy Education       Title: PT OT SLP Therapies (Done)       Topic: Occupational Therapy (Done)       Point: ADL training (Done)       Description:   Instruct learner(s) on proper safety adaptation and remediation techniques during self care or transfers.   Instruct in proper use of assistive devices.                  Learning Progress Summary            Patient Eager, E, VU,DU by AR at 1/28/2025 1823    Eager, E,TB,D, VU by AR at 1/28/2025 1230   Family Eager, E, VU,DU by AR at 1/28/2025 1823                      Point: Home exercise program (Done)       Description:   Instruct learner(s) on appropriate technique for monitoring, assisting and/or progressing therapeutic exercises/activities.                  Learning Progress Summary            Patient Eager, E, VU,DU by AR at 1/28/2025 1823    Eager, E,TB,D, VU by AR at 1/28/2025 1230   Family Eager, E, VU,DU by AR at 1/28/2025 1823                      Point: Precautions (Done)       Description:   Instruct learner(s) on prescribed precautions during self-care and functional transfers.                  Learning Progress Summary            Patient Eager, E, VU,DU by AR at 1/28/2025 1823    Eager, E,TB,D, VU by AR at 1/28/2025 1230   Family Eager, E, VU,DU by AR at 1/28/2025 1823                      Point: Body mechanics (Done)       Description:   Instruct learner(s) on proper positioning and spine alignment during self-care, functional mobility activities and/or exercises.                  Learning Progress Summary            Patient  NARINDER Lyn, RENAE,DU by AR at 1/28/2025 1823    NARINDER Lyn,TB,D, VU by AR at 1/28/2025 1230   Family NARINDER Lny VU,KIMBERLY by AR at 1/28/2025 1823                                      User Key       Initials Effective Dates Name Provider Type Discipline    AR 07/11/23 -  Valentina Stewart, OT Occupational Therapist OT                  OT Recommendation and Plan  Planned Therapy Interventions (OT): adaptive equipment training, BADL retraining, edema control/reduction, functional balance retraining, IADL retraining, occupation/activity based interventions, orthotic fabrication/fitting/training, patient/caregiver education/training, ROM/therapeutic exercise, transfer/mobility retraining  Therapy Frequency (OT): daily  Plan of Care Review  Plan of Care Reviewed With: patient, daughter  Outcome Evaluation: OT returned per nurse request as pain decreased and pt anticipating DC home. Daughter at bedside, reviewed NBW status, ADL retraining and sling management. Attempted UB dressing, however shirt sleeve would not accommodate dressing and pt to DC home in gown. She demo AROM L hand all digits and pre/post pain level 8/10 LUE. Recommend DC home with initial 24/7 assist and HHOT.     Time Calculation:   Evaluation Complexity (OT)  Review Occupational Profile/Medical/Therapy History Complexity: brief/low complexity  Assessment, Occupational Performance/Identification of Deficit Complexity: 1-3 performance deficits  Clinical Decision Making Complexity (OT): problem focused assessment/low complexity  Overall Complexity of Evaluation (OT): low complexity     Time Calculation- OT       Row Name 01/28/25 1823 01/28/25 1230          Time Calculation- OT    OT Start Time 1730  -AR 0919  -AR     OT Received On 01/28/25  -AR 01/28/25  -AR     OT Goal Re-Cert Due Date 02/07/25  -AR 02/07/25  -AR        Timed Charges    85139 - OT Self Care/Mgmt Minutes 24  -AR 14  -AR        Untimed Charges    OT Eval/Re-eval Minutes -- 55  -AR        Total  Minutes    Timed Charges Total Minutes 24  -AR 14  -AR     Untimed Charges Total Minutes -- 55  -AR      Total Minutes 24  -AR 69  -AR               User Key  (r) = Recorded By, (t) = Taken By, (c) = Cosigned By      Initials Name Provider Type    Valentina Javier OT Occupational Therapist                  Therapy Charges for Today       Code Description Service Date Service Provider Modifiers Qty    81162151872 HC OT SELF CARE/MGMT/TRAIN EA 15 MIN 1/28/2025 Valentina Stewart, OT GO 1    93608587896 HC OT EVAL LOW COMPLEXITY 4 1/28/2025 Valentina Stewart, OT GO 1    83521419138 HC OT SELF CARE/MGMT/TRAIN EA 15 MIN 1/28/2025 Valentina Stewart OT GO 2                 Valentina Stewart OT  1/28/2025

## 2025-02-12 ENCOUNTER — THERAPY VISIT (OUTPATIENT)
Dept: PHYSICAL THERAPY | Facility: CLINIC | Age: 40
End: 2025-02-12
Attending: PEDIATRICS
Payer: COMMERCIAL

## 2025-02-12 DIAGNOSIS — M25.50 HYPERMOBILITY ARTHRALGIA: Primary | ICD-10-CM

## 2025-02-12 DIAGNOSIS — Q79.60 EHLERS-DANLOS SYNDROME: ICD-10-CM

## 2025-02-12 PROCEDURE — 97140 MANUAL THERAPY 1/> REGIONS: CPT | Mod: GP | Performed by: PHYSICAL THERAPIST

## 2025-02-12 NOTE — PROGRESS NOTES
02/12/25 0500   Appointment Info   Signing clinician's name / credentials Elisa Levine PT, Board Certified Geriatric Clinical Specialist   Total/Authorized Visits [facility]. 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 Pt will  also report return to no more than 5 headache days per month.   Goal Progress 6/26/24: Saw Neri, going to continue previous meds. In the AM since starting exercises, not waking up with headaches. Everything is stiff - not sure if due to exercises or not. 8/28/28: Bad this last week, likely due to L shoulder being off. Did massage on Fri and Sun - did help some. Not waking up with headaches - more waking up with neck and muscle strain. Having 4 headaches days per month.   Target Date 08/04/24   Date Met 08/28/24   PT Goal 2   Goal Identifier Orthotics   Goal Description pt will obtain appropriate orthotics in order to manage pes planus and ankle position.   Goal Progress 6/26/24: Has appt for AFOs 7/3 with Eddie. Delivery on 7/23. 8/28/24: Attained AFOs and they are going well. Ankles/calves will complain if not wearing them.   Target Date 08/04/24   Date Met 08/28/24   PT Goal 3   Goal Identifier HEP   Goal Description pt will demonstrate indep with HEP for postural and joint stability. with consistent performance of  HEP 10 mins (or 3 exercises) per day. 4/7 days per week.   Goal Progress 2/12/25  Doing well with what they have.11/20/24  resuming HEP as able 8/28/24: Doing daily.   Target Date 05/13/25   PT Goal 4   Goal Identifier [place] index of hypermobility   Goal Description Patient to demo a 10% or greater reduction in score on BIoH questionnaire to demo improved subjective reports of impairement related to EDS and to improve overall function.   Goal Progress 2/12/25 not assessed today 11/20/24 233/360 = 64.7% at eval 206/360 = 57.22%. 8/28/24: pt didn't fill it out before session today.   Target Date 05/13/25   PT Goal 5   Goal Identifier pain management   Goal Description Patient to report and/or demo at least 3 pain management strategies to reduce neck, shoulder and hip pain to perform activities with ease.   Goal Progress 11/20/24 using home massage tools, heat, supports.  Still having lots of pain.  8/28/24: Has AFOs which are helping. Considering compression for bed time (often waking up with pain due to involuntary positioning while sleeping).   Target Date 02/13/25   Date Met 02/12/25   Subjective Report   Subjective Report Really sore due to being sick and has really low energy  and neck is really sore and painful. lots of coughing   Manual Therapy   Manual Therapy: Mobilization, MFR, MLD, friction massage minutes (62490) 45   Manual Therapy 1 in supine   Manual Therapy 1 - Details Release to subocciptials, SCMS, scalenes, thoracic inlet,  subscapularis on L , inferior and posterior glides to (B) ghj gentle oscillation. Capsular release to shoulders and light touch release to thoracic inlet and respiratory diaphragm   Skilled Intervention Assessment of position -   Patient Response/Progress Decreased pain improved movement   Education   Learner/Method Patient   Plan   Home program Continue with PT Rx   Plan for next session Has pain calmed down? check supine 3b- move to dead bug? consider bird dog   Total Session Time   Timed Code Treatment Minutes 45   Total Treatment Time (sum of timed and untimed services) 45          PLAN  Continue therapy per current plan of care.    Beginning/End Dates of Progress Note Reporting Period:  11/20/24 to 02/12/2025    Referring Provider:  Twan Marin

## 2025-02-26 ENCOUNTER — THERAPY VISIT (OUTPATIENT)
Dept: PHYSICAL THERAPY | Facility: CLINIC | Age: 40
End: 2025-02-26
Attending: PEDIATRICS
Payer: COMMERCIAL

## 2025-02-26 DIAGNOSIS — Q79.60 EHLERS-DANLOS SYNDROME: ICD-10-CM

## 2025-02-26 DIAGNOSIS — M25.50 HYPERMOBILITY ARTHRALGIA: Primary | ICD-10-CM

## 2025-02-26 PROCEDURE — 97140 MANUAL THERAPY 1/> REGIONS: CPT | Mod: GP | Performed by: PHYSICAL THERAPIST

## 2025-02-28 ENCOUNTER — LAB (OUTPATIENT)
Dept: LAB | Facility: CLINIC | Age: 40
End: 2025-02-28
Payer: COMMERCIAL

## 2025-02-28 DIAGNOSIS — I10 ESSENTIAL HYPERTENSION: ICD-10-CM

## 2025-02-28 LAB
ALBUMIN SERPL BCG-MCNC: 4.5 G/DL (ref 3.5–5.2)
ALP SERPL-CCNC: 21 U/L (ref 40–150)
ALT SERPL W P-5'-P-CCNC: 30 U/L (ref 0–70)
ANION GAP SERPL CALCULATED.3IONS-SCNC: 11 MMOL/L (ref 7–15)
AST SERPL W P-5'-P-CCNC: 25 U/L (ref 0–45)
BASOPHILS # BLD AUTO: 0 10E3/UL (ref 0–0.2)
BASOPHILS NFR BLD AUTO: 1 %
BILIRUB SERPL-MCNC: 0.4 MG/DL
BUN SERPL-MCNC: 8.4 MG/DL (ref 6–20)
CALCIUM SERPL-MCNC: 9.2 MG/DL (ref 8.8–10.4)
CHLORIDE SERPL-SCNC: 102 MMOL/L (ref 98–107)
CREAT SERPL-MCNC: 0.91 MG/DL (ref 0.51–1.17)
EGFRCR SERPLBLD CKD-EPI 2021: 81 ML/MIN/1.73M2
EOSINOPHIL # BLD AUTO: 0.2 10E3/UL (ref 0–0.7)
EOSINOPHIL NFR BLD AUTO: 2 %
ERYTHROCYTE [DISTWIDTH] IN BLOOD BY AUTOMATED COUNT: 13.1 % (ref 10–15)
GLUCOSE SERPL-MCNC: 94 MG/DL (ref 70–99)
HCO3 SERPL-SCNC: 26 MMOL/L (ref 22–29)
HCT VFR BLD AUTO: 44.3 % (ref 35–53)
HGB BLD-MCNC: 14.8 G/DL (ref 11.7–17.7)
IMM GRANULOCYTES # BLD: 0 10E3/UL
IMM GRANULOCYTES NFR BLD: 1 %
LYMPHOCYTES # BLD AUTO: 2.7 10E3/UL (ref 0.8–5.3)
LYMPHOCYTES NFR BLD AUTO: 42 %
MCH RBC QN AUTO: 28.1 PG (ref 26.5–33)
MCHC RBC AUTO-ENTMCNC: 33.4 G/DL (ref 31.5–36.5)
MCV RBC AUTO: 84 FL (ref 78–100)
MONOCYTES # BLD AUTO: 0.6 10E3/UL (ref 0–1.3)
MONOCYTES NFR BLD AUTO: 9 %
NEUTROPHILS # BLD AUTO: 3 10E3/UL (ref 1.6–8.3)
NEUTROPHILS NFR BLD AUTO: 46 %
NRBC # BLD AUTO: 0 10E3/UL
NRBC BLD AUTO-RTO: 0 /100
PLATELET # BLD AUTO: 307 10E3/UL (ref 150–450)
POTASSIUM SERPL-SCNC: 4.1 MMOL/L (ref 3.4–5.3)
PROT SERPL-MCNC: 7.6 G/DL (ref 6.4–8.3)
RBC # BLD AUTO: 5.26 10E6/UL (ref 3.8–5.9)
SODIUM SERPL-SCNC: 139 MMOL/L (ref 135–145)
WBC # BLD AUTO: 6.5 10E3/UL (ref 4–11)

## 2025-02-28 PROCEDURE — 85025 COMPLETE CBC W/AUTO DIFF WBC: CPT | Performed by: PATHOLOGY

## 2025-02-28 PROCEDURE — 80053 COMPREHEN METABOLIC PANEL: CPT | Performed by: PATHOLOGY

## 2025-02-28 PROCEDURE — 36415 COLL VENOUS BLD VENIPUNCTURE: CPT | Performed by: PATHOLOGY

## 2025-03-12 ENCOUNTER — MYC MEDICAL ADVICE (OUTPATIENT)
Dept: INTERNAL MEDICINE | Facility: CLINIC | Age: 40
End: 2025-03-12

## 2025-03-31 ENCOUNTER — THERAPY VISIT (OUTPATIENT)
Dept: PHYSICAL THERAPY | Facility: CLINIC | Age: 40
End: 2025-03-31
Attending: NURSE PRACTITIONER
Payer: COMMERCIAL

## 2025-03-31 DIAGNOSIS — Q79.60 EHLERS-DANLOS SYNDROME: ICD-10-CM

## 2025-03-31 DIAGNOSIS — M25.50 HYPERMOBILITY ARTHRALGIA: Primary | ICD-10-CM

## 2025-03-31 PROCEDURE — 97140 MANUAL THERAPY 1/> REGIONS: CPT | Mod: GP

## 2025-03-31 PROCEDURE — 97110 THERAPEUTIC EXERCISES: CPT | Mod: GP

## 2025-04-02 ENCOUNTER — LAB (OUTPATIENT)
Dept: LAB | Facility: CLINIC | Age: 40
End: 2025-04-02
Payer: COMMERCIAL

## 2025-04-02 DIAGNOSIS — R74.8 LOW SERUM ALKALINE PHOSPHATASE: ICD-10-CM

## 2025-04-02 LAB
CORTIS SERPL-MCNC: 17.1 UG/DL
MAGNESIUM SERPL-MCNC: 2.1 MG/DL (ref 1.7–2.3)

## 2025-04-02 PROCEDURE — 36415 COLL VENOUS BLD VENIPUNCTURE: CPT

## 2025-04-02 PROCEDURE — 83735 ASSAY OF MAGNESIUM: CPT

## 2025-04-02 PROCEDURE — 84630 ASSAY OF ZINC: CPT

## 2025-04-02 PROCEDURE — 82533 TOTAL CORTISOL: CPT

## 2025-04-03 LAB — ZINC SERPL-MCNC: 78.1 UG/DL

## 2025-04-04 ENCOUNTER — ANCILLARY PROCEDURE (OUTPATIENT)
Dept: MAMMOGRAPHY | Facility: CLINIC | Age: 40
End: 2025-04-04
Attending: NURSE PRACTITIONER
Payer: COMMERCIAL

## 2025-04-04 DIAGNOSIS — Z12.31 VISIT FOR SCREENING MAMMOGRAM: ICD-10-CM

## 2025-04-04 PROCEDURE — 77063 BREAST TOMOSYNTHESIS BI: CPT

## 2025-04-04 PROCEDURE — 77067 SCR MAMMO BI INCL CAD: CPT

## 2025-04-08 ENCOUNTER — MYC REFILL (OUTPATIENT)
Dept: INTERNAL MEDICINE | Facility: CLINIC | Age: 40
End: 2025-04-08
Payer: COMMERCIAL

## 2025-04-08 DIAGNOSIS — I10 ESSENTIAL HYPERTENSION: ICD-10-CM

## 2025-04-08 RX ORDER — LOSARTAN POTASSIUM 25 MG/1
25 TABLET ORAL DAILY
Qty: 90 TABLET | Refills: 3 | Status: CANCELLED | OUTPATIENT
Start: 2025-04-08

## 2025-04-08 RX ORDER — ELAGOLIX 150 MG/1
TABLET, FILM COATED ORAL
Status: CANCELLED | OUTPATIENT
Start: 2025-04-08

## 2025-04-10 NOTE — TELEPHONE ENCOUNTER
Last Written Prescription:  ORILISSA 150 MG TABS -- --  -- Yes   Class: Historical   Order: [id number]     ----------------------  Last Visit Date: 2-28-25  Future Visit Date: none      Refill decision: Medication unable to be refilled by RN due to: Medication not included in refill protocol policy   historical    RX AVAILABLE  losartan (COZAAR) 25 MG tablet 90 tablet 3 2/28/2025 -- No   Sig - Route: Take 1 tablet (25 mg) by mouth daily. - Oral       Request from pharmacy:  Requested Prescriptions   Pending Prescriptions Disp Refills    ORILISSA 150 MG TABS         There is no refill protocol information for this order       losartan (COZAAR) 25 MG tablet 90 tablet 3     Sig: Take 1 tablet (25 mg) by mouth daily.       Angiotensin-II Receptors Passed - 4/10/2025  7:27 AM        Passed - Most recent blood pressure under 140/90 in past 12 months     BP Readings from Last 3 Encounters:   02/28/25 119/85   08/23/24 123/81   06/17/24 (!) 131/92       No data recorded            Passed - Medication is active on med list and the sig matches. RN to manually verify dose and sig if red X/fail.     If the protocol passes (green check), you do not need to verify med dose and sig.    A prescription matches if they are the same clinical intention.    For Example: once daily and every morning are the same.    The protocol can not identify upper and lower case letters as matching and will fail.     For Example: Take 1 tablet (50 mg) by mouth daily     TAKE 1 TABLET (50 MG) BY MOUTH DAILY    For all fails (red x), verify dose and sig.    If the refill does match what is on file, the RN can still proceed to approve the refill request.       If they do not match, route to the appropriate provider.             Passed - Has GFR on file in past 12 months and most recent value is normal        Passed - Medication indicated for associated diagnosis     The medication is prescribed for one or more of the following conditions:    Chronic  Kidney Disease (CDK)    Heart Failure (HF)    Diabetes, Nephropathy   Hypertension    Coronary Artery Disease (CAD)   Raynaud's Disease   Ischemic cardiomyopathy   Cardiomyopathy   Pulmonary Hypertension          Passed - Recent (12 mo) or future (90days) visit within the authorizing provider's specialty     The patient must have completed an in-person or virtual visit within the past 12 months or has a future visit scheduled within the next 90 days with the authorizing provider s specialty.  Urgent care and e-visits do not qualify as an office visit for this protocol.          Passed - Patient is age 18 or older        Passed - No active pregnancy on record        Passed - Normal serum potassium on file in past 12 months     Recent Labs   Lab Test 02/28/25  0952   POTASSIUM 4.1                    Passed - No positive pregnancy test in past 12 months

## 2025-04-14 NOTE — TELEPHONE ENCOUNTER
Spoke to patient her medication ORILISSA 150 MG TAB  is not prescribed by Radha Collins, she uses another provider at [facility],  Declined refill request.  Patient will contact the prescriber to fill the med.     Angelita Lopez RN on 4/14/2025 at 3:25 PM

## 2025-05-20 DIAGNOSIS — M21.41 ACQUIRED PES PLANUS OF BOTH FEET: ICD-10-CM

## 2025-05-20 DIAGNOSIS — M62.838 MUSCLE SPASM: ICD-10-CM

## 2025-05-20 DIAGNOSIS — M21.42 ACQUIRED PES PLANUS OF BOTH FEET: ICD-10-CM

## 2025-05-20 DIAGNOSIS — Q79.60 EHLERS-DANLOS SYNDROME: ICD-10-CM

## 2025-05-20 DIAGNOSIS — M79.7 FIBROMYALGIA: Primary | ICD-10-CM

## 2025-05-20 DIAGNOSIS — M25.50 HYPERMOBILITY ARTHRALGIA: ICD-10-CM

## 2025-05-20 NOTE — PROGRESS NOTES
Patient needing updated referral to PT for EDS and associated joint pain/instability.  TORSTEN Chao CNP

## 2025-05-21 ENCOUNTER — THERAPY VISIT (OUTPATIENT)
Dept: PHYSICAL THERAPY | Facility: CLINIC | Age: 40
End: 2025-05-21
Attending: PEDIATRICS
Payer: COMMERCIAL

## 2025-05-21 DIAGNOSIS — M79.7 FIBROMYALGIA: ICD-10-CM

## 2025-05-21 DIAGNOSIS — M21.41 ACQUIRED PES PLANUS OF BOTH FEET: ICD-10-CM

## 2025-05-21 DIAGNOSIS — M25.50 HYPERMOBILITY ARTHRALGIA: ICD-10-CM

## 2025-05-21 DIAGNOSIS — M21.42 ACQUIRED PES PLANUS OF BOTH FEET: ICD-10-CM

## 2025-05-21 DIAGNOSIS — M62.838 MUSCLE SPASM: ICD-10-CM

## 2025-05-21 DIAGNOSIS — Q79.60 EHLERS-DANLOS SYNDROME: ICD-10-CM

## 2025-05-21 PROCEDURE — 97535 SELF CARE MNGMENT TRAINING: CPT | Mod: GP | Performed by: PHYSICAL THERAPIST

## 2025-05-21 PROCEDURE — 97140 MANUAL THERAPY 1/> REGIONS: CPT | Mod: GP | Performed by: PHYSICAL THERAPIST

## 2025-05-21 NOTE — PROGRESS NOTES
05/21/25 0500   Appointment Info   Signing clinician's name / credentials Leann Sandoval DPT, Board-Certified Neurologic Clinical Specialist   Total/Authorized Visits [facility]. 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 Pt will  also report return to no more than 5 headache days per month.   Goal Progress 6/26/24: Saw Neri, going to continue previous meds. In the AM since starting exercises, not waking up with headaches. Everything is stiff - not sure if due to exercises or not. 8/28/28: Bad this last week, likely due to L shoulder being off. Did massage on Fri and Sun - did help some. Not waking up with headaches - more waking up with neck and muscle strain. Having 4 headaches days per month. 5/21/25: Headaches under control, but lots of neck tension. Now 4/10, worst 8/10.   Target Date 08/18/25   PT Goal 2   Goal Identifier Orthotics   Goal Description pt will obtain appropriate orthotics in order to manage pes planus and ankle position.   Goal Progress 6/26/24: Has appt for AFOs 7/3 with Eddie. Delivery on 7/23. 8/28/24: Attained AFOs and they are going well. Ankles/calves will complain if not wearing them. 5/21/25: Using when out for a long period of time and they are very useful. Not using when gardening.   Target Date 08/04/24   Date Met 08/28/24   PT Goal 3   Goal Identifier  HEP   Goal Description pt will demonstrate indep with HEP for postural and joint stability. with consistent performance of  HEP 10 mins (or 3 exercises) per day. 4/7 days per week.   Goal Progress 5/21/25: cervical retration, looking for muscles for tension and release, some shoulder isometrics, bridging, magic [place] [employer] trucks. [place] throughout the day. 2/12/25  Doing well with what they have.11/20/24 resuming HEP as able 8/28/24: Doing daily.   Target Date 08/18/25   PT Goal 4   Goal Identifier [place] index of hypermobility   Goal Description Patient to demo a 10% or greater reduction in score on BIoH questionnaire to demo improved subjective reports of impairement related to EDS and to improve overall function.   Goal Progress 5/21/25: Didn't fill out before appt today. 2/12/25 not assessed today 11/20/24 233/360 = 64.7% at eval 206/360 = 57.22%. 8/28/24: pt didn't fill it out before session today.   Target Date 08/18/25   PT Goal 5   Goal Identifier pain management   Goal Description Patient to report and/or demo at least 3 pain management strategies to reduce neck, shoulder and hip pain to perform activities with ease.   Goal Progress 5/21/25: Has been working on using c-collar, AFOs occasionally, fig 8 posture support. Additionally, self trigger point releases, ice. 11/20/24 using home massage tools, heat, supports.  Still having lots of pain.  8/28/24: Has AFOs which are helping. Considering compression for bed time (often waking up with pain due to involuntary positioning while sleeping).   Target Date 08/18/25   PT Goal 6   Goal Identifier Functional neck strength   Goal Description Pt report improved neck strength via ability to sit and work on computer while supporting head for no more than 10 mins of every hour.   Goal Progress 5/21/25:Pt is holding head when reading 20 mins of every hour.   Target Date 08/18/25   PT Goal 7   Goal Identifier Functional shoulder strength/gardening   Goal  Description Pt report improved shoulder strength and reduced pain to be able to use long handled tools with both arms for at least 15 mins at a time to return to gardening.   Target Date 08/18/25   Subjective Report   Subjective Report SCM is still out of control, very aware of clavicle, arm weakness. Basically stopped using L arm a few days ago - had a tendon pop.  Got a fig 8 shoulder brace. Tried TB to help shoulder back. Isometrics are making things worse. Got fed up and powered through and did a lot of gardening - filling an elevated bed was not good. Popped L shoulder with arm abd and IR to cradle cat, other was scratching armpit. Want to focus on trunk, but shoulders are bad. Sleeping more - napping 2-4hrs, 3-4x per week. Sleeping overnight 7-9hrs. restless arms at night.   Manual Therapy   Manual Therapy: Mobilization, MFR, MLD, friction massage minutes (49736) 15   Skilled Intervention In supine, L humerus ant of acromion, L clavicle upslipped distally, 1st ribs in good position. Increased fibrotic tone in B upper traps (R side significantly worse), B SCM, B scalenes, B pectorals. Completed trigger point releases to all of the above.   Self Care/home Management   ADL/Home Mgmt Training (66645) 25   Skilled Intervention Subjective per above. Discussed potential of having more issues with dysautonomia due to need for holding head more, more profound fatigue. Will do screening next session. Discussed new goals and goal updates per above. Pt notes needng to make decisions around eating vs gardening after work - usually chooses gardening. Discussed potential of AROLDO - pt will make an appt to discuss with PCP as they noted mental health also brought it up.   Education   Learner/Method Patient   Education Comments self care   Plan   Plan for next session Active Stand Test! Start working on dysautonomia protocol. Assigned bristol - see if pt has completed it and update goal.   Total Session Time   Timed Code  Treatment Minutes 40   Total Treatment Time (sum of timed and untimed services) 40         PLAN  Continue therapy per current plan of care.    Beginning/End Dates of Progress Note Reporting Period:  5/6/24 to 05/21/2025    Referring Provider:  Dr. Radha Collins

## 2025-05-22 DIAGNOSIS — G43.009 MIGRAINE WITHOUT AURA AND WITHOUT STATUS MIGRAINOSUS, NOT INTRACTABLE: ICD-10-CM

## 2025-05-22 NOTE — TELEPHONE ENCOUNTER
[facility] Task Note    TASK    Rx Authorization:  Requested Medication/ Dose ubrogepant (UBRELVY) 50 MG tablet   Date last refill ordered: 6/28/24  Quantity ordered:#  30   Refills: 4  Date of last clinic visit with ordering provider: 6/17/24  Date of next clinic visit with ordering provider:6/13/25  All pertinent protocol data (lab date/result):   Include pertinent information from patients message:       Luann Hanks CMA

## 2025-05-27 ENCOUNTER — THERAPY VISIT (OUTPATIENT)
Dept: PHYSICAL THERAPY | Facility: CLINIC | Age: 40
End: 2025-05-27
Attending: PEDIATRICS
Payer: COMMERCIAL

## 2025-05-27 DIAGNOSIS — M25.50 HYPERMOBILITY ARTHRALGIA: ICD-10-CM

## 2025-05-27 DIAGNOSIS — M79.7 FIBROMYALGIA: Primary | ICD-10-CM

## 2025-05-27 DIAGNOSIS — Q79.60 EHLERS-DANLOS SYNDROME: ICD-10-CM

## 2025-05-27 PROCEDURE — 97110 THERAPEUTIC EXERCISES: CPT | Mod: GP | Performed by: PHYSICAL THERAPIST

## 2025-05-27 PROCEDURE — 97530 THERAPEUTIC ACTIVITIES: CPT | Mod: GP | Performed by: PHYSICAL THERAPIST

## 2025-05-27 NOTE — PROGRESS NOTES
Dysautonomia Screening - Active Stand Test  Body Position (Time) Blood Pressure Heart Rate Comments   Supine (3 min) 119/81 90    Supine (5 min) 125/78 87    Transition to quiet standing at bedside ---121/81----- ---92--- No sx------   Standing (1 min) 127/85 90    Standing (3 min) 130/83 90    Standing (5 min) 128/80 97 Feet are cold   Standing (7 min) OPTIONAL 125/83 86 Feels like legs were not feeling tingling but feels It now while sitting in feet   Standing (10 min) OPTIONAL        Orthostatic Hypotension - BP reduction of >20mmHg SBP or >10 mmHg DBP within the first 3 minutes of upright posture    Postural Orthostatic Tachycardia Syndrome (POTS) is likely if:   30+bpm increase from baseline HR for adults, OR   40+bpm increase in 12-18yo from supine   WITHOUT >20mmHg drop in SBP or >10 point drop in DBP

## 2025-06-01 ASSESSMENT — ASTHMA QUESTIONNAIRES
ACT_TOTALSCORE: 20
QUESTION_1 LAST FOUR WEEKS HOW MUCH OF THE TIME DID YOUR ASTHMA KEEP YOU FROM GETTING AS MUCH DONE AT WORK, SCHOOL OR AT HOME: A LITTLE OF THE TIME
QUESTION_3 LAST FOUR WEEKS HOW OFTEN DID YOUR ASTHMA SYMPTOMS (WHEEZING, COUGHING, SHORTNESS OF BREATH, CHEST TIGHTNESS OR PAIN) WAKE YOU UP AT NIGHT OR EARLIER THAN USUAL IN THE MORNING: NOT AT ALL
QUESTION_4 LAST FOUR WEEKS HOW OFTEN HAVE YOU USED YOUR RESCUE INHALER OR NEBULIZER MEDICATION (SUCH AS ALBUTEROL): NOT AT ALL
QUESTION_5 LAST FOUR WEEKS HOW WOULD YOU RATE YOUR ASTHMA CONTROL: SOMEWHAT CONTROLLED
QUESTION_2 LAST FOUR WEEKS HOW OFTEN HAVE YOU HAD SHORTNESS OF BREATH: THREE TO SIX TIMES A WEEK

## 2025-06-01 ASSESSMENT — ANXIETY QUESTIONNAIRES
1. FEELING NERVOUS, ANXIOUS, OR ON EDGE: SEVERAL DAYS
5. BEING SO RESTLESS THAT IT IS HARD TO SIT STILL: NOT AT ALL
2. NOT BEING ABLE TO STOP OR CONTROL WORRYING: SEVERAL DAYS
7. FEELING AFRAID AS IF SOMETHING AWFUL MIGHT HAPPEN: SEVERAL DAYS
GAD7 TOTAL SCORE: 9
4. TROUBLE RELAXING: NEARLY EVERY DAY
3. WORRYING TOO MUCH ABOUT DIFFERENT THINGS: SEVERAL DAYS
IF YOU CHECKED OFF ANY PROBLEMS ON THIS QUESTIONNAIRE, HOW DIFFICULT HAVE THESE PROBLEMS MADE IT FOR YOU TO DO YOUR WORK, TAKE CARE OF THINGS AT HOME, OR GET ALONG WITH OTHER PEOPLE: SOMEWHAT DIFFICULT
6. BECOMING EASILY ANNOYED OR IRRITABLE: MORE THAN HALF THE DAYS
GAD7 TOTAL SCORE: 9
7. FEELING AFRAID AS IF SOMETHING AWFUL MIGHT HAPPEN: SEVERAL DAYS
8. IF YOU CHECKED OFF ANY PROBLEMS, HOW DIFFICULT HAVE THESE MADE IT FOR YOU TO DO YOUR WORK, TAKE CARE OF THINGS AT HOME, OR GET ALONG WITH OTHER PEOPLE?: SOMEWHAT DIFFICULT
GAD7 TOTAL SCORE: 9

## 2025-06-06 ENCOUNTER — OFFICE VISIT (OUTPATIENT)
Dept: INTERNAL MEDICINE | Facility: CLINIC | Age: 40
End: 2025-06-06
Payer: COMMERCIAL

## 2025-06-06 VITALS
TEMPERATURE: 97.4 F | OXYGEN SATURATION: 97 % | RESPIRATION RATE: 16 BRPM | BODY MASS INDEX: 37.52 KG/M2 | WEIGHT: 219.8 LBS | SYSTOLIC BLOOD PRESSURE: 128 MMHG | HEIGHT: 64 IN | DIASTOLIC BLOOD PRESSURE: 87 MMHG | HEART RATE: 102 BPM

## 2025-06-06 DIAGNOSIS — R53.82 CHRONIC FATIGUE: ICD-10-CM

## 2025-06-06 DIAGNOSIS — E55.9 VITAMIN D DEFICIENCY: ICD-10-CM

## 2025-06-06 DIAGNOSIS — G47.19 EXCESSIVE DAYTIME SLEEPINESS: ICD-10-CM

## 2025-06-06 DIAGNOSIS — M54.81 OCCIPITAL NEURALGIA, UNSPECIFIED LATERALITY: ICD-10-CM

## 2025-06-06 DIAGNOSIS — E78.5 HYPERLIPIDEMIA LDL GOAL <100: ICD-10-CM

## 2025-06-06 DIAGNOSIS — M79.7 FIBROMYALGIA: ICD-10-CM

## 2025-06-06 DIAGNOSIS — G25.81 RESTLESS LEGS SYNDROME (RLS): Primary | ICD-10-CM

## 2025-06-06 DIAGNOSIS — M25.50 HYPERMOBILITY ARTHRALGIA: ICD-10-CM

## 2025-06-06 DIAGNOSIS — Z82.49 FAMILY HISTORY OF EARLY CAD: ICD-10-CM

## 2025-06-06 PROCEDURE — 82306 VITAMIN D 25 HYDROXY: CPT | Performed by: NURSE PRACTITIONER

## 2025-06-06 PROCEDURE — 99214 OFFICE O/P EST MOD 30 MIN: CPT | Performed by: NURSE PRACTITIONER

## 2025-06-06 PROCEDURE — 3079F DIAST BP 80-89 MM HG: CPT | Performed by: NURSE PRACTITIONER

## 2025-06-06 PROCEDURE — G2211 COMPLEX E/M VISIT ADD ON: HCPCS | Performed by: NURSE PRACTITIONER

## 2025-06-06 PROCEDURE — 3074F SYST BP LT 130 MM HG: CPT | Performed by: NURSE PRACTITIONER

## 2025-06-06 PROCEDURE — 99000 SPECIMEN HANDLING OFFICE-LAB: CPT | Performed by: PATHOLOGY

## 2025-06-06 PROCEDURE — 83695 ASSAY OF LIPOPROTEIN(A): CPT | Performed by: NURSE PRACTITIONER

## 2025-06-06 RX ORDER — NALOXONE HYDROCHLORIDE 4 MG/.1ML
SPRAY NASAL
COMMUNITY
Start: 2025-02-28

## 2025-06-06 RX ORDER — VITAMIN B COMPLEX
25 TABLET ORAL DAILY
Qty: 90 TABLET | Refills: 3 | Status: SHIPPED | OUTPATIENT
Start: 2025-06-06

## 2025-06-06 RX ORDER — FAMOTIDINE 20 MG/1
20 TABLET, FILM COATED ORAL
COMMUNITY
Start: 2025-05-22

## 2025-06-06 ASSESSMENT — ENCOUNTER SYMPTOMS: FATIGUE: 1

## 2025-06-06 NOTE — PROGRESS NOTES
Assessment & Plan     Vitamin D deficiency  Previous history of both high and low vitamin D levels.  She has not been on her vitamin D supplement for a little while (unclear how long she has been out of this)--refill provided and will recheck levels today given worsening fatigue lately.  - Vitamin D Deficiency; Future  - Vitamin D3 (CHOLECALCIFEROL) 25 mcg (1000 units) tablet; Take 1 tablet (25 mcg) by mouth daily.    Restless legs syndrome (RLS)  Excessive daytime sleepiness  Endorses unrefreshing sleep and excessive daytime sleepiness despite sleeping up to 9 hours a night, requiring frequent napping during the day.  Will refer to sleep management for further evaluation.  Continue with weighted blanket and compression sleeves as needed for restless arms and legs.  Will screen ferritin and iron studies.  - Adult Sleep Eval & Management Referral; Future  - Ferritin; Future  - Iron and iron binding capacity; Future    Chronic fatigue  She is enrolled to start and lifestyle modification program with OT through [facility].  She may benefit from a reduced work schedule, as this has been recommended by both her physical therapist and her psychotherapist.  She can follow-up with me if she has any forms to complete for work accommodations.  - TSH with free T4 reflex; Future  - Pain Management  Referral; Future    Hyperlipidemia LDL goal <100  Family history of early CAD  Recent lipid panel completed at [facility] showed total cholesterol 233, HDL 34, .  She reports family history of MI in both her father and paternal grandfather.  Will check lipoprotein a level.  This is within normal limits.  - Lipoprotein (a); Future    Fibromyalgia  Hypermobility polyarthralgia syndrome  Occipital neuralgia, unspecified laterality  Longstanding widespread body pain and joint pain related to EDS/hypermobility, as well as fibromyalgia.  She is taking gabapentin, which she recognizes could be sedating and  contributing to the fatigue, but she feels unable to adjust this dose due to her ongoing pain levels.  She continues on venlafaxine.  Will refer to pain management for comprehensive pain evaluation and further recommendations to better optimize her pain management strategies.  - Pain Management  Referral; Future      Follow-up  In approximately 8 weeks as scheduled, or sooner as needed for FMLA/short-term disability forms    The longitudinal plan of care for the diagnosis(es)/condition(s) as documented were addressed during this visit. Due to the added complexity in care, I will continue to support Ri in the subsequent management and with ongoing continuity of care.     Subjective   Ri is a 40 year old, presenting for the following health issues:  Musculoskeletal Problem (Shoulder pain. PT and CBT therapists have suggested leave of absence from work. ), Hypermobility (EDS), Dysautonomia, and Fatigue (Chronic pain and fatigue)        6/6/2025     4:26 PM   Additional Questions   Roomed by matt     Musculoskeletal Problem  Associated symptoms include fatigue.   Fatigue  Associated symptoms include fatigue.   History of Present Illness       Back Pain:  She presents for follow up of back pain. Patient's back pain is a chronic problem.  Location of back pain:  Right upper back, left upper back, right side of neck, right shoulder and left shoulder  Description of back pain: dull ache  Back pain spreads: right side of neck    Since patient first noticed back pain, pain is: gradually worsening  Does back pain interfere with her job:  Yes       Mental Health Follow-up:  Patient presents to follow-up on Depression & Anxiety.Patient's depression since last visit has been:  Medium  The patient is having other symptoms associated with depression.  Patient's anxiety since last visit has been:  Medium  The patient is having other symptoms associated with anxiety.  Any significant life events: job concerns, financial  "concerns and health concerns  Patient is feeling anxious or having panic attacks.  Patient has concerns about alcohol or drug use.    Hyperlipidemia:  She presents for follow up of hyperlipidemia.   She is not taking medication to lower cholesterol. She is not having myalgia or other side effects to statin medications.    Reason for visit:  To discuss a leave of absence so i can focus on physical and mental health.    She eats 2-3 servings of fruits and vegetables daily.She consumes 2 sweetened beverage(s) daily.She exercises with enough effort to increase her heart rate 10 to 19 minutes per day.  She exercises with enough effort to increase her heart rate 4 days per week. She is missing 1 dose(s) of medications per week.  She is not taking prescribed medications regularly due to remembering to take.        3 cats.    Can't keep up with life.  Last 3 months have been bad; fatigue has been worse than ever been.  Had a short streak where felt more normal, without any changes/activity.  Half of the work day, felt bad brain fog, no critical thinking; both therapist and PT suggested taking AROLDO from work.  Went on leave in , did intense outpatient program for DBT, but now goal is to do \"less to recover\".  Enrolled in a lifestyle renewal OT program at [facility], evaluation in July.    Dysautonomia with 3-m module with PT place, weekly.  Goes to bed after finishing work.  Dietician in a month.    Low-grade pain, exhausted, neck/shoulder/collar bone interfering with hobbies/gardening  Concerned about low sodium level 135--she tries to keep up with drinking plenty of electrolytes.  Lipids were elevated (ASCVD risk 1.4%)  Activity limited to 1 thing a day (shower or cook dinner).  Grieving things missing  Mom IgA nephropathy, kidney transplant, on steroids and Cushing's.  Dad was alcoholic, pancreatitis, MI x3.  PGF  from MI at age 55.    NO recent medication changes.  Continues on hydroxyzine over past 2 years.  Skin so " "itchy waking up at night.    Singulair in evening.  Hydroxyzine at bedtime.  Follows with allergist, started famotidine.    Doesn't feel could go down on gabapentin d/t chronic pain.    Feels like brain is still sleeping despite being up. Takes >1 hr to \"be with it\", despite sleeping 9 hr/night.  Will take a nap after work.  Can sleep up to 12 hr in a day.    Sleep study years ago was relatively unrevealing.  Sometimes shoulder pain will also wake at night.  Icy Hot with compression sleeve help restless arms, and weighted blanket helps with RLS.  Kinesiotape would be helpful for the joints/collar bone, but skin is too sensitive; even gentle tapes too irritating.        Review of Systems  Constitutional, HEENT, cardiovascular, pulmonary, gi and gu systems are negative, except as otherwise noted.      Objective    /87 (BP Location: Right arm, Patient Position: Sitting, Cuff Size: Adult Large)   Pulse 102   Temp 97.4  F (36.3  C) (Temporal)   Resp 16   Ht 1.638 m (5' 4.49\")   Wt 99.7 kg (219 lb 12.8 oz)   LMP  (LMP Unknown)   SpO2 97%   BMI 37.16 kg/m    Body mass index is 37.16 kg/m .  Physical Exam   GENERAL: alert and no distress  HENT: nose and mouth without ulcers or lesions  NECK: no adenopathy, no asymmetry, masses, or scars  RESP: lungs clear to auscultation - no rales, rhonchi or wheezes  CV: regular rate and rhythm, normal S1 S2, no S3 or S4, no murmur, click or rub, no peripheral edema  ABDOMEN: soft, nontender, no hepatosplenomegaly, no masses and bowel sounds normal  MS: no gross musculoskeletal defects noted, no edema  NEURO: Normal strength and tone, mentation intact and speech normal  PSYCH: mentation appears normal, affect normal/bright            Signed Electronically by: TORSTEN Chao CNP    "

## 2025-06-07 ENCOUNTER — RESULTS FOLLOW-UP (OUTPATIENT)
Dept: INTERNAL MEDICINE | Facility: CLINIC | Age: 40
End: 2025-06-07

## 2025-06-09 ENCOUNTER — PATIENT OUTREACH (OUTPATIENT)
Dept: CARE COORDINATION | Facility: CLINIC | Age: 40
End: 2025-06-09
Payer: COMMERCIAL

## 2025-06-13 ENCOUNTER — TELEPHONE (OUTPATIENT)
Dept: INTERNAL MEDICINE | Facility: CLINIC | Age: 40
End: 2025-06-13

## 2025-06-13 NOTE — TELEPHONE ENCOUNTER
General Call    Contacts       Contact Date/Time Type Contact Phone/Fax    06/13/2025 02:17 PM CDT Phone (Incoming) Patrick Stephanie [place] (Self) [phone] (M)          Reason for Call:  CPT Code    What are your questions or concerns:  Pt received standard message from outreach to schedule the consult, and in the message it states to contact insurance before scheduling, pt needs CPT code. Please contact pt.     Date of last appointment with provider: NA    Could we send this information to you in [employer] or would you prefer to receive a phone call?:   Patient would like to be contacted via [employer]

## 2025-06-17 DIAGNOSIS — G43.709 CHRONIC MIGRAINE WITHOUT AURA WITHOUT STATUS MIGRAINOSUS, NOT INTRACTABLE: ICD-10-CM

## 2025-06-17 NOTE — TELEPHONE ENCOUNTER
Left Voicemail (1st Attempt) for the patient to call back and schedule the following:    Appointment type: Return   Provider: PCP  Return date: Reschedule 8/29  Specialty phone number: [phone]  Additonal Notes: PCP not in clinic 8/29, pt will need to reschedule

## 2025-06-18 ENCOUNTER — TELEPHONE (OUTPATIENT)
Dept: NEUROLOGY | Facility: CLINIC | Age: 40
End: 2025-06-18
Payer: COMMERCIAL

## 2025-06-18 RX ORDER — GALCANEZUMAB 120 MG/ML
INJECTION, SOLUTION SUBCUTANEOUS
Qty: 3 ML | Refills: 3 | OUTPATIENT
Start: 2025-06-18

## 2025-06-18 NOTE — TELEPHONE ENCOUNTER
Prior Authorization Retail Medication Request     Medication/Dose: Ubrelvy 50 mg  Diagnosis and ICD code (if different than what is on RX):    New/renewal/insurance change PA/secondary ins. PA:  Previously Tried and Failed:    Rationale:       Insurance   Primary: Migraine  Transfer of care.  Continue with [facility]     Insurance   Primary: [employer]  Insurance ID:  [id number]

## 2025-06-19 NOTE — TELEPHONE ENCOUNTER
Central Prior Authorization Team   Phone: [phone]    PA Initiation    Medication: Ubrelvy  Insurance Company: [employer] Non-Specialty PA's - Phone [phone] Fax [phone]  Pharmacy Filling the Rx: [id number] - [place], [place] - [address]  Filling Pharmacy Phone: [phone]  Filling Pharmacy Fax:    Start Date: 6/19/2025

## 2025-06-19 NOTE — TELEPHONE ENCOUNTER
Prior Authorization Approval            Authorization Effective Date: 5/20/2025  Authorization Expiration Date: 6/19/2026  Medication: Ubrelvy-PA APPROVED   Approved Dose/Quantity:   Reference #:     Insurance Company: [employer] Non-Specialty PA's - Phone [phone] Fax [phone]  Expected CoPay:       CoPay Card Available:      Foundation Assistance Needed:    Which Pharmacy is filling the prescription (Not needed for infusion/clinic administered): [id number] [place], [place] - [address]  Pharmacy Notified:  Yes- **Instructed pharmacy to notify patient when script is ready to /ship.**  Patient Notified:  Yes

## 2025-07-08 ENCOUNTER — THERAPY VISIT (OUTPATIENT)
Dept: PHYSICAL THERAPY | Facility: CLINIC | Age: 40
End: 2025-07-08
Attending: PEDIATRICS
Payer: COMMERCIAL

## 2025-07-08 DIAGNOSIS — M79.7 FIBROMYALGIA: Primary | ICD-10-CM

## 2025-07-08 DIAGNOSIS — Q79.60 EHLERS-DANLOS SYNDROME: ICD-10-CM

## 2025-07-08 DIAGNOSIS — M25.50 HYPERMOBILITY ARTHRALGIA: ICD-10-CM

## 2025-07-08 PROCEDURE — 97530 THERAPEUTIC ACTIVITIES: CPT | Mod: GP | Performed by: PHYSICAL THERAPIST

## 2025-07-08 PROCEDURE — 97110 THERAPEUTIC EXERCISES: CPT | Mod: GP | Performed by: PHYSICAL THERAPIST

## 2025-07-15 ASSESSMENT — PATIENT HEALTH QUESTIONNAIRE - PHQ9
10. IF YOU CHECKED OFF ANY PROBLEMS, HOW DIFFICULT HAVE THESE PROBLEMS MADE IT FOR YOU TO DO YOUR WORK, TAKE CARE OF THINGS AT HOME, OR GET ALONG WITH OTHER PEOPLE: VERY DIFFICULT
SUM OF ALL RESPONSES TO PHQ QUESTIONS 1-9: 15
SUM OF ALL RESPONSES TO PHQ QUESTIONS 1-9: 15

## 2025-07-16 ENCOUNTER — OFFICE VISIT (OUTPATIENT)
Dept: INTERNAL MEDICINE | Facility: CLINIC | Age: 40
End: 2025-07-16
Payer: COMMERCIAL

## 2025-07-16 VITALS
SYSTOLIC BLOOD PRESSURE: 121 MMHG | OXYGEN SATURATION: 98 % | DIASTOLIC BLOOD PRESSURE: 79 MMHG | RESPIRATION RATE: 16 BRPM | TEMPERATURE: 97.3 F | WEIGHT: 220.1 LBS | HEART RATE: 97 BPM | BODY MASS INDEX: 36.67 KG/M2 | HEIGHT: 65 IN

## 2025-07-16 DIAGNOSIS — Z02.89 ENCOUNTER FOR COMPLETION OF FORM WITH PATIENT: ICD-10-CM

## 2025-07-16 DIAGNOSIS — M25.50 HYPERMOBILITY ARTHRALGIA: ICD-10-CM

## 2025-07-16 DIAGNOSIS — I47.11 INAPPROPRIATE SINUS TACHYCARDIA: Primary | ICD-10-CM

## 2025-07-16 DIAGNOSIS — Q79.60 EHLERS-DANLOS SYNDROME: ICD-10-CM

## 2025-07-16 DIAGNOSIS — M79.7 FIBROMYALGIA: ICD-10-CM

## 2025-07-16 DIAGNOSIS — R53.82 CHRONIC FATIGUE: ICD-10-CM

## 2025-07-16 PROBLEM — M25.642 FINGER STIFFNESS, LEFT: Status: RESOLVED | Noted: 2021-09-28 | Resolved: 2025-07-16

## 2025-07-16 PROCEDURE — 99215 OFFICE O/P EST HI 40 MIN: CPT | Mod: 25 | Performed by: NURSE PRACTITIONER

## 2025-07-16 PROCEDURE — 93000 ELECTROCARDIOGRAM COMPLETE: CPT | Performed by: NURSE PRACTITIONER

## 2025-07-16 PROCEDURE — 3078F DIAST BP <80 MM HG: CPT | Performed by: NURSE PRACTITIONER

## 2025-07-16 PROCEDURE — 3074F SYST BP LT 130 MM HG: CPT | Performed by: NURSE PRACTITIONER

## 2025-07-16 RX ORDER — METOPROLOL SUCCINATE 25 MG/1
25 TABLET, EXTENDED RELEASE ORAL DAILY
Qty: 90 TABLET | Refills: 3 | Status: SHIPPED | OUTPATIENT
Start: 2025-07-16

## 2025-07-16 NOTE — PROGRESS NOTES
"  Assessment & Plan     Inappropriate sinus tachycardia  EKG shows normal sinus rhythm.  She does have several documented episodes of mild sinus tachycardia in clinic as well as reports episodes lasting up to 2 hours at home.  She can trial a low-dose beta-blocker to help with the symptoms.  She is interested in meeting with cardiology for consultation, referral provided.  - Adult Cardiology Eval  Referral; Future  - EKG 12-lead complete w/read - Clinics  - metoprolol succinate ER (TOPROL XL) 25 MG 24 hr tablet; Take 1 tablet (25 mg) by mouth daily.    Erin-Danlos syndrome  Hypermobility polyarthralgia syndrome  Fibromyalgia  Chronic fatigue  Encounter for completion of form with patient  [place] completed to request continuous leave from 7/14/2025 through 9/15/2025, given increased brain fog and difficulty with critical thinking and poor recall.  She also has impairments in typing which is required for her job.  She is working with a program at [facility] as well as physical therapy for dysautonomia over the next 2 months to help with these issues.    Follow-up  In 6 weeks      42 minutes spent by me on the date of the encounter doing chart review, history and exam, documentation and further activities per the note      Subjective   Ri is a 40 year old, presenting for the following health issues:  Forms ([facility])      7/16/2025    12:19 PM   Additional Questions   Roomed by KTR     History of Present Illness       Reason for visit:  To complete leave paperwork    She eats 2-3 servings of fruits and vegetables daily.She consumes 2 sweetened beverage(s) daily.She exercises with enough effort to increase her heart rate 10 to 19 minutes per day.  She exercises with enough effort to increase her heart rate 5 days per week.   She is taking medications regularly.      [place] forms for [employer], requesting 2 month continuous leave to try to focus on health, 7/14-9/15/25.  Using the \"[place] Protocol\" takes ~2 months to see " "improvement with PT, has heard mixed things about [facility] program, but interested in trying this.  [place].   Brain fog has been significant, needs leave for poor mental clarity, critical thinking, concentration, poor memory/recall, and typing.    Heart racing for no reason, not postural, HR stays elevated for ~2 hr in low-100s.  Adhesive sensitivity, wouldn't be able to tolerate ziopatch.   Feels misattributing anxiety d/t physical symptom.   Spironolactone 25 mg BID for cystic acne.    Has been trying to schedule with sleep management for excessive daytime sleepiness.  Feels needs additional neck support, wears neck brace for support.  Can't sit for long periods of time (work-related), brain fog worsens when blood pools.    Takes [employer] to appointments.   Asks about [facility]--rides for medical appointments--doesn't qualify for metro mobility.    Tries to avoid albuterol--jittery/buzzing.  Xopenex nebulizers, hasn't used since Feb.  Migraines well controlled with [facility].       Review of Systems  Constitutional, HEENT, cardiovascular, pulmonary, gi and gu systems are negative, except as otherwise noted.      Objective    /79 (BP Location: Right arm, Patient Position: Sitting, Cuff Size: Adult Large)   Pulse 97   Temp 97.3  F (36.3  C) (Temporal)   Resp 16   Ht 1.638 m (5' 4.5\")   Wt 99.8 kg (220 lb 1.6 oz)   LMP  (LMP Unknown)   SpO2 98%   BMI 37.20 kg/m    Body mass index is 37.2 kg/m .  Physical Exam   GENERAL: alert and no distress  NECK: no adenopathy, no asymmetry, masses, or scars  RESP: lungs clear to auscultation - no rales, rhonchi or wheezes  CV: regular rate and rhythm, normal S1 S2, no S3 or S4, no murmur, click or rub, no peripheral edema  MS: no gross musculoskeletal defects noted, no edema, pain elicited with ROM of shoulders/neck  NEURO: Normal strength and tone, mentation intact and speech normal  PSYCH: mentation appears normal, affect normal/bright            Signed " Electronically by: TORSTEN Chao CNP

## 2025-07-17 ENCOUNTER — PATIENT OUTREACH (OUTPATIENT)
Dept: CARE COORDINATION | Facility: CLINIC | Age: 40
End: 2025-07-17
Payer: COMMERCIAL

## 2025-07-17 LAB
ATRIAL RATE - MUSE: 79 BPM
DIASTOLIC BLOOD PRESSURE - MUSE: NORMAL MMHG
INTERPRETATION ECG - MUSE: NORMAL
P AXIS - MUSE: 60 DEGREES
PR INTERVAL - MUSE: 132 MS
QRS DURATION - MUSE: 90 MS
QT - MUSE: 392 MS
QTC - MUSE: 449 MS
R AXIS - MUSE: 12 DEGREES
SYSTOLIC BLOOD PRESSURE - MUSE: NORMAL MMHG
T AXIS - MUSE: 5 DEGREES
VENTRICULAR RATE- MUSE: 79 BPM

## 2025-07-21 ENCOUNTER — PATIENT OUTREACH (OUTPATIENT)
Dept: CARE COORDINATION | Facility: CLINIC | Age: 40
End: 2025-07-21
Payer: COMMERCIAL

## 2025-07-21 DIAGNOSIS — I47.11 INAPPROPRIATE SINUS TACHYCARDIA: ICD-10-CM

## 2025-07-22 ENCOUNTER — THERAPY VISIT (OUTPATIENT)
Dept: PHYSICAL THERAPY | Facility: CLINIC | Age: 40
End: 2025-07-22
Attending: PEDIATRICS
Payer: COMMERCIAL

## 2025-07-22 DIAGNOSIS — M79.7 FIBROMYALGIA: Primary | ICD-10-CM

## 2025-07-22 DIAGNOSIS — Q79.60 EHLERS-DANLOS SYNDROME: ICD-10-CM

## 2025-07-22 PROCEDURE — 97140 MANUAL THERAPY 1/> REGIONS: CPT | Mod: GP | Performed by: PHYSICAL THERAPIST

## 2025-07-22 PROCEDURE — 97110 THERAPEUTIC EXERCISES: CPT | Mod: GP | Performed by: PHYSICAL THERAPIST

## 2025-07-23 RX ORDER — METOPROLOL SUCCINATE 25 MG/1
25 TABLET, EXTENDED RELEASE ORAL DAILY
Qty: 90 TABLET | Refills: 3 | Status: SHIPPED | OUTPATIENT
Start: 2025-07-23

## 2025-07-23 RX ORDER — METOPROLOL SUCCINATE 25 MG/1
TABLET, EXTENDED RELEASE ORAL
Refills: 0 | OUTPATIENT
Start: 2025-07-23

## 2025-07-23 NOTE — TELEPHONE ENCOUNTER
Last Written Prescription:  metoprolol succinate ER (TOPROL XL) 25 MG 24 hr tablet 90 tablet 3 7/16/2025 -- No   Sig - Route: Take 1 tablet (25 mg) by mouth daily. - Oral     ----------------------  Last Visit Date: 07/16/2025 Office Visit AURA Bal   Future Visit Date: 8/27/25  ----------------------      Refill decision:   [] Medication refilled per  Medication Refill in Ambulatory Care  policy.  Pharmacy comment: YOUR PATIENT REQUESTED AND EXPRESSLY CONSENTED FOR THIS RX TO BE FILLED AT OUR PHARMACY. THIS INFORMATION IS BASED ON A PRIOR RX CLAIM. PLEASE VERIFY CURRENT THERAPY. 90-DAY SUPPLY REQUESTED.   [facility])  Request from pharmacy:  Requested Prescriptions   Pending Prescriptions Disp Refills    metoprolol succinate ER (TOPROL XL) 25 MG 24 hr tablet [Pharmacy Med Name: METOPROLOL SUCCINATE ER TABS 25MG]  0       Beta-Blockers Protocol Failed - 7/23/2025  2:41 PM        Failed - Medication is active on med list and the sig matches. RN to manually verify dose and sig if red X/fail.     If the protocol passes (green check), you do not need to verify med dose and sig.    A prescription matches if they are the same clinical intention.    For Example: once daily and every morning are the same.    The protocol can not identify upper and lower case letters as matching and will fail.     For Example: Take 1 tablet (50 mg) by mouth daily     TAKE 1 TABLET (50 MG) BY MOUTH DAILY    For all fails (red x), verify dose and sig.    If the refill does match what is on file, the RN can still proceed to approve the refill request.       If they do not match, route to the appropriate provider.             Passed - Most recent blood pressure under 140/90 in past 12 months     BP Readings from Last 3 Encounters:   07/16/25 121/79   06/06/25 128/87   02/28/25 119/85       No data recorded            Passed - Patient is age 6 or older        Passed - Medication indicated for  associated diagnosis     Medication is associated with one or more of the following diagnoses:     Hypertension (HTN)   Atrial fibrillation/flutter   Angina   ASCVD   Migraine   Heart Failure   Tremor   Anxiety   Ocular hypertension   Glaucoma   PTSD   Obstructive hypertrophic cardiomyopathy   History of myocarditis   Palpitations   POTS (postural orthostatic tachycardia syndrome)   SVT (supraventricular tachycardia)   Hyperthyroidism   Tachycardia   Acute non-st segment elevation myocardial infarction   Subsequent non-st segment elevation myocardial infarction   Ischemic myocardial dysfunction          Passed - Recent (12 month) or future (90 days) visit with authorizing provider's specialty (provided they have been seen in the past 15 months)     The patient must have completed an in-person or virtual visit within the past 12 months or has a future visit scheduled within the next 90 days with the authorizing provider s specialty.  Urgent care and e-visits do not qualify as an office visit for this protocol.

## 2025-08-01 ENCOUNTER — TELEPHONE (OUTPATIENT)
Dept: ANESTHESIOLOGY | Facility: CLINIC | Age: 40
End: 2025-08-01
Payer: COMMERCIAL

## 2025-08-02 ASSESSMENT — ANXIETY QUESTIONNAIRES
IF YOU CHECKED OFF ANY PROBLEMS ON THIS QUESTIONNAIRE, HOW DIFFICULT HAVE THESE PROBLEMS MADE IT FOR YOU TO DO YOUR WORK, TAKE CARE OF THINGS AT HOME, OR GET ALONG WITH OTHER PEOPLE: VERY DIFFICULT
2. NOT BEING ABLE TO STOP OR CONTROL WORRYING: SEVERAL DAYS
8. IF YOU CHECKED OFF ANY PROBLEMS, HOW DIFFICULT HAVE THESE MADE IT FOR YOU TO DO YOUR WORK, TAKE CARE OF THINGS AT HOME, OR GET ALONG WITH OTHER PEOPLE?: VERY DIFFICULT
GAD7 TOTAL SCORE: 9
1. FEELING NERVOUS, ANXIOUS, OR ON EDGE: MORE THAN HALF THE DAYS
7. FEELING AFRAID AS IF SOMETHING AWFUL MIGHT HAPPEN: MORE THAN HALF THE DAYS
4. TROUBLE RELAXING: NOT AT ALL
GAD7 TOTAL SCORE: 9
GAD7 TOTAL SCORE: 9
5. BEING SO RESTLESS THAT IT IS HARD TO SIT STILL: NOT AT ALL
3. WORRYING TOO MUCH ABOUT DIFFERENT THINGS: SEVERAL DAYS
6. BECOMING EASILY ANNOYED OR IRRITABLE: NEARLY EVERY DAY
7. FEELING AFRAID AS IF SOMETHING AWFUL MIGHT HAPPEN: MORE THAN HALF THE DAYS

## 2025-08-02 ASSESSMENT — PAIN SCALES - PAIN ENJOYMENT GENERAL ACTIVITY SCALE (PEG)
INTERFERED_ENJOYMENT_LIFE: 7
PEG_TOTALSCORE: 6.67
INTERFERED_GENERAL_ACTIVITY: 7
INTERFERED_ENJOYMENT_LIFE: 7
AVG_PAIN_PASTWEEK: 6
AVG_PAIN_PASTWEEK: 6
INTERFERED_GENERAL_ACTIVITY: 7
PEG_TOTALSCORE: 6.67

## 2025-08-05 ENCOUNTER — THERAPY VISIT (OUTPATIENT)
Dept: PHYSICAL THERAPY | Facility: CLINIC | Age: 40
End: 2025-08-05
Attending: PEDIATRICS
Payer: COMMERCIAL

## 2025-08-05 ENCOUNTER — OFFICE VISIT (OUTPATIENT)
Dept: ANESTHESIOLOGY | Facility: CLINIC | Age: 40
End: 2025-08-05
Attending: NURSE PRACTITIONER
Payer: COMMERCIAL

## 2025-08-05 VITALS
HEART RATE: 82 BPM | RESPIRATION RATE: 16 BRPM | SYSTOLIC BLOOD PRESSURE: 113 MMHG | DIASTOLIC BLOOD PRESSURE: 78 MMHG | OXYGEN SATURATION: 99 %

## 2025-08-05 DIAGNOSIS — R53.82 CHRONIC FATIGUE: ICD-10-CM

## 2025-08-05 DIAGNOSIS — M54.81 OCCIPITAL NEURALGIA, UNSPECIFIED LATERALITY: ICD-10-CM

## 2025-08-05 DIAGNOSIS — M79.7 FIBROMYALGIA: ICD-10-CM

## 2025-08-05 DIAGNOSIS — M79.7 FIBROMYALGIA: Primary | ICD-10-CM

## 2025-08-05 DIAGNOSIS — Q79.60 EHLERS-DANLOS SYNDROME: ICD-10-CM

## 2025-08-05 DIAGNOSIS — M25.50 HYPERMOBILITY ARTHRALGIA: ICD-10-CM

## 2025-08-05 PROCEDURE — 3074F SYST BP LT 130 MM HG: CPT | Performed by: ANESTHESIOLOGY

## 2025-08-05 PROCEDURE — 97110 THERAPEUTIC EXERCISES: CPT | Mod: GP | Performed by: PHYSICAL THERAPIST

## 2025-08-05 PROCEDURE — 99204 OFFICE O/P NEW MOD 45 MIN: CPT | Performed by: ANESTHESIOLOGY

## 2025-08-05 PROCEDURE — 97140 MANUAL THERAPY 1/> REGIONS: CPT | Mod: GP | Performed by: PHYSICAL THERAPIST

## 2025-08-05 PROCEDURE — 3078F DIAST BP <80 MM HG: CPT | Performed by: ANESTHESIOLOGY

## 2025-08-06 ENCOUNTER — PATIENT OUTREACH (OUTPATIENT)
Dept: CARE COORDINATION | Facility: CLINIC | Age: 40
End: 2025-08-06
Payer: COMMERCIAL

## 2025-08-19 ENCOUNTER — THERAPY VISIT (OUTPATIENT)
Dept: PHYSICAL THERAPY | Facility: CLINIC | Age: 40
End: 2025-08-19
Attending: PEDIATRICS
Payer: COMMERCIAL

## 2025-08-19 DIAGNOSIS — Q79.60 EHLERS-DANLOS SYNDROME: ICD-10-CM

## 2025-08-19 DIAGNOSIS — M79.7 FIBROMYALGIA: Primary | ICD-10-CM

## 2025-08-19 PROCEDURE — 97110 THERAPEUTIC EXERCISES: CPT | Mod: GP | Performed by: PHYSICAL THERAPIST

## 2025-08-19 PROCEDURE — 97140 MANUAL THERAPY 1/> REGIONS: CPT | Mod: GP | Performed by: PHYSICAL THERAPIST

## 2025-08-19 PROCEDURE — 97530 THERAPEUTIC ACTIVITIES: CPT | Mod: GP | Performed by: PHYSICAL THERAPIST

## 2025-08-27 ENCOUNTER — OFFICE VISIT (OUTPATIENT)
Dept: INTERNAL MEDICINE | Facility: CLINIC | Age: 40
End: 2025-08-27
Payer: COMMERCIAL

## 2025-08-27 VITALS
DIASTOLIC BLOOD PRESSURE: 79 MMHG | OXYGEN SATURATION: 99 % | RESPIRATION RATE: 16 BRPM | BODY MASS INDEX: 37.74 KG/M2 | SYSTOLIC BLOOD PRESSURE: 114 MMHG | TEMPERATURE: 97.3 F | WEIGHT: 223.3 LBS | HEART RATE: 78 BPM

## 2025-08-27 DIAGNOSIS — G47.19 EXCESSIVE DAYTIME SLEEPINESS: ICD-10-CM

## 2025-08-27 DIAGNOSIS — I47.11 INAPPROPRIATE SINUS TACHYCARDIA: ICD-10-CM

## 2025-08-27 DIAGNOSIS — G25.81 RESTLESS LEGS SYNDROME (RLS): ICD-10-CM

## 2025-08-27 DIAGNOSIS — Q79.60 EHLERS-DANLOS SYNDROME: ICD-10-CM

## 2025-08-27 DIAGNOSIS — M25.50 HYPERMOBILITY ARTHRALGIA: ICD-10-CM

## 2025-08-27 DIAGNOSIS — M79.7 FIBROMYALGIA: ICD-10-CM

## 2025-08-27 DIAGNOSIS — R53.82 CHRONIC FATIGUE: ICD-10-CM

## 2025-08-27 DIAGNOSIS — Z02.89 ENCOUNTER FOR COMPLETION OF FORM WITH PATIENT: Primary | ICD-10-CM

## 2025-09-03 ENCOUNTER — THERAPY VISIT (OUTPATIENT)
Dept: PHYSICAL THERAPY | Facility: CLINIC | Age: 40
End: 2025-09-03
Attending: PEDIATRICS
Payer: COMMERCIAL

## 2025-09-03 DIAGNOSIS — Q79.60 EHLERS-DANLOS SYNDROME: Primary | ICD-10-CM

## 2025-09-03 PROCEDURE — 97535 SELF CARE MNGMENT TRAINING: CPT | Mod: GP | Performed by: PHYSICAL THERAPIST
